# Patient Record
Sex: MALE | Race: OTHER | HISPANIC OR LATINO
[De-identification: names, ages, dates, MRNs, and addresses within clinical notes are randomized per-mention and may not be internally consistent; named-entity substitution may affect disease eponyms.]

---

## 2016-12-24 NOTE — ED PROVIDER NOTE - CONSTITUTIONAL, MLM
normal... obese, lethargic but when aroused-  oriented to person, place, time/situation and in no apparent distress.

## 2016-12-24 NOTE — H&P ADULT. - NEGATIVE NEUROLOGICAL SYMPTOMS
no focal seizures/no weakness/no tremors/no generalized seizures/no syncope/no paresthesias/no transient paralysis

## 2016-12-24 NOTE — ED PROVIDER NOTE - ENMT, MLM
Airway patent, Nasal mucosa clear. Mouth with parched mucosa. Throat has no vesicles, no oropharyngeal exudates and uvula is midline.

## 2016-12-24 NOTE — ED ADULT NURSE NOTE - OBJECTIVE STATEMENT
Patient BIBA to ED today altered from home.  Patient is able to answer his name, and place but not date, no family present at this time to add to patients current condition.  Patient GUZMAN, and has equal strength in all four extremities.  Patient states feeling weak. Patient denies chest pain, SOB, numbness or tingling.

## 2016-12-24 NOTE — H&P ADULT. - HISTORY OF PRESENT ILLNESS
81 yo M with h/o chronic HFpEF, AVR with bioprosthetic valve, recurrent hyponatremia, DM, CKD - 3 represents with one episode of lethargy. Pt was admitted last night for weakness likely 2/2 hyponatremia which improved with fluid restriction. Pt was discharged home this morning with resumption of salt tabs. After bringing the patient home, the patient's grandson left to  his medications, only to return to find the patient unresponsive. He was speaking in gibberish and was not able to recall about today's events.     In the ED, pt was found to be febrile Tmax 103.8 and chest xray showed possible LLL pneumonia. He received vanc and zosyn.

## 2016-12-24 NOTE — ED ADULT NURSE NOTE - PMH
Asthma    Chronic systolic CHF (congestive heart failure)    CKD (chronic kidney disease), stage 4 (severe)    Diabetes    Gassiness    Hypertension    Hypertrophic cardiomyopathy    Prostate cancer

## 2016-12-24 NOTE — ED PROVIDER NOTE - MEDICAL DECISION MAKING DETAILS
fever with ams  - low o2 sat, rales at left base, and cxr suggest pneumonia  - sepsis protocol, but only one liter ns at this time b/c CAD history  - admit back to medical service

## 2016-12-24 NOTE — H&P ADULT. - ASSESSMENT
81 yo M with h/o chronic HFpEF, AVR with bioprosthetic valve, recurrent hyponatremia, DM, CKD - 3 now here with community acquired pneumonia. As per grandson, his mentation improved when I examined him.

## 2016-12-24 NOTE — ED ADULT TRIAGE NOTE - CHIEF COMPLAINT QUOTE
BIBA, patient is awake and not answering any questions, BIBA, patient is awake and not answering any questions, sent from home for eval of ams, no family present for further history

## 2016-12-24 NOTE — ED PROVIDER NOTE - CRITICAL CARE PROVIDED
additional history taking/interpretation of diagnostic studies/consultation with other physicians/direct patient care (not related to procedure)/documentation

## 2016-12-24 NOTE — H&P ADULT. - PROBLEM SELECTOR PLAN 1
Febrile in the ED with new cough as per grandson. CXR showing possible LLL infiltrate. Sputum and blood cultures pending. Urine legionella antigen pending. RVP pending. To start ceftriaxone and azithromycin.

## 2016-12-24 NOTE — H&P ADULT. - NEUROLOGICAL DETAILS
deep reflexes intact/sensation intact/responds to verbal commands/alert and oriented x 3/responds to pain

## 2016-12-24 NOTE — ED ADULT NURSE NOTE - CHIEF COMPLAINT QUOTE
BIBA, patient is awake and not answering any questions, sent from home for eval of ams, no family present for further history

## 2016-12-25 NOTE — SWALLOW BEDSIDE ASSESSMENT ADULT - PHARYNGEAL PHASE
Delayed pharyngeal swallow/Decreased laryngeal elevation/Cough post oral intake Delayed pharyngeal swallow/Decreased laryngeal elevation/Delayed cough post oral intake

## 2016-12-25 NOTE — PROGRESS NOTE ADULT - SUBJECTIVE AND OBJECTIVE BOX
DAVID VINCENT is a 82y Male with HPI:  83 yo M with h/o chronic HFpEF, AVR with bioprosthetic valve, recurrent hyponatremia, DM, CKD - 3 represents with one episode of lethargy. Pt was admitted last night for weakness likely 2/2 hyponatremia which improved with fluid restriction. Pt was discharged home this morning with resumption of salt tabs. After bringing the patient home, the patient's grandson left to  his medications, only to return to find the patient unresponsive. [facility] and [facility] not able to recall about today's events.     In the ED, pt was found to be febrile Tmax 103.8   PT READMITTED WITH FEVERS  NOW BACK AT BASELINE DAUGHTER AT BEDSIDE STATES HE HAS SHEEBA HAVING UTI AND SEEN UROLOGIST  IN [place]    Allergies:  No Known Allergies      Medications:  cefTRIAXone   IVPB 1Gram(s) IV Intermittent every 24 hours  amiodarone    Tablet 200milliGRAM(s) Oral daily  aspirin enteric coated 81milliGRAM(s) Oral daily  folic acid 1milliGRAM(s) Oral daily  gabapentin   Solution 900milliGRAM(s) Oral two times a day  lactulose Syrup 10Gram(s) Oral daily  furosemide    Tablet 40milliGRAM(s) Oral daily  pantoprazole    Tablet 40milliGRAM(s) Oral before breakfast  artificial  tears Solution 1Drop(s) Both EYES two times a day  sodium chloride 1Gram(s) Oral three times a day  tiotropium 18 MICROgram(s) Capsule 1Capsule(s) Inhalation daily  acetaminophen  Suppository 650milliGRAM(s) Rectal every 6 hours PRN  metFORMIN 500milliGRAM(s) Oral daily  insulin lispro (HumaLOG) corrective regimen sliding scale  SubCutaneous two times a day with meals  dextrose 5%. 1000milliLiter(s) IV Continuous <Continuous>  dextrose Gel 1Dose(s) Oral once PRN  dextrose 50% Injectable 12.5Gram(s) IV Push once  dextrose 50% Injectable 25Gram(s) IV Push once  dextrose 50% Injectable 25Gram(s) IV Push once  glucagon  Injectable 1milliGRAM(s) IntraMuscular once PRN      ANTIBIOTICS: ROCEPHIN     Review of Systems: - Negative except as mentioned above     Physical Exam:  ICU Vital Signs Last 24 Hrs  T(C): 37.4, Max: 39.9 (12-24 @ 15:38)  T(F): 99.3, Max: 103.8 (12-24 @ 15:38)  HR: 85 (77 - 91)  BP: 120/79 (106/61 - 148/85)  BP(mean): --  ABP: --  ABP(mean): --  RR: 18 (16 - 22)  SpO2: 98% (94% - 100%)    GEN: NAD, pleasant  HEENT: normocephalic and atraumatic. EOMI. FAUZIA...  NECK: Supple. No carotid bruits.  No lymphadenopathy or thyromegaly.  LUNGS: Clear to auscultation.  HEART: Regular rate and rhythm without murmur.  ABDOMEN: Soft, nontender, and nondistended.  Positive bowel sounds.  No hepatosplenomegaly was noted.  NO REBOUND NO GUARDING  EXTREMITIES: Without any cyanosis, clubbing, rash, lesions or edema.  NEUROLOGIC: Cranial nerves II through XII are grossly intact.    SKIN: No ulceration or induration present.      Labs:  25 Dec 2016 10:19    129    |  87     |  23.0   ----------------------------<  111    4.2     |  31.0   |  1.73     Ca    8.9        25 Dec 2016 10:19  Phos  3.1       24 Dec 2016 16:51  Mg     1.7       25 Dec 2016 10:19    TPro  6.2    /  Alb  3.6    /  TBili  0.9    /  DBili  x      /  AST  31     /  ALT  22     /  AlkPhos  84     25 Dec 2016 10:19                          10.6   9.70  )-----------( 112      ( 25 Dec 2016 10:19 )             32.1       PT/INR - ( 24 Dec 2016 16:51 )   PT: 15.1 sec;   INR: 1.37 ratio         PTT - ( 24 Dec 2016 16:51 )  PTT:40.6 sec  Urinalysis Basic - ( 24 Dec 2016 16:55 )    Color: Yellow / Appearance: Clear / S.015 / pH: x  Gluc: x / Ketone: Negative  / Bili: Negative / Urobili: 4 mg/dL   Blood: x / Protein: 30 mg/dL / Nitrite: Negative   Leuk Esterase: Negative / RBC: 3-5 /HPF / WBC 3-5   Sq Epi: x / Non Sq Epi: Occasional / Bacteria: Occasional      LIVER FUNCTIONS - ( 25 Dec 2016 10:19 )  Alb: 3.6 g/dL / Pro: 6.2 g/dL / ALK PHOS: 84 U/L / ALT: 22 U/L / AST: 31 U/L / GGT: x           CARDIAC MARKERS ( 24 Dec 2016 16:51 )  x     / <0.01 ng/mL / x     / x     / x      CARDIAC MARKERS ( 24 Dec 2016 02:49 )  x     / 0.03 ng/mL / x     / x     / x          CAPILLARY BLOOD GLUCOSE    ABG - ( 24 Dec 2016 16:12 )  pH: 7.42  /  pCO2: 51    /  pO2: 48    / HCO3: 31    / Base Excess: 7.4   /  SaO2: 86                  RECENT CULTURES:  12-24 .Urine Clean Catch (Midstream) XXXX XXXX   No significant bacterial growth.

## 2016-12-25 NOTE — SWALLOW BEDSIDE ASSESSMENT ADULT - ORAL PHASE
Delayed oral transit time/however WFL/Decreased anterior-posterior movement of the bolus suspect posterior loss

## 2016-12-26 NOTE — CONSULT NOTE ADULT - PROBLEM SELECTOR PROBLEM 4
Type 2 diabetes mellitus with diabetic chronic kidney disease, unspecified CKD stage, unspecified long term insulin use status

## 2016-12-26 NOTE — PROGRESS NOTE ADULT - SUBJECTIVE AND OBJECTIVE BOX
DAVID VINCENT is a 82y Male with HPI:  83 yo M with h/o chronic HFpEF, AVR with bioprosthetic valve, recurrent hyponatremia, DM, CKD - 3 represents with one episode of lethargy. Pt was admitted last night for weakness likely 2/2 hyponatremia which improved with fluid restriction. Pt was discharged home this morning with resumption of salt tabs. After bringing the patient home, the patient's grandson left to  his medications, only to return to find the patient unresponsive. He was speaking in gibberish and was not able to recall about today's events.     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    fever resolved  alert        Allergies:  No Known Allergies      Medications:  cefTRIAXone   IVPB 1Gram(s) IV Intermittent every 24 hours  amiodarone    Tablet 200milliGRAM(s) Oral daily  aspirin enteric coated 81milliGRAM(s) Oral daily  folic acid 1milliGRAM(s) Oral daily  gabapentin   Solution 900milliGRAM(s) Oral two times a day  lactulose Syrup 10Gram(s) Oral daily  furosemide    Tablet 40milliGRAM(s) Oral daily  pantoprazole    Tablet 40milliGRAM(s) Oral before breakfast  artificial  tears Solution 1Drop(s) Both EYES two times a day  sodium chloride 1Gram(s) Oral three times a day  tiotropium 18 MICROgram(s) Capsule 1Capsule(s) Inhalation daily  acetaminophen  Suppository 650milliGRAM(s) Rectal every 6 hours PRN  metFORMIN 500milliGRAM(s) Oral daily  insulin lispro (HumaLOG) corrective regimen sliding scale  SubCutaneous two times a day with meals  dextrose 5%. 1000milliLiter(s) IV Continuous <Continuous>  dextrose Gel 1Dose(s) Oral once PRN  dextrose 50% Injectable 12.5Gram(s) IV Push once  dextrose 50% Injectable 25Gram(s) IV Push once  dextrose 50% Injectable 25Gram(s) IV Push once  glucagon  Injectable 1milliGRAM(s) IntraMuscular once PRN      ANTIBIOTICS: ROCEPHIN     Review of Systems: - Negative except as mentioned above     Physical Exam:  ICU Vital Signs Last 24 Hrs  T(C): 37.4, Max: 39.9 (12-24 @ 15:38)  T(F): 99.3, Max: 103.8 (12-24 @ 15:38)  HR: 85 (77 - 91)  BP: 120/79 (106/61 - 148/85)  BP(mean): --  ABP: --  ABP(mean): --  RR: 18 (16 - 22)  SpO2: 98% (94% - 100%)    GEN: NAD, pleasant  HEENT: normocephalic and atraumatic. EOMI. FAUZIA...  NECK: Supple. No carotid bruits.  No lymphadenopathy or thyromegaly.  LUNGS: Clear to auscultation.  HEART: Regular rate and rhythm without murmur.  ABDOMEN: Soft, nontender, and nondistended.  Positive bowel sounds.  No hepatosplenomegaly was noted.  NO REBOUND NO GUARDING  EXTREMITIES: Without any cyanosis, clubbing, rash, lesions or edema.  NEUROLOGIC: Cranial nerves II through XII are grossly intact.    SKIN: No ulceration or induration present.      Labs:  25 Dec 2016 10:19    129    |  87     |  23.0   ----------------------------<  111    4.2     |  31.0   |  1.73     Ca    8.9        25 Dec 2016 10:19  Phos  3.1       24 Dec 2016 16:51  Mg     1.7       25 Dec 2016 10:19    TPro  6.2    /  Alb  3.6    /  TBili  0.9    /  DBili  x      /  AST  31     /  ALT  22     /  AlkPhos  84     25 Dec 2016 10:19                          10.6   9.70  )-----------( 112      ( 25 Dec 2016 10:19 )             32.1       PT/INR - ( 24 Dec 2016 16:51 )   PT: 15.1 sec;   INR: 1.37 ratio         PTT - ( 24 Dec 2016 16:51 )  PTT:40.6 sec  Urinalysis Basic - ( 24 Dec 2016 16:55 )    Color: Yellow / Appearance: Clear / S.015 / pH: x  Gluc: x / Ketone: Negative  / Bili: Negative / Urobili: 4 mg/dL   Blood: x / Protein: 30 mg/dL / Nitrite: Negative   Leuk Esterase: Negative / RBC: 3-5 /HPF / WBC 3-5   Sq Epi: x / Non Sq Epi: Occasional / Bacteria: Occasional      LIVER FUNCTIONS - ( 25 Dec 2016 10:19 )  Alb: 3.6 g/dL / Pro: 6.2 g/dL / ALK PHOS: 84 U/L / ALT: 22 U/L / AST: 31 U/L / GGT: x           CARDIAC MARKERS ( 24 Dec 2016 16:51 )  x     / <0.01 ng/mL / x     / x     / x      CARDIAC MARKERS ( 24 Dec 2016 02:49 )  x     / 0.03 ng/mL / x     / x     / x          CAPILLARY BLOOD GLUCOSE    ABG - ( 24 Dec 2016 16:12 )  pH: 7.42  /  pCO2: 51    /  pO2: 48    / HCO3: 31    / Base Excess: 7.4   /  SaO2: 86                  RECENT CULTURES:  12-24 .Urine Clean Catch (Midstream) XXXX XXXX   No significant bacterial growth.

## 2016-12-26 NOTE — CONSULT NOTE ADULT - ASSESSMENT
1.CHF ( S/P A VR )  2.Cardio Renal Syndrome, C KD - 3  3.Hypervolemic  Hyponatremia,  4.Anemia  5.Metabolic alkalosis - ? Mixed acid - base Disorder,

## 2016-12-26 NOTE — PROGRESS NOTE ADULT - SUBJECTIVE AND OBJECTIVE BOX
Renal :      S Na+ > 130 Meq/L  e GFR < 40 ml.,    S : D.C Metformin & NaCl.    Maintain KCl , to replete intracellular stores & Correct chronic  Hypervolemic hyponatremia,    Oral F.R

## 2016-12-26 NOTE — CONSULT NOTE ADULT - SUBJECTIVE AND OBJECTIVE BOX
Patient is a 82y old  AA  Male who presented w. Fever & Lethargy (24 Dec 2016 18:08)    Recently Treated & Discharged ( S Na+ < 125 Meq/L ) only to Re present to the Ed w.in 24 Hours,    Discussed w. Dr. Pickering,      HPI:    83 yo M with h/o chronic HF pEF, AVR with bioprosthetic valve, recurrent hyponatremia, DM, CKD - 3 represented w.  lethargy. Pt was admitted last night for weakness likely 2/2 hyponatremia which improved with fluid restriction. Pt was discharged to  home this morning with resumption of salt tabs. After bringing the patient home, the patient's grandson left to  his medications, only to return to find the patient unresponsive. He was speaking in gibberish and was not able to recall  today's events.     In the ED, pt was found to be febrile T max 103.8 and chest x-ray showed possible LLL pneumonia. He received vancomycin  and zosyn. (24 Dec 2016 18:08)      PAST MEDICAL & SURGICAL HISTORY:  Chronic systolic CHF (congestive heart failure)  Hypertrophic cardiomyopathy  C KD (chronic kidney disease), stage 3  Diabetes  Asthma  Prostate cancer  Hypertension    History of valvuloplasty  History of knee surgery      FAMILY HISTORY:  No pertinent family history in first degree relatives      Social History: No ETOH.    MEDICATIONS :  ceftriaxone    1Gram IV Intermittent every 24 hours  amiodarone    Tablet 200 mg., Oral daily  aspirin enteric coated 81 mg.,  Oral daily  folic acid 1mg.,  Oral daily  gabapentin   Solution 900milliGRAM(s) Oral two times a day  lactulose Syrup 10Gram(s) Oral daily  furosemide    Tablet 40milliGRAM(s) Oral daily  pantoprazole    Tablet 40milliGRAM(s) Oral before breakfast  tiotropium 18 mcg., Capsule  Inhalation daily  potassium chloride    Tablet ER 20milliEquivalent(s) Oral two times a day    Allergies    No Known Allergies    REVIEW OF SYSTEMS:    CONSTITUTIONAL: Lethargic,  EYES: No eye pain, visual disturbances, or discharge  NECK: No pain or stiffness  RESPIRATORY: No cough, wheezing, chills or hemoptysis; No shortness of breath  CARDIOVASCULAR: No chest pain, palpitations, dizziness, or leg swelling  GASTROINTESTINAL: No abdominal or epigastric pain.  GENITOURINARY: No dysuria, frequency, hematuria, or incontinence  NEUROLOGICAL :Weak * Lethargic, No Focal deficits,  SKIN: No itching, burning, rashes, or lesions   LYMPH NODES: No enlarged glands  MUSCULOSKELETAL: No joint pain or swelling;  PSYCHIATRIC: No depression,   HEME/LYMPH: No easy bruising, or bleeding gums        Vital Signs Last 24 Hrs  T(C): 36.9, Max: 38.1 (- @ 22:00)  T(F): 98.5, Max: 100.6 ( @ 22:00)  HR: 76 (76 - 83)  BP: 102/65 (102/65 - 107/69)  BP(mean): --  RR: 18 (18 - 18)  SpO2: 98% (95% - 98%)    PHYSICAL EXAM:    GENERAL: NAD, Pale, Non Icteric,  HEAD:  Atraumatic, Normocephalic  EYES: EOMI, PERRLA, conjunctiva and sclera clear  NECK: Supple, No JVD, Normal thyroid  NERVOUS SYSTEM:  Lethargic,  CHEST/LUNG: Clear to percussion bilaterally;   HEART: Regular rate and rhythm; No murmurs, rubs, or gallops  ABDOMEN: Soft, Nontender, Nondistended; Bowel sounds present  EXTREMITIES:  2+ Peripheral Pulses, No clubbing, cyanosis, or edema  LYMPH: No lymphadenopathy noted  SKIN: No rashes or lesions      LABS:                        10.7   7.33  )-----------( 111      ( 26 Dec 2016 07:06 )             32.6       131    |  89     |  33.0   ----------------------------<  108    4.0     |  30.0   |  1.97     Ca    8.7        26 Dec 2016 07:06  Phos  3.1       24 Dec 2016 16:51  Mg     2.0       26 Dec 2016 07:06    T Pro  6.3    /  Alb  3.3    /  T Bili  0.6    /  AST  23     /  ALT  19     /  Alk Phos  84     26 Dec 2016 07:06    PT /INR - ( 24 Dec 2016 16:51 )   PT: 15.1 sec;   INR: 1.37 ratio         PTT - ( 24 Dec 2016 16:51 )  PTT:40.6 sec  Urinalysis Basic - ( 25 Dec 2016 14:54 )    Color: Yellow / Appearance: Clear / S.015 / pH: x  Gluc: x / Ketone: Negative  / Bili: Negative / Urobilinogen : 1 mg/dL   Blood: x / Protein: 30 mg/dL / Nitrite: Negative   Leuk Esterase: Trace / RBC: 3-5 /HPF / WBC 10-15 /HPF   Sq Epi: x / Non Sq Epi: Few / Bacteria: Few      Magnesium, Serum: 2.0 mg/dL ( @ 07:06)      RADIOLOGY :      FINDINGS:  Prior study of earlier the same day was available for review.    The lungs demonstrate increased pulmonary vascular congestion. No gross   consolidation is seen. No pleural effusion is seen. The heart is   difficult to evaluate. The patient is status post median sternotomy and   valve replacement.           IMPRESSION: Increased pulmonary vascular congestion noted. No gross   consolidation is seen. Status post valve replacement.

## 2016-12-27 NOTE — PROGRESS NOTE ADULT - SUBJECTIVE AND OBJECTIVE BOX
DAVID VINCENT is a 82y Male with HPI:  81 yo M with h/o chronic HFpEF, AVR with bioprosthetic valve, recurrent hyponatremia, DM, CKD - 3 represents with one episode of lethargy. Pt was admitted last night for weakness likely 2/2 hyponatremia which improved with fluid restriction. Pt was discharged home this morning with resumption of salt tabs. After bringing the patient home, the patient's grandson left to  his medications, only to return to find the patient unresponsive. He was speaking in gibberish and was not able to recall about today's events.     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    fever resolved  LETHARGIC AND DIFF TO AROUSE, NO SLEEPING PILLS OR TRANQ GIVEN  HAS KRYSTIN - CHK ABG  AWAKENS BUT LETHARGIC AFTER 5 MINS OF MOVING PT  AWARE OF ME  - SL CONFUSED  LABS WL        Allergies:  No Known Allergies      Medications:  cefTRIAXone   IVPB 1Gram(s) IV Intermittent every 24 hours  amiodarone    Tablet 200milliGRAM(s) Oral daily  aspirin enteric coated 81milliGRAM(s) Oral daily  folic acid 1milliGRAM(s) Oral daily  gabapentin   Solution 900milliGRAM(s) Oral two times a day  lactulose Syrup 10Gram(s) Oral daily  furosemide    Tablet 40milliGRAM(s) Oral daily  pantoprazole    Tablet 40milliGRAM(s) Oral before breakfast  artificial  tears Solution 1Drop(s) Both EYES two times a day  sodium chloride 1Gram(s) Oral three times a day  tiotropium 18 MICROgram(s) Capsule 1Capsule(s) Inhalation daily  acetaminophen  Suppository 650milliGRAM(s) Rectal every 6 hours PRN  metFORMIN 500milliGRAM(s) Oral daily  insulin lispro (HumaLOG) corrective regimen sliding scale  SubCutaneous two times a day with meals  dextrose 5%. 1000milliLiter(s) IV Continuous <Continuous>  dextrose Gel 1Dose(s) Oral once PRN  dextrose 50% Injectable 12.5Gram(s) IV Push once  dextrose 50% Injectable 25Gram(s) IV Push once  dextrose 50% Injectable 25Gram(s) IV Push once  glucagon  Injectable 1milliGRAM(s) IntraMuscular once PRN      ANTIBIOTICS: D/C     Review of Systems: -LETHARGY     Physical Exam:  ICU Vital Signs Last 24 Hrs  T(C): 37.4, Max: 39.9 (12-24 @ 15:38)  T(F): 99.3, Max: 103.8 (12-24 @ 15:38)  HR: 85 (77 - 91)  BP: 120/79 (106/61 - 148/85)  BP(mean): --  ABP: --  ABP(mean): --  RR: 18 (16 - 22)  SpO2: 98% (94% - 100%)    GEN: NAD, pleasant  HEENT: normocephalic and atraumatic. EOMI. FAUZIA...  NECK: Supple. No carotid bruits.  No lymphadenopathy or thyromegaly.  LUNGS: Clear to auscultation.  HEART: Regular rate and rhythm without murmur.  ABDOMEN: Soft, nontender, and nondistended.  Positive bowel sounds.  No hepatosplenomegaly was noted.  NO REBOUND NO GUARDING  EXTREMITIES: Without any cyanosis, clubbing, rash, lesions or edema.  NEUROLOGIC: Cranial nerves II through XII are grossly intact.    SKIN: No ulceration or induration present.      Labs:  Comprehensive Metabolic Panel in AM (12.27.16 @ 07:01)    Sodium, Serum: 132 mmol/L    Potassium, Serum: 4.6: Mild hemolysis.  Results may be falsely elevated. mmol/L    Chloride, Serum: 90 mmol/L    Carbon Dioxide, Serum: 28.0 mmol/L    Anion Gap, Serum: 14 mmol/L    Blood Urea Nitrogen, Serum: 49.0 mg/dL    Creatinine, Serum: 2.93 mg/dL    Glucose, Serum: 157 mg/dL    Calcium, Total Serum: 8.9 mg/dL    Protein Total, Serum: 6.5 g/dL    Albumin, Serum: 3.2 g/dL    Bilirubin Total, Serum: 0.4 mg/dL    Alkaline Phosphatase, Serum: 83 U/L    Aspartate Aminotransferase (AST/SGOT): 18 U/L    Alanine Aminotransferase (ALT/SGPT): 17 U/L    eGFR if Non : 19: Interpretative comment  T    25 Dec 2016 10:19    129    |  87     |  23.0   ----------------------------<  111    4.2     |  31.0   |  1.73     Ca    8.9        25 Dec 2016 10:19  Phos  3.1       24 Dec 2016 16:51  Mg     1.7       25 Dec 2016 10:19    TPro  6.2    /  Alb  3.6    /  TBili  0.9    /  DBili  x      /  AST  31     /  ALT  22     /  AlkPhos  84     25 Dec 2016 10:19                          10.6   9.70  )-----------( 112      ( 25 Dec 2016 10:19 )             32.1       PT/INR - ( 24 Dec 2016 16:51 )   PT: 15.1 sec;   INR: 1.37 ratio         PTT - ( 24 Dec 2016 16:51 )  PTT:40.6 sec  Urinalysis Basic - ( 24 Dec 2016 16:55 )    Color: Yellow / Appearance: Clear / S.015 / pH: x  Gluc: x / Ketone: Negative  / Bili: Negative / Urobili: 4 mg/dL   Blood: x / Protein: 30 mg/dL / Nitrite: Negative   Leuk Esterase: Negative / RBC: 3-5 /HPF / WBC 3-5   Sq Epi: x / Non Sq Epi: Occasional / Bacteria: Occasional      LIVER FUNCTIONS - ( 25 Dec 2016 10:19 )  Alb: 3.6 g/dL / Pro: 6.2 g/dL / ALK PHOS: 84 U/L / ALT: 22 U/L / AST: 31 U/L / GGT: x           CARDIAC MARKERS ( 24 Dec 2016 16:51 )  x     / <0.01 ng/mL / x     / x     / x      CARDIAC MARKERS ( 24 Dec 2016 02:49 )  x     / 0.03 ng/mL / x     / x     / x          CAPILLARY BLOOD GLUCOSE    ABG - ( 24 Dec 2016 16:12 )  pH: 7.42  /  pCO2: 51    /  pO2: 48    / HCO3: 31    / Base Excess: 7.4   /  SaO2: 86                  RECENT CULTURES:  12-24 .Urine Clean Catch (Midstream) XXXX XXXX   No significant bacterial growth.

## 2016-12-27 NOTE — PROGRESS NOTE ADULT - SUBJECTIVE AND OBJECTIVE BOX
Patient is a 82y old  Male who presents with a chief complaint of Lethargic (24 Dec 2016 18:08)      BRIEF HOSPITAL COURSE: 83 yo M with h/o chronic HFpEF, AVR with bioprosthetic valve, recurrent hyponatremia, DM, CKD - 3 represents with one episode of lethargy. Pt was admitted last night for weakness likely 2/2 hyponatremia which improved with fluid restriction. Pt was discharged home 12/24/2016  with resumption of salt tabs. After bringing the patient home, the patient's grandson left to  his medications, only to return to find the patient unresponsive. [facility] and was not able to recall about today's events.     Events last 24 hours: Improving Sodium, ABX were discontinued,  [place] on CKD worsening Cr now 3.52.  Tonight wRRT for worsening Lethergy found to be Hypercapnic requiring Bipap.  ABG not improving and setting were changed.  Pt waking up now in ICU.    PAST MEDICAL & SURGICAL HISTORY:  Chronic systolic CHF (congestive heart failure)  Hypertrophic cardiomyopathy  CKD (chronic kidney disease), stage 4 (severe)  Gassiness  Diabetes  Asthma  Prostate cancer  Hypertension  History of valvuloplasty  History of knee surgery      Review of Systems: Unable to access pt obtunded on exam      Medications:  amiodarone    Tablet 200milliGRAM(s) Oral daily  tiotropium 18 MICROgram(s) Capsule 1Capsule(s) Inhalation daily  gabapentin   Solution 900milliGRAM(s) Oral two times a day  acetaminophen  Suppository 650milliGRAM(s) Rectal every 6 hours PRN  aspirin enteric coated 81milliGRAM(s) Oral daily  lactulose Syrup 10Gram(s) Oral daily  pantoprazole    Tablet 40milliGRAM(s) Oral before breakfast  folic acid 1milliGRAM(s) Oral daily  dextrose 5%. 1000milliLiter(s) IV Continuous <Continuous>  potassium chloride    Tablet ER 20milliEquivalent(s) Oral two times a day  artificial  tears Solution 1Drop(s) Both EYES two times a day        ICU Vital Signs Last 24 Hrs  T(C): 36.9, Max: 36.9 (12-27 @ 20:00)  T(F): 98.5, Max: 98.5 (12-27 @ 20:00)  HR: 88 (51 - 91)  BP: 106/64 (85/65 - 112/80)  BP(mean): 80 (80 - 80)  RR: 18 (16 - 18)  SpO2: 98% (93% - 98%)      ABG - ( 27 Dec 2016 18:38 )  pH: 7.19  /  pCO2: 85    /  pO2: 78    / HCO3: 26    / Base Excess: 1.5   /  SaO2: 95              LABS:                        10.7   4.74  )-----------( 128      ( 27 Dec 2016 18:19 )             33.9     27 Dec 2016 18:19    132    |  89     |  57.0   ----------------------------<  138    4.6     |  29.0   |  3.52     Ca    8.9        27 Dec 2016 18:19  Phos  5.2       27 Dec 2016 18:20  Mg     2.5       27 Dec 2016 18:20    TPro  6.8    /  Alb  3.6    /  TBili  0.4    /  DBili  x      /  AST  15     /  ALT  18     /  AlkPhos  88     27 Dec 2016 18:19          CAPILLARY BLOOD GLUCOSE  99 (26 Dec 2016 08:00)        CULTURES:  Rapid RVP Result: NotDetec (12-25 @ 19:16)  Culture Results:   No growth (12-25 @ 14:54)  Culture Results:   No growth at 48 hours (12-24 @ 22:01)  Culture Results:   No growth at 48 hours (12-24 @ 18:33)  Culture Results:   No significant bacterial growth. (12-24 @ 01:27)  Culture Results:   No growth at 48 hours (12-23 @ 23:11)  Culture Results:   No growth at 48 hours (12-23 @ 23:08)      Physical Examination:    General: No acute distress.  Lethargic but arrousable to Noxious stim.  Speaking now.    HEENT: Pupils equal, reactive to light.  Symmetric.    PULM: Decreased BS b/l. Fine rhonchi Bibasilarly, No Wheeze, No Rales, no significant sputum production    CVS: S1, S2 tachy, no murmurs, rubs, or gallops    ABD: Soft, nondistended, nontender, normoactive bowel sounds, no masses    EXT: Trace Bipedal edema, nontender    SKIN: Warm and well perfused, no rashes noted.    RADIOLOGY: ***   EXAM:  CHEST SINGLE VIEW FRONTAL                          PROCEDURE DATE:  12/24/2016        INTERPRETATION:  Chest radiograph (one view)     CPT 75193    CLINICAL INFORMATION:  Patient is unable to communicate.  Chest pain.    TECHNIQUE:  Single frontal view of the chest was obtained.    FINDINGS:  Prior study of earlier the same day was available for review.    The lungs demonstrate increased pulmonary vascular congestion. No gross   consolidation is seen. No pleural effusion is seen. The heart is   difficult to evaluate. The patient is status post median sternotomy and   valve replacement.           IMPRESSION: Increased pulmonary vascular congestion noted. No gross   consolidation is seen. Status post valve replacement.       CRITICAL CARE TIME SPENT: 45

## 2016-12-27 NOTE — PHYSICAL THERAPY INITIAL EVALUATION ADULT - ACTIVE RANGE OF MOTION EXAMINATION, REHAB EVAL
bilateral  lower extremity Active ROM was WFL (within functional limits)/bilateral upper extremity Active ROM was WFL (within functional limits)

## 2016-12-27 NOTE — PHYSICAL THERAPY INITIAL EVALUATION ADULT - PASSIVE RANGE OF MOTION EXAMINATION, REHAB EVAL
bilateral upper extremity Passive ROM was WFL (within functional limits)/bilateral lower extremity Passive ROM was WFL (within functional limits)

## 2016-12-27 NOTE — PHYSICAL THERAPY INITIAL EVALUATION ADULT - GENERAL OBSERVATIONS, REHAB EVAL
Pt. greeted resting oob in chair, (+) IV lock, (+) O2 via NC. Per personal aide at bedside pt has been more "sleepy" today

## 2016-12-27 NOTE — PHYSICAL THERAPY INITIAL EVALUATION ADULT - ADDITIONAL COMMENTS
Pt. lives in a house with 3 steps to enter (+) handrails. There are no steps to negotiate inside. Pt was independent PTA and owns a RW that he was using as needed. Pt caregiver at bedside states she is with the patient at all times and assists with ADL/IADLS however pt was independent with ambulation and grooming/bathing/toileting

## 2016-12-27 NOTE — CHART NOTE - NSCHARTNOTEFT_GEN_A_CORE
Rapid Response PGY 3 Note    [id number]  DAVID VINCENT    82y year old Male  admitted for Sepsis 2/2 to pneumonia.   RR called because pt noted to be AMS for few hours with no improvement.   Patient was seen and examined at the bedside by the RRT.   PT is lethargic, but arousable. Pt denies any cp, dizziness, sob, headache, abd pain, leg pain.     Allergies: No Known Allergies    PAST MEDICAL & SURGICAL HISTORY:  Chronic systolic CHF (congestive heart failure)  Hypertrophic cardiomyopathy  CKD (chronic kidney disease), stage 4 (severe)  Gassiness  Diabetes  Asthma  Prostate cancer  Hypertension  History of valvuloplasty  History of knee surgery    VS: HR 80, BP 92/58, 2sat 98% on NC, T 98    Telemetry: A fib at 78bpm     PHYSICAL EXAM:    GENERAL: lethargic  HEAD:  Atraumatic, Normocephalic, EOMI, PERRLA, conjunctiva and sclera clear  NECK: Supple, No JVD, Normal thyroid  CHEST/LUNG: Clear to percussion bilaterally; No rales, rhonchi, wheezing, or rubs  HEART: irregular rate and rhythm; No murmurs, rubs, or gallops  ABDOMEN: Soft, Nontender, Nondistended; Bowel sounds present  EXTREMITIES:  2+ Peripheral Pulses, No clubbing, cyanosis, or edema  NERVOUS SYSTEM:  somnolent, but arousable. Motor Strength 5/5 B/L upper and lower extremities; DTRs 2+ intact and symmetric    I & Os for 24h ending 12-27 @ 07:00  =============================================  IN: 100 ml / OUT: 0 ml / NET: 100 ml    I & Os for current day (as of 12-27 @ 23:22)  =============================================  IN: 1085.6 ml / OUT: 0 ml / NET: 1085.6 ml                            10.7   4.74  )-----------( 128      ( 27 Dec 2016 18:19 )             33.9     27 Dec 2016 18:19    132    |  89     |  57.0   ----------------------------<  138    4.6     |  29.0   |  3.52     Ca    8.9        27 Dec 2016 18:19  Phos  5.2       27 Dec 2016 18:20  Mg     2.5       27 Dec 2016 18:20    TPro  6.8    /  Alb  3.6    /  TBili  0.4    /  DBili  x      /  AST  15     /  ALT  18     /  AlkPhos  88     27 Dec 2016 18:19     LIVER FUNCTIONS - ( 27 Dec 2016 18:19 )  Alb: 3.6 g/dL / Pro: 6.8 g/dL / ALK PHOS: 88 U/L / ALT: 18 U/L / AST: 15 U/L / GGT: x        ABG at Rapid: pH 7.20, pCO2 82, pO2 74, HCO3 26        A/P:  81 yo m with:     1) Encephelopathy likely 2/2 to Respiratory Acidosis and met acidosis on ABG:  BIpap ordered, ICU consulted   no focal defecit noted  CT head w/o contrast  cbc, cmp, mg, phos, RPR, vit B12, ammonia level, TSH    2) Acute on chronic Renal failure:   hold Lasix   monitor BUN/Cr.     Case discussed with Dr. Suarez, who agrees with the plan and wants ICU consultation  Naida Uribe PGY 3

## 2016-12-27 NOTE — CHART NOTE - NSCHARTNOTEFT_GEN_A_CORE
The PA was called to evaluate an IV infiltrate. The IV was located in the left wrist and there was swelling around the site. When I attempted to flush the IV I felt air bubbles through the skin. I did observe minor swelling, but no pain, or erythema. The IV will be removed and the nurse was instructed to apply warm compresses to the area (15 minutes on and 15 minutes off) as well as elevate the extremity.

## 2016-12-27 NOTE — CHART NOTE - NSCHARTNOTEFT_GEN_A_CORE
PGY3 Rapid Response    82 year old male with PMH 83 yo M with h/o chronic HFpEF, AVR with bioprosthetic valve, recurrent hyponatremia, DM, CKD - 3 admitted for sepsis secondary to pneumonia with rapid response called for lethargy and hypoxia. ICU at bedside and previously consulted. ABG showing pt is acidotic and hypercapnic while on bipap. Pt transferred to ICU for monitoring and possible intubation after observation with bipap settings adjusted for pt.     Vital Signs Last 24 Hrs  T(C): 36.6, Max: 36.7 (12-26 @ 22:27)  T(F): 97.8, Max: 98 (12-26 @ 22:27)  HR: 76 (51 - 91)  BP: 92/58 (85/65 - 112/80)  BP(mean): --  RR: 16 (16 - 18)  SpO2: 98% (97% - 98%)     PE Gen pt lethargic  Chest L/S course throughout  Heart S1S2 tachycardic  Abd soft nontender nondistended +BS  Extremities +CMSx4 no edema  Neuro Pt responsive to pain only    ABG - ( 27 Dec 2016 18:38 )  pH: 7.19  /  pCO2: 85    /  pO2: 78    / HCO3: 26    / Base Excess: 1.5   /  SaO2: 95          A/P 82 year old male with rapid response called for increased lethargy and hypercapnia   Pt transferred to ICU  Chest xray ordered  Spoke to Dr. Suarez who is aware and has been in contact with ICU.    Cami Wei  PGY3

## 2016-12-27 NOTE — PROGRESS NOTE ADULT - PROBLEM SELECTOR PLAN 1
Bipap Settings increased with good effect on MS - Awake now  Repeat ABG  Intubation may be necessary   Will Add nebs

## 2016-12-27 NOTE — PROCEDURE NOTE - NSTRACHPOSTINTU_RESP_A_CORE
Breath sounds bilateral/Breath sounds equal/Chest excursion noted/Positive end tidal Co2 noted Breath sounds equal/Chest excursion noted/MAC 4 blade/Breath sounds bilateral/Positive end tidal Co2 noted

## 2016-12-28 NOTE — PROGRESS NOTE ADULT - SUBJECTIVE AND OBJECTIVE BOX
PAST MEDICAL & SURGICAL BRIEF HOSPITAL COURSE: 81 yo M with h/o chronic HFpEF, AVR with bioprosthetic valve, recurrent hyponatremia, DM, CKD - 3 represents with one episode of lethargy. Pt was admitted last night for weakness likely 2/2 hyponatremia which improved with fluid restriction. Pt was discharged home 12/24/2016  with resumption of salt tabs. After bringing the patient home, the patient's grandson left to  his medications, only to return to find the patient unresponsive. He was speaking in gibberish and was not able to recall about today's events.     Events last 24 hours: Improving Sodium, ABX were discontinued,  [place] on CKD worsening Cr now 3.52.  Tonight wRRT for worsening Lethergy found to be Hypercapnic requiring Bipap.  ABG not improving and setting were changed.  Pt waking up now in ICU.HISTORY:  Chronic systolic CHF (congestive heart failure)  Hypertrophic cardiomyopathy  CKD (chronic kidney disease), stage 4 (severe)  Gassiness  Diabetes  Asthma  Prostate cancer  Hypertension  History of valvuloplasty  History of knee surgery      Review of Systems:  unable to obtain      Medications:    amiodarone    Tablet 200milliGRAM(s) Oral daily    tiotropium 18 MICROgram(s) Capsule 1Capsule(s) Inhalation daily  ALBUTerol/ipratropium for Nebulization 3milliLiter(s) Nebulizer every 6 hours    gabapentin   Solution 200milliGRAM(s) Oral daily      aspirin enteric coated 81milliGRAM(s) Oral daily  heparin  Injectable 5000Unit(s) SubCutaneous every 12 hours    lactulose Syrup 10Gram(s) Oral daily  pantoprazole  Injectable 40milliGRAM(s) IV Push daily      methylPREDNISolone sodium succinate Injectable 40milliGRAM(s) IV Push two times a day  insulin lispro (HumaLOG) corrective regimen sliding scale  SubCutaneous every 6 hours  dextrose Gel 1Dose(s) Oral once PRN  dextrose 50% Injectable 12.5Gram(s) IV Push once  dextrose 50% Injectable 25Gram(s) IV Push once  dextrose 50% Injectable 25Gram(s) IV Push once  glucagon  Injectable 1milliGRAM(s) IntraMuscular once PRN    folic acid 1milliGRAM(s) Oral daily  dextrose 5%. 1000milliLiter(s) IV Continuous <Continuous>  plasma-lyte A. 1000milliLiter(s) IV Continuous <Continuous>  dextrose 5%. 1000milliLiter(s) IV Continuous <Continuous>      artificial  tears Solution 1Drop(s) Both EYES two times a day  chlorhexidine 0.12% Liquid 15milliLiter(s) Swish and Spit every 12 hours    Mode: AC/ CMV (Assist Control/ Continuous Mandatory Ventilation)  RR (machine): 20  TV (machine): 360  FiO2: 35  PEEP: 5  ITime: 1  MAP: 9  PIP: 21      ICU Vital Signs Last 24 Hrs  T(C): 37.4, Max: 37.4 (12-28 @ 15:00)  T(F): 99.3, Max: 99.3 (12-28 @ 15:00)  HR: 70 (68 - 89)  BP: 109/63 (75/52 - 142/67)  BP(mean): 82 (60 - 97)  ABP: --  ABP(mean): --  RR: 20 (13 - 40)  SpO2: 98% (91% - 100%)      ABG - ( 28 Dec 2016 09:36 )  pH: 7.27  /  pCO2: 68    /  pO2: 65    / HCO3: 27    / Base Excess: 2.5   /  SaO2: 93        I&O's Detail    LABS:                        9.1    3.08  )-----------( 109      ( 28 Dec 2016 03:33 )             27.5     28 Dec 2016 03:33    131    |  88     |  60.0   ----------------------------<  152    4.7     |  27.0   |  3.52     Ca    8.0        28 Dec 2016 03:33  Phos  5.2       27 Dec 2016 18:20  Mg     2.5       28 Dec 2016 03:33    TPro  5.6    /  Alb  3.1    /  TBili  0.3    /  DBili  x      /  AST  12     /  ALT  15     /  AlkPhos  74     28 Dec 2016 03:33    CAPILLARY BLOOD GLUCOSE  162 (28 Dec 2016 06:00)    CULTURES:  Rapid RVP Result: NotDetec (12-25 @ 19:16)  Culture Results:   No growth (12-25 @ 14:54)  Culture Results:   No growth at 48 hours (12-24 @ 22:01)  Culture Results:   No growth at 48 hours (12-24 @ 18:33)  Culture Results:   No significant bacterial growth. (12-24 @ 01:27)  Culture Results:   No growth at 48 hours (12-23 @ 23:11)  Culture Results:   No growth at 48 hours (12-23 @ 23:08)      Physical Examination:    General: No acute distress.  Alert, oriented, interactive, nonfocal    HEENT: Pupils equal, reactive to light.  Symmetric.    PULM: Clear to auscultation bilaterally, no significant sputum production    CVS: Regular rate and rhythm, no murmurs, rubs, or gallops    ABD: Soft, nondistended, nontender, normoactive bowel sounds, no masses    EXT: No edema, nontender    SKIN: Warm and well perfused, no rashes noted.    RADIOLOGY: ***    CRITICAL CARE TIME SPENT: *** PAST MEDICAL & SURGICAL BRIEF HOSPITAL COURSE: 81 yo M with h/o chronic HFpEF, AVR with bioprosthetic valve, recurrent hyponatremia, DM, CKD - 3 represents with one episode of lethargy. Pt was admitted last night for weakness likely 2/2 hyponatremia which improved with fluid restriction. Pt was discharged home 12/24/2016  with resumption of salt tabs. After bringing the patient home, the patient's grandson left to  his medications, only to return to find the patient unresponsive. He was speaking in gibberish and was not able to recall about today's events.     Events last 24 hours: Intubated early for hypercarbic respiratory failure awake this am following commands, received fluids overnight for low BP      HISTORY:  Chronic systolic CHF (congestive heart failure)  Hypertrophic cardiomyopathy  CKD (chronic kidney disease), stage 4 (severe)  Gassiness  Diabetes  Asthma  Prostate cancer  Hypertension  History of valvuloplasty  History of knee surgery      Review of Systems:  unable to obtain      Medications:    amiodarone    Tablet 200milliGRAM(s) Oral daily    tiotropium 18 MICROgram(s) Capsule 1Capsule(s) Inhalation daily  ALBUTerol/ipratropium for Nebulization 3milliLiter(s) Nebulizer every 6 hours    gabapentin   Solution 200milliGRAM(s) Oral daily      aspirin enteric coated 81milliGRAM(s) Oral daily  heparin  Injectable 5000Unit(s) SubCutaneous every 12 hours    lactulose Syrup 10Gram(s) Oral daily  pantoprazole  Injectable 40milliGRAM(s) IV Push daily      methylPREDNISolone sodium succinate Injectable 40milliGRAM(s) IV Push two times a day  insulin lispro (HumaLOG) corrective regimen sliding scale  SubCutaneous every 6 hours  dextrose Gel 1Dose(s) Oral once PRN  dextrose 50% Injectable 12.5Gram(s) IV Push once  dextrose 50% Injectable 25Gram(s) IV Push once  dextrose 50% Injectable 25Gram(s) IV Push once  glucagon  Injectable 1milliGRAM(s) IntraMuscular once PRN    folic acid 1milliGRAM(s) Oral daily  dextrose 5%. 1000milliLiter(s) IV Continuous <Continuous>  plasma-lyte A. 1000milliLiter(s) IV Continuous <Continuous>  dextrose 5%. 1000milliLiter(s) IV Continuous <Continuous>      artificial  tears Solution 1Drop(s) Both EYES two times a day  chlorhexidine 0.12% Liquid 15milliLiter(s) Swish and Spit every 12 hours    Mode: AC/ CMV (Assist Control/ Continuous Mandatory Ventilation)  RR (machine): 20  TV (machine): 360  FiO2: 35  PEEP: 5  ITime: 1  MAP: 9  PIP: 21      ICU Vital Signs Last 24 Hrs  T(C): 37.4, Max: 37.4 (12-28 @ 15:00)  T(F): 99.3, Max: 99.3 (12-28 @ 15:00)  HR: 70 (68 - 89)  BP: 109/63 (75/52 - 142/67)  BP(mean): 82 (60 - 97)  ABP: --  ABP(mean): --  RR: 20 (13 - 40)  SpO2: 98% (91% - 100%)      ABG - ( 28 Dec 2016 09:36 )  pH: 7.27  /  pCO2: 68    /  pO2: 65    / HCO3: 27    / Base Excess: 2.5   /  SaO2: 93        I&O's Detail    LABS:                        9.1    3.08  )-----------( 109      ( 28 Dec 2016 03:33 )             27.5     28 Dec 2016 03:33    131    |  88     |  60.0   ----------------------------<  152    4.7     |  27.0   |  3.52     Ca    8.0        28 Dec 2016 03:33  Phos  5.2       27 Dec 2016 18:20  Mg     2.5       28 Dec 2016 03:33    TPro  5.6    /  Alb  3.1    /  TBili  0.3    /  DBili  x      /  AST  12     /  ALT  15     /  AlkPhos  74     28 Dec 2016 03:33    CAPILLARY BLOOD GLUCOSE  162 (28 Dec 2016 06:00)    CULTURES:  Rapid RVP Result: NotDetec (12-25 @ 19:16)  Culture Results:   No growth (12-25 @ 14:54)  Culture Results:   No growth at 48 hours (12-24 @ 22:01)  Culture Results:   No growth at 48 hours (12-24 @ 18:33)  Culture Results:   No significant bacterial growth. (12-24 @ 01:27)  Culture Results:   No growth at 48 hours (12-23 @ 23:11)  Culture Results:   No growth at 48 hours (12-23 @ 23:08)      Physical Examination:    General: No acute distress.  Alert, following commands    HEENT: Pupils equal, reactive to light.  Symmetric.    PULM: Diminished b/l    CVS: Afib    ABD: Soft, nondistended, nontender, normoactive bowel sounds, no masses    EXT: No edema, nontender    SKIN: Warm and well perfused, no rashes noted.    RADIOLOGY: EXAM:  CHEST SINGLE VIEW FRONTAL                          PROCEDURE DATE:  12/27/2016        INTERPRETATION:  Chest, single portable view    HISTORY: Status post ET tube and OG tube placement    Comparison: 12/27    IMPRESSION:    Support Devices:ET tube just above the serena, OG tube not visualized  Heart: Cardiomegaly stable  Mediastinum:  Unremarkable  Lungs/Airways: Mild interstitial edema stable  Bones/Soft tissues: Unremarkable    CRITICAL CARE TIME SPENT: 40

## 2016-12-28 NOTE — PROGRESS NOTE ADULT - SUBJECTIVE AND OBJECTIVE BOX
[facility] [place] JACLYN LEAL MD FACP   ISABELLE APARICIO MD  Diplomates American Board of Internal Medicine and Infecctious Diseases        DAVID VINCENT [id number]    INTERVAL HPI/OVERNIGHT EVENTS:    intubated due to resp failure  stable on vent  alert  afebrile    Vital Signs Last 24 Hrs  T(C): 36.8, Max: 36.9 (12-27 @ 20:00)  T(F): 98.2, Max: 98.5 (12-27 @ 20:00)  HR: 71 (69 - 89)  BP: 95/62 (75/52 - 108/63)  BP(mean): 72 (60 - 81)  RR: 30 (13 - 40)  SpO2: 98% (91% - 100%)    MEDICATIONS  (STANDING):  amiodarone    Tablet 200milliGRAM(s) Oral daily  aspirin enteric coated 81milliGRAM(s) Oral daily  folic acid 1milliGRAM(s) Oral daily  gabapentin   Solution 900milliGRAM(s) Oral two times a day  lactulose Syrup 10Gram(s) Oral daily  artificial  tears Solution 1Drop(s) Both EYES two times a day  tiotropium 18 MICROgram(s) Capsule 1Capsule(s) Inhalation daily  dextrose 5%. 1000milliLiter(s) IV Continuous <Continuous>  potassium chloride    Tablet ER 20milliEquivalent(s) Oral two times a day  ALBUTerol/ipratropium for Nebulization 3milliLiter(s) Nebulizer every 6 hours  chlorhexidine 0.12% Liquid 15milliLiter(s) Swish and Spit every 12 hours  dexmedetomidine Infusion 0.7MICROgram(s)/kG/Hr IV Continuous <Continuous>  plasma-lyte A. 1000milliLiter(s) IV Continuous <Continuous>  methylPREDNISolone sodium succinate Injectable 40milliGRAM(s) IV Push two times a day  pantoprazole  Injectable 40milliGRAM(s) IV Push daily  insulin lispro (HumaLOG) corrective regimen sliding scale  SubCutaneous every 6 hours  dextrose 5%. 1000milliLiter(s) IV Continuous <Continuous>  dextrose 50% Injectable 12.5Gram(s) IV Push once  dextrose 50% Injectable 25Gram(s) IV Push once  dextrose 50% Injectable 25Gram(s) IV Push once    MEDICATIONS  (PRN):  dextrose Gel 1Dose(s) Oral once PRN Blood Glucose LESS THAN 70 milliGRAM(s)/deciliter  glucagon  Injectable 1milliGRAM(s) IntraMuscular once PRN Glucose LESS THAN 70 milligrams/deciliter        Allergies    No Known Allergies    Intolerances        REVIEW OF SYSTEMS:          PHYSICAL EXAMINATION:    VITAL SIGNS:   T(C): 36.8, Max: 36.9 (12-27 @ 20:00)  HR: 71 (69 - 89)  BP: 95/62 (75/52 - 108/63)  RR: 30 (13 - 40)  SpO2: 98% (91% - 100%)  Wt(kg): -- I & Os for 24h ending 12-28 @ 07:00  =============================================  IN: 2599.2 ml / OUT: 485 ml / NET: 2114.2 ml    I & Os for current day (as of 12-28 @ 08:50)  =============================================  IN: 75 ml / OUT: 300 ml / NET: -225 ml      HEENT: Head is normocephalic and atraumatic. Extraocular muscles are intact. Pupils are equal, round, and reactive to light and accommodation. Nares appeared normal. Mouth is well hydrated and without lesions. Mucous membranes are moist. Posterior pharynx clear of any exudate or lesions.    NECK: Supple. No carotid bruits.  No lymphadenopathy or thyromegaly.    LUNGS: Clear to auscultation.    HEART: Regular rate and rhythm without murmur.    ABDOMEN: Soft, nontender, and nondistended.  Positive bowel sounds.  No hepatosplenomegaly was noted.    EXTREMITIES: Without any cyanosis, clubbing, rash, lesions or edema.    NEUROLOGIC: Cranial nerves II through XII are grossly intact.    MUSCULOSKELETAL:    SKIN: No ulceration or induration present.      LABS:                        9.1    3.08  )-----------( 109      ( 28 Dec 2016 03:33 )             27.5     28 Dec 2016 03:33    131    |  88     |  60.0   ----------------------------<  152    4.7     |  27.0   |  3.52     Ca    8.0        28 Dec 2016 03:33  Phos  5.2       27 Dec 2016 18:20  Mg     2.5       28 Dec 2016 03:33    TPro  5.6    /  Alb  3.1    /  TBili  0.3    /  DBili  x      /  AST  12     /  ALT  15     /  AlkPhos  74     28 Dec 2016 03:33              RADIOLOGY & ADDITIONAL STUDIES:      ASSESSMENT:    RESP FAILURE  ?IF ON KRYSTIN TX AT HOME - JUST HOME 02  NO ACTIVE ID ISSUES  TX AS PER ICU    PLAN:          SHLOMO LEAL MD FACP

## 2016-12-29 NOTE — SWALLOW BEDSIDE ASSESSMENT ADULT - SWALLOW EVAL: DIAGNOSIS
Oral stage appears grossly WFL for consistencies administered; Suspected pharyngeal dysphagia for puree and thins due +over S&S of penetration/aspiration
Pt. with mild oral dysphagia impacted by reduced dentition.  Unable to r/o pharyngea dysphagia.

## 2016-12-29 NOTE — PROGRESS NOTE ADULT - SUBJECTIVE AND OBJECTIVE BOX
S; extubated doing well    PAST MEDICAL & SURGICAL HISTORY:  Chronic systolic CHF (congestive heart failure)  Hypertrophic cardiomyopathy  CKD (chronic kidney disease), stage 4 (severe)  Gassiness  Diabetes  Asthma  Prostate cancer  Hypertension  History of valvuloplasty  History of knee surgery      Review of Systems:  CONSTITUTIONAL: No fever, chills, or fatigue  EYES: No eye pain, visual disturbances, or discharge  ENMT:  No difficulty hearing, tinnitus, vertigo; No sinus or throat pain  NECK: No pain or stiffness  RESPIRATORY: No cough, wheezing, chills or hemoptysis; No shortness of breath  CARDIOVASCULAR: No chest pain, palpitations, dizziness, or leg swelling  GASTROINTESTINAL: No abdominal or epigastric pain. No nausea, vomiting, or hematemesis; No diarrhea or constipation. No melena or hematochezia.  GENITOURINARY: No dysuria, frequency, hematuria, or incontinence  NEUROLOGICAL: No headaches, memory loss, loss of strength, numbness, or tremors  SKIN: No itching, burning, rashes, or lesions   MUSCULOSKELETAL: No joint pain or swelling; No muscle, back, or extremity pain  PSYCHIATRIC: No depression, anxiety, mood swings, or difficulty sleeping      Medications:    amiodarone    Tablet 200milliGRAM(s) Oral daily    ALBUTerol/ipratropium for Nebulization 3milliLiter(s) Nebulizer every 6 hours    gabapentin   Solution 200milliGRAM(s) Oral daily      aspirin enteric coated 81milliGRAM(s) Oral daily  heparin  Injectable 5000Unit(s) SubCutaneous every 12 hours    pantoprazole  Injectable 40milliGRAM(s) IV Push daily      methylPREDNISolone sodium succinate Injectable 40milliGRAM(s) IV Push two times a day  insulin lispro (HumaLOG) corrective regimen sliding scale  SubCutaneous every 6 hours  dextrose Gel 1Dose(s) Oral once PRN  dextrose 50% Injectable 12.5Gram(s) IV Push once  dextrose 50% Injectable 25Gram(s) IV Push once  dextrose 50% Injectable 25Gram(s) IV Push once  glucagon  Injectable 1milliGRAM(s) IntraMuscular once PRN    folic acid 1milliGRAM(s) Oral daily      artificial  tears Solution 1Drop(s) Both EYES two times a day        Mode: CPAP with PS  FiO2: 35  PEEP: 5  PS: 5  MAP: 6      ICU Vital Signs Last 24 Hrs  T(C): 36.8, Max: 37.6 (12-29 @ 00:00)  T(F): 98.2, Max: 99.7 (12-29 @ 00:00)  HR: 80 (69 - 87)  BP: 132/79 (105/72 - 153/95)  BP(mean): 101 (84 - 116)  ABP: --  ABP(mean): --  RR: 18 (11 - 64)  SpO2: 97% (95% - 100%)      ABG - ( 28 Dec 2016 09:36 )  pH: 7.27  /  pCO2: 68    /  pO2: 65    / HCO3: 27    / Base Excess: 2.5   /  SaO2: 93                  I&O's Detail        LABS:                        9.3    4.29  )-----------( 126      ( 29 Dec 2016 04:56 )             27.1     29 Dec 2016 04:56    136    |  95     |  71.0   ----------------------------<  191    4.3     |  29.0   |  2.89     Ca    8.8        29 Dec 2016 04:56  Mg     2.6       29 Dec 2016 04:56    TPro  5.8    /  Alb  3.0    /  TBili  0.4    /  DBili  x      /  AST  21     /  ALT  19     /  AlkPhos  76     29 Dec 2016 04:56          CAPILLARY BLOOD GLUCOSE  178 (29 Dec 2016 18:00)        CULTURES:  Rapid RVP Result: NotDetec (12-25 @ 19:16)  Culture Results:   No growth (12-25 @ 14:54)  Culture Results:   No growth at 48 hours (12-24 @ 22:01)  Culture Results:   No growth at 5 days. (12-24 @ 18:33)  Culture Results:   No significant bacterial growth. (12-24 @ 01:27)  Culture Results:   No growth at 5 days. (12-23 @ 23:11)  Culture Results:   No growth at 5 days. (12-23 @ 23:08)      Physical Examination:    General: No acute distress.  Alert, oriented, interactive, nonfocal    HEENT: Pupils equal, reactive to light.  Symmetric.    PULM: diminshed    CVS: afib    ABD: Soft, nondistended, nontender, normoactive bowel sounds, no masses    EXT: No edema, nontender    SKIN: Warm and well perfused, no rashes noted.

## 2016-12-29 NOTE — SWALLOW BEDSIDE ASSESSMENT ADULT - SLP GENERAL OBSERVATIONS
Pt received A&A resting in bed, 0x3, +02NC, [place] speaking, daughter Dona present
family present, O2 nasal canula, O2 %

## 2016-12-29 NOTE — SWALLOW BEDSIDE ASSESSMENT ADULT - SWALLOW EVAL: RECOMMENDED FEEDING/EATING TECHNIQUES
maintain upright posture during/after eating for 30 mins/oral hygiene/position upright (90 degrees)/no straws/small sips/bites/crush medication (when feasible)

## 2016-12-29 NOTE — SWALLOW BEDSIDE ASSESSMENT ADULT - ASR SWALLOW ASPIRATION MONITOR
gurgly voice/pneumonia/upper respiratory infection/cough/oral hygiene/throat clearing/position upright (90Y)/change of breathing pattern/fever

## 2016-12-29 NOTE — PROGRESS NOTE ADULT - PROBLEM SELECTOR PLAN 1
- renal following. possible adding back salt tabs per ICU team. - renal following. possible adding back salt tabs per ICU team.  - Lasix held due to elevated creatinine.   - free water restriction.

## 2016-12-29 NOTE — PROGRESS NOTE ADULT - SUBJECTIVE AND OBJECTIVE BOX
DAVID VINCENT   chart reviewed.  patient extubated today.       Allergies:  No Known Allergies      Medications:  amiodarone    Tablet 200milliGRAM(s) Oral daily  aspirin enteric coated 81milliGRAM(s) Oral daily  folic acid 1milliGRAM(s) Oral daily  lactulose Syrup 10Gram(s) Oral daily  artificial  tears Solution 1Drop(s) Both EYES two times a day  tiotropium 18 MICROgram(s) Capsule 1Capsule(s) Inhalation daily  ALBUTerol/ipratropium for Nebulization 3milliLiter(s) Nebulizer every 6 hours  chlorhexidine 0.12% Liquid 15milliLiter(s) Swish and Spit every 12 hours  methylPREDNISolone sodium succinate Injectable 40milliGRAM(s) IV Push two times a day  pantoprazole  Injectable 40milliGRAM(s) IV Push daily  insulin lispro (HumaLOG) corrective regimen sliding scale  SubCutaneous every 6 hours  dextrose Gel 1Dose(s) Oral once PRN  dextrose 50% Injectable 12.5Gram(s) IV Push once  dextrose 50% Injectable 25Gram(s) IV Push once  dextrose 50% Injectable 25Gram(s) IV Push once  glucagon  Injectable 1milliGRAM(s) IntraMuscular once PRN  gabapentin   Solution 200milliGRAM(s) Oral daily  heparin  Injectable 5000Unit(s) SubCutaneous every 12 hours       REVIEW OF SYSTEMS:  CONSTITUTIONAL:  as per HPI  HEENT:  Eyes:  No diplopia or blurred vision. ENT:  No earache, sore throat or runny nose.  CARDIOVASCULAR:  No pressure, squeezing, strangling, tightness, heaviness or aching about the chest, neck, axilla or epigastrium.  RESPIRATORY:  No cough, shortness of breath, PND or orthopnea.  GASTROINTESTINAL:  No nausea, vomiting or diarrhea.  GENITOURINARY:  No dysuria, frequency or urgency. No Blood in urine  MUSCULOSKELETAL:  no joint aches, no muscle pain  SKIN:  No change in skin, hair or nails.  NEUROLOGIC:  No paresthesias, fasciculations, seizures or weakness.  PSYCHIATRIC:  No disorder of thought or mood.  ENDOCRINE:  No heat or cold intolerance, polyuria or polydipsia.  HEMATOLOGICAL:  No easy bruising or bleeding.        Physical Exam:  ICU Vital Signs Last 24 Hrs  T(C): 37.1, Max: 37.6 (12-28 @ 17:00)  T(F): 98.8, Max: 99.7 (12-28 @ 17:00)  HR: 84 (68 - 87)  BP: 153/95 (99/69 - 153/95)  BP(mean): 116 (80 - 116)  ABP: --  ABP(mean): --  RR: 26 (11 - 26)  SpO2: 100% (91% - 100%)    GEN: NAD, pleasant  HEENT: normocephalic and atraumatic. EOMI. FAUZIA.    NECK: Supple. No carotid bruits.  No lymphadenopathy or thyromegaly.  LUNGS: Clear to auscultation.  HEART: Regular rate and rhythm without murmur.  ABDOMEN: Soft, nontender, and nondistended.  Positive bowel sounds.    EXTREMITIES: Without any cyanosis, clubbing, rash, lesions or edema.  NEUROLOGIC: Cranial nerves II through XII are grossly intact.  PSYCHIATRIC: Appropriate affect .  SKIN: No ulceration or induration present.        Labs:  29 Dec 2016 04:56    136    |  95     |  71.0   ----------------------------<  191    4.3     |  29.0   |  2.89     Ca    8.8        29 Dec 2016 04:56  Phos  5.2       27 Dec 2016 18:20  Mg     2.6       29 Dec 2016 04:56    TPro  5.8    /  Alb  3.0    /  TBili  0.4    /  DBili  x      /  AST  21     /  ALT  19     /  AlkPhos  76     29 Dec 2016 04:56                          9.3    4.29  )-----------( 126      ( 29 Dec 2016 04:56 )             27.1           LIVER FUNCTIONS - ( 29 Dec 2016 04:56 )  Alb: 3.0 g/dL / Pro: 5.8 g/dL / ALK PHOS: 76 U/L / ALT: 19 U/L / AST: 21 U/L / GGT: x               CAPILLARY BLOOD GLUCOSE    ABG - ( 28 Dec 2016 09:36 )  pH: 7.27  /  pCO2: 68    /  pO2: 65    / HCO3: 27    / Base Excess: 2.5   /  SaO2: 93                  RECENT CULTURES:  12-25 .Urine Clean Catch (Midstream) XXXX XXXX   No growth    12-24 .Blood Blood-Peripheral XXXX XXXX   No growth at 48 hours    12-24 .Blood Blood-Peripheral XXXX XXXX   No growth at 48 hours    12-24 .Urine Clean Catch (Midstream) XXXX XXXX   No significant bacterial growth.    12-23 .Blood Blood-Peripheral XXXX XXXX   No growth at 5 days.    12-23 .Blood Blood-Peripheral XXXX XXXX   No growth at 5 days. DAVID VINCENT   chart reviewed.  patient extubated today. no new complaints    Allergies:  No Known Allergies      Medications:  amiodarone    Tablet 200milliGRAM(s) Oral daily  aspirin enteric coated 81milliGRAM(s) Oral daily  folic acid 1milliGRAM(s) Oral daily  lactulose Syrup 10Gram(s) Oral daily  artificial  tears Solution 1Drop(s) Both EYES two times a day  tiotropium 18 MICROgram(s) Capsule 1Capsule(s) Inhalation daily  ALBUTerol/ipratropium for Nebulization 3milliLiter(s) Nebulizer every 6 hours  chlorhexidine 0.12% Liquid 15milliLiter(s) Swish and Spit every 12 hours  methylPREDNISolone sodium succinate Injectable 40milliGRAM(s) IV Push two times a day  pantoprazole  Injectable 40milliGRAM(s) IV Push daily  insulin lispro (HumaLOG) corrective regimen sliding scale  SubCutaneous every 6 hours  dextrose Gel 1Dose(s) Oral once PRN  dextrose 50% Injectable 12.5Gram(s) IV Push once  dextrose 50% Injectable 25Gram(s) IV Push once  dextrose 50% Injectable 25Gram(s) IV Push once  glucagon  Injectable 1milliGRAM(s) IntraMuscular once PRN  gabapentin   Solution 200milliGRAM(s) Oral daily  heparin  Injectable 5000Unit(s) SubCutaneous every 12 hours       REVIEW OF SYSTEMS:  CONSTITUTIONAL:  as per HPI  HEENT:  Eyes:  No diplopia or blurred vision. ENT:  No earache, sore throat or runny nose.  CARDIOVASCULAR:  No pressure, squeezing, strangling, tightness, heaviness or aching about the chest, neck, axilla or epigastrium.  RESPIRATORY:  breathing better  GASTROINTESTINAL:  No nausea, vomiting or diarrhea.  GENITOURINARY:  No dysuria, frequency or urgency. No Blood in urine  MUSCULOSKELETAL:  no joint aches, no muscle pain  SKIN:  No change in skin, hair or nails.  NEUROLOGIC:  No paresthesias, fasciculations, seizures or weakness.  PSYCHIATRIC:  No disorder of thought or mood.  ENDOCRINE:  No heat or cold intolerance, polyuria or polydipsia.  HEMATOLOGICAL:  No easy bruising or bleeding.        Physical Exam:  ICU Vital Signs Last 24 Hrs  T(C): 37.1, Max: 37.6 (12-28 @ 17:00)  T(F): 98.8, Max: 99.7 (12-28 @ 17:00)  HR: 84 (68 - 87)  BP: 153/95 (99/69 - 153/95)  BP(mean): 116 (80 - 116)  ABP: --  ABP(mean): --  RR: 26 (11 - 26)  SpO2: 100% (91% - 100%)    GEN: NAD, pleasant, obese  HEENT: normocephalic and atraumatic. EOMI. FAUZIA.    NECK: Supple. No carotid bruits.  No lymphadenopathy or thyromegaly.  LUNGS: fair air entry, slight wheezing,.   HEART: Regular rate and rhythm without murmur.  ABDOMEN: Soft, nontender, and nondistended.  Positive bowel sounds.    EXTREMITIES: trace bilateral edema  NEUROLOGIC: Cranial nerves II through XII are grossly intact.  PSYCHIATRIC: Appropriate affect .  SKIN: No ulceration or induration present.        Labs:  29 Dec 2016 04:56    136    |  95     |  71.0   ----------------------------<  191    4.3     |  29.0   |  2.89     Ca    8.8        29 Dec 2016 04:56  Phos  5.2       27 Dec 2016 18:20  Mg     2.6       29 Dec 2016 04:56    TPro  5.8    /  Alb  3.0    /  TBili  0.4    /  DBili  x      /  AST  21     /  ALT  19     /  AlkPhos  76     29 Dec 2016 04:56                          9.3    4.29  )-----------( 126      ( 29 Dec 2016 04:56 )             27.1           LIVER FUNCTIONS - ( 29 Dec 2016 04:56 )  Alb: 3.0 g/dL / Pro: 5.8 g/dL / ALK PHOS: 76 U/L / ALT: 19 U/L / AST: 21 U/L / GGT: x               CAPILLARY BLOOD GLUCOSE    ABG - ( 28 Dec 2016 09:36 )  pH: 7.27  /  pCO2: 68    /  pO2: 65    / HCO3: 27    / Base Excess: 2.5   /  SaO2: 93                  RECENT CULTURES:  12-25 .Urine Clean Catch (Midstream) XXXX XXXX   No growth    12-24 .Blood Blood-Peripheral XXXX XXXX   No growth at 48 hours    12-24 .Blood Blood-Peripheral XXXX XXXX   No growth at 48 hours    12-24 .Urine Clean Catch (Midstream) XXXX XXXX   No significant bacterial growth.    12-23 .Blood Blood-Peripheral XXXX XXXX   No growth at 5 days.    12-23 .Blood Blood-Peripheral XXXX XXXX   No growth at 5 days.

## 2016-12-29 NOTE — SWALLOW BEDSIDE ASSESSMENT ADULT - ORAL PHASE
Delayed oral transit time/5 seconds per tsp. amount Delayed oral transit time/10+ seconds Within functional limits

## 2016-12-30 NOTE — PROGRESS NOTE ADULT - SUBJECTIVE AND OBJECTIVE BOX
Renal :         E [facility] R & Labs reviewed,    No need now for  Oral  Na cl  now;    continue current  management,    discussed w. Dr. Motley,

## 2016-12-30 NOTE — PROGRESS NOTE ADULT - SUBJECTIVE AND OBJECTIVE BOX
DAVID VINCENT     Allergies:  No Known Allergies      Medications:  amiodarone    Tablet 200milliGRAM(s) Oral daily  aspirin enteric coated 81milliGRAM(s) Oral daily  folic acid 1milliGRAM(s) Oral daily  artificial  tears Solution 1Drop(s) Both EYES two times a day  ALBUTerol/ipratropium for Nebulization 3milliLiter(s) Nebulizer every 6 hours  pantoprazole  Injectable 40milliGRAM(s) IV Push daily  gabapentin   Solution 200milliGRAM(s) Oral daily  heparin  Injectable 5000Unit(s) SubCutaneous every 12 hours  predniSONE   Tablet 10milliGRAM(s) Oral daily  insulin lispro (HumaLOG) corrective regimen sliding scale  SubCutaneous Before meals and at bedtime  dextrose 5%. 1000milliLiter(s) IV Continuous <Continuous>  dextrose Gel 1Dose(s) Oral once PRN  dextrose 50% Injectable 12.5Gram(s) IV Push once  dextrose 50% Injectable 25Gram(s) IV Push once  dextrose 50% Injectable 25Gram(s) IV Push once  glucagon  Injectable 1milliGRAM(s) IntraMuscular once PRN       REVIEW OF SYSTEMS:  CONSTITUTIONAL:  as per HPI  HEENT:  Eyes:  No diplopia or blurred vision. ENT:  No earache, sore throat or runny nose.  CARDIOVASCULAR:  No pressure, squeezing, strangling, tightness, heaviness or aching about the chest, neck, axilla or epigastrium.  RESPIRATORY:  No cough, shortness of breath, PND or orthopnea.  GASTROINTESTINAL:  No nausea, vomiting or diarrhea.  GENITOURINARY:  No dysuria, frequency or urgency. No Blood in urine  MUSCULOSKELETAL:  no joint aches, no muscle pain  SKIN:  No change in skin, hair or nails.  NEUROLOGIC:  No paresthesias, fasciculations, seizures or weakness.  PSYCHIATRIC:  No disorder of thought or mood.  ENDOCRINE:  No heat or cold intolerance, polyuria or polydipsia.  HEMATOLOGICAL:  No easy bruising or bleeding.      Physical Exam:  ICU Vital Signs Last 24 Hrs  T(C): 36.7, Max: 37 (12-29 @ 11:00)  T(F): 98, Max: 98.6 (12-29 @ 11:00)  HR: 86 (69 - 86)  BP: 138/100 (113/71 - 141/106)  BP(mean): 115 (86 - 121)  RR: 36 (12 - 64)  SpO2: 98% (95% - 100%)    GEN: NAD, pleasant  HEENT: normocephalic and atraumatic. EOMI. FAUZIA.    NECK: Supple. No carotid bruits.  No lymphadenopathy or thyromegaly.  LUNGS: Clear to auscultation.  HEART: Regular rate and rhythm without murmur.  ABDOMEN: Soft, nontender, and nondistended.  Positive bowel sounds.    EXTREMITIES: Without any cyanosis, clubbing, rash, lesions or edema.  NEUROLOGIC: Cranial nerves II through XII are grossly intact.  PSYCHIATRIC: Appropriate affect .  SKIN: No ulceration or induration present.        Labs:  30 Dec 2016 03:34    141    |  97     |  69.0   ----------------------------<  195    4.5     |  34.0   |  2.21     Ca    9.5        30 Dec 2016 03:34  Phos  2.7       30 Dec 2016 03:34  Mg     2.8       30 Dec 2016 03:34    TPro  5.8    /  Alb  3.0    /  TBili  0.4    /  DBili  x      /  AST  21     /  ALT  19     /  AlkPhos  76     29 Dec 2016 04:56                          10.1   5.80  )-----------( 147      ( 30 Dec 2016 03:34 )             30.3       LIVER FUNCTIONS - ( 29 Dec 2016 04:56 )  Alb: 3.0 g/dL / Pro: 5.8 g/dL / ALK PHOS: 76 U/L / ALT: 19 U/L / AST: 21 U/L / GGT: x               CAPILLARY BLOOD GLUCOSE  143 (30 Dec 2016 08:00)  164 (30 Dec 2016 05:00)  192 (29 Dec 2016 23:00)  178 (29 Dec 2016 18:00)        RECENT CULTURES:  12-25 .Urine Clean Catch (Midstream) XXXX XXXX   No growth    12-24 .Blood Blood-Peripheral XXXX XXXX   No growth at 5 days.    12-24 .Blood Blood-Peripheral XXXX XXXX   No growth at 5 days.    12-24 .Urine Clean Catch (Midstream) XXXX XXXX   No significant bacterial growth.    12-23 .Blood Blood-Peripheral XXXX XXXX   No growth at 5 days.    12-23 .Blood Blood-Peripheral XXXX XXXX   No growth at 5 days. DAVID VINCENT   patient seen and examined. Required bipap overnight.   no significant issues today.    Allergies:  No Known Allergies    Medications:  amiodarone    Tablet 200milliGRAM(s) Oral daily  aspirin enteric coated 81milliGRAM(s) Oral daily  folic acid 1milliGRAM(s) Oral daily  artificial  tears Solution 1Drop(s) Both EYES two times a day  ALBUTerol/ipratropium for Nebulization 3milliLiter(s) Nebulizer every 6 hours  pantoprazole  Injectable 40milliGRAM(s) IV Push daily  gabapentin   Solution 200milliGRAM(s) Oral daily  heparin  Injectable 5000Unit(s) SubCutaneous every 12 hours  predniSONE   Tablet 10milliGRAM(s) Oral daily  insulin lispro (HumaLOG) corrective regimen sliding scale  SubCutaneous Before meals and at bedtime  dextrose 5%. 1000milliLiter(s) IV Continuous <Continuous>  dextrose Gel 1Dose(s) Oral once PRN  dextrose 50% Injectable 12.5Gram(s) IV Push once  dextrose 50% Injectable 25Gram(s) IV Push once  dextrose 50% Injectable 25Gram(s) IV Push once  glucagon  Injectable 1milliGRAM(s) IntraMuscular once PRN       REVIEW OF SYSTEMS:  CONSTITUTIONAL:  as per HPI  HEENT:  Eyes:  No diplopia or blurred vision. ENT:  No earache, sore throat or runny nose.  CARDIOVASCULAR:  No pressure, squeezing, strangling, tightness, heaviness or aching about the chest, neck, axilla or epigastrium.  RESPIRATORY:  No cough, shortness of breath, PND or orthopnea.  GASTROINTESTINAL:  No nausea, vomiting or diarrhea.  GENITOURINARY:  No dysuria, frequency or urgency. No Blood in urine  MUSCULOSKELETAL:  no joint aches, no muscle pain  SKIN:  No change in skin, hair or nails.  NEUROLOGIC:  No paresthesias, fasciculations, seizures or weakness.  PSYCHIATRIC:  No disorder of thought or mood.  ENDOCRINE:  No heat or cold intolerance, polyuria or polydipsia.  HEMATOLOGICAL:  No easy bruising or bleeding.      Physical Exam:  ICU Vital Signs Last 24 Hrs  T(C): 36.7, Max: 37 (12-29 @ 11:00)  T(F): 98, Max: 98.6 (12-29 @ 11:00)  HR: 86 (69 - 86)  BP: 138/100 (113/71 - 141/106)  BP(mean): 115 (86 - 121)  RR: 36 (12 - 64)  SpO2: 98% (95% - 100%)    GEN: NAD, pleasant, obese  HEENT: normocephalic and atraumatic. EOMI. FAUZIA.  POOR dentition  NECK: Supple.  .  LUNGS: Clear to auscultation.  HEART: Regular rate and rhythm without murmur.  ABDOMEN: Soft, nontender, and nondistended.  Positive bowel sounds.    EXTREMITIES: Without any cyanosis, clubbing, rash, lesions or edema.  NEUROLOGIC: Cranial nerves II through XII are grossly intact.  PSYCHIATRIC: Appropriate affect .  SKIN: No ulceration or induration present.        Labs:  30 Dec 2016 03:34    141    |  97     |  69.0   ----------------------------<  195    4.5     |  34.0   |  2.21     Ca    9.5        30 Dec 2016 03:34  Phos  2.7       30 Dec 2016 03:34  Mg     2.8       30 Dec 2016 03:34    TPro  5.8    /  Alb  3.0    /  TBili  0.4    /  DBili  x      /  AST  21     /  ALT  19     /  AlkPhos  76     29 Dec 2016 04:56                          10.1   5.80  )-----------( 147      ( 30 Dec 2016 03:34 )             30.3       LIVER FUNCTIONS - ( 29 Dec 2016 04:56 )  Alb: 3.0 g/dL / Pro: 5.8 g/dL / ALK PHOS: 76 U/L / ALT: 19 U/L / AST: 21 U/L / GGT: x               CAPILLARY BLOOD GLUCOSE  143 (30 Dec 2016 08:00)  164 (30 Dec 2016 05:00)  192 (29 Dec 2016 23:00)  178 (29 Dec 2016 18:00)        RECENT CULTURES:  12-25 .Urine Clean Catch (Midstream) XXXX XXXX   No growth    12-24 .Blood Blood-Peripheral XXXX XXXX   No growth at 5 days.    12-24 .Blood Blood-Peripheral XXXX XXXX   No growth at 5 days.    12-24 .Urine Clean Catch (Midstream) XXXX XXXX   No significant bacterial growth.    12-23 .Blood Blood-Peripheral XXXX XXXX   No growth at 5 days.    12-23 .Blood Blood-Peripheral XXXX XXXX   No growth at 5 days.

## 2016-12-30 NOTE — PROGRESS NOTE ADULT - PROBLEM SELECTOR PLAN 4
previously on Metformin.  contraindicated due to poor renal function  - will start Lantus 20 units daily AM basal.  - sliding scale insulin for coverage

## 2016-12-30 NOTE — PROGRESS NOTE ADULT - SUBJECTIVE AND OBJECTIVE BOX
S/P VDRF due to AMS as a consequence of hypercapnea  +/- hyponatremia.    extubated 12/29   Awake alert oriented watching TV.     ***needs to wear nocturnal BIPAP****    Can transfer to monitored pulse ox bed.

## 2016-12-31 NOTE — PROGRESS NOTE ADULT - PROBLEM SELECTOR PLAN 1
resolved.  - renal follow up today  - ? restart salt tabs resolved.  no need for SODIUM CL tabs for now  - KCL supplementation per renal

## 2016-12-31 NOTE — PROGRESS NOTE ADULT - SUBJECTIVE AND OBJECTIVE BOX
DAVID VINCENT     Allergies:  No Known Allergies      Medications:  amiodarone    Tablet 200milliGRAM(s) Oral daily  aspirin enteric coated 81milliGRAM(s) Oral daily  folic acid 1milliGRAM(s) Oral daily  artificial  tears Solution 1Drop(s) Both EYES two times a day  ALBUTerol/ipratropium for Nebulization 3milliLiter(s) Nebulizer every 6 hours  pantoprazole  Injectable 40milliGRAM(s) IV Push daily  gabapentin   Solution 200milliGRAM(s) Oral daily  heparin  Injectable 5000Unit(s) SubCutaneous every 12 hours  predniSONE   Tablet 10milliGRAM(s) Oral daily  insulin lispro (HumaLOG) corrective regimen sliding scale  SubCutaneous Before meals and at bedtime  dextrose 5%. 1000milliLiter(s) IV Continuous <Continuous>  dextrose Gel 1Dose(s) Oral once PRN  dextrose 50% Injectable 12.5Gram(s) IV Push once  dextrose 50% Injectable 25Gram(s) IV Push once  dextrose 50% Injectable 25Gram(s) IV Push once  glucagon  Injectable 1milliGRAM(s) IntraMuscular once PRN  insulin glargine Injectable (LANTUS) 20Unit(s) SubCutaneous every morning  potassium chloride    Tablet ER 20milliEquivalent(s) Oral daily            REVIEW OF SYSTEMS:  CONSTITUTIONAL:  as per HPI  HEENT:  Eyes:  No diplopia or blurred vision. ENT:  No earache, sore throat or runny nose.  CARDIOVASCULAR:  No pressure, squeezing, strangling, tightness, heaviness or aching about the chest, neck, axilla or epigastrium.  RESPIRATORY:  No cough, shortness of breath, PND or orthopnea.  GASTROINTESTINAL:  No nausea, vomiting or diarrhea.  GENITOURINARY:  No dysuria, frequency or urgency. No Blood in urine  MUSCULOSKELETAL:  no joint aches, no muscle pain  SKIN:  No change in skin, hair or nails.  NEUROLOGIC:  No paresthesias, fasciculations, seizures or weakness.  PSYCHIATRIC:  No disorder of thought or mood.  ENDOCRINE:  No heat or cold intolerance, polyuria or polydipsia.  HEMATOLOGICAL:  No easy bruising or bleeding.            Physical Exam:  ICU Vital Signs Last 24 Hrs  T(C): 36.9, Max: 36.9 (12-31 @ 09:58)  T(F): 98.4, Max: 98.4 (12-31 @ 09:58)  HR: 97 (57 - 97)  BP: 108/70 (107/59 - 159/130)  BP(mean): 107 (96 - 137)  ABP: --  ABP(mean): --  RR: 19 (19 - 39)  SpO2: 99% (95% - 100%)    GEN: NAD, pleasant  HEENT: normocephalic and atraumatic. EOMI. FAUZIA.    NECK: Supple. No carotid bruits.  No lymphadenopathy or thyromegaly.  LUNGS: Clear to auscultation.  HEART: Regular rate and rhythm without murmur.  ABDOMEN: Soft, nontender, and nondistended.  Positive bowel sounds.    EXTREMITIES: Without any cyanosis, clubbing, rash, lesions or edema.  NEUROLOGIC: Cranial nerves II through XII are grossly intact.  PSYCHIATRIC: Appropriate affect .  SKIN: No ulceration or induration present.      Labs:  30 Dec 2016 03:34    141    |  97     |  69.0   ----------------------------<  195    4.5     |  34.0   |  2.21     Ca    9.5        30 Dec 2016 03:34  Phos  2.7       30 Dec 2016 03:34  Mg     2.8       30 Dec 2016 03:34                          10.1   5.80  )-----------( 147      ( 30 Dec 2016 03:34 )             30.3       CAPILLARY BLOOD GLUCOSE  177 (30 Dec 2016 22:00)  176 (30 Dec 2016 16:00)      RECENT CULTURES:  12-25 .Urine Clean Catch (Midstream) XXXX XXXX   No growth    12-24 .Blood Blood-Peripheral XXXX XXXX   No growth at 5 days.    12-24 .Blood Blood-Peripheral XXXX XXXX   No growth at 5 days. DAVID VINCENT   patient seen and examined. reports feeling week.  has not gotten out of bed yet.  nephrology consult follow up seen.   potassium supplemented.     Allergies:  No Known Allergies      Medications:  amiodarone    Tablet 200milliGRAM(s) Oral daily  aspirin enteric coated 81milliGRAM(s) Oral daily  folic acid 1milliGRAM(s) Oral daily  artificial  tears Solution 1Drop(s) Both EYES two times a day  ALBUTerol/ipratropium for Nebulization 3milliLiter(s) Nebulizer every 6 hours  pantoprazole  Injectable 40milliGRAM(s) IV Push daily  gabapentin   Solution 200milliGRAM(s) Oral daily  heparin  Injectable 5000Unit(s) SubCutaneous every 12 hours  predniSONE   Tablet 10milliGRAM(s) Oral daily  insulin lispro (HumaLOG) corrective regimen sliding scale  SubCutaneous Before meals and at bedtime  dextrose 5%. 1000milliLiter(s) IV Continuous <Continuous>  dextrose Gel 1Dose(s) Oral once PRN  dextrose 50% Injectable 12.5Gram(s) IV Push once  dextrose 50% Injectable 25Gram(s) IV Push once  dextrose 50% Injectable 25Gram(s) IV Push once  glucagon  Injectable 1milliGRAM(s) IntraMuscular once PRN  insulin glargine Injectable (LANTUS) 20Unit(s) SubCutaneous every morning  potassium chloride    Tablet ER 20milliEquivalent(s) Oral daily            REVIEW OF SYSTEMS:  CONSTITUTIONAL:  as per HPI  HEENT:  Eyes:  No diplopia or blurred vision. ENT:  No earache, sore throat or runny nose.  CARDIOVASCULAR:  No pressure, squeezing, strangling, tightness, heaviness or aching about the chest, neck, axilla or epigastrium.  RESPIRATORY:  No cough, shortness of breath, PND or orthopnea.  GASTROINTESTINAL:  No nausea, vomiting or diarrhea.  GENITOURINARY:  No dysuria, frequency or urgency. No Blood in urine  MUSCULOSKELETAL:  no joint aches, no muscle pain. REPORTS weakness  SKIN:  No change in skin, hair or nails.  NEUROLOGIC:  No paresthesias, fasciculations, seizures or weakness.  PSYCHIATRIC:  No disorder of thought or mood.  ENDOCRINE:  No heat or cold intolerance, polyuria or polydipsia.  HEMATOLOGICAL:  No easy bruising or bleeding.            Physical Exam:  ICU Vital Signs Last 24 Hrs  T(C): 36.9, Max: 36.9 (12-31 @ 09:58)  T(F): 98.4, Max: 98.4 (12-31 @ 09:58)  HR: 97 (57 - 97)  BP: 108/70 (107/59 - 159/130)  BP(mean): 107 (96 - 137)  ABP: --  ABP(mean): --  RR: 19 (19 - 39)  SpO2: 99% (95% - 100%)    GEN: NAD, pleasant, obese  HEENT: normocephalic and atraumatic. EOMI. FAUZIA.  POOR dentition  NECK: Supple.  .  LUNGS: Clear to auscultation.  HEART: Regular rate and rhythm without murmur.  ABDOMEN: Soft, nontender, and nondistended.  Positive bowel sounds.    EXTREMITIES: trace edema;  moves all extremities  NEUROLOGIC: Cranial nerves II through XII are grossly intact.  PSYCHIATRIC: Appropriate affect .  SKIN: No ulceration or induration present.        Labs:  30 Dec 2016 03:34    141    |  97     |  69.0   ----------------------------<  195    4.5     |  34.0   |  2.21     Ca    9.5        30 Dec 2016 03:34  Phos  2.7       30 Dec 2016 03:34  Mg     2.8       30 Dec 2016 03:34                          10.1   5.80  )-----------( 147      ( 30 Dec 2016 03:34 )             30.3       CAPILLARY BLOOD GLUCOSE  177 (30 Dec 2016 22:00)  176 (30 Dec 2016 16:00)      RECENT CULTURES:  12-25 .Urine Clean Catch (Midstream) XXXX XXXX   No growth    12-24 .Blood Blood-Peripheral XXXX XXXX   No growth at 5 days.    12-24 .Blood Blood-Peripheral XXXX XXXX   No growth at 5 days.

## 2016-12-31 NOTE — PROGRESS NOTE ADULT - SUBJECTIVE AND OBJECTIVE BOX
Renal :    Alert, On Respiratory Therapy,    Well Hydrated, A KI Resolving,    Dx :  Combined Acid - Base Disorder (  Respiratory Acidosis + Metabolic Alkalosis )    S : K cl, to  Combat Hyponatremia,    Use Diuretic PRN,    Will D.C IVF ,     Control of DM -      Discussed w. Dr. Motley,

## 2016-12-31 NOTE — PROGRESS NOTE ADULT - PROBLEM SELECTOR PLAN 5
BP relatively stable,   - possible add agent in AM if increasing BP relatively stable,   - will monitor

## 2016-12-31 NOTE — PROGRESS NOTE ADULT - PROBLEM SELECTOR PLAN 4
previously on Metformin.  contraindicated due to poor renal function  - will start Lantus 20 units daily AM basal.  - sliding scale insulin for coverage previously on Metformin.  contraindicated due to poor renal function  - continue Lantus 20 units daily AM basal;   - sliding scale insulin for coverage  - diabetes teaching ordred

## 2017-01-02 NOTE — PROGRESS NOTE ADULT - SUBJECTIVE AND OBJECTIVE BOX
Renal :    Alert,    On Nasal Oxygen,    GFR improved, Hyponatremia Resolved,    Will No Longer Routinely Follow,    S ; Continue K - cl ( To Provide Solute & Facilitate Free water Excretion )*  & Moderate  Oral F.R,    Loop Diuretics PRN, for CHF Exacerbation,      Thank you,

## 2017-01-02 NOTE — PROGRESS NOTE ADULT - PROBLEM SELECTOR PLAN 4
previously on Metformin.  contraindicated due to Low GFR.  - continue Lantus 20 units daily AM basal;   - sliding scale insulin for coverage , Per IM

## 2017-01-02 NOTE — PROGRESS NOTE ADULT - PROBLEM SELECTOR PLAN 4
previously on Metformin.  contraindicated due to Low GFR.  - continue Lantus 20 units daily AM basal;   - sliding scale insulin for coverage

## 2017-01-02 NOTE — PROGRESS NOTE ADULT - SUBJECTIVE AND OBJECTIVE BOX
DAVID VINCENT :    Patient seen and examined.  Reports  feeling Better .  Still  Dyspneic on Exertion,  In Chair, W. Friends @ Bedside,    On potassium supplements, Insulin & Off Diuretics,     Allergies:  No Known Allergies      Medications:    amiodarone 200 mg.,  Oral daily  aspirin enteric coated 81milliGRAM(s) Oral daily  folic acid 1milliGRAM(s) Oral daily  Albuterol   /ipratropium for Nebulization 3milliLiter(s) Nebulizer every 6 hours  pantoprazole  Injectable 40milliGRAM(s) IV Push daily  gabapentin   Solution 200milliGRAM(s) Oral daily  heparin  Injectable 5000Unit(s) SC  every 12 hours  prednisone    Tablet 10milliGRAM(s) Oral daily  insulin lispro - corrective regimen sliding scale  SC  Before meals and at bedtime  insulin glargine Injectable (LANTUS) 20Unit(s) SC  every morning  potassium chloride    Tablet ER 20milliEquivalent(s) Oral daily            REVIEW OF SYSTEMS:  CONSTITUTIONAL:  as per HPI  HEENT:  Eyes:  No diplopia or blurred vision. ENT:  No earache, sore throat or runny nose.  CARDIOVASCULAR:  No pressure, squeezing, strangling, tightness, heaviness or aching about the chest, neck, axilla or epigastrium.  RESPIRATORY:  No cough,  + shortness of breath On Exertion, No P ND or orthopnea.  GASTROINTESTINAL:  No nausea, vomiting or diarrhea.  GENITOURINARY:  No dysuria, frequency or urgency. No Blood in urine  MUSCULOSKELETAL:  no joint aches, no muscle pain. REPORTS weakness  SKIN:  No change in skin, hair or nails.  NEUROLOGIC:  No paresthesias, fasciculations, seizures or weakness.  PSYCHIATRIC:  No disorder of thought or mood.  ENDOCRINE:  No heat or cold intolerance, polyuria or polydipsia.  HEMATOLOGICAL:  No easy bruising or bleeding.       Physical Exam:    ICU Vital Signs Last 24 Hrs  T(C): 36.9, Max: 36.9 (12-31 @ 09:58)  T(F): 98.4, Max: 98.4 (12-31 @ 09:58)  HR: 97 (57 - 97)  BP: 108/70 (107/59 - 159/130)  BP(mean): 107 (96 - 137)    RR: 19 (19 - 39)  SpO2: 99% (95% - 100%)    GEN: NAD, pleasant, obese  HEENT: normocephalic and atraumatic. EOMI. FAUZIA.  POOR dentition  NECK: Supple.  .  LUNGS: Clear to auscultation. + Rhonchi,  HEART: Regular rate and rhythm without murmur.  ABDOMEN: Soft, nontender, and nondistended.  Positive bowel sounds.    EXTREMITIES: trace edema;  moves all extremities  NEUROLOGIC: Cranial nerves II through XII are grossly intact.  PSYCHIATRIC: Appropriate affect .  SKIN: No ulceration or induration present.        Labs:    Comprehensive Metabolic Panel in AM (01.02.17 @ 05:32)        Sodium, Serum: 138 mmol/L    Potassium, Serum: 4.8 mmol/L    Chloride, Serum: 97 mmol/L    Carbon Dioxide, Serum: 33.0 mmol/L    Anion Gap, Serum: 8 mmol/L    Blood Urea Nitrogen, Serum: 38.0 mg/dL    Creatinine, Serum: 1.21 mg/dL    Glucose, Serum: 84 mg/dL    Calcium, Total Serum: 9.2 mg/dL    Protein Total, Serum: 5.7 g/dL    Albumin, Serum: 3.0 g/dL    Bilirubin Total, Serum: 0.6 mg/dL    Alkaline Phosphatase, Serum: 68 U/L    Aspartate Aminotransferase (AST/SGOT): 17 U/L    Alanine Aminotransferase (ALT/SGPT): 20 U/L    eGFR if Non : 55: Interpretative comment  The units for eGFR are ml/min/1.73m2 (normalized body surface area). The  eGFR is calculated from a serum creatinine using the CKD-EPI equation.  Other variables required for calculation are race, age and sex. Among  patients w55: ith chronic kidney disease (CKD), the eGFR is useful in  determining the stage of disease according to KDOQI CKD classification.  All eGFR results are reported numerically with the following  interpretation.          GFR                    With55:                  Without     (ml/min/1.73 m2)    Kidney Damage       Kidney Damage        >= 90                    Stage 1                     Normal        60-89                    Stage 2                     Decreased GFR        :      Stage 3                     Stage 3        15-29                    Stage 4                     Stage 4        < 15                      Stage 5                     Stage 5  Each stage of CKD assumes that the associated GFR level has been in  eff55: ect for at least 3 months. Determination of stages one and two (with  eGFR > 59 ml/min/m2) requires estimation of kidney damage for at least 3  months as defined by structural or functional abnormalities.  Limitations: All estimates of GFR will be les55: s accurate for patients at  extremes of muscle mass (including but not limited to frail elderly,  critically ill, or cancer patients), those with unusual diets, and those  with conditions associated with reduced secretion or extrarenal  elimination of55:  creatinine. The eGFR equation is not recommended for use  in patients with unstable creatinine levels. mL/min/1.73M2    eGFR if African American: 64 mL/min/1.73M2          141    |  97     |  69.0   ----------------------------<  195    4.5     |  34.0   |  2.21     Ca    9.5        30 Dec 2016 03:34  Phos  2.7       30 Dec 2016 03:34  Mg     2.8       30 Dec 2016 03:34                          10.1   5.80  )-----------( 147      ( 30 Dec 2016 03:34 )             30.3       CAPILLARY BLOOD GLUCOSE  177 (30 Dec 2016 22:00)  176 (30 Dec 2016 16:00)      RECENT CULTURES:  12-25 .Urine Clean Catch (Midstream) XXXX XXXX   No growth    12-24 .Blood Blood-Peripheral XXXX XXXX   No growth at 5 days.    12-24 .Blood Blood-Peripheral XXXX XXXX   No growth at 5 days.

## 2017-01-02 NOTE — PROGRESS NOTE ADULT - SUBJECTIVE AND OBJECTIVE BOX
DAVID VINCENT     patient seen and examined. Report intermittent cough.       Allergies:  No Known Allergies      Medications:  amiodarone    Tablet 200milliGRAM(s) Oral daily  aspirin enteric coated 81milliGRAM(s) Oral daily  folic acid 1milliGRAM(s) Oral daily  artificial  tears Solution 1Drop(s) Both EYES two times a day  ALBUTerol/ipratropium for Nebulization 3milliLiter(s) Nebulizer every 6 hours  pantoprazole  Injectable 40milliGRAM(s) IV Push daily  gabapentin   Solution 200milliGRAM(s) Oral daily  heparin  Injectable 5000Unit(s) SubCutaneous every 12 hours  predniSONE   Tablet 10milliGRAM(s) Oral daily  insulin lispro (HumaLOG) corrective regimen sliding scale  SubCutaneous Before meals and at bedtime  dextrose 5%. 1000milliLiter(s) IV Continuous <Continuous>  dextrose Gel 1Dose(s) Oral once PRN  dextrose 50% Injectable 12.5Gram(s) IV Push once  dextrose 50% Injectable 25Gram(s) IV Push once  dextrose 50% Injectable 25Gram(s) IV Push once  glucagon  Injectable 1milliGRAM(s) IntraMuscular once PRN  insulin glargine Injectable (LANTUS) 20Unit(s) SubCutaneous every morning  potassium chloride   Powder 20milliEquivalent(s) Oral daily            REVIEW OF SYSTEMS:  CONSTITUTIONAL:  as per HPI  HEENT:  Eyes:  No diplopia or blurred vision. ENT:  No earache, sore throat or runny nose.  CARDIOVASCULAR:  No pressure, squeezing, strangling, tightness, heaviness or aching about the chest, neck, axilla or epigastrium.  RESPIRATORY:  slight cough  GASTROINTESTINAL:  No nausea, vomiting or diarrhea.  GENITOURINARY:  No dysuria, frequency or urgency. No Blood in urine  MUSCULOSKELETAL:  no joint aches, no muscle pain  SKIN:  No change in skin, hair or nails.  NEUROLOGIC:  No paresthesias, fasciculations, seizures or weakness.  PSYCHIATRIC:  No disorder of thought or mood.  ENDOCRINE:  No heat or cold intolerance, polyuria or polydipsia.  HEMATOLOGICAL:  No easy bruising or bleeding.           Physical Exam:  ICU Vital Signs Last 24 Hrs  T(C): 36.7, Max: 36.7 ( @ 09:54)  T(F): 98, Max: 98 ( @ 09:54)  HR: 93 (93 - 97)  BP: 116/76 (116/76 - 116/86)  RR: 16 (16 - 18)  SpO2: 99% (94% - 99%)    GEN: NAD, pleasant, obese  HEENT: normocephalic and atraumatic. EOMI. FAUZIA.  POOR dentition  NECK: Supple.  .  LUNGS: Clear to auscultation.  HEART: Regular rate and rhythm without murmur.  ABDOMEN: Soft, nontender, and nondistended.  Positive bowel sounds.    EXTREMITIES: trace edema;  moves all extremities  NEUROLOGIC: Cranial nerves II through XII are grossly intact.  PSYCHIATRIC: Appropriate affect .  SKIN: No ulceration or induration present.      Labs:  2017 05:32    138    |  97     |  38.0   ----------------------------<  84     4.8     |  33.0   |  1.21     Ca    9.2        2017 05:32    TPro  5.7    /  Alb  3.0    /  TBili  0.6    /  DBili  x      /  AST  17     /  ALT  20     /  AlkPhos  68     2017 05:32                          10.1   6.12  )-----------( 156      ( 2017 05:32 )             29.6       Urinalysis Basic - ( 31 Dec 2016 15:56 )    Color: Yellow / Appearance: Clear / S.015 / pH: x  Gluc: x / Ketone: Negative  / Bili: Negative / Urobili: 8 mg/dL   Blood: x / Protein: 15 mg/dL / Nitrite: Negative   Leuk Esterase: Small / RBC: 0-2 /HPF / WBC 3-5   Sq Epi: x / Non Sq Epi: Occasional / Bacteria: Occasional      LIVER FUNCTIONS - ( 2017 05:32 )  Alb: 3.0 g/dL / Pro: 5.7 g/dL / ALK PHOS: 68 U/L / ALT: 20 U/L / AST: 17 U/L / GGT: x           CAPILLARY BLOOD GLUCOSE  150 (2017 11:45)  84 (2017 07:56)  98 (2017 22:18)  103 (2017 16:53)

## 2017-01-03 NOTE — PHYSICAL THERAPY INITIAL EVALUATION ADULT - ADDITIONAL COMMENTS
Pt. lives in a house with 3 steps to enter (+) handrails. There are no steps to negotiate inside. Pt was independent PTA and owns a RW that he was using as needed. Pt on home O2. Pt states friends were able to assist PTA however may be unavailable to assist as needed upon discharge. Pt. lives in a house with 3 steps to enter (+) handrails. There are no steps to negotiate inside. Pt was independent PTA and owns a RW that he was using as needed. Pt on home O2. Pt states friends were able to assist PTA with ADL/IADLS however may be unavailable to assist as needed upon discharge.

## 2017-01-03 NOTE — PROGRESS NOTE ADULT - PROBLEM SELECTOR PLAN 4
previously on Metformin.  contraindicated due to Low GFR.  - decreased Lantus to 15 units daily AM basal;   - sliding scale insulin for coverage

## 2017-01-03 NOTE — PHYSICAL THERAPY INITIAL EVALUATION ADULT - IMPAIRMENTS FOUND, PT EVAL
gait, locomotion, and balance/gross motor/muscle strength/ROM
gait, locomotion, and balance/aerobic capacity/endurance/ROM/muscle strength/gross motor

## 2017-01-03 NOTE — PHYSICAL THERAPY INITIAL EVALUATION ADULT - IMPAIRED TRANSFERS: SIT/STAND, REHAB EVAL
impaired balance/decreased ROM/decreased strength/pain
decreased strength/decreased ROM/impaired balance

## 2017-01-03 NOTE — PHYSICAL THERAPY INITIAL EVALUATION ADULT - GENERAL OBSERVATIONS, REHAB EVAL
Pt. greeted OOB in chair on 5T, (+) IV lock. Agreeable to PT Pt. greeted OOB in chair on 5T, (+) IV lock, (+) tele monitor, (+) pulse ox, (+) 2L O2 via NC.  Agreeable to PT

## 2017-01-03 NOTE — PHYSICAL THERAPY INITIAL EVALUATION ADULT - DISCHARGE DISPOSITION, PT EVAL
home with supervision/assist and home PT pending progress, stairs assessment home with supervision/assist and home PT vs. Subacute Rehab pending progress and stairs assessment

## 2017-01-03 NOTE — PROGRESS NOTE ADULT - SUBJECTIVE AND OBJECTIVE BOX
DAVID VINCENT   - diabetes educator input appreciated.       Allergies:  No Known Allergies      Medications:  amiodarone    Tablet 200milliGRAM(s) Oral daily  aspirin enteric coated 81milliGRAM(s) Oral daily  folic acid 1milliGRAM(s) Oral daily  artificial  tears Solution 1Drop(s) Both EYES two times a day  ALBUTerol/ipratropium for Nebulization 3milliLiter(s) Nebulizer every 6 hours  pantoprazole  Injectable 40milliGRAM(s) IV Push daily  gabapentin   Solution 200milliGRAM(s) Oral daily  heparin  Injectable 5000Unit(s) SubCutaneous every 12 hours  predniSONE   Tablet 10milliGRAM(s) Oral daily  insulin lispro (HumaLOG) corrective regimen sliding scale  SubCutaneous Before meals and at bedtime  dextrose 5%. 1000milliLiter(s) IV Continuous <Continuous>  dextrose Gel 1Dose(s) Oral once PRN  dextrose 50% Injectable 12.5Gram(s) IV Push once  dextrose 50% Injectable 25Gram(s) IV Push once  dextrose 50% Injectable 25Gram(s) IV Push once  glucagon  Injectable 1milliGRAM(s) IntraMuscular once PRN  potassium chloride   Powder 20milliEquivalent(s) Oral daily  insulin glargine Injectable (LANTUS) 15Unit(s) SubCutaneous every morning             REVIEW OF SYSTEMS:  CONSTITUTIONAL:  as per HPI  HEENT:  Eyes:  No diplopia or blurred vision. ENT:  No earache, sore throat or runny nose.  CARDIOVASCULAR:  No pressure, squeezing, strangling, tightness, heaviness or aching about the chest, neck, axilla or epigastrium.  RESPIRATORY:  No cough, shortness of breath, PND or orthopnea.  GASTROINTESTINAL:  No nausea, vomiting or diarrhea.  GENITOURINARY:  No dysuria, frequency or urgency. No Blood in urine  MUSCULOSKELETAL:  no joint aches, no muscle pain  SKIN:  No change in skin, hair or nails.  NEUROLOGIC:  No paresthesias, fasciculations, seizures or weakness.  PSYCHIATRIC:  No disorder of thought or mood.  ENDOCRINE:  No heat or cold intolerance, polyuria or polydipsia.  HEMATOLOGICAL:  No easy bruising or bleeding.           Physical Exam:  ICU Vital Signs Last 24 Hrs  T(C): 37, Max: 37 (01-02 @ 21:23)  T(F): 98.6, Max: 98.6 (01-02 @ 21:23)  HR: 60 (60 - 99)  BP: 134/86 (120/86 - 134/86)  BP(mean): --  ABP: --  ABP(mean): --  RR: 18 (18 - 18)  SpO2: 99% (95% - 99%)    GEN: NAD, pleasant, obese  HEENT: normocephalic and atraumatic. EOMI. FAUZIA.  POOR dentition  NECK: Supple.  .  LUNGS: Clear to auscultation.  HEART: Regular rate and rhythm without murmur.  ABDOMEN: Soft, nontender, and nondistended.  Positive bowel sounds.    EXTREMITIES: trace edema;  moves all extremities  NEUROLOGIC: Cranial nerves II through XII are grossly intact.  PSYCHIATRIC: Appropriate affect .  SKIN: No ulceration or induration present.      Labs:  02 Jan 2017 05:32    138    |  97     |  38.0   ----------------------------<  84     4.8     |  33.0   |  1.21     Ca    9.2        02 Jan 2017 05:32    TPro  5.7    /  Alb  3.0    /  TBili  0.6    /  DBili  x      /  AST  17     /  ALT  20     /  AlkPhos  68     02 Jan 2017 05:32                          10.1   6.12  )-----------( 156      ( 02 Jan 2017 05:32 )             29.6           LIVER FUNCTIONS - ( 02 Jan 2017 05:32 )  Alb: 3.0 g/dL / Pro: 5.7 g/dL / ALK PHOS: 68 U/L / ALT: 20 U/L / AST: 17 U/L / GGT: x               CAPILLARY BLOOD GLUCOSE  156 (03 Jan 2017 12:00)  81 (03 Jan 2017 08:00)  99 (02 Jan 2017 21:23)        RECENT CULTURES:

## 2017-01-03 NOTE — PHYSICAL THERAPY INITIAL EVALUATION ADULT - LEVEL OF INDEPENDENCE: SIT/STAND, REHAB EVAL
moderate assist (50% patients effort)/x3 attempts with bilateral knee buckling, upon 3rd attempt pt able to stand with Robert
minimum assist (75% patients effort)

## 2017-01-03 NOTE — PHYSICAL THERAPY INITIAL EVALUATION ADULT - PERTINENT HX OF CURRENT PROBLEM, REHAB EVAL
82y year old Male admitted for Sepsis 2/2 to pneumonia. RR 12/27 2/2 AMS and lethargy, pt transferred to ICU. Pt extubated 12/29
81 yo M with h/o chronic HF pEF, AVR with bioprosthetic valve, recurrent hyponatremia, DM, CKD - 3 represented w.  lethargy. Pt was admitted for weakness likely 2/2 hyponatremia which improved with fluid restriction. In ED In the ED, pt was found to be febrile Tmax 103.8 and chest xray showed possible LLL pneumonia.

## 2017-01-03 NOTE — PHYSICAL THERAPY INITIAL EVALUATION ADULT - CRITERIA FOR SKILLED THERAPEUTIC INTERVENTIONS
predicted duration of therapy intervention/anticipated equipment needs at discharge/anticipated discharge recommendation/therapy frequency/risk reduction/prevention/functional limitations in following categories/rehab potential/impairments found
impairments found/rehab potential/risk reduction/prevention/functional limitations in following categories/anticipated equipment needs at discharge/anticipated discharge recommendation/predicted duration of therapy intervention/therapy frequency

## 2017-01-04 ENCOUNTER — TRANSCRIPTION ENCOUNTER (OUTPATIENT)
Age: 82
End: 2017-01-04

## 2017-01-04 NOTE — PROGRESS NOTE ADULT - SUBJECTIVE AND OBJECTIVE BOX
DAVID VINCENT   patient seen and examined.   PT eval appreciated.  No evidence of hypoglycemia.     Allergies:  No Known Allergies    MEDICATIONS  (STANDING):  amiodarone    Tablet 200milliGRAM(s) Oral daily  aspirin enteric coated 81milliGRAM(s) Oral daily  folic acid 1milliGRAM(s) Oral daily  artificial  tears Solution 1Drop(s) Both EYES two times a day  ALBUTerol/ipratropium for Nebulization 3milliLiter(s) Nebulizer every 6 hours  pantoprazole  Injectable 40milliGRAM(s) IV Push daily  gabapentin   Solution 200milliGRAM(s) Oral daily  heparin  Injectable 5000Unit(s) SubCutaneous every 12 hours  predniSONE   Tablet 10milliGRAM(s) Oral daily  insulin lispro (HumaLOG) corrective regimen sliding scale  SubCutaneous Before meals and at bedtime  dextrose 5%. 1000milliLiter(s) IV Continuous <Continuous>  dextrose 50% Injectable 12.5Gram(s) IV Push once  dextrose 50% Injectable 25Gram(s) IV Push once  dextrose 50% Injectable 25Gram(s) IV Push once  potassium chloride   Powder 20milliEquivalent(s) Oral daily  insulin glargine Injectable (LANTUS) 15Unit(s) SubCutaneous every morning    MEDICATIONS  (PRN):  dextrose Gel 1Dose(s) Oral once PRN Blood Glucose LESS THAN 70 milliGRAM(s)/deciliter  glucagon  Injectable 1milliGRAM(s) IntraMuscular once PRN Glucose LESS THAN 70 milligrams/deciliter         REVIEW OF SYSTEMS:  CONSTITUTIONAL:  as per HPI  HEENT:  Eyes:  No diplopia or blurred vision. ENT:  No earache, sore throat or runny nose.  CARDIOVASCULAR:  No pressure, squeezing, strangling, tightness, heaviness or aching about the chest, neck, axilla or epigastrium.  RESPIRATORY:  No cough, shortness of breath, PND or orthopnea.  GASTROINTESTINAL:  No nausea, vomiting or diarrhea.  GENITOURINARY:  No dysuria, frequency or urgency. No Blood in urine  MUSCULOSKELETAL:  no joint aches, no muscle pain  SKIN:  No change in skin, hair or nails.  NEUROLOGIC:  No paresthesias, fasciculations, seizures or weakness.  PSYCHIATRIC:  No disorder of thought or mood.  ENDOCRINE:  No heat or cold intolerance, polyuria or polydipsia.  HEMATOLOGICAL:  No easy bruising or bleeding.           Physical Exam:  Vital Signs Last 24 Hrs  T(C): 37, Max: 37 (01-04 @ 00:00)  T(F): 98.6, Max: 98.6 (01-04 @ 00:00)  HR: 81 (62 - 81)  BP: 129/76 (120/75 - 129/76)  BP(mean): --  RR: 20 (18 - 20)  SpO2: --    GEN: NAD, pleasant, obese  HEENT: normocephalic and atraumatic. EOMI. FAUZIA.  POOR dentition  NECK: Supple.  .  LUNGS: Clear to auscultation.  HEART: Regular rate and rhythm without murmur.  ABDOMEN: Soft, nontender, and nondistended.  Positive bowel sounds.    EXTREMITIES: trace edema;  moves all extremities  NEUROLOGIC: Cranial nerves II through XII are grossly intact.  PSYCHIATRIC: Appropriate affect .  SKIN: No ulceration or induration present.      Labs:             CAPILLARY BLOOD GLUCOSE  104 (04 Jan 2017 08:21)  107 (03 Jan 2017 23:20)  113 (03 Jan 2017 18:00)  156 (03 Jan 2017 12:00)

## 2017-01-04 NOTE — PROGRESS NOTE ADULT - ATTENDING COMMENTS
PT consult  SW consult  possble d/c next week - ? Rehab
Awaiting PT consult; repeat PT consult order placed on 12/31/2016; and again today.
Plan for DC to [facility]; possible tomorrow.
Please  Call , as Needed,    Thank you,
Pt. reportedly tolerating current diet of mechanical soft with thin liquids. per Speech / Swallow

## 2017-01-04 NOTE — DISCHARGE NOTE ADULT - CARE PROVIDER_API CALL
Geremias Suarez), Infectious Disease; Internal Medicine  [address] [place] [id number]  Phone: [phone]  Fax: [phone]    Henrietta Khan (DREA), Internal Medicine; Nephrology  [address] [place] [address]  Phone: [phone]  Fax: [phone]

## 2017-01-04 NOTE — PROGRESS NOTE ADULT - PROBLEM SELECTOR PLAN 4
previously on Metformin.  contraindicated due to Low GFR.  - decreased Lantus to 15 units daily AM basal;   - sliding scale insulin for coverage  - SENT LEVEMIR rx to pharmacy to determine coverage

## 2017-01-04 NOTE — DISCHARGE NOTE ADULT - SECONDARY DIAGNOSIS.
Type 2 diabetes mellitus with stage 3 chronic kidney disease, unspecified long term insulin use status Acute respiratory failure with hypercapnia CKD (chronic kidney disease), stage 4 (severe)

## 2017-01-04 NOTE — DISCHARGE NOTE ADULT - HOSPITAL COURSE
DAVID VINCENT is a 82y Male with HPI:  83 yo M with h/o chronic HFpEF, AVR with bioprosthetic valve, recurrent hyponatremia, DM, CKD - 3 represents with one episode of lethargy. Pt was admitted last night for weakness likely 2/2 hyponatremia which improved with fluid restriction. Pt was discharged home this morning with resumption of salt tabs. After bringing the patient home, the patient's grandson left to  his medications, only to return to find the patient unresponsive. [facility] and was not able to recall about today's events.     In the ED, pt was found to be febrile Tmax 103.8   PT READMITTED WITH FEVERS - all c/s neg and afebrile  NOW BACK AT BASELINE DAUGHTER AT BEDSIDE STATES HE HAS BEEN HAVING UTI AND SEEN UROLOGIST  IN [place]  patient with h/o psychogenic polydipsia - 12 glasses fluid /day.    patient was admitted in the ICU for management   Renal consulted Free water restriction Na CL and Kcl given in addition to loop diuretics.     patient was intubated for lethary and hypercapnia.  he was extubated after gas improvd    Patient was downgraded to medical floor. Renal follow up suggest to continue KCl only.    patient seen by swallow helena and RECOMMENDED Dysphagia 2 , Mechanical SOFT Thin liquids

## 2017-01-04 NOTE — DISCHARGE NOTE ADULT - MEDICATION SUMMARY - MEDICATIONS TO STOP TAKING
I will STOP taking the medications listed below when I get home from the hospital:    Lasix  -- 40  by mouth once a day    metFORMIN 500 mg oral tablet  -- 500 tab(s) by mouth once a day    Zaroxolyn  -- 2.5 milligram(s) by mouth Monday, Wednesday, and Friday    sodium chloride 1 g oral tablet  -- 1 tab(s) by mouth 3 times a day

## 2017-01-04 NOTE — DISCHARGE NOTE ADULT - PLAN OF CARE
stab sodium continue daily Potassium supplementation   - BiWeekly Chemistries to be faxed to our office [phone] with unstable creatinine, DM teaching done in hospital. NOT good candidate for Metformin.  Levemir 15 units SC daily continue OXygen COPD and CPAP for KRYSTIN  -continue inhalers

## 2017-01-04 NOTE — DISCHARGE NOTE ADULT - MEDICATION SUMMARY - MEDICATIONS TO CHANGE
I will SWITCH the dose or number of times a day I take the medications listed below when I get home from the hospital:    gabapentin  -- 900  by mouth 2 times a day

## 2017-01-04 NOTE — DISCHARGE NOTE ADULT - PATIENT PORTAL LINK FT
“You can access the [facility] Patient Portal, offered by [facility], by registering with the following website: [email or web address]”

## 2017-01-04 NOTE — DISCHARGE NOTE ADULT - CARE PLAN
Principal Discharge DX:	Hyponatremia  Goal:	stab sodium  Instructions for follow-up, activity and diet:	continue daily Potassium supplementation   - BiWeekly Chemistries to be faxed to our office [phone]  Secondary Diagnosis:	Type 2 diabetes mellitus with stage 3 chronic kidney disease, unspecified long term insulin use status  Instructions for follow-up, activity and diet:	with unstable creatinine, DM teaching done in hospital. NOT good candidate for Metformin.  Levemir 15 units SC daily  Secondary Diagnosis:	Acute respiratory failure with hypercapnia  Instructions for follow-up, activity and diet:	continue OXygen COPD and CPAP for KRYSTIN  -continue inhalers  Secondary Diagnosis:	CKD (chronic kidney disease), stage 4 (severe) Principal Discharge DX:	Hyponatremia  Goal:	stab sodium  Instructions for follow-up, activity and diet:	continue daily Potassium supplementation   - BiWeekly Chemistries to be faxed to our office [phone]  Secondary Diagnosis:	Type 2 diabetes mellitus with stage 3 chronic kidney disease, unspecified long term insulin use status  Instructions for follow-up, activity and diet:	with unstable creatinine, DM teaching done in hospital. NOT good candidate for Metformin.  Levemir 15 units SC daily  Secondary Diagnosis:	Acute respiratory failure with hypercapnia  Instructions for follow-up, activity and diet:	continue OXygen COPD and CPAP for KRYSTIN  -continue inhalers  Secondary Diagnosis:	CKD (chronic kidney disease), stage 4 (severe) Principal Discharge DX:	Hyponatremia  Goal:	stab sodium  Instructions for follow-up, activity and diet:	continue daily Potassium supplementation   - BiWeekly Chemistries to be faxed to our office [phone]  Secondary Diagnosis:	Type 2 diabetes mellitus with stage 3 chronic kidney disease, unspecified long term insulin use status  Instructions for follow-up, activity and diet:	with unstable creatinine, DM teaching done in hospital. NOT good candidate for Metformin.  Levemir 15 units SC daily  Secondary Diagnosis:	Acute respiratory failure with hypercapnia  Instructions for follow-up, activity and diet:	continue OXygen COPD and CPAP for KRYSTIN  -continue inhalers  Secondary Diagnosis:	CKD (chronic kidney disease), stage 4 (severe)

## 2017-01-04 NOTE — DISCHARGE NOTE ADULT - MEDICATION SUMMARY - MEDICATIONS TO TAKE
I will START or STAY ON the medications listed below when I get home from the hospital:    DME  -- 4L into nose OXYGEN  DX:COPD AND KRYSTIN  CONTINUOUS AND PORTABLE    -- Indication: For COPD    aspirin 81 mg oral delayed release tablet  -- 1 tab(s) by mouth once a day  -- Indication: For Chronic diastolic (congestive) heart failure    Cordarone 200 mg oral tablet  -- 1 tab(s) by mouth once a day  -- Indication: For Chronic diastolic (congestive) heart failure    Neurontin 300 mg oral capsule  -- 1 cap(s) by mouth 3 times a day  -- Indication: For neuropathy    insulin lispro 100 units/mL subcutaneous solution  --  subcutaneous 4 times a day (before meals and at bedtime); 2 Unit(s) if Glucose 151 - 200  4 Unit(s) if Glucose 201 - 250  6 Unit(s) if Glucose 251 - 300  8 Unit(s) if Glucose 301 - 350  10 Unit(s) if Glucose 351 - 400  12 Unit(s) if Glucose Greater Than 400  -- Indication: For Diabetes    Levemir FlexPen 100 units/mL subcutaneous solution  -- 15 unit(s) subcutaneous once a day MDD:dispense 5 pens  -- Check with your doctor before becoming pregnant.  Do not drink alcoholic beverages when taking this medication.  Keep in refrigerator.  Do not freeze.    -- Indication: For Diabetes    lovastatin 40 mg oral tablet  -- 1 tab(s) by mouth once a day  -- Indication: For Chronic diastolic (congestive) heart failure    Spiriva 18 mcg inhalation capsule  -- 1 cap(s) inhaled once a day  -- Indication: For COPD    Breo Ellipta 200 mcg-25 mcg/inh inhalation powder  -- 1 puff(s) inhaled once a day  -- Indication: For COPD    lactulose 10 g/15 mL oral syrup  -- 15 milliliter(s) by mouth once a day  -- Indication: For Constipation    Klor-Con 20 mEq oral powder for reconstitution  -- 1 packet(s) by mouth once a day  -- Indication: For Hyponatremia    ocular lubricant ophthalmic solution  -- 1 drop(s) to each affected eye 2 times a day  -- Indication: For Eye    Protonix 40 mg oral delayed release tablet  -- 1 tab(s) by mouth once a day  -- Indication: For GERD    folic acid 0.8 mg oral tablet  -- 1 tab(s) by mouth once a day  -- Indication: For Supplment

## 2017-01-05 NOTE — PROGRESS NOTE ADULT - SUBJECTIVE AND OBJECTIVE BOX
DAVID VINCENT is a 82y Male with HPI:  83 yo M with h/o chronic HFpEF, AVR with bioprosthetic valve, recurrent hyponatremia, DM, CKD - 3 represents with one episode of lethargy. Pt was admitted last night for weakness likely 2/2 hyponatremia which improved with fluid restriction. Pt was discharged home this morning with resumption of salt tabs. In the ED, pt was found to be febrile Tmax 103.8 and chest xray showed possible LLL pneumonia. He received vanc and zosyn.   PT CLINICALLY IMPROVED STABLE FOR TRANSFER TO REHAB        Allergies:  No Known Allergies      Medications:  amiodarone    Tablet 200milliGRAM(s) Oral daily  aspirin enteric coated 81milliGRAM(s) Oral daily  folic acid 1milliGRAM(s) Oral daily  artificial  tears Solution 1Drop(s) Both EYES two times a day  ALBUTerol/ipratropium for Nebulization 3milliLiter(s) Nebulizer every 6 hours  pantoprazole  Injectable 40milliGRAM(s) IV Push daily  gabapentin   Solution 200milliGRAM(s) Oral daily  heparin  Injectable 5000Unit(s) SubCutaneous every 12 hours  predniSONE   Tablet 10milliGRAM(s) Oral daily  insulin lispro (HumaLOG) corrective regimen sliding scale  SubCutaneous Before meals and at bedtime  dextrose 5%. 1000milliLiter(s) IV Continuous <Continuous>  dextrose Gel 1Dose(s) Oral once PRN  dextrose 50% Injectable 12.5Gram(s) IV Push once  dextrose 50% Injectable 25Gram(s) IV Push once  dextrose 50% Injectable 25Gram(s) IV Push once  glucagon  Injectable 1milliGRAM(s) IntraMuscular once PRN  potassium chloride   Powder 20milliEquivalent(s) Oral daily  insulin glargine Injectable (LANTUS) 15Unit(s) SubCutaneous every morning  gabapentin 300milliGRAM(s) Oral three times a day      ANTIBIOTICS:         Review of Systems: - Negative except as mentioned above     Physical Exam:  ICU Vital Signs Last 24 Hrs  T(C): 36.8, Max: 36.8 (01-04 @ 23:02)  T(F): 98.2, Max: 98.2 (01-04 @ 23:02)  HR: 77 (65 - 90)  BP: 144/83 (125/71 - 144/83)  BP(mean): --  ABP: --  ABP(mean): --  RR: 20 (19 - 20)  SpO2: 98% (95% - 98%)    GEN: NAD, pleasant  HEENT: normocephalic and atraumatic. EOMI. FAUZIA...  NECK: Supple. No carotid bruits.  No lymphadenopathy or thyromegaly.  LUNGS: Clear to auscultation.  HEART: Regular rate and rhythm without murmur.  ABDOMEN: Soft, nontender, and nondistended.  Positive bowel sounds.  No hepatosplenomegaly was noted.  NO REBOUND NO GUARDING  EXTREMITIES: Without any cyanosis, clubbing, rash, lesions or edema.  NEUROLOGIC: Cranial nerves II through XII are grossly intact.    SKIN: No ulceration or induration present.      Labs:                      CAPILLARY BLOOD GLUCOSE  83 (04 Jan 2017 22:38)  120 (04 Jan 2017 16:33)  160 (04 Jan 2017 11:39)        RECENT CULTURES:

## 2017-01-06 NOTE — PROGRESS NOTE ADULT - PROBLEM SELECTOR PROBLEM 2
CKD (chronic kidney disease), stage 4 (severe)
Polydipsia
CKD (chronic kidney disease), stage 4 (severe)
CKD (chronic kidney disease), stage 4 (severe)
Stage 3 chronic kidney disease
Type 2 diabetes mellitus with diabetic chronic kidney disease, unspecified CKD stage, unspecified long term insulin use status
Type 2 diabetes mellitus with diabetic chronic kidney disease, unspecified CKD stage, unspecified long term insulin use status

## 2017-01-06 NOTE — PROGRESS NOTE ADULT - PROBLEM SELECTOR PLAN 2
Not an active concern,
Not an active concern,
Not an active concern
Not an active concern
- continue SS insulin
Renal following   Getting lasix   May require rico hydration
SSI
increased creatinine watch urine output, renall dose meds
Not an active concern,

## 2017-01-06 NOTE — PROGRESS NOTE ADULT - PROBLEM SELECTOR PLAN 3
improving renal function  - avoid nephrotoxic agents  - lasix from outpatient stopped
improving renal function  - avoid nephrotoxic agents  - Lasix from outpatient stopped , To Use PRN
Fluid restriction for now   cont to monitor
continue current management
holding lasix needed fluids overnight, hyperchloremic metabolic acidosis on kae of CO2 retention
stable on current management./
improving renal function  - avoid nephrotoxic agents  - lasix from outpatient stopped

## 2017-01-06 NOTE — PROGRESS NOTE ADULT - PROBLEM SELECTOR PROBLEM 5
Hypertension
Hyponatremia
Type 2 diabetes mellitus with diabetic chronic kidney disease, unspecified CKD stage, unspecified long term insulin use status

## 2017-01-06 NOTE — PROGRESS NOTE ADULT - PROBLEM SELECTOR PROBLEM 1
Hyponatremia
Polydipsia
Hyponatremia
Acute respiratory failure with hypercapnia
Hyponatremia
Polydipsia

## 2017-01-06 NOTE — PROGRESS NOTE ADULT - PROBLEM SELECTOR PROBLEM 6
Acute respiratory failure with hypercapnia
Dysphagia

## 2017-01-06 NOTE — PROGRESS NOTE ADULT - PROBLEM SELECTOR PROBLEM 4
Diabetes
Hyponatremia
Stage 3 chronic kidney disease

## 2017-01-06 NOTE — PROGRESS NOTE ADULT - PROBLEM SELECTOR PLAN 6
resolved hypercapnia  - continue nocturnal bipap
dysphagia diet speech and swallow evaluated
resolved hypercapnia  - continue nocturnal bipap

## 2017-01-06 NOTE — PROGRESS NOTE ADULT - PROVIDER SPECIALTY LIST ADULT
Critical Care
Infectious Disease
Internal Medicine
Nephrology
Nephrology
Internal Medicine
Nephrology
Nephrology

## 2017-01-06 NOTE — PROGRESS NOTE ADULT - PROBLEM SELECTOR PROBLEM 3
CKD (chronic kidney disease), stage 4 (severe)
Chronic diastolic (congestive) heart failure
CKD (chronic kidney disease), stage 4 (severe)
Chronic diastolic (congestive) heart failure
Chronic diastolic (congestive) heart failure
Chronic systolic CHF (congestive heart failure)
Essential hypertension
Hyponatremia

## 2017-01-10 ENCOUNTER — APPOINTMENT (OUTPATIENT)
Dept: INTERNAL MEDICINE | Facility: CLINIC | Age: 82
End: 2017-01-10

## 2017-02-14 ENCOUNTER — APPOINTMENT (OUTPATIENT)
Dept: INTERNAL MEDICINE | Facility: CLINIC | Age: 82
End: 2017-02-14

## 2017-04-11 ENCOUNTER — APPOINTMENT (OUTPATIENT)
Dept: INTERNAL MEDICINE | Facility: CLINIC | Age: 82
End: 2017-04-11

## 2017-09-27 ENCOUNTER — APPOINTMENT (OUTPATIENT)
Dept: INTERNAL MEDICINE | Facility: CLINIC | Age: 82
End: 2017-09-27
Payer: MEDICARE

## 2017-09-27 PROCEDURE — 90686 IIV4 VACC NO PRSV 0.5 ML IM: CPT

## 2017-09-27 PROCEDURE — G0008: CPT

## 2017-09-27 PROCEDURE — 99214 OFFICE O/P EST MOD 30 MIN: CPT | Mod: 25

## 2017-09-27 PROCEDURE — 36415 COLL VENOUS BLD VENIPUNCTURE: CPT

## 2017-10-02 ENCOUNTER — APPOINTMENT (OUTPATIENT)
Dept: ORTHOPEDIC SURGERY | Facility: CLINIC | Age: 82
End: 2017-10-02
Payer: MEDICARE

## 2017-10-02 VITALS
BODY MASS INDEX: 40.15 KG/M2 | WEIGHT: 241 LBS | DIASTOLIC BLOOD PRESSURE: 82 MMHG | HEIGHT: 65 IN | SYSTOLIC BLOOD PRESSURE: 116 MMHG | HEART RATE: 84 BPM

## 2017-10-02 PROCEDURE — 20610 DRAIN/INJ JOINT/BURSA W/O US: CPT | Mod: RT

## 2017-10-02 PROCEDURE — 99213 OFFICE O/P EST LOW 20 MIN: CPT | Mod: 25

## 2017-10-13 ENCOUNTER — EMERGENCY (EMERGENCY)
Facility: HOSPITAL | Age: 82
LOS: 1 days | Discharge: HOME | End: 2017-10-13
Attending: EMERGENCY MEDICINE
Payer: MEDICARE

## 2017-10-13 VITALS
DIASTOLIC BLOOD PRESSURE: 88 MMHG | TEMPERATURE: 98 F | SYSTOLIC BLOOD PRESSURE: 144 MMHG | HEART RATE: 64 BPM | OXYGEN SATURATION: 98 %

## 2017-10-13 VITALS — HEIGHT: 65 IN | WEIGHT: 240.08 LBS

## 2017-10-13 DIAGNOSIS — Z98.89 OTHER SPECIFIED POSTPROCEDURAL STATES: Chronic | ICD-10-CM

## 2017-10-13 PROCEDURE — 99284 EMERGENCY DEPT VISIT MOD MDM: CPT | Mod: 25

## 2017-10-13 PROCEDURE — 99284 EMERGENCY DEPT VISIT MOD MDM: CPT

## 2017-10-13 PROCEDURE — 96374 THER/PROPH/DIAG INJ IV PUSH: CPT

## 2017-10-13 PROCEDURE — 96375 TX/PRO/DX INJ NEW DRUG ADDON: CPT

## 2017-10-13 RX ORDER — FAMOTIDINE 10 MG/ML
20 INJECTION INTRAVENOUS ONCE
Qty: 0 | Refills: 0 | Status: COMPLETED | OUTPATIENT
Start: 2017-10-13 | End: 2017-10-13

## 2017-10-13 RX ORDER — DIPHENHYDRAMINE HCL 50 MG
25 CAPSULE ORAL ONCE
Qty: 0 | Refills: 0 | Status: COMPLETED | OUTPATIENT
Start: 2017-10-13 | End: 2017-10-13

## 2017-10-13 RX ORDER — FAMOTIDINE 10 MG/ML
1 INJECTION INTRAVENOUS
Qty: 20 | Refills: 0 | OUTPATIENT
Start: 2017-10-13 | End: 2017-10-23

## 2017-10-13 RX ORDER — DIPHENHYDRAMINE HCL 50 MG
1 CAPSULE ORAL
Qty: 30 | Refills: 0 | OUTPATIENT
Start: 2017-10-13 | End: 2017-10-23

## 2017-10-13 RX ADMIN — Medication 125 MILLIGRAM(S): at 16:27

## 2017-10-13 RX ADMIN — Medication 25 MILLIGRAM(S): at 16:27

## 2017-10-13 RX ADMIN — FAMOTIDINE 20 MILLIGRAM(S): 10 INJECTION INTRAVENOUS at 16:27

## 2017-10-13 NOTE — ED ADULT NURSE NOTE - OBJECTIVE STATEMENT
Pt A&OX3, states he was eating a donut and did not see a bee was on donut when he bit into it.  Bee stung pt on lower lip.  Lip swelling has been increasing since bite.  Pt also has swelling to left cheek.  Pt denies any difficulty swallowing or breathing.  Clear bsb, abd soft nondistended, nontender, moving all ext well.   Pt is O2 dependent at home, 2LNC maintained.

## 2017-10-13 NOTE — ED ADULT NURSE REASSESSMENT NOTE - NS ED NURSE REASSESS COMMENT FT1
patient rec'd at this time. patient alert and cooperative nasal o2 in use.  family states that dr was just by and states that6 she was goiong to sent patient home. awaiting on discharge papers.  patient deneis any pain or discomfort will monitor and cahrt changes

## 2017-10-13 NOTE — ED ADULT NURSE NOTE - CHPI ED SYMPTOMS NEG
no shortness of breath/no difficulty swallowing/no nausea/no difficulty breathing/no rash/no throat itching/no wheezing/no cough/no vomiting

## 2017-10-13 NOTE — ED ADULT TRIAGE NOTE - CHIEF COMPLAINT QUOTE
pt was stung by yellow jacket about 1 hour ago. pt has swelling to lower lip, ice applied prior to arrival. no resp  distress, no wheezing no hives

## 2017-10-13 NOTE — ED PROVIDER NOTE - ENMT, MLM
Airway patent, Nasal mucosa clear. Mouth with normal mucosa. Throat has no vesicles, no oropharyngeal exudates and uvula is midline. No edema of hypopharynx. Uvula midline.

## 2017-10-13 NOTE — ED PROVIDER NOTE - MEDICAL DECISION MAKING DETAILS
Pt with localized edema from sting versus mild allergic rxn. Steroids, Benadryl, and Pepcid. Reevaluate.

## 2017-10-13 NOTE — ED PROVIDER NOTE - OBJECTIVE STATEMENT
84 y/o M pt with PMHx of cardiomyopathy, CKD, DM, and HTN presents to ED with lip and left sided face swelling. Pt was eating a piece of cake that had a bee on it and bee stung his lip. Denies any difficulty brething, rash, hives, and states that his face is becoming increasingly more swollen since the sting.

## 2017-11-13 ENCOUNTER — RX RENEWAL (OUTPATIENT)
Age: 82
End: 2017-11-13

## 2017-11-16 ENCOUNTER — MEDICATION RENEWAL (OUTPATIENT)
Age: 82
End: 2017-11-16

## 2017-12-24 ENCOUNTER — INPATIENT (INPATIENT)
Facility: HOSPITAL | Age: 82
LOS: 2 days | Discharge: HOME | DRG: 202 | End: 2017-12-27
Attending: HOSPITALIST | Admitting: HOSPITALIST
Payer: MEDICARE

## 2017-12-24 VITALS
OXYGEN SATURATION: 91 % | RESPIRATION RATE: 22 BRPM | HEART RATE: 91 BPM | HEIGHT: 64 IN | SYSTOLIC BLOOD PRESSURE: 119 MMHG | WEIGHT: 220.02 LBS | DIASTOLIC BLOOD PRESSURE: 72 MMHG | TEMPERATURE: 99 F

## 2017-12-24 DIAGNOSIS — J18.9 PNEUMONIA, UNSPECIFIED ORGANISM: ICD-10-CM

## 2017-12-24 DIAGNOSIS — J44.9 CHRONIC OBSTRUCTIVE PULMONARY DISEASE, UNSPECIFIED: ICD-10-CM

## 2017-12-24 DIAGNOSIS — Z95.2 PRESENCE OF PROSTHETIC HEART VALVE: ICD-10-CM

## 2017-12-24 DIAGNOSIS — Z98.89 OTHER SPECIFIED POSTPROCEDURAL STATES: Chronic | ICD-10-CM

## 2017-12-24 DIAGNOSIS — E11.9 TYPE 2 DIABETES MELLITUS WITHOUT COMPLICATIONS: ICD-10-CM

## 2017-12-24 DIAGNOSIS — I10 ESSENTIAL (PRIMARY) HYPERTENSION: ICD-10-CM

## 2017-12-24 DIAGNOSIS — I48.0 PAROXYSMAL ATRIAL FIBRILLATION: ICD-10-CM

## 2017-12-24 DIAGNOSIS — E78.5 HYPERLIPIDEMIA, UNSPECIFIED: ICD-10-CM

## 2017-12-24 DIAGNOSIS — I48.91 UNSPECIFIED ATRIAL FIBRILLATION: ICD-10-CM

## 2017-12-24 DIAGNOSIS — J18.1 LOBAR PNEUMONIA, UNSPECIFIED ORGANISM: ICD-10-CM

## 2017-12-24 LAB
ACANTHOCYTES BLD QL SMEAR: SLIGHT — SIGNIFICANT CHANGE UP
ALBUMIN SERPL ELPH-MCNC: 3.6 G/DL — SIGNIFICANT CHANGE UP (ref 3.3–5.2)
ALP SERPL-CCNC: 81 U/L — SIGNIFICANT CHANGE UP (ref 40–120)
ALT FLD-CCNC: 11 U/L — SIGNIFICANT CHANGE UP
ANION GAP SERPL CALC-SCNC: 14 MMOL/L — SIGNIFICANT CHANGE UP (ref 5–17)
ANISOCYTOSIS BLD QL: SLIGHT — SIGNIFICANT CHANGE UP
APPEARANCE UR: CLEAR — SIGNIFICANT CHANGE UP
AST SERPL-CCNC: 25 U/L — SIGNIFICANT CHANGE UP
BACTERIA # UR AUTO: ABNORMAL
BASOPHILS NFR BLD AUTO: 1 % — SIGNIFICANT CHANGE UP (ref 0–2)
BILIRUB SERPL-MCNC: 0.9 MG/DL — SIGNIFICANT CHANGE UP (ref 0.4–2)
BILIRUB UR-MCNC: NEGATIVE — SIGNIFICANT CHANGE UP
BUN SERPL-MCNC: 24 MG/DL — HIGH (ref 8–20)
CALCIUM SERPL-MCNC: 8.7 MG/DL — SIGNIFICANT CHANGE UP (ref 8.6–10.2)
CHLORIDE SERPL-SCNC: 96 MMOL/L — LOW (ref 98–107)
CO2 SERPL-SCNC: 29 MMOL/L — SIGNIFICANT CHANGE UP (ref 22–29)
COLOR SPEC: YELLOW — SIGNIFICANT CHANGE UP
CREAT SERPL-MCNC: 1.67 MG/DL — HIGH (ref 0.5–1.3)
DACRYOCYTES BLD QL SMEAR: SLIGHT — SIGNIFICANT CHANGE UP
DIFF PNL FLD: ABNORMAL
ELLIPTOCYTES BLD QL SMEAR: SLIGHT — SIGNIFICANT CHANGE UP
EOSINOPHIL NFR BLD AUTO: 1 % — SIGNIFICANT CHANGE UP (ref 0–6)
EPI CELLS # UR: SIGNIFICANT CHANGE UP
FERRITIN SERPL-MCNC: 22.7 NG/ML — LOW (ref 30–400)
FOLATE SERPL-MCNC: >20 NG/ML — HIGH (ref 4–16)
GLUCOSE BLDC GLUCOMTR-MCNC: 129 MG/DL — HIGH (ref 70–99)
GLUCOSE BLDC GLUCOMTR-MCNC: 139 MG/DL — HIGH (ref 70–99)
GLUCOSE BLDC GLUCOMTR-MCNC: 146 MG/DL — HIGH (ref 70–99)
GLUCOSE BLDC GLUCOMTR-MCNC: 161 MG/DL — HIGH (ref 70–99)
GLUCOSE SERPL-MCNC: 102 MG/DL — SIGNIFICANT CHANGE UP (ref 70–115)
GLUCOSE UR QL: NEGATIVE MG/DL — SIGNIFICANT CHANGE UP
HCT VFR BLD CALC: 27.8 % — LOW (ref 42–52)
HGB BLD-MCNC: 8.4 G/DL — LOW (ref 14–18)
HYPOCHROMIA BLD QL: SLIGHT — SIGNIFICANT CHANGE UP
IRON SATN MFR SERPL: 14 % — LOW (ref 16–55)
IRON SATN MFR SERPL: 58 UG/DL — LOW (ref 59–158)
KETONES UR-MCNC: NEGATIVE — SIGNIFICANT CHANGE UP
LACTATE BLDV-MCNC: 1.3 MMOL/L — SIGNIFICANT CHANGE UP (ref 0.5–2)
LEUKOCYTE ESTERASE UR-ACNC: ABNORMAL
LYMPHOCYTES # BLD AUTO: 6 % — LOW (ref 20–55)
MACROCYTES BLD QL: SLIGHT — SIGNIFICANT CHANGE UP
MCHC RBC-ENTMCNC: 25.4 PG — LOW (ref 27–31)
MCHC RBC-ENTMCNC: 30.2 G/DL — LOW (ref 32–36)
MCV RBC AUTO: 84 FL — SIGNIFICANT CHANGE UP (ref 80–94)
MICROCYTES BLD QL: SLIGHT — SIGNIFICANT CHANGE UP
MONOCYTES NFR BLD AUTO: 3 % — SIGNIFICANT CHANGE UP (ref 3–10)
NEUTROPHILS NFR BLD AUTO: 87 % — HIGH (ref 37–73)
NEUTS BAND # BLD: 1 % — SIGNIFICANT CHANGE UP (ref 0–8)
NITRITE UR-MCNC: POSITIVE
OB PNL STL: POSITIVE
OVALOCYTES BLD QL SMEAR: SLIGHT — SIGNIFICANT CHANGE UP
PH UR: 6 — SIGNIFICANT CHANGE UP (ref 5–8)
PLAT MORPH BLD: NORMAL — SIGNIFICANT CHANGE UP
PLATELET # BLD AUTO: 138 K/UL — LOW (ref 150–400)
POIKILOCYTOSIS BLD QL AUTO: SIGNIFICANT CHANGE UP
POTASSIUM SERPL-MCNC: 4.5 MMOL/L — SIGNIFICANT CHANGE UP (ref 3.5–5.3)
POTASSIUM SERPL-SCNC: 4.5 MMOL/L — SIGNIFICANT CHANGE UP (ref 3.5–5.3)
PROT SERPL-MCNC: 6.4 G/DL — LOW (ref 6.6–8.7)
PROT UR-MCNC: 15 MG/DL
RAPID RVP RESULT: SIGNIFICANT CHANGE UP
RBC # BLD: 3.31 M/UL — LOW (ref 4.6–6.2)
RBC # FLD: 16.4 % — HIGH (ref 11–15.6)
RBC BLD AUTO: ABNORMAL
RBC CASTS # UR COMP ASSIST: ABNORMAL /HPF (ref 0–4)
RETICS #: 6.4 K/UL — LOW (ref 25–125)
RETICS/RBC NFR: 1.9 % — SIGNIFICANT CHANGE UP (ref 0.5–2.5)
SCHISTOCYTES BLD QL AUTO: SLIGHT — SIGNIFICANT CHANGE UP
SODIUM SERPL-SCNC: 139 MMOL/L — SIGNIFICANT CHANGE UP (ref 135–145)
SP GR SPEC: 1.01 — SIGNIFICANT CHANGE UP (ref 1.01–1.02)
TARGETS BLD QL SMEAR: SLIGHT — SIGNIFICANT CHANGE UP
TIBC SERPL-MCNC: 422 UG/DL — SIGNIFICANT CHANGE UP (ref 220–430)
TRANSFERRIN SERPL-MCNC: 295 MG/DL — SIGNIFICANT CHANGE UP (ref 180–329)
UROBILINOGEN FLD QL: 1 MG/DL
VARIANT LYMPHS # BLD: 1 % — SIGNIFICANT CHANGE UP (ref 0–6)
VIT B12 SERPL-MCNC: 535 PG/ML — SIGNIFICANT CHANGE UP (ref 180–914)
WBC # BLD: 9.5 K/UL — SIGNIFICANT CHANGE UP (ref 4.8–10.8)
WBC # FLD AUTO: 9.5 K/UL — SIGNIFICANT CHANGE UP (ref 4.8–10.8)
WBC UR QL: >50

## 2017-12-24 PROCEDURE — 99223 1ST HOSP IP/OBS HIGH 75: CPT

## 2017-12-24 PROCEDURE — 93010 ELECTROCARDIOGRAM REPORT: CPT

## 2017-12-24 PROCEDURE — 99222 1ST HOSP IP/OBS MODERATE 55: CPT

## 2017-12-24 PROCEDURE — 71020: CPT | Mod: 26

## 2017-12-24 PROCEDURE — 71250 CT THORAX DX C-: CPT | Mod: 26

## 2017-12-24 RX ORDER — AZITHROMYCIN 500 MG/1
500 TABLET, FILM COATED ORAL ONCE
Qty: 0 | Refills: 0 | Status: COMPLETED | OUTPATIENT
Start: 2017-12-24 | End: 2017-12-24

## 2017-12-24 RX ORDER — AMIODARONE HYDROCHLORIDE 400 MG/1
200 TABLET ORAL DAILY
Qty: 0 | Refills: 0 | Status: DISCONTINUED | OUTPATIENT
Start: 2017-12-24 | End: 2017-12-27

## 2017-12-24 RX ORDER — GLUCAGON INJECTION, SOLUTION 0.5 MG/.1ML
1 INJECTION, SOLUTION SUBCUTANEOUS ONCE
Qty: 0 | Refills: 0 | Status: DISCONTINUED | OUTPATIENT
Start: 2017-12-24 | End: 2017-12-27

## 2017-12-24 RX ORDER — IPRATROPIUM/ALBUTEROL SULFATE 18-103MCG
3 AEROSOL WITH ADAPTER (GRAM) INHALATION ONCE
Qty: 0 | Refills: 0 | Status: COMPLETED | OUTPATIENT
Start: 2017-12-24 | End: 2017-12-24

## 2017-12-24 RX ORDER — CEFTRIAXONE 500 MG/1
1 INJECTION, POWDER, FOR SOLUTION INTRAMUSCULAR; INTRAVENOUS EVERY 24 HOURS
Qty: 0 | Refills: 0 | Status: DISCONTINUED | OUTPATIENT
Start: 2017-12-24 | End: 2017-12-27

## 2017-12-24 RX ORDER — INSULIN LISPRO 100/ML
VIAL (ML) SUBCUTANEOUS
Qty: 0 | Refills: 0 | Status: DISCONTINUED | OUTPATIENT
Start: 2017-12-24 | End: 2017-12-27

## 2017-12-24 RX ORDER — DEXTROSE 50 % IN WATER 50 %
12.5 SYRINGE (ML) INTRAVENOUS ONCE
Qty: 0 | Refills: 0 | Status: DISCONTINUED | OUTPATIENT
Start: 2017-12-24 | End: 2017-12-27

## 2017-12-24 RX ORDER — SACCHAROMYCES BOULARDII 250 MG
250 POWDER IN PACKET (EA) ORAL
Qty: 0 | Refills: 0 | Status: DISCONTINUED | OUTPATIENT
Start: 2017-12-24 | End: 2017-12-27

## 2017-12-24 RX ORDER — FOLIC ACID 0.8 MG
1 TABLET ORAL DAILY
Qty: 0 | Refills: 0 | Status: DISCONTINUED | OUTPATIENT
Start: 2017-12-24 | End: 2017-12-27

## 2017-12-24 RX ORDER — ONDANSETRON 8 MG/1
4 TABLET, FILM COATED ORAL EVERY 6 HOURS
Qty: 0 | Refills: 0 | Status: DISCONTINUED | OUTPATIENT
Start: 2017-12-24 | End: 2017-12-27

## 2017-12-24 RX ORDER — AZITHROMYCIN 500 MG/1
500 TABLET, FILM COATED ORAL DAILY
Qty: 0 | Refills: 0 | Status: COMPLETED | OUTPATIENT
Start: 2017-12-24 | End: 2017-12-27

## 2017-12-24 RX ORDER — BUDESONIDE AND FORMOTEROL FUMARATE DIHYDRATE 160; 4.5 UG/1; UG/1
2 AEROSOL RESPIRATORY (INHALATION)
Qty: 0 | Refills: 0 | Status: DISCONTINUED | OUTPATIENT
Start: 2017-12-24 | End: 2017-12-27

## 2017-12-24 RX ORDER — SODIUM CHLORIDE 9 MG/ML
1000 INJECTION INTRAMUSCULAR; INTRAVENOUS; SUBCUTANEOUS ONCE
Qty: 0 | Refills: 0 | Status: COMPLETED | OUTPATIENT
Start: 2017-12-24 | End: 2017-12-24

## 2017-12-24 RX ORDER — ACETAMINOPHEN 500 MG
650 TABLET ORAL EVERY 6 HOURS
Qty: 0 | Refills: 0 | Status: DISCONTINUED | OUTPATIENT
Start: 2017-12-24 | End: 2017-12-27

## 2017-12-24 RX ORDER — ACETAMINOPHEN 500 MG
650 TABLET ORAL ONCE
Qty: 0 | Refills: 0 | Status: COMPLETED | OUTPATIENT
Start: 2017-12-24 | End: 2017-12-24

## 2017-12-24 RX ORDER — SODIUM CHLORIDE 9 MG/ML
1000 INJECTION, SOLUTION INTRAVENOUS
Qty: 0 | Refills: 0 | Status: DISCONTINUED | OUTPATIENT
Start: 2017-12-24 | End: 2017-12-27

## 2017-12-24 RX ORDER — CEFTRIAXONE 500 MG/1
1 INJECTION, POWDER, FOR SOLUTION INTRAMUSCULAR; INTRAVENOUS ONCE
Qty: 0 | Refills: 0 | Status: COMPLETED | OUTPATIENT
Start: 2017-12-24 | End: 2017-12-24

## 2017-12-24 RX ORDER — ATORVASTATIN CALCIUM 80 MG/1
10 TABLET, FILM COATED ORAL AT BEDTIME
Qty: 0 | Refills: 0 | Status: DISCONTINUED | OUTPATIENT
Start: 2017-12-24 | End: 2017-12-27

## 2017-12-24 RX ORDER — DEXTROSE 50 % IN WATER 50 %
1 SYRINGE (ML) INTRAVENOUS ONCE
Qty: 0 | Refills: 0 | Status: DISCONTINUED | OUTPATIENT
Start: 2017-12-24 | End: 2017-12-27

## 2017-12-24 RX ORDER — SODIUM CHLORIDE 9 MG/ML
3 INJECTION INTRAMUSCULAR; INTRAVENOUS; SUBCUTANEOUS EVERY 8 HOURS
Qty: 0 | Refills: 0 | Status: DISCONTINUED | OUTPATIENT
Start: 2017-12-24 | End: 2017-12-27

## 2017-12-24 RX ADMIN — Medication 650 MILLIGRAM(S): at 03:15

## 2017-12-24 RX ADMIN — BUDESONIDE AND FORMOTEROL FUMARATE DIHYDRATE 2 PUFF(S): 160; 4.5 AEROSOL RESPIRATORY (INHALATION) at 08:55

## 2017-12-24 RX ADMIN — Medication 40 MILLIGRAM(S): at 06:33

## 2017-12-24 RX ADMIN — Medication 250 MILLIGRAM(S): at 17:14

## 2017-12-24 RX ADMIN — Medication 250 MILLIGRAM(S): at 06:51

## 2017-12-24 RX ADMIN — AMIODARONE HYDROCHLORIDE 200 MILLIGRAM(S): 400 TABLET ORAL at 06:51

## 2017-12-24 RX ADMIN — SODIUM CHLORIDE 1000 MILLILITER(S): 9 INJECTION INTRAMUSCULAR; INTRAVENOUS; SUBCUTANEOUS at 03:15

## 2017-12-24 RX ADMIN — Medication 3 MILLILITER(S): at 06:39

## 2017-12-24 RX ADMIN — SODIUM CHLORIDE 3 MILLILITER(S): 9 INJECTION INTRAMUSCULAR; INTRAVENOUS; SUBCUTANEOUS at 21:24

## 2017-12-24 RX ADMIN — SODIUM CHLORIDE 3 MILLILITER(S): 9 INJECTION INTRAMUSCULAR; INTRAVENOUS; SUBCUTANEOUS at 06:30

## 2017-12-24 RX ADMIN — BUDESONIDE AND FORMOTEROL FUMARATE DIHYDRATE 2 PUFF(S): 160; 4.5 AEROSOL RESPIRATORY (INHALATION) at 20:51

## 2017-12-24 RX ADMIN — CEFTRIAXONE 100 GRAM(S): 500 INJECTION, POWDER, FOR SOLUTION INTRAMUSCULAR; INTRAVENOUS at 06:32

## 2017-12-24 RX ADMIN — Medication 1 MILLIGRAM(S): at 11:04

## 2017-12-24 RX ADMIN — Medication 650 MILLIGRAM(S): at 17:14

## 2017-12-24 RX ADMIN — ATORVASTATIN CALCIUM 10 MILLIGRAM(S): 80 TABLET, FILM COATED ORAL at 21:24

## 2017-12-24 RX ADMIN — Medication 650 MILLIGRAM(S): at 03:45

## 2017-12-24 RX ADMIN — AZITHROMYCIN 255 MILLIGRAM(S): 500 TABLET, FILM COATED ORAL at 06:32

## 2017-12-24 RX ADMIN — SODIUM CHLORIDE 3 MILLILITER(S): 9 INJECTION INTRAMUSCULAR; INTRAVENOUS; SUBCUTANEOUS at 15:24

## 2017-12-24 RX ADMIN — SODIUM CHLORIDE 1000 MILLILITER(S): 9 INJECTION INTRAMUSCULAR; INTRAVENOUS; SUBCUTANEOUS at 06:32

## 2017-12-24 RX ADMIN — AZITHROMYCIN 500 MILLIGRAM(S): 500 TABLET, FILM COATED ORAL at 11:04

## 2017-12-24 NOTE — H&P ADULT - PROBLEM SELECTOR PLAN 2
Patient with significant anemia, which patient and family report as new. Patient started on Xarelto 1 month ago for afib, and on ASA. No reports of BRBPR/Black stool. Reports normal colonoscopy 10 years ago, no hx of PUD/GERD. Check occult  blood in stool, retic count, iron studies. Hold asa/xarelto for now. May need GI eval if occult pos. No indication for transfusion at this time.

## 2017-12-24 NOTE — H&P ADULT - ASSESSMENT
82 y/o male with LLL PNA w/ elevated pneumonia severity index, hx of HTN, HLD, DM-2, COPD, chronic hypoxia, Afib

## 2017-12-24 NOTE — ED PROVIDER NOTE - OBJECTIVE STATEMENT
82 y/o male with PMHx COPD presents to the ED with c/o shoulder pain, onset 2300 yesterday. Pt reports sore throat, non-productive cough for the past few days, SOB, and fever. Pt attempted to alleviate with OTC medication and nebulizer treatment. Pt denies n/v, and any other acute symptoms and complaints at this time.   PMD: Dr. Suarez  Cardio: Dr. Bond 82 y/o male with PMHx COPD on 2.5L home O2, no chronic steroids or h/o intubation, Afib on xarelto, presents to the ED with c/o shoulder pain, onset 2300 yesterday. Pt reports sore throat,  productive cough for the past few days, SOB, and fever. Pt attempted to alleviate with OTC medication and nebulizer treatment. Not currently SOB at this time. Daughter reports finding pt with shaking chills tonight. Pt denies n/v, and any other acute symptoms and complaints at this time.   PMD: Dr. Suarez  Cardio: Dr. Bond

## 2017-12-24 NOTE — CONSULT NOTE ADULT - SUBJECTIVE AND OBJECTIVE BOX
[facility] [place] JACLYN LEAL MD FACP   ISABELLE WALTON MD  Diplomates American Board of Internal Medicine and Infecctious Diseases      [id number]  DAVID VINCENT is a 83y  Male     CC:  PT WELL KNOWN TO ME    FOLLOWING MED ISSUES  STAGE 3 RF  AVR  H/O AFIB  H/O PNEUMOCOCCAL SEPSIS WITH SEPTIC L KNEE  WELL CONTROLLED DM A1C<7  HBP  COPD  CHR HYPOXIA ON HOME O2    ILL 36 HRS FEVER/CHILLS /COUGH  NO GI/ SXS    Past Medical & Surgical Hx:  PAST MEDICAL & SURGICAL HISTORY:  COPD (chronic obstructive pulmonary disease)  Hyperlipidemia, unspecified hyperlipidemia type  Essential hypertension  Afib  No significant past surgical history      Problem List:  HEALTH ISSUES - PROBLEM Dx:  Atrial fibrillation, unspecified type: Atrial fibrillation, unspecified type  Essential hypertension: Essential hypertension  Hyperlipidemia, unspecified hyperlipidemia type: Hyperlipidemia, unspecified hyperlipidemia type  Chronic obstructive pulmonary disease, unspecified COPD type: Chronic obstructive pulmonary disease, unspecified COPD type  Pneumonia of left lower lobe due to infectious organism: Pneumonia of left lower lobe due to infectious organism            Allergies    No Known Allergies    Intolerances          ANTIBIOTICS:   azithromycin   Tablet 500 milliGRAM(s) Oral daily  cefTRIAXone   IVPB 1 Gram(s) IV Intermittent every 24 hours       Review of Systems: - Negative except as mentioned below  COUGH  CHILLS  NO CP    Physical Exam:    Vital Signs Last 24 Hrs  T(C): 37.1 (24 Dec 2017 08:04), Max: 37.4 (24 Dec 2017 03:27)  T(F): 98.8 (24 Dec 2017 08:04), Max: 99.3 (24 Dec 2017 03:27)  HR: 62 (24 Dec 2017 08:04) (62 - 91)  BP: 95/53 (24 Dec 2017 08:04) (95/53 - 121/70)  BP(mean): 87 (24 Dec 2017 05:41) (87 - 87)  RR: 22 (24 Dec 2017 08:04) (18 - 22)  SpO2: 98% (24 Dec 2017 08:04) (91% - 98%)    Height (cm): 162.56 (12-24 @ 00:42)  Weight (kg): 99.8 (12-24 @ 00:42)  BMI (kg/m2): 37.8 (12-24 @ 00:42)  BSA (m2): 2.04 (12-24 @ 00:42)    GEN: NAD, pleasant VSS ALERT, NON TOXIC  HEENT: normocephalic and atraumatic. EOMI. FAUZIA. Moist mucosa. Clear Posterior pharynx.  NECK: Supple. No carotid bruits.  No lymphadenopathy or thyromegaly.  LUNGS: RALES LLL  CV 1/6 M NSR  ABDOMEN: Soft, nontender, and nondistended.  Positive bowel sounds.  No hepatosplenomegaly was noted. OBESE NO CHANGE  EXTREMITIES: Without any cyanosis, clubbing, rash, lesions or edema.  NEUROLOGIC: Cranial nerves II through XII are grossly intact.  MUSCULOSKELETAL:NL JNTS  SKIN: No ulceration or induration present.      Labs:                        8.4    9.5   )-----------( 138      ( 24 Dec 2017 03:24 )             27.8     12-24    139  |  96<L>  |  24.0<H>  ----------------------------<  102  4.5   |  29.0  |  1.67<H>    Ca    8.7      24 Dec 2017 03:24    TPro  6.4<L>  /  Alb  3.6  /  TBili  0.9  /  DBili  x   /  AST  25  /  ALT  11  /  AlkPhos  81  12-24          WBC Count: 9.5 K/uL (12-24 @ 03:24)  Hematocrit: 27.8 % (12-24 @ 03:24)  Platelet Count - Automated: 138 K/uL (12-24 @ 03:24)  Hemoglobin: 8.4 g/dL (12-24 @ 03:24)    Sodium, Serum: 139 mmol/L (12-24 @ 03:24)  Blood Urea Nitrogen, Serum: 24.0 mg/dL <H> (12-24 @ 03:24)  Potassium, Serum: 4.5 mmol/L (12-24 @ 03:24)          MICROBIOLOGY        RADIOLOGY  < from: Xray Chest 2 Views PA/Lat (12.24.17 @ 02:58) >  he lungs demonstrate patchy airspace opacity at the left base suspicious   for pneumonia. No pleural effusion is seen. The heart is difficultto   evaluate. The patient is status post median sternotomy.           IMPRESSION:  Left lower lobe pneumonia. Status post sternotomy.               < end of copied text >              SHLOMO LEAL MD FACP

## 2017-12-24 NOTE — ED ADULT TRIAGE NOTE - CHIEF COMPLAINT QUOTE
Pt states "I started having shoulder pain in both shoulders, went to bed, and was having difficulty breathing with chills", states shoulder pain has since resolved

## 2017-12-24 NOTE — CONSULT NOTE ADULT - SUBJECTIVE AND OBJECTIVE BOX
PULMONARY CONSULT NOTE      LILY VINCENTJOE-[id number]    Patient is a 83y old  Male who presents with a chief complaint of Chills, Cough (24 Dec 2017 05:41)  Patient with 36 hours of chills, cough with findings on CXR of LLL infiltrate.  Review of CXR personally reveals that study appears to be accurate though review of older chest xrays reveal waxing and waning atelectatic changes to LLL.  He does not have significant primary pulmonary disease (known to our group) with PFT data in the past all without airway obstruction but there is restriction to a mild degree secondary to obesity.  He is s/p valve surgery in the past,.  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  Currently in no distress on nasal O2.  He denies chest pain, hemoptysis.     INTERVAL HPI/OVERNIGHT EVENTS:    MEDICATIONS  (STANDING):  amiodarone    Tablet 200 milliGRAM(s) Oral daily  atorvastatin 10 milliGRAM(s) Oral at bedtime  azithromycin   Tablet 500 milliGRAM(s) Oral daily  buDESOnide 160 MICROgram(s)/formoterol 4.5 MICROgram(s) Inhaler 2 Puff(s) Inhalation two times a day  cefTRIAXone   IVPB 1 Gram(s) IV Intermittent every 24 hours  dextrose 5%. 1000 milliLiter(s) (50 mL/Hr) IV Continuous <Continuous>  dextrose 50% Injectable 12.5 Gram(s) IV Push once  folic acid 1 milliGRAM(s) Oral daily  insulin lispro (HumaLOG) corrective regimen sliding scale   SubCutaneous Before meals and at bedtime  saccharomyces boulardii 250 milliGRAM(s) Oral two times a day  sodium chloride 0.9% lock flush 3 milliLiter(s) IV Push every 8 hours      MEDICATIONS  (PRN):  acetaminophen   Tablet 650 milliGRAM(s) Oral every 6 hours PRN For Temp greater than 38.5 C (101.3 F)  dextrose Gel 1 Dose(s) Oral once PRN Blood Glucose LESS THAN 70 milliGRAM(s)/deciliter  glucagon  Injectable 1 milliGRAM(s) IntraMuscular once PRN Glucose LESS THAN 70 milligrams/deciliter  ondansetron Injectable 4 milliGRAM(s) IV Push every 6 hours PRN Nausea      Allergies    No Known Allergies    Intolerances        PAST MEDICAL & SURGICAL HISTORY:  COPD (chronic obstructive pulmonary disease)  Hyperlipidemia, unspecified hyperlipidemia type  Essential hypertension  Afib  No significant past surgical history      FAMILY HISTORY:  No pertinent family history in first degree relatives      SOCIAL HISTORY  Smoking History: Former    REVIEW OF SYSTEMS:    CONSTITUTIONAL:  As per HPI.    HEENT:  Eyes:  No diplopia or blurred vision. ENT:  No earache, sore throat or runny nose.    CARDIOVASCULAR:  No pressure, squeezing, tightness, heaviness or aching about the chest; no palpitations.    RESPIRATORY:  Per HPI    GASTROINTESTINAL:  No nausea, vomiting or diarrhea.    GENITOURINARY:  No dysuria, frequency or urgency.    MUSCULOSKELETAL:  No joint pains    SKIN:  No new lesions.    NEUROLOGIC:  No paresthesias, fasciculations, seizures or weakness.    PSYCHIATRIC:  No disorder of thought or mood.    ENDOCRINE:  No heat or cold intolerance, polyuria or polydipsia.    HEMATOLOGICAL:  No easy bruising or bleeding.     Vital Signs Last 24 Hrs  T(C): 36.9 (24 Dec 2017 11:50), Max: 37.4 (24 Dec 2017 03:27)  T(F): 98.5 (24 Dec 2017 11:50), Max: 99.3 (24 Dec 2017 03:27)  HR: 62 (24 Dec 2017 11:50) (62 - 91)  BP: 100/73 (24 Dec 2017 11:50) (95/53 - 121/70)  BP(mean): 87 (24 Dec 2017 05:41) (87 - 87)  RR: 21 (24 Dec 2017 11:50) (18 - 22)  SpO2: 96% (24 Dec 2017 11:50) (91% - 98%)    PHYSICAL EXAMINATION:    GENERAL: The patient is a well-developed, well-nourished _____in no apparent distress.     HEENT: Head is normocephalic and atraumatic. Extraocular muscles are intact. Mucous membranes are moist.     NECK: Supple.     LUNGS: Clear to auscultation without wheezing, rales, or rhonchi. Respirations unlabored; bronchial breath sounds LLL    HEART: Regular rate and rhythm without murmur.    ABDOMEN: Soft, nontender, and nondistended.  No hepatosplenomegaly is noted.    EXTREMITIES: Without any cyanosis, clubbing, rash, lesions or edema.    NEUROLOGIC: Grossly intact.    SKIN: No ulceration or induration present.      LABS:                        8.4    9.5   )-----------( 138      ( 24 Dec 2017 03:24 )             27.8     12-    139  |  96<L>  |  24.0<H>  ----------------------------<  102  4.5   |  29.0  |  1.67<H>    Ca    8.7      24 Dec 2017 03:24    TPro  6.4<L>  /  Alb  3.6  /  TBili  0.9  /  DBili  x   /  AST  25  /  ALT  11  /  AlkPhos  81  12-24      Urinalysis Basic - ( 24 Dec 2017 12:13 )    Color: Yellow / Appearance: Clear / S.010 / pH: x  Gluc: x / Ketone: Negative  / Bili: Negative / Urobili: 1 mg/dL   Blood: x / Protein: 15 mg/dL / Nitrite: Positive   Leuk Esterase: Moderate / RBC: 3-5 /HPF / WBC >50   Sq Epi: x / Non Sq Epi: Occasional / Bacteria: Moderate        CARDIAC MARKERS ( 24 Dec 2017 03:24 )  x     / 0.04 ng/mL / x     / x     / x                    MICROBIOLOGY:    RADIOLOGY & ADDITIONAL STUDIES:< from: Xray Chest 2 Views PA/Lat (17 @ 02:58) >   EXAM:  CHEST P.A. LATERAL                          PROCEDURE DATE:  2017          INTERPRETATION:  Chest radiographs     CPT 25951    CLINICAL INFORMATION:       Cough and fever of unknown severity or   duration.    TECHNIQUE:  Frontal and lateral views of the chest were obtained.    FINDINGS:  No previous examinations are available for review.    The lungs demonstrate patchy airspace opacity at the left base suspicious   for pneumonia. No pleural effusion is seen. The heart is difficultto   evaluate. The patient is status post median sternotomy.           IMPRESSION:  Left lower lobe pneumonia. Status post sternotomy.           < end of copied text >

## 2017-12-24 NOTE — H&P ADULT - PROBLEM SELECTOR PLAN 1
Admit to monitored/pulse ox bed, cont. CAP coverage, Nebs, oxygen, probiotics, incentive spirometer, chest PT. Blood Cx, urine legionella. No hx of dysphagia

## 2017-12-24 NOTE — ED ADULT NURSE NOTE - OBJECTIVE STATEMENT
Assumed pt care @ 0300. Pt received sitting on stretcher in NAD on 2.5L NC. Pt AOx3 C/O  both of his shoulders hurting and then it became relieved. Pt reports chills and shakeyness with cough and c/o throat pain. Daughter in law @ bedside. Lungs CTA, RR even unlabored. Denies Chest pain Nausea, Vomiting, Diarrhea. Skin warm, dry, color appropriate for age and race. Assumed pt care @ 0300. Pt received sitting on stretcher in NAD on 2.5L NC. Pt AOx3 C/O  both of his shoulders hurting and then it became relieved. Pt reports chills and shakeyness with cough and c/o throat pain with SOB. Daughter in law @ bedside. Pt got flu vaccine this season. Lungs CTA, RR even unlabored. Denies Chest pain Nausea, Vomiting, Diarrhea. Skin warm, dry, color appropriate for age and race.

## 2017-12-24 NOTE — H&P ADULT - PMH
Afib    COPD (chronic obstructive pulmonary disease)    Essential hypertension    Hyperlipidemia, unspecified hyperlipidemia type

## 2017-12-24 NOTE — H&P ADULT - HISTORY OF PRESENT ILLNESS
84 y/o male from home with Daughter with history of COPD on home 02 3 liters cont, DM-2, HTN, HLD, Chronic LE edema, Afib on Xarelto presents with rigors, cough, that started 3 days ago. He denies any sick contacts, travel, or hx of PNA. Patient went to bed last night and called to his Daughter after he was shaking in bed and coughing with thick sputum. In the ED patient with LLL infiltrate on cxr, ronchi at left base, left shift. No fever. Patient also noted to have significant anemia with no history of anemia or hx of transfusion. Denies any BRBPR or black stool.

## 2017-12-24 NOTE — ED ADULT NURSE REASSESSMENT NOTE - NS ED NURSE REASSESS COMMENT FT1
Unable to locate patient in ED, placed call to home and spoke with daughter and states "other sister was at the hospital with dad", at home daughter provided me with cell phone number and upon calling- number is not in service, charge RN and MD made aware

## 2017-12-25 LAB
ANION GAP SERPL CALC-SCNC: 12 MMOL/L — SIGNIFICANT CHANGE UP (ref 5–17)
BASOPHILS # BLD AUTO: 0 K/UL — SIGNIFICANT CHANGE UP (ref 0–0.2)
BASOPHILS NFR BLD AUTO: 0.4 % — SIGNIFICANT CHANGE UP (ref 0–2)
BUN SERPL-MCNC: 24 MG/DL — HIGH (ref 8–20)
CALCIUM SERPL-MCNC: 8.6 MG/DL — SIGNIFICANT CHANGE UP (ref 8.6–10.2)
CHLORIDE SERPL-SCNC: 101 MMOL/L — SIGNIFICANT CHANGE UP (ref 98–107)
CO2 SERPL-SCNC: 29 MMOL/L — SIGNIFICANT CHANGE UP (ref 22–29)
CREAT SERPL-MCNC: 1.43 MG/DL — HIGH (ref 0.5–1.3)
EOSINOPHIL # BLD AUTO: 0 K/UL — SIGNIFICANT CHANGE UP (ref 0–0.5)
EOSINOPHIL NFR BLD AUTO: 1.1 % — SIGNIFICANT CHANGE UP (ref 0–5)
GLUCOSE BLDC GLUCOMTR-MCNC: 102 MG/DL — HIGH (ref 70–99)
GLUCOSE BLDC GLUCOMTR-MCNC: 106 MG/DL — HIGH (ref 70–99)
GLUCOSE BLDC GLUCOMTR-MCNC: 107 MG/DL — HIGH (ref 70–99)
GLUCOSE BLDC GLUCOMTR-MCNC: 110 MG/DL — HIGH (ref 70–99)
GLUCOSE SERPL-MCNC: 117 MG/DL — HIGH (ref 70–115)
HBA1C BLD-MCNC: 5 % — SIGNIFICANT CHANGE UP (ref 4–5.6)
HCT VFR BLD CALC: 25.2 % — LOW (ref 42–52)
HGB BLD-MCNC: 7.9 G/DL — LOW (ref 14–18)
INR BLD: 1.23 RATIO — HIGH (ref 0.88–1.16)
LEGIONELLA AG UR QL: NEGATIVE — SIGNIFICANT CHANGE UP
LYMPHOCYTES # BLD AUTO: 0.6 K/UL — LOW (ref 1–4.8)
LYMPHOCYTES # BLD AUTO: 13.3 % — LOW (ref 20–55)
MAGNESIUM SERPL-MCNC: 2.1 MG/DL — SIGNIFICANT CHANGE UP (ref 1.8–2.6)
MCHC RBC-ENTMCNC: 25.6 PG — LOW (ref 27–31)
MCHC RBC-ENTMCNC: 31.3 G/DL — LOW (ref 32–36)
MCV RBC AUTO: 81.8 FL — SIGNIFICANT CHANGE UP (ref 80–94)
MONOCYTES # BLD AUTO: 0.4 K/UL — SIGNIFICANT CHANGE UP (ref 0–0.8)
MONOCYTES NFR BLD AUTO: 8.4 % — SIGNIFICANT CHANGE UP (ref 3–10)
NEUTROPHILS # BLD AUTO: 3.6 K/UL — SIGNIFICANT CHANGE UP (ref 1.8–8)
NEUTROPHILS NFR BLD AUTO: 76.6 % — HIGH (ref 37–73)
PHOSPHATE SERPL-MCNC: 2.9 MG/DL — SIGNIFICANT CHANGE UP (ref 2.4–4.7)
PLATELET # BLD AUTO: 126 K/UL — LOW (ref 150–400)
POTASSIUM SERPL-MCNC: 4.4 MMOL/L — SIGNIFICANT CHANGE UP (ref 3.5–5.3)
POTASSIUM SERPL-SCNC: 4.4 MMOL/L — SIGNIFICANT CHANGE UP (ref 3.5–5.3)
PROTHROM AB SERPL-ACNC: 13.6 SEC — HIGH (ref 9.8–12.7)
RBC # BLD: 3.08 M/UL — LOW (ref 4.6–6.2)
RBC # FLD: 16.8 % — HIGH (ref 11–15.6)
SODIUM SERPL-SCNC: 142 MMOL/L — SIGNIFICANT CHANGE UP (ref 135–145)
WBC # BLD: 4.7 K/UL — LOW (ref 4.8–10.8)
WBC # FLD AUTO: 4.7 K/UL — LOW (ref 4.8–10.8)

## 2017-12-25 PROCEDURE — 99233 SBSQ HOSP IP/OBS HIGH 50: CPT

## 2017-12-25 PROCEDURE — 99222 1ST HOSP IP/OBS MODERATE 55: CPT

## 2017-12-25 PROCEDURE — 99232 SBSQ HOSP IP/OBS MODERATE 35: CPT

## 2017-12-25 PROCEDURE — 99223 1ST HOSP IP/OBS HIGH 75: CPT

## 2017-12-25 RX ORDER — FUROSEMIDE 40 MG
40 TABLET ORAL DAILY
Qty: 0 | Refills: 0 | Status: DISCONTINUED | OUTPATIENT
Start: 2017-12-25 | End: 2017-12-27

## 2017-12-25 RX ORDER — HYDROCORTISONE 1 %
1 OINTMENT (GRAM) TOPICAL
Qty: 0 | Refills: 0 | Status: DISCONTINUED | OUTPATIENT
Start: 2017-12-25 | End: 2017-12-27

## 2017-12-25 RX ORDER — PANTOPRAZOLE SODIUM 20 MG/1
40 TABLET, DELAYED RELEASE ORAL
Qty: 0 | Refills: 0 | Status: DISCONTINUED | OUTPATIENT
Start: 2017-12-25 | End: 2017-12-27

## 2017-12-25 RX ADMIN — SODIUM CHLORIDE 3 MILLILITER(S): 9 INJECTION INTRAMUSCULAR; INTRAVENOUS; SUBCUTANEOUS at 15:16

## 2017-12-25 RX ADMIN — Medication 250 MILLIGRAM(S): at 06:06

## 2017-12-25 RX ADMIN — Medication 1 SUPPOSITORY(S): at 17:23

## 2017-12-25 RX ADMIN — Medication 250 MILLIGRAM(S): at 17:23

## 2017-12-25 RX ADMIN — AMIODARONE HYDROCHLORIDE 200 MILLIGRAM(S): 400 TABLET ORAL at 06:06

## 2017-12-25 RX ADMIN — AZITHROMYCIN 500 MILLIGRAM(S): 500 TABLET, FILM COATED ORAL at 12:01

## 2017-12-25 RX ADMIN — SODIUM CHLORIDE 3 MILLILITER(S): 9 INJECTION INTRAMUSCULAR; INTRAVENOUS; SUBCUTANEOUS at 06:07

## 2017-12-25 RX ADMIN — PANTOPRAZOLE SODIUM 40 MILLIGRAM(S): 20 TABLET, DELAYED RELEASE ORAL at 17:23

## 2017-12-25 RX ADMIN — Medication 1 MILLIGRAM(S): at 12:01

## 2017-12-25 RX ADMIN — Medication 650 MILLIGRAM(S): at 23:10

## 2017-12-25 RX ADMIN — ATORVASTATIN CALCIUM 10 MILLIGRAM(S): 80 TABLET, FILM COATED ORAL at 21:35

## 2017-12-25 RX ADMIN — BUDESONIDE AND FORMOTEROL FUMARATE DIHYDRATE 2 PUFF(S): 160; 4.5 AEROSOL RESPIRATORY (INHALATION) at 08:52

## 2017-12-25 RX ADMIN — CEFTRIAXONE 100 GRAM(S): 500 INJECTION, POWDER, FOR SOLUTION INTRAMUSCULAR; INTRAVENOUS at 06:07

## 2017-12-25 RX ADMIN — BUDESONIDE AND FORMOTEROL FUMARATE DIHYDRATE 2 PUFF(S): 160; 4.5 AEROSOL RESPIRATORY (INHALATION) at 20:57

## 2017-12-25 RX ADMIN — Medication 40 MILLIGRAM(S): at 17:23

## 2017-12-25 RX ADMIN — SODIUM CHLORIDE 3 MILLILITER(S): 9 INJECTION INTRAMUSCULAR; INTRAVENOUS; SUBCUTANEOUS at 21:35

## 2017-12-25 NOTE — PROGRESS NOTE ADULT - SUBJECTIVE AND OBJECTIVE BOX
PULMONARY Progress NOTE      YAMIL VINCENTALVIN-[id number]    Patient is a 83y old  Male who presentd with a chief complaint of Chills, Cough (24 Dec 2017 05:41)  Patient with 36 hours of chills, cough with findings on CXR of LLL infiltrate.  Review of CXR personally reveals that study appears to be accurate though review of older chest xrays reveal waxing and waning atelectatic changes to LLL.  He does not have significant primary pulmonary disease (known to our group) with PFT data in the past all without airway obstruction but there is restriction to a mild degree secondary to obesity.  He is s/p valve surgery in the past,.  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  Currently in no distress on nasal O2.  He denies chest pain, hemoptysis.     INTERVAL HPI/OVERNIGHT EVENTS:  No pulmonary complaints    MEDICATIONS  (STANDING):  amiodarone    Tablet 200 milliGRAM(s) Oral daily  atorvastatin 10 milliGRAM(s) Oral at bedtime  azithromycin   Tablet 500 milliGRAM(s) Oral daily  buDESOnide 160 MICROgram(s)/formoterol 4.5 MICROgram(s) Inhaler 2 Puff(s) Inhalation two times a day  cefTRIAXone   IVPB 1 Gram(s) IV Intermittent every 24 hours  dextrose 5%. 1000 milliLiter(s) (50 mL/Hr) IV Continuous <Continuous>  dextrose 50% Injectable 12.5 Gram(s) IV Push once  folic acid 1 milliGRAM(s) Oral daily  insulin lispro (HumaLOG) corrective regimen sliding scale   SubCutaneous Before meals and at bedtime  saccharomyces boulardii 250 milliGRAM(s) Oral two times a day  sodium chloride 0.9% lock flush 3 milliLiter(s) IV Push every 8 hours      MEDICATIONS  (PRN):  acetaminophen   Tablet 650 milliGRAM(s) Oral every 6 hours PRN For Temp greater than 38.5 C (101.3 F)  dextrose Gel 1 Dose(s) Oral once PRN Blood Glucose LESS THAN 70 milliGRAM(s)/deciliter  glucagon  Injectable 1 milliGRAM(s) IntraMuscular once PRN Glucose LESS THAN 70 milligrams/deciliter  ondansetron Injectable 4 milliGRAM(s) IV Push every 6 hours PRN Nausea      Allergies    No Known Allergies    Intolerances        PAST MEDICAL & SURGICAL HISTORY:  COPD (chronic obstructive pulmonary disease)  Hyperlipidemia, unspecified hyperlipidemia type  Essential hypertension  Afib  No significant past surgical history      FAMILY HISTORY:  No pertinent family history in first degree relatives      SOCIAL HISTORY  Smoking History: Former    REVIEW OF SYSTEMS:    CONSTITUTIONAL:  As per HPI.    HEENT:  Eyes:  No diplopia or blurred vision. ENT:  No earache, sore throat or runny nose.    CARDIOVASCULAR:  No pressure, squeezing, tightness, heaviness or aching about the chest; no palpitations.    RESPIRATORY:  Per HPI    GASTROINTESTINAL:  No nausea, vomiting or diarrhea.    GENITOURINARY:  No dysuria, frequency or urgency.    MUSCULOSKELETAL:  No joint pains    SKIN:  No new lesions.    NEUROLOGIC:  No paresthesias, fasciculations, seizures or weakness.    PSYCHIATRIC:  No disorder of thought or mood.    ENDOCRINE:  No heat or cold intolerance, polyuria or polydipsia.    HEMATOLOGICAL:  No easy bruising or bleeding.     Vital Signs Last 24 Hrs  T(C): 36.9 (24 Dec 2017 11:50), Max: 37.4 (24 Dec 2017 03:27)  T(F): 98.5 (24 Dec 2017 11:50), Max: 99.3 (24 Dec 2017 03:27)  HR: 62 (24 Dec 2017 11:50) (62 - 91)  BP: 100/73 (24 Dec 2017 11:50) (95/53 - 121/70)  BP(mean): 87 (24 Dec 2017 05:41) (87 - 87)  RR: 21 (24 Dec 2017 11:50) (18 - 22)  SpO2: 96% (24 Dec 2017 11:50) (91% - 98%)    PHYSICAL EXAMINATION:    GENERAL: The patient is a well-developed, well-nourished _____in no apparent distress.     HEENT: Head is normocephalic and atraumatic. Extraocular muscles are intact. Mucous membranes are moist.     NECK: Supple.     LUNGS: Clear to auscultation post cough without wheezing, rales, or rhonchi. Respirations unlabored;     HEART: Regular rate and rhythm without murmur.    ABDOMEN: Soft, nontender, and nondistended.  No hepatosplenomegaly is noted.    EXTREMITIES: Without any cyanosis, clubbing, rash, lesions or edema.    NEUROLOGIC: Grossly intact.    SKIN: No ulceration or induration present.      LABS:                        8.4    9.5   )-----------( 138      ( 24 Dec 2017 03:24 )             27.8     12-    139  |  96<L>  |  24.0<H>  ----------------------------<  102  4.5   |  29.0  |  1.67<H>    Ca    8.7      24 Dec 2017 03:24    TPro  6.4<L>  /  Alb  3.6  /  TBili  0.9  /  DBili  x   /  AST  25  /  ALT  11  /  AlkPhos  81  12-24      Urinalysis Basic - ( 24 Dec 2017 12:13 )    Color: Yellow / Appearance: Clear / S.010 / pH: x  Gluc: x / Ketone: Negative  / Bili: Negative / Urobili: 1 mg/dL   Blood: x / Protein: 15 mg/dL / Nitrite: Positive   Leuk Esterase: Moderate / RBC: 3-5 /HPF / WBC >50   Sq Epi: x / Non Sq Epi: Occasional / Bacteria: Moderate        CARDIAC MARKERS ( 24 Dec 2017 03:24 )  x     / 0.04 ng/mL / x     / x     / x            MICROBIOLOGY: RSV neg    RADIOLOGY & ADDITIONAL STUDIES:       EXAM:  CHEST P.A. LATERAL    17                      FINDINGS:  No previous examinations are available for review.    The lungs demonstrate patchy airspace opacity at the left base suspicious   for pneumonia. No pleural effusion is seen. The heart is difficultto   evaluate. The patient is status post median sternotomy.           IMPRESSION:  Left lower lobe pneumonia. Status post sternotomy.           STEVEN GUERRA M.D., ATTENDING RADIOLOGIST  This document has been electronically signed. Dec 24 2017 10:15AM          CT Chest on 17  FINDINGS:    The lungs are clear. No pneumonia, CHF, pleural effusion, pneumothorax.    The heart is enlarged. Status post CABG procedure. Visualized thyroid   gland is unremarkable. Ascending aorta measures 4.9 cm in diameter. The   aortic knob measures 4.2 cm in diameter. Dense coronary artery   calcifications. Dense aortic valve calcifications.     Limited visualized upper abdomen shows moderate bilateral nonspecific   perinephric stranding. No acute osseous abnormality.    IMPRESSION:  No pneumonia.     JORDAN HERNANDEZ M.D., ATTENDING RADIOLOGIST  This document has been electronically signed. Dec 24 2017  5:32PM

## 2017-12-25 NOTE — PROGRESS NOTE ADULT - ASSESSMENT
82 y/o male from home with Daughter with history of COPD on home 02 3 liters cont, DM-2, HTN, HLD, Chronic LE edema, Afib on Xarelto presents with rigors, cough, that started 3 days ago. He denies any sick contacts, travel, or hx of PNA. Patient went to bed last night and called to his Daughter after he was shaking in bed and coughing with thick sputum. In the ED patient with LLL infiltrate on cxr, ronchi at left base, left shift. No fever. Patient also noted to have significant anemia with no history of anemia or hx of transfusion. Denies any BRBPR or black stool.  pt started on iv abx for pneumonia cxr with pneumonia ct scan ?no pneumonia  STOOL GUAIAC  POSTIVE   SEEN BY GI  CT NO PNEUMONIA POSSIBLE BRONCHITIS  URINE POSSIBLE UTI  WILL CONTINUE ABX  WILL FOLLOW UP

## 2017-12-25 NOTE — CONSULT NOTE ADULT - ASSESSMENT
IMP:  RECURRENT PNEUMONIA IN FULLY VACCINATED MALE  R/O BACTEREMIA AND SECONDARY PVE    COPD ON O2 - STABLE CALL PULM IF NEEDED    DM - NO TX  HBP -? MEDS BUT SL LOW NOW    NO RESP DISTRESS    PLAN  BLOOD C/S  CT CHEST  LEGIONELLA TESTING  R/Z PENDING C/S  DEFER TTE UNLESS POS BLOODS
Patient with obesity probable KRYSTIN with nocturnal desats on home O2 at night with history and xray, physical findings c/w pneumonia.    No current distress    Plan:  1.CT planned to better evaluate LLL as findings may reflect simple volume abnormality in obese male with AP portable technique  2.Abx per ID  3.O2 nocturnally and daytime titrate to sat >93%
Patient with ?pneumonia and with heme positive stools and now with iron deficiency anemia.    Once his pulmonary status improves, should be considered for EGD and colonoscopy  Cardiology and pulmonary evaluation  Iron supplementation  PPI bid  Hold anticoagulation at this time  Anusol suppository bid

## 2017-12-25 NOTE — PROGRESS NOTE ADULT - ASSESSMENT
82 y/o male with LLL PNA w/ elevated pneumonia severity index, hx of HTN, HLD, DM-2, COPD, chronic hypoxia, Afib       Problem/Plan - 1:  ·  Problem: Pneumonia of left lower lobe due to infectious organism.  Plan:   on monitored/pulse ox bed,   CT chest reported no pneumonia  on. ceftriaxone/Zithromax. f/u Id regarding abx. May dc. No fever. Mild leucopenia   Nebs, oxygen, probiotics, incentive spirometer, chest PT.   f/u Blood Cx, urine legionella.      Problem/Plan - 2:  ·  Problem: R/O Anemia, unspecified type.  Plan: Patient with significant anemia, which patient and family report as new. Patient started on Xarelto 1 month ago for afib, and on ASA. No reports of BRBPR/Black stool. Reports normal colonoscopy 10 years ago, no hx of PUD/GERD.   Positive occult  blood in stool.   retic count, iron studies noted.   Hold asa/xarelto for now. GI eval   No indication for transfusion at this time.      Problem/Plan - 3:  ·  Problem: Chronic obstructive pulmonary disease, unspecified COPD type.  Plan: Stable cont. 02, Symbicort in place of Breo while in hospital.      Problem/Plan - 4:  ·  Problem: Hyperlipidemia, unspecified hyperlipidemia type.  Plan: cardiac diet, Statin.      Problem/Plan - 5:  ·  Problem: Essential hypertension.  Plan: DASH diet, cont. o/p meds.      Problem/Plan - 6:  Problem: Atrial fibrillation, unspecified type. Plan: plan as above. 82 y/o male with LLL PNA w/ elevated pneumonia severity index, hx of HTN, HLD, DM-2, COPD, chronic hypoxia, Afib       Problem/Plan - 1:  ·  Problem: Pneumonia of left lower lobe due to infectious organism.  Plan:   on monitored/pulse ox bed,   CT chest reported no pneumonia  on. ceftriaxone/Zithromax. f/u Id regarding abx. May dc. No fever. Mild leucopenia   Nebs, oxygen, probiotics, incentive spirometer, chest PT.   f/u Blood Cx, urine legionella.      Problem/Plan - 2:  ·  Problem: R/O Anemia, unspecified  Plan: Patient with significant anemia, which patient and family report as new. Patient started on Xarelto 1 month ago for afib, and on ASA. No reports of BRBPR/Black stool. Reports normal colonoscopy 10 years ago, no hx of PUD/GERD.hemorrhoids reported by GI. annusol supp added. protonix IV BID added   Positive occult  blood in stool.   retic count, iron studies noted.   Hold asa/xarelto for now. GI eval appreciated  No indication for transfusion at this time.      Problem/Plan - 3:  ·  Problem: Chronic obstructive pulmonary disease, unspecified COPD type.  Plan: Stable cont. 02, Symbicort in place of Breo while in hospital.      Problem/Plan - 4:  ·  Problem: Hyperlipidemia, unspecified hyperlipidemia type.  Plan: cardiac diet, Statin.      Problem/Plan - 5:  ·  Problem: Essential hypertension.  Plan: DASH diet, cont. o/p meds.      Problem/Plan - 6:  Problem: Atrial fibrillation, unspecified type. Plan:    Problem/pla6: UTI. on ceftriaxone/zithro for PNA, will cover. asymptomatic 82 y/o male with LLL PNA w/ elevated pneumonia severity index, hx of HTN, HLD, DM-2, COPD, chronic hypoxia, Afib       Problem/Plan - 1:  ·  Problem: Pneumonia of left lower lobe due to infectious organism.  Plan:   on monitored/pulse ox bed,   CT chest reported no pneumonia  on. ceftriaxone/Zithromax. f/u Id regarding abx. May dc. No fever. Mild leucopenia   Nebs, oxygen, probiotics, incentive spirometer, chest PT.   f/u Blood Cx, urine legionella.      Problem/Plan - 2:  ·  Problem:  Anemia, unspecified  Plan: Patient with significant anemia, which patient and family report as new. Patient started on Xarelto 1 month ago for afib, and on ASA. No reports of BRBPR/Black stool. Reports normal colonoscopy 10 years ago, no hx of PUD/GERD.hemorrhoids reported by GI. annusol supp added. protonix IV BID added   Positive occult  blood in stool.   retic count, iron studies noted.   Hold asa/xarelto for now. GI eval appreciated  No indication for transfusion at this time.      Problem/Plan - 3:  ·  Problem: Chronic obstructive pulmonary disease, unspecified COPD type.  Plan: Stable cont. 02, Symbicort in place of Breo while in hospital.      Problem/Plan - 4:  ·  Problem: Hyperlipidemia, unspecified hyperlipidemia type.  Plan: cardiac diet, Statin.      Problem/Plan - 5:  ·  Problem: Essential hypertension.  Plan: DASH diet, cont. o/p meds.      Problem/Plan - 6:  Problem: Atrial fibrillation, unspecified type. Plan:    Problem/pla6: UTI. on ceftriaxone/zithro for PNA, will cover. asymptomatic

## 2017-12-25 NOTE — PROGRESS NOTE ADULT - SUBJECTIVE AND OBJECTIVE BOX
Patient is a 83y old  Male who presents with a chief complaint of Chills, Cough (24 Dec 2017 05:41)    Interval:      Interval: FOBt positive,. Hb slightly dropped to 7.9 [ 8.4]. GI consult placed. Seen by Id and pulmonary     hpi:    82 y/o male from home with Daughter with history of COPD on home 02 3 liters cont, DM-2, HTN, HLD, Chronic LE edema, Afib on Xarelto presents with rigors, cough, that started 3 days ago. He denies any sick contacts, travel, or hx of PNA. Patient went to bed last night and called to his Daughter after he was shaking in bed and coughing with thick sputum. In the ED patient with LLL infiltrate on cxr, ronchi at left base, left shift. No fever. Patient also noted to have significant anemia with no history of anemia or hx of transfusion. Denies any BRBPR or black stool.      ROS:  CONSTITUTIONAL:  No distress.no fever/chills/fatigue/weight loss  HEENT:  Eyes:  No diplopia or blurred vision.   CARDIOVASCULAR:  No pressure, squeezing, tightness, heaviness or aching about the chest; no palpitations.no leg swelling, no orthopnea or PND  RESPIRATORY:  no SOB. no wheezing.no cough or sputum.  No hemoptysis  GI: no nausea, no vomiting, no diarrhea, no constipation. No hematochezia or melena  EXT:No joint pain or joint swelling or redness  SKIN: no skin break or ulcer. No cellulitis.   CNS: No headaches. No weakness.no numbness. No depression or anxiety. No SI    MEDICATIONS  (STANDING):  amiodarone    Tablet 200 milliGRAM(s) Oral daily  atorvastatin 10 milliGRAM(s) Oral at bedtime  azithromycin   Tablet 500 milliGRAM(s) Oral daily  buDESOnide 160 MICROgram(s)/formoterol 4.5 MICROgram(s) Inhaler 2 Puff(s) Inhalation two times a day  cefTRIAXone   IVPB 1 Gram(s) IV Intermittent every 24 hours  dextrose 5%. 1000 milliLiter(s) (50 mL/Hr) IV Continuous <Continuous>  dextrose 50% Injectable 12.5 Gram(s) IV Push once  folic acid 1 milliGRAM(s) Oral daily  insulin lispro (HumaLOG) corrective regimen sliding scale   SubCutaneous Before meals and at bedtime  saccharomyces boulardii 250 milliGRAM(s) Oral two times a day  sodium chloride 0.9% lock flush 3 milliLiter(s) IV Push every 8 hours    MEDICATIONS  (PRN):  acetaminophen   Tablet 650 milliGRAM(s) Oral every 6 hours PRN For Temp greater than 38.5 C (101.3 F)  acetaminophen   Tablet. 650 milliGRAM(s) Oral every 6 hours PRN Mild Pain (1 - 3)  dextrose Gel 1 Dose(s) Oral once PRN Blood Glucose LESS THAN 70 milliGRAM(s)/deciliter  glucagon  Injectable 1 milliGRAM(s) IntraMuscular once PRN Glucose LESS THAN 70 milligrams/deciliter  ondansetron Injectable 4 milliGRAM(s) IV Push every 6 hours PRN Nausea    P/E:    Vital Signs Last 24 Hrs  T(C): 37.1 (25 Dec 2017 05:02), Max: 37.2 (24 Dec 2017 20:30)  T(F): 98.7 (25 Dec 2017 05:02), Max: 98.9 (24 Dec 2017 20:30)  HR: 60 (25 Dec 2017 05:02) (60 - 74)  BP: 127/82 (25 Dec 2017 05:02) (100/73 - 132/72)  BP(mean): --  RR: 18 (25 Dec 2017 05:02) (18 - 21)  SpO2: 99% (25 Dec 2017 05:02) (96% - 99%)    GENERAL: Not in distress. Alert    HEENT:  Normocephalic and atraumatic. PEARLA,EOMI  NECK: Supple.  No JVD.    CARDIOVASCULAR: RRR S1, S2. No murmur/rubs/gallop  LUNGS: BLAE+, reduced. no rales, no wheezing, no rhonchi.    ABDOMEN: ND. Soft,  NT, no guarding / rebound / rigidity. BS normoactive. No CVA tenderness.    BACK: No spine tenderness.  EXTREMITIES: no cyanosis, no clubbing, no edema.   SKIN: no rash. No skin break or ulcer. No cellulitis.  NEUROLOGIC: AAO*3.strength is symmetric, sensation intact, speech fluent.    PSYCHIATRIC: Calm.  No agitation. Patient is a 83y old  Male who presents with a chief complaint of Chills, Cough (24 Dec 2017 05:41)    Interval:      Interval: FOBt positive,. Hb slightly dropped to 7.9 [ 8.4]. Seen by Id and pulmonary and GI. Gi reported hemorrhoids and BRBR. on protonix BID. egd/colonosocopy once pulmonary status improve.   h/o afib , AC on hold due to bleed and anemia. cardiology cx was placed.  patient see Dr.Snider Wyatt Romano [facility] cardiology    hpi:    84 y/o male from home with Daughter with history of COPD on home 02 3 liters cont, DM-2, HTN, HLD, Chronic LE edema, Afib on Xarelto presents with rigors, cough, that started 3 days ago. He denies any sick contacts, travel, or hx of PNA. Patient went to bed last night and called to his Daughter after he was shaking in bed and coughing with thick sputum. In the ED patient with LLL infiltrate on cxr, ronchi at left base, left shift. No fever. Patient also noted to have significant anemia with no history of anemia or hx of transfusion. Denies any BRBPR or black stool.      ROS:  CONSTITUTIONAL:  No distress.no fever/chills/fatigue/weight loss  HEENT:  Eyes:  No diplopia or blurred vision.   CARDIOVASCULAR:  No pressure, squeezing, tightness, heaviness or aching about the chest; no palpitations.no leg swelling, no orthopnea or PND  RESPIRATORY:  no SOB. no wheezing.no cough or sputum.  No hemoptysis  GI: no nausea, no vomiting, no diarrhea, no constipation. No hematochezia or melena  EXT:No joint pain or joint swelling or redness  SKIN: no skin break or ulcer. No cellulitis.   CNS: No headaches. No weakness.no numbness. No depression or anxiety. No SI    MEDICATIONS  (STANDING):  amiodarone    Tablet 200 milliGRAM(s) Oral daily  atorvastatin 10 milliGRAM(s) Oral at bedtime  azithromycin   Tablet 500 milliGRAM(s) Oral daily  buDESOnide 160 MICROgram(s)/formoterol 4.5 MICROgram(s) Inhaler 2 Puff(s) Inhalation two times a day  cefTRIAXone   IVPB 1 Gram(s) IV Intermittent every 24 hours  dextrose 5%. 1000 milliLiter(s) (50 mL/Hr) IV Continuous <Continuous>  dextrose 50% Injectable 12.5 Gram(s) IV Push once  folic acid 1 milliGRAM(s) Oral daily  insulin lispro (HumaLOG) corrective regimen sliding scale   SubCutaneous Before meals and at bedtime  saccharomyces boulardii 250 milliGRAM(s) Oral two times a day  sodium chloride 0.9% lock flush 3 milliLiter(s) IV Push every 8 hours    MEDICATIONS  (PRN):  acetaminophen   Tablet 650 milliGRAM(s) Oral every 6 hours PRN For Temp greater than 38.5 C (101.3 F)  acetaminophen   Tablet. 650 milliGRAM(s) Oral every 6 hours PRN Mild Pain (1 - 3)  dextrose Gel 1 Dose(s) Oral once PRN Blood Glucose LESS THAN 70 milliGRAM(s)/deciliter  glucagon  Injectable 1 milliGRAM(s) IntraMuscular once PRN Glucose LESS THAN 70 milligrams/deciliter  ondansetron Injectable 4 milliGRAM(s) IV Push every 6 hours PRN Nausea    P/E:    Vital Signs Last 24 Hrs  T(C): 37.1 (25 Dec 2017 05:02), Max: 37.2 (24 Dec 2017 20:30)  T(F): 98.7 (25 Dec 2017 05:02), Max: 98.9 (24 Dec 2017 20:30)  HR: 60 (25 Dec 2017 05:02) (60 - 74)  BP: 127/82 (25 Dec 2017 05:02) (100/73 - 132/72)  BP(mean): --  RR: 18 (25 Dec 2017 05:02) (18 - 21)  SpO2: 99% (25 Dec 2017 05:02) (96% - 99%)    GENERAL: Not in distress. Alert    HEENT:  Normocephalic and atraumatic. PEARLA,EOMI  NECK: Supple.  No JVD.    CARDIOVASCULAR: RRR S1, S2. No murmur/rubs/gallop  LUNGS: BLAE+, reduced. no rales, no wheezing, no rhonchi.    ABDOMEN: ND. Soft,  NT, no guarding / rebound / rigidity. BS normoactive. No CVA tenderness.    BACK: No spine tenderness.  EXTREMITIES: no cyanosis, no clubbing, no edema.   SKIN: no rash. No skin break or ulcer. No cellulitis.  NEUROLOGIC: AAO*3.strength is symmetric, sensation intact, speech fluent.    PSYCHIATRIC: Calm.  No agitation.

## 2017-12-25 NOTE — CONSULT NOTE ADULT - SUBJECTIVE AND OBJECTIVE BOX
CHIEF COMPLAINT: chills/cough/dyspnea    HPI: Patient is a  83y Male with h/o bio AVR, CABG, recent onset AF, COPD on home O2, here with chills/cough and dyspnea. Started Saturday night. Felt shaking chills and had low grade fever. Has had cough with green sputum as well. Developed SOB suddenly as well with sensation he was choking. No CP, pnd. Has had chronic orthopnea since open heart surgey. Recent onset AF as well and started on Xarelto. Started on antibiotics here and has clincially improved. Has had mild leg swelling as well that is chronic.     PAST MEDICAL & SURGICAL HISTORY:  COPD (chronic obstructive pulmonary disease) on home O2  Hyperlipidemia, unspecified hyperlipidemia type  Essential hypertension  Afib  CAD s/p CABG and bio AVR 2014 by Dr. Nguyen    MEDICATIONS: Furosemide 40 mg a day, metformin 500 a day, gabapentin 300 b.i.d., amiodarone 200 mg a day, losartan 40 mg a day, folic acid 800 mcg, albuterol 2.5 a day, aspirin 81 a day, and Protonix 40 a day.    FAMILY HISTORY:  FAMILY HISTORY:  No pertinent family history in first degree relatives      SOCIAL HISTORY: no EtOH, drugs or tobacco    ROS: GI negative, All others negative    PHYSCIAL EXAM:  Vital Signs Last 24 Hrs  T(C): 36.6 (25 Dec 2017 09:03), Max: 37.2 (24 Dec 2017 20:30)  T(F): 97.9 (25 Dec 2017 09:03), Max: 98.9 (24 Dec 2017 20:30)  HR: 74 (25 Dec 2017 09:03) (60 - 74)  BP: 122/68 (25 Dec 2017 09:03) (122/68 - 132/72)  BP(mean): --  RR: 18 (25 Dec 2017 09:03) (18 - 20)  SpO2: 97% (25 Dec 2017 09:03) (97% - 99%)  I&O's Summary    24 Dec 2017 07:01  -  25 Dec 2017 07:00  --------------------------------------------------------  IN: 0 mL / OUT: 500 mL / NET: -500 mL      GEN: NAD  HEENT: MMM, sclera anicteric  RESP: reduced air movement but no rales/rhonchi/wheezes  CVS: soft S1S2,irregular, , no JVD/S3 no M/R/G  GI: Soft, NT, ND, BS+  EXT: 1+ edema bilaterally  NEURO: AAOX3  PSYCH: Normal affect    ECG: AF, iRBBB    LABS:                        7.9    4.7   )-----------( 126      ( 25 Dec 2017 06:20 )             25.2     12-25    142  |  101  |  24.0<H>  ----------------------------<  117<H>  4.4   |  29.0  |  1.43<H>    Ca    8.6      25 Dec 2017 06:20  Phos  2.9     12-25  Mg     2.1     12-25    TPro  6.4<L>  /  Alb  3.6  /  TBili  0.9  /  DBili  x   /  AST  25  /  ALT  11  /  AlkPhos  81  12-24    CARDIAC MARKERS ( 24 Dec 2017 03:24 )  x     / 0.04 ng/mL / x     / x     / x          PT/INR - ( 25 Dec 2017 13:25 )   PT: 13.6 sec;   INR: 1.23 ratio         Echo (done in office 7/2017)  1. Hyperdynamic global left ventricular systolic function.  2. Left ventricular ejection fraction, by visual estimation, is 70 to 75%.  3. Moderate concentric left ventricular hypertrophy.  4. Normal left ventricular internal cavity size.  5. Mildly enlarged right ventricle with normal systolic function..  6. Moderately dilated left atrium.  7. Calculated LA Volume Index 48ml/ml2.  8. Borderline dilated right atrium.  9. Mild to moderate mitral annular calcification.  10. Moderate thickening and calcification of the anterior and posterior mitral valve leaflets.  11. Mild mitral valve regurgitation.  12. Mild tricuspid regurgitation.  13. Mildly elevated pulmonary artery systolic pressure.  14. Trace pulmonic valve regurgitation.  15. Bovine bioprosthesis in the aortic position.  16. Aortic valve is normally functioning bioprosthetic valve.  17. Interatrial and interventricular septa appear intact, with no echocardiographic evidence of  intracardiac shunting.  18. There is no evidence of pericardial effusion.  19. In comparison to prior study, the PA pressure is slightly lower otherwise, there is no  significant interval change.      Assessment:   Patient is a  83y Male with h/o bio AVR, CABG, recent onset AF, COPD on home O2, here with fevers/chills and found to have ? PNA, GIB and UTI. No infiltrate on CT, likely acute bronchitis causing SOB and cough. Evidence of UTI as well contributing to chills/fever. AF is rate controlled and no evidence significant decompensated CHF. No evidence of ischemic. Has GIB with positive guiaic and anemia, no active bleeding at this time  Recent echo with normal systolic function and normally functioning Bio AVR      Plan:  1. Continue rate control AF  2. CV stable for EGD/colonoscopy at moderate risk  3. Continue to hold A/C, will need to restart once etiology of bleed ascertained as long as no active bleeding  4. Continue po lasix  5. No further cardiac work up at this time  6. Will follow, thanks!

## 2017-12-25 NOTE — PROGRESS NOTE ADULT - ASSESSMENT
Patient with obesity probable KRYSTIN with nocturnal desats on home O2 at night with history and xray, physical findings c/w pneumonia - not confirmed on CT  No current distress    Plan:    1.Abx per ID  2.O2 nocturnally and daytime titrate to sat >93%  3.Truncal elevation for sleep  4.Nocturnal H2 blocker Patient with obesity probable KRYSTIN with nocturnal desats on home O2 at night with history and xray, physical findings c/w pneumonia - not confirmed on CT  No current distress    Plan:    1.Abx per ID  2.O2 nocturnally and daytime titrate to sat >93%  3.Truncal elevation for sleep  4.Antiacid Rx

## 2017-12-25 NOTE — PROGRESS NOTE ADULT - SUBJECTIVE AND OBJECTIVE BOX
DAVID VINCENT is a 83y Male with HPI:  82 y/o male from home with Daughter with history of COPD on home 02 3 liters cont, DM-2, HTN, HLD, Chronic LE edema, Afib on Xarelto presents with rigors, cough, that started 3 days ago. He denies any sick contacts, travel, or hx of PNA. Patient went to bed last night and called to his Daughter after he was shaking in bed and coughing with thick sputum. In the ED patient with LLL infiltrate on cxr, ronchi at left base, left shift. No fever. Patient also noted to have significant anemia with no history of anemia or hx of transfusion. Denies any BRBPR or black stool.  pt started on iv abx for pneumonia cxr with pneumonia ct scan ?no pneumonia  STOOL GUIAC POSTIVE         Allergies:  No Known Allergies      Medications:  acetaminophen   Tablet 650 milliGRAM(s) Oral every 6 hours PRN  acetaminophen   Tablet. 650 milliGRAM(s) Oral every 6 hours PRN  amiodarone    Tablet 200 milliGRAM(s) Oral daily  atorvastatin 10 milliGRAM(s) Oral at bedtime  azithromycin   Tablet 500 milliGRAM(s) Oral daily  buDESOnide 160 MICROgram(s)/formoterol 4.5 MICROgram(s) Inhaler 2 Puff(s) Inhalation two times a day  cefTRIAXone   IVPB 1 Gram(s) IV Intermittent every 24 hours  dextrose 5%. 1000 milliLiter(s) IV Continuous <Continuous>  dextrose 50% Injectable 12.5 Gram(s) IV Push once  dextrose Gel 1 Dose(s) Oral once PRN  folic acid 1 milliGRAM(s) Oral daily  furosemide    Tablet 40 milliGRAM(s) Oral daily  glucagon  Injectable 1 milliGRAM(s) IntraMuscular once PRN  hydrocortisone hemorrhoidal Suppository 1 Suppository(s) Rectal two times a day  insulin lispro (HumaLOG) corrective regimen sliding scale   SubCutaneous Before meals and at bedtime  ondansetron Injectable 4 milliGRAM(s) IV Push every 6 hours PRN  pantoprazole  Injectable 40 milliGRAM(s) IV Push two times a day  saccharomyces boulardii 250 milliGRAM(s) Oral two times a day  sodium chloride 0.9% lock flush 3 milliLiter(s) IV Push every 8 hours      ANTIBIOTICS:         Review of Systems: - Negative except as mentioned above     Physical Exam:  ICU Vital Signs Last 24 Hrs  T(C): 36.6 (25 Dec 2017 09:03), Max: 37.2 (24 Dec 2017 20:30)  T(F): 97.9 (25 Dec 2017 09:03), Max: 98.9 (24 Dec 2017 20:30)  HR: 74 (25 Dec 2017 09:03) (60 - 74)  BP: 122/68 (25 Dec 2017 09:03) (122/68 - 132/72)  BP(mean): --  ABP: --  ABP(mean): --  RR: 18 (25 Dec 2017 09:03) (18 - 20)  SpO2: 97% (25 Dec 2017 09:03) (97% - 99%)    GEN: NAD, pleasant  HEENT: normocephalic and atraumatic. EOMI. FAUZIA...  NECK: Supple. No carotid bruits.  No lymphadenopathy or thyromegaly.  LUNGS: Clear to auscultation.  HEART: Regular rate and rhythm without murmur.  ABDOMEN: Soft, nontender, and nondistended.  Positive bowel sounds.  No hepatosplenomegaly was noted.  NO REBOUND NO GUARDING  EXTREMITIES: Without any cyanosis, clubbing, rash, lesions or edema.  NEUROLOGIC: Cranial nerves II through XII are grossly intact.    SKIN: No ulceration or induration present.      Labs:

## 2017-12-25 NOTE — CONSULT NOTE ADULT - SUBJECTIVE AND OBJECTIVE BOX
HPI:  84 y/o male from home with Daughter with history of COPD on home 02 3 liters cont, DM-2, HTN, HLD, Chronic LE edema, Afib on Xarelto presents with rigors, cough, that started 3 days ago. He denies any sick contacts, travel, or hx of PNA. Patient went to bed last night and called to his Daughter after he was shaking in bed and coughing with thick sputum. In the ED patient with LLL infiltrate on cxr, ronchi at left base, left shift. No fever. Patient also noted to have significant anemia with no history of anemia or hx of transfusion. Denies any BRBPR or black stool. (24 Dec 2017 05:41)      PAST MEDICAL & SURGICAL HISTORY:  COPD (chronic obstructive pulmonary disease)  Hyperlipidemia, unspecified hyperlipidemia type  Essential hypertension  Afib  No significant past surgical history      ROS:  No Heartburn, regurgitation, dysphagia, odynophagia.  No dyspepsia  No abdominal pain.    No Nausea, vomiting.  No Bleeding.  No hematemesis.   No diarrhea.    No hematochesia.  No weight loss, anorexia.  No edema.      MEDICATIONS  (STANDING):  amiodarone    Tablet 200 milliGRAM(s) Oral daily  atorvastatin 10 milliGRAM(s) Oral at bedtime  azithromycin   Tablet 500 milliGRAM(s) Oral daily  buDESOnide 160 MICROgram(s)/formoterol 4.5 MICROgram(s) Inhaler 2 Puff(s) Inhalation two times a day  cefTRIAXone   IVPB 1 Gram(s) IV Intermittent every 24 hours  dextrose 5%. 1000 milliLiter(s) (50 mL/Hr) IV Continuous <Continuous>  dextrose 50% Injectable 12.5 Gram(s) IV Push once  folic acid 1 milliGRAM(s) Oral daily  insulin lispro (HumaLOG) corrective regimen sliding scale   SubCutaneous Before meals and at bedtime  pantoprazole  Injectable 40 milliGRAM(s) IV Push two times a day  saccharomyces boulardii 250 milliGRAM(s) Oral two times a day  sodium chloride 0.9% lock flush 3 milliLiter(s) IV Push every 8 hours    MEDICATIONS  (PRN):  acetaminophen   Tablet 650 milliGRAM(s) Oral every 6 hours PRN For Temp greater than 38.5 C (101.3 F)  acetaminophen   Tablet. 650 milliGRAM(s) Oral every 6 hours PRN Mild Pain (1 - 3)  dextrose Gel 1 Dose(s) Oral once PRN Blood Glucose LESS THAN 70 milliGRAM(s)/deciliter  glucagon  Injectable 1 milliGRAM(s) IntraMuscular once PRN Glucose LESS THAN 70 milligrams/deciliter  ondansetron Injectable 4 milliGRAM(s) IV Push every 6 hours PRN Nausea      Allergies    No Known Allergies    Intolerances        SOCIAL HISTORY:    ENDOSCOPIC/GI HISTORY:    FAMILY HISTORY:  No pertinent family history in first degree relatives      Vital Signs Last 24 Hrs  T(C): 36.6 (25 Dec 2017 09:03), Max: 37.2 (24 Dec 2017 20:30)  T(F): 97.9 (25 Dec 2017 09:03), Max: 98.9 (24 Dec 2017 20:30)  HR: 74 (25 Dec 2017 09:) (60 - 74)  BP: 122/68 (25 Dec 2017 09:03) (100/73 - 132/72)  BP(mean): --  RR: 18 (25 Dec 2017 09:03) (18 - 21)  SpO2: 97% (25 Dec 2017 09:03) (96% - 99%)    PHYSICAL EXAM:    GENERAL: NAD, well-groomed, well-developed  HEAD:  Atraumatic, Normocephalic  EYES: EOMI, PERRLA, conjunctiva and sclera clear  ENMT: No tonsillar erythema, exudates, or enlargement; Moist mucous membranes, Good dentition, No lesions  NECK: Supple, No JVD, Normal thyroid  CHEST/LUNG: Clear to percussion bilaterally; No rales, rhonchi, wheezing, or rubs  HEART: Regular rate and rhythm; No murmurs, rubs, or gallops  ABDOMEN: Soft, Nontender, Nondistended; Bowel sounds present  EXTREMITIES:  2+ Peripheral Pulses, No clubbing, cyanosis, or edema  LYMPH: No lymphadenopathy noted  SKIN: No rashes or lesions      LABS:                        7.9    4.7   )-----------( 126      ( 25 Dec 2017 06:20 )             25.2     12-    142  |  101  |  24.0<H>  ----------------------------<  117<H>  4.4   |  29.0  |  1.43<H>    Ca    8.6      25 Dec 2017 06:20  Phos  2.9     12-25  Mg     2.1     12-    TPro  6.4<L>  /  Alb  3.6  /  TBili  0.9  /  DBili  x   /  AST  25  /  ALT  11  /  AlkPhos  81  12-       Urinalysis Basic - ( 24 Dec 2017 12:13 )    Color: Yellow / Appearance: Clear / S.010 / pH: x  Gluc: x / Ketone: Negative  / Bili: Negative / Urobili: 1 mg/dL   Blood: x / Protein: 15 mg/dL / Nitrite: Positive   Leuk Esterase: Moderate / RBC: 3-5 /HPF / WBC >50   Sq Epi: x / Non Sq Epi: Occasional / Bacteria: Moderate        LIVER FUNCTIONS - ( 24 Dec 2017 03:24 )  Alb: 3.6 g/dL / Pro: 6.4 g/dL / ALK PHOS: 81 U/L / ALT: 11 U/L / AST: 25 U/L / GGT: x               RADIOLOGY & ADDITIONAL STUDIES: HPI:  84 y/o male from home with Daughter with history of COPD on home 02 3 liters cont, DM-2, HTN, HLD, Chronic LE edema, Afib on Xarelto presents with rigors, cough, that started 3 days ago. He denies any sick contacts, travel, or hx of PNA. Patient went to bed last night and called to his Daughter after he was shaking in bed and coughing with thick sputum. In the ED patient with LLL infiltrate on cxr, ronchi at left base, left shift. No fever. Patient also noted to have significant anemia with no history of anemia or hx of transfusion. Denies any BRBPR or black stool. He was noted to have heme positive stools. No history of abdominal pain. No prior history of colonoscopy or EGD. He was noted to have iron deficiency.      PAST MEDICAL & SURGICAL HISTORY:  COPD (chronic obstructive pulmonary disease)  Hyperlipidemia, unspecified hyperlipidemia type  Essential hypertension  Afib  CABG    ROS:  No Heartburn, regurgitation, dysphagia, odynophagia.  No dyspepsia  No abdominal pain.    No Nausea, vomiting.  No Bleeding.  No hematemesis.   No diarrhea.    No hematochezia  No weight loss, anorexia.  No edema.      MEDICATIONS  (STANDING):  amiodarone    Tablet 200 milliGRAM(s) Oral daily  atorvastatin 10 milliGRAM(s) Oral at bedtime  azithromycin   Tablet 500 milliGRAM(s) Oral daily  buDESOnide 160 MICROgram(s)/formoterol 4.5 MICROgram(s) Inhaler 2 Puff(s) Inhalation two times a day  cefTRIAXone   IVPB 1 Gram(s) IV Intermittent every 24 hours  dextrose 5%. 1000 milliLiter(s) (50 mL/Hr) IV Continuous <Continuous>  dextrose 50% Injectable 12.5 Gram(s) IV Push once  folic acid 1 milliGRAM(s) Oral daily  insulin lispro (HumaLOG) corrective regimen sliding scale   SubCutaneous Before meals and at bedtime  pantoprazole  Injectable 40 milliGRAM(s) IV Push two times a day  saccharomyces boulardii 250 milliGRAM(s) Oral two times a day  sodium chloride 0.9% lock flush 3 milliLiter(s) IV Push every 8 hours    MEDICATIONS  (PRN):  acetaminophen   Tablet 650 milliGRAM(s) Oral every 6 hours PRN For Temp greater than 38.5 C (101.3 F)  acetaminophen   Tablet. 650 milliGRAM(s) Oral every 6 hours PRN Mild Pain (1 - 3)  dextrose Gel 1 Dose(s) Oral once PRN Blood Glucose LESS THAN 70 milliGRAM(s)/deciliter  glucagon  Injectable 1 milliGRAM(s) IntraMuscular once PRN Glucose LESS THAN 70 milligrams/deciliter  ondansetron Injectable 4 milliGRAM(s) IV Push every 6 hours PRN Nausea      Allergies    No Known Allergies    Intolerances        SOCIAL HISTORY:Denies x 3    ENDOSCOPIC/GI HISTORY:No EGD or colonoscopy    FAMILY HISTORY:  No pertinent family history in first degree relatives      Vital Signs Last 24 Hrs  T(C): 36.6 (25 Dec 2017 09:03), Max: 37.2 (24 Dec 2017 20:30)  T(F): 97.9 (25 Dec 2017 09:03), Max: 98.9 (24 Dec 2017 20:30)  HR: 74 (25 Dec 2017 09:03) (60 - 74)  BP: 122/68 (25 Dec 2017 09:03) (100/73 - 132/72)  BP(mean): --  RR: 18 (25 Dec 2017 09:03) (18 - 21)  SpO2: 97% (25 Dec 2017 09:03) (96% - 99%)    PHYSICAL EXAM:    GENERAL: NAD, well-groomed, well-developed  HEAD:  Atraumatic, Normocephalic  EYES: EOMI, PERRLA, conjunctiva and sclera clear  ENMT: No tonsillar erythema, exudates, or enlargement; Moist mucous membranes, Good dentition, No lesions  NECK: Supple, No JVD, Normal thyroid  CHEST/LUNG: Clear to percussion bilaterally; No rales, rhonchi, wheezing, or rubs  HEART: Regular rate and rhythm; No murmurs, rubs, or gallops  ABDOMEN: Soft, Nontender, Nondistended; Bowel sounds present  RECTAL: Bright red blood per rectum. Hemorrhoids. No masses  EXTREMITIES:  2+ Peripheral Pulses, No clubbing, cyanosis, or edema  LYMPH: No lymphadenopathy noted  SKIN: No rashes or lesions      LABS:                        7.9    4.7   )-----------( 126      ( 25 Dec 2017 06:20 )             25.2     12-    142  |  101  |  24.0<H>  ----------------------------<  117<H>  4.4   |  29.0  |  1.43<H>    Ca    8.6      25 Dec 2017 06:20  Phos  2.9     12-25  Mg     2.1     12-25    TPro  6.4<L>  /  Alb  3.6  /  TBili  0.9  /  DBili  x   /  AST  25  /  ALT  11  /  AlkPhos  81  12-24       Urinalysis Basic - ( 24 Dec 2017 12:13 )    Color: Yellow / Appearance: Clear / S.010 / pH: x  Gluc: x / Ketone: Negative  / Bili: Negative / Urobili: 1 mg/dL   Blood: x / Protein: 15 mg/dL / Nitrite: Positive   Leuk Esterase: Moderate / RBC: 3-5 /HPF / WBC >50   Sq Epi: x / Non Sq Epi: Occasional / Bacteria: Moderate        LIVER FUNCTIONS - ( 24 Dec 2017 03:24 )  Alb: 3.6 g/dL / Pro: 6.4 g/dL / ALK PHOS: 81 U/L / ALT: 11 U/L / AST: 25 U/L / GGT: x               RADIOLOGY & ADDITIONAL STUDIES:  < from: CT Chest No Cont (17 @ 14:08) >  FINDINGS:    The lungs are clear. No pneumonia, CHF, pleural effusion, pneumothorax.    The heart is enlarged. Status post CABG procedure. Visualized thyroid   gland is unremarkable. Ascending aorta measures 4.9 cm in diameter. The   aortic knob measures 4.2 cm in diameter. Dense coronary artery   calcifications. Dense aortic valve calcifications.     Limited visualized upper abdomen shows moderate bilateral nonspecific   perinephric stranding. No acute osseous abnormality.    IMPRESSION:  No pneumonia.     < end of copied text >  < from: Xray Chest 2 Views PA/Lat (17 @ 02:58) >  TECHNIQUE:  Frontal and lateral views of the chest were obtained.    FINDINGS:  No previous examinations are available for review.    The lungs demonstrate patchy airspace opacity at the left base suspicious   for pneumonia. No pleural effusion is seen. The heart is difficultto   evaluate. The patient is status post median sternotomy.           IMPRESSION:  Left lower lobe pneumonia. Status post sternotomy.           < end of copied text >

## 2017-12-26 ENCOUNTER — TRANSCRIPTION ENCOUNTER (OUTPATIENT)
Age: 82
End: 2017-12-26

## 2017-12-26 DIAGNOSIS — R19.5 OTHER FECAL ABNORMALITIES: ICD-10-CM

## 2017-12-26 DIAGNOSIS — D50.0 IRON DEFICIENCY ANEMIA SECONDARY TO BLOOD LOSS (CHRONIC): ICD-10-CM

## 2017-12-26 LAB
-  AMIKACIN: SIGNIFICANT CHANGE UP
-  AMPICILLIN/SULBACTAM: SIGNIFICANT CHANGE UP
-  AMPICILLIN: SIGNIFICANT CHANGE UP
-  AZTREONAM: SIGNIFICANT CHANGE UP
-  CEFAZOLIN: SIGNIFICANT CHANGE UP
-  CEFEPIME: SIGNIFICANT CHANGE UP
-  CEFOXITIN: SIGNIFICANT CHANGE UP
-  CEFTAZIDIME: SIGNIFICANT CHANGE UP
-  CEFTRIAXONE: SIGNIFICANT CHANGE UP
-  CIPROFLOXACIN: SIGNIFICANT CHANGE UP
-  ERTAPENEM: SIGNIFICANT CHANGE UP
-  GENTAMICIN: SIGNIFICANT CHANGE UP
-  IMIPENEM: SIGNIFICANT CHANGE UP
-  LEVOFLOXACIN: SIGNIFICANT CHANGE UP
-  MEROPENEM: SIGNIFICANT CHANGE UP
-  NITROFURANTOIN: SIGNIFICANT CHANGE UP
-  PIPERACILLIN/TAZOBACTAM: SIGNIFICANT CHANGE UP
-  TOBRAMYCIN: SIGNIFICANT CHANGE UP
-  TRIMETHOPRIM/SULFAMETHOXAZOLE: SIGNIFICANT CHANGE UP
CULTURE RESULTS: SIGNIFICANT CHANGE UP
GLUCOSE BLDC GLUCOMTR-MCNC: 107 MG/DL — HIGH (ref 70–99)
GLUCOSE BLDC GLUCOMTR-MCNC: 110 MG/DL — HIGH (ref 70–99)
GLUCOSE BLDC GLUCOMTR-MCNC: 115 MG/DL — HIGH (ref 70–99)
GLUCOSE BLDC GLUCOMTR-MCNC: 120 MG/DL — HIGH (ref 70–99)
METHOD TYPE: SIGNIFICANT CHANGE UP
ORGANISM # SPEC MICROSCOPIC CNT: SIGNIFICANT CHANGE UP
ORGANISM # SPEC MICROSCOPIC CNT: SIGNIFICANT CHANGE UP
SPECIMEN SOURCE: SIGNIFICANT CHANGE UP

## 2017-12-26 PROCEDURE — 99232 SBSQ HOSP IP/OBS MODERATE 35: CPT

## 2017-12-26 PROCEDURE — 99233 SBSQ HOSP IP/OBS HIGH 50: CPT

## 2017-12-26 RX ORDER — AZITHROMYCIN 500 MG/1
1 TABLET, FILM COATED ORAL
Qty: 1 | Refills: 0 | OUTPATIENT
Start: 2017-12-26 | End: 2017-12-26

## 2017-12-26 RX ORDER — CEPHALEXIN 500 MG
1 CAPSULE ORAL
Qty: 9 | Refills: 0 | OUTPATIENT
Start: 2017-12-26 | End: 2017-12-28

## 2017-12-26 RX ORDER — SACCHAROMYCES BOULARDII 250 MG
1 POWDER IN PACKET (EA) ORAL
Qty: 0 | Refills: 0 | COMMUNITY
Start: 2017-12-26

## 2017-12-26 RX ORDER — ATORVASTATIN CALCIUM 80 MG/1
1 TABLET, FILM COATED ORAL
Qty: 30 | Refills: 0 | OUTPATIENT
Start: 2017-12-26 | End: 2018-01-24

## 2017-12-26 RX ORDER — PANTOPRAZOLE SODIUM 20 MG/1
1 TABLET, DELAYED RELEASE ORAL
Qty: 60 | Refills: 0 | OUTPATIENT
Start: 2017-12-26 | End: 2018-01-24

## 2017-12-26 RX ORDER — HYDROCORTISONE 1 %
1 OINTMENT (GRAM) TOPICAL
Qty: 0 | Refills: 0 | COMMUNITY
Start: 2017-12-26

## 2017-12-26 RX ADMIN — SODIUM CHLORIDE 3 MILLILITER(S): 9 INJECTION INTRAMUSCULAR; INTRAVENOUS; SUBCUTANEOUS at 06:18

## 2017-12-26 RX ADMIN — Medication 250 MILLIGRAM(S): at 05:46

## 2017-12-26 RX ADMIN — Medication 650 MILLIGRAM(S): at 22:28

## 2017-12-26 RX ADMIN — AMIODARONE HYDROCHLORIDE 200 MILLIGRAM(S): 400 TABLET ORAL at 05:46

## 2017-12-26 RX ADMIN — BUDESONIDE AND FORMOTEROL FUMARATE DIHYDRATE 2 PUFF(S): 160; 4.5 AEROSOL RESPIRATORY (INHALATION) at 08:06

## 2017-12-26 RX ADMIN — SODIUM CHLORIDE 3 MILLILITER(S): 9 INJECTION INTRAMUSCULAR; INTRAVENOUS; SUBCUTANEOUS at 22:27

## 2017-12-26 RX ADMIN — Medication 250 MILLIGRAM(S): at 18:18

## 2017-12-26 RX ADMIN — Medication 650 MILLIGRAM(S): at 23:30

## 2017-12-26 RX ADMIN — Medication 40 MILLIGRAM(S): at 05:46

## 2017-12-26 RX ADMIN — SODIUM CHLORIDE 3 MILLILITER(S): 9 INJECTION INTRAMUSCULAR; INTRAVENOUS; SUBCUTANEOUS at 11:58

## 2017-12-26 RX ADMIN — BUDESONIDE AND FORMOTEROL FUMARATE DIHYDRATE 2 PUFF(S): 160; 4.5 AEROSOL RESPIRATORY (INHALATION) at 20:33

## 2017-12-26 RX ADMIN — Medication 1 SUPPOSITORY(S): at 08:58

## 2017-12-26 RX ADMIN — CEFTRIAXONE 100 GRAM(S): 500 INJECTION, POWDER, FOR SOLUTION INTRAMUSCULAR; INTRAVENOUS at 05:46

## 2017-12-26 RX ADMIN — ATORVASTATIN CALCIUM 10 MILLIGRAM(S): 80 TABLET, FILM COATED ORAL at 22:27

## 2017-12-26 RX ADMIN — AZITHROMYCIN 500 MILLIGRAM(S): 500 TABLET, FILM COATED ORAL at 11:57

## 2017-12-26 RX ADMIN — Medication 1 MILLIGRAM(S): at 11:57

## 2017-12-26 RX ADMIN — PANTOPRAZOLE SODIUM 40 MILLIGRAM(S): 20 TABLET, DELAYED RELEASE ORAL at 05:46

## 2017-12-26 RX ADMIN — PANTOPRAZOLE SODIUM 40 MILLIGRAM(S): 20 TABLET, DELAYED RELEASE ORAL at 18:18

## 2017-12-26 RX ADMIN — Medication 650 MILLIGRAM(S): at 09:02

## 2017-12-26 RX ADMIN — Medication 1 SUPPOSITORY(S): at 18:18

## 2017-12-26 NOTE — DISCHARGE NOTE ADULT - PLAN OF CARE
resolved. s/p antibiotic. seek immediate medical attention if fever/chest pain/worsening short of breathcough or any other concerning symptoms.  [ pMD] in 1-2 weeks improved continue inhalers. see lung doctor in 1-2 weeks. continue meds. follow up with Dr. Looney 1-2 weeks. hold xarelto 2 days before endoscopy. see  in 1 week for endoscopy. come to Blood in stool or black colored stool, abdominal pain. or any other concerning symptoms. continue protonix twice daily as above continue meds. see primary care MD in 1-2 weeks finish antibiotic course. seek immediate medical attention if fever/chest pain/worsening short of breathcough or any other concerning symptoms.  [ pMD] in 1-2 weeks improved. c/w and finish zithromax until 12/28 no pNA on CT. PNA ruled out. acute bronchitis

## 2017-12-26 NOTE — DISCHARGE NOTE ADULT - PATIENT PORTAL LINK FT
“You can access the [facility] Patient Portal, offered by [facility], by registering with the following website: [email or web address]”

## 2017-12-26 NOTE — PROGRESS NOTE ADULT - SUBJECTIVE AND OBJECTIVE BOX
PULMONARY PROGRESS NOTE      YAMIL [id number]    Patient is a 83y old  Male who presents with a chief complaint of Chills, Cough (24 Dec 2017 05:41)      INTERVAL HPI/OVERNIGHT EVENTS:  feels well, no pulmonary complaints  has home 02  and rescue inhaler  anxious to go home  MEDICATIONS  (STANDING):  amiodarone    Tablet 200 milliGRAM(s) Oral daily  atorvastatin 10 milliGRAM(s) Oral at bedtime  azithromycin   Tablet 500 milliGRAM(s) Oral daily  buDESOnide 160 MICROgram(s)/formoterol 4.5 MICROgram(s) Inhaler 2 Puff(s) Inhalation two times a day  cefTRIAXone   IVPB 1 Gram(s) IV Intermittent every 24 hours  dextrose 5%. 1000 milliLiter(s) (50 mL/Hr) IV Continuous <Continuous>  dextrose 50% Injectable 12.5 Gram(s) IV Push once  folic acid 1 milliGRAM(s) Oral daily  furosemide    Tablet 40 milliGRAM(s) Oral daily  hydrocortisone hemorrhoidal Suppository 1 Suppository(s) Rectal two times a day  insulin lispro (HumaLOG) corrective regimen sliding scale   SubCutaneous Before meals and at bedtime  pantoprazole  Injectable 40 milliGRAM(s) IV Push two times a day  saccharomyces boulardii 250 milliGRAM(s) Oral two times a day  sodium chloride 0.9% lock flush 3 milliLiter(s) IV Push every 8 hours      MEDICATIONS  (PRN):  acetaminophen   Tablet 650 milliGRAM(s) Oral every 6 hours PRN For Temp greater than 38.5 C (101.3 F)  acetaminophen   Tablet. 650 milliGRAM(s) Oral every 6 hours PRN Mild Pain (1 - 3)  dextrose Gel 1 Dose(s) Oral once PRN Blood Glucose LESS THAN 70 milliGRAM(s)/deciliter  glucagon  Injectable 1 milliGRAM(s) IntraMuscular once PRN Glucose LESS THAN 70 milligrams/deciliter  ondansetron Injectable 4 milliGRAM(s) IV Push every 6 hours PRN Nausea      Allergies    No Known Allergies    Intolerances        PAST MEDICAL & SURGICAL HISTORY:  COPD (chronic obstructive pulmonary disease)  Hyperlipidemia, unspecified hyperlipidemia type  Essential hypertension  Afib  No significant past surgical history      SOCIAL HISTORY  Smoking History:       REVIEW OF SYSTEMS:    CONSTITUTIONAL:  No distress    HEENT:  Eyes:  No diplopia or blurred vision. ENT:  No earache, sore throat or runny nose.    CARDIOVASCULAR:  No pressure, squeezing, tightness, heaviness or aching about the chest; no palpitations.    RESPIRATORY:  see hpi    GASTROINTESTINAL:  No nausea, vomiting or diarrhea.    GENITOURINARY:  No dysuria, frequency or urgency.    NEUROLOGIC:  No paresthesias, fasciculations, seizures or weakness.    PSYCHIATRIC:  No disorder of thought or mood.    Vital Signs Last 24 Hrs  T(C): 36.7 (26 Dec 2017 05:15), Max: 36.7 (26 Dec 2017 05:15)  T(F): 98.1 (26 Dec 2017 05:15), Max: 98.1 (26 Dec 2017 05:15)  HR: 64 (26 Dec 2017 05:15) (62 - 64)  BP: 132/82 (26 Dec 2017 05:15) (132/82 - 156/80)  BP(mean): --  RR: 20 (26 Dec 2017 05:15) (18 - 20)  SpO2: 94% (26 Dec 2017 05:15) (94% - 97%)    PHYSICAL EXAMINATION:    GENERAL: The patient is awake and alert in no apparent distress.     HEENT: Head is normocephalic and atraumatic. Extraocular muscles are intact. Mucous membranes are moist.    NECK: Supple.    LUNGS: moderate air entry bilat without wheezing, rales or rhonchi; respirations unlabored    HEART: Regular rate and rhythm without murmur.    ABDOMEN: Soft, nontender, and nondistended.      EXTREMITIES: Without any cyanosis, clubbing, rash, lesions or edema.    NEUROLOGIC: Grossly intact.    LABS:                        7.9    4.7   )-----------( 126      ( 25 Dec 2017 06:20 )             25.2     12-    142  |  101  |  24.0<H>  ----------------------------<  117<H>  4.4   |  29.0  |  1.43<H>    Ca    8.6      25 Dec 2017 06:20  Phos  2.9     12-25  Mg     2.1     12-25      PT/INR - ( 25 Dec 2017 13:25 )   PT: 13.6 sec;   INR: 1.23 ratio           Urinalysis Basic - ( 24 Dec 2017 12:13 )    Color: Yellow / Appearance: Clear / S.010 / pH: x  Gluc: x / Ketone: Negative  / Bili: Negative / Urobili: 1 mg/dL   Blood: x / Protein: 15 mg/dL / Nitrite: Positive   Leuk Esterase: Moderate / RBC: 3-5 /HPF / WBC >50   Sq Epi: x / Non Sq Epi: Occasional / Bacteria: Moderate                      MICROBIOLOGY:    RADIOLOGY & ADDITIONAL STUDIES:  cxr and CT scan reviewed

## 2017-12-26 NOTE — PROGRESS NOTE ADULT - SUBJECTIVE AND OBJECTIVE BOX
DAVID VINCENT is a 83y Male with HPI:  82 y/o male from home with Daughter with history of COPD on home 02 3 liters cont, DM-2, HTN, HLD, Chronic LE edema, Afib on Xarelto presents with rigors, cough, that started 3 days ago. He denies any sick contacts, travel, or hx of PNA. Patient went to bed last night and called to his Daughter after he was shaking in bed and coughing with thick sputum. In the ED patient with LLL infiltrate on cxr, ronchi at left base, left shift. No fever. Patient also noted to have significant anemia with no history of anemia or hx of transfusion. Denies any BRBPR or black stool.  pt started on iv abx for pneumonia cxr with pneumonia ct scan ?no pneumonia  STOOL GUIAC POSTIVE FEELS WELL        Allergies:  No Known Allergies      Medications:  acetaminophen   Tablet 650 milliGRAM(s) Oral every 6 hours PRN  acetaminophen   Tablet. 650 milliGRAM(s) Oral every 6 hours PRN  amiodarone    Tablet 200 milliGRAM(s) Oral daily  atorvastatin 10 milliGRAM(s) Oral at bedtime  azithromycin   Tablet 500 milliGRAM(s) Oral daily  buDESOnide 160 MICROgram(s)/formoterol 4.5 MICROgram(s) Inhaler 2 Puff(s) Inhalation two times a day  cefTRIAXone   IVPB 1 Gram(s) IV Intermittent every 24 hours  dextrose 5%. 1000 milliLiter(s) IV Continuous <Continuous>  dextrose 50% Injectable 12.5 Gram(s) IV Push once  dextrose Gel 1 Dose(s) Oral once PRN  folic acid 1 milliGRAM(s) Oral daily  furosemide    Tablet 40 milliGRAM(s) Oral daily  glucagon  Injectable 1 milliGRAM(s) IntraMuscular once PRN  hydrocortisone hemorrhoidal Suppository 1 Suppository(s) Rectal two times a day  insulin lispro (HumaLOG) corrective regimen sliding scale   SubCutaneous Before meals and at bedtime  ondansetron Injectable 4 milliGRAM(s) IV Push every 6 hours PRN  pantoprazole  Injectable 40 milliGRAM(s) IV Push two times a day  saccharomyces boulardii 250 milliGRAM(s) Oral two times a day  sodium chloride 0.9% lock flush 3 milliLiter(s) IV Push every 8 hours      ANTIBIOTICS:         Review of Systems: - Negative except as mentioned above     Physical Exam:  ICU Vital Signs Last 24 Hrs  T(C): 36.6 (25 Dec 2017 09:03), Max: 37.2 (24 Dec 2017 20:30)  T(F): 97.9 (25 Dec 2017 09:03), Max: 98.9 (24 Dec 2017 20:30)  HR: 74 (25 Dec 2017 09:03) (60 - 74)  BP: 122/68 (25 Dec 2017 09:03) (122/68 - 132/72)  BP(mean): --  ABP: --  ABP(mean): --  RR: 18 (25 Dec 2017 09:03) (18 - 20)  SpO2: 97% (25 Dec 2017 09:03) (97% - 99%)    GEN: NAD, pleasant  HEENT: normocephalic and atraumatic. EOMI. FAUZIA...  NECK: Supple. No carotid bruits.  No lymphadenopathy or thyromegaly.  LUNGS: Clear to auscultation.  HEART: Regular rate and rhythm without murmur.  ABDOMEN: Soft, nontender, and nondistended.  Positive bowel sounds.  No hepatosplenomegaly was noted.  NO REBOUND NO GUARDING  EXTREMITIES: Without any cyanosis, clubbing, rash, lesions or edema.  NEUROLOGIC: Cranial nerves II through XII are grossly intact.    SKIN: No ulceration or induration present.      Labs:

## 2017-12-26 NOTE — PROGRESS NOTE ADULT - ASSESSMENT
obesity, no sig obstruction on PFTS, home 02 depd  CT scan without pneumonia  has not had sleep study  bronchitis and UTI, abx per ID  GI input noted, can do work up as needed form pulmonary perspective, he appears to be close to baseline  serum c02 not significantly elevated  Remains at increased risk secondary to obesity/hypoxemia  risk/benefit favors  empiric bipap 16/8 , rate of 10 if any significant desaturation during procedure  Pulmonary follow up as out pt

## 2017-12-26 NOTE — PROGRESS NOTE ADULT - ASSESSMENT
84 y/o male with LLL PNA w/ elevated pneumonia severity index, hx of HTN, HLD, DM-2, COPD, chronic hypoxia, Afib       Problem/Plan - 1:  ·  Problem: Pneumonia of left lower lobe due to infectious organism.  resolved. Plan:   on monitored/pulse ox bed, CT chest reported no pneumonia. Id no PNA. cleared for DC today  on. ceftriaxone. No fever. Mild leucopenia.   Nebs, oxygen, probiotics, incentive spirometer, chest PT.        Problem/Plan - 2:  ·  Problem:  Anemia, unspecified  Plan: Patient with significant anemia, which patient and family report as new. Patient started on Xarelto 1 month ago for afib, and on ASA. No reports of BRBPR/Black stool. Reports normal colonoscopy 10 years ago, no hx of PUD/GERD.hemorrhoids reported by GI. annusol supp added. protonix IV BID added can changed to PO in DC   Positive occult  blood in stool.   retic count, iron studies noted.    GI eval appreciated  No indication for transfusion at this time.   spoke to .patient is cleared for DC with OP f/u.      Problem/Plan - 3:  ·  Problem: Chronic obstructive pulmonary disease, unspecified COPD type.  Plan: Stable cont. 02, Symbicort in place of Breo while in hospital.      Problem/Plan - 4:  ·  Problem: Hyperlipidemia, unspecified hyperlipidemia type.  Plan: cardiac diet, Statin.      Problem/Plan - 5:  ·  Problem: Essential hypertension.  Plan: DASH diet, cont. o/p meds.      Problem/Plan - 6:  Problem: Atrial fibrillation, unspecified type. Plan:    Problem/pla6: UTI. DC with PO keflex for complete total of 7 days    disposition NAA 84 y/o male with LLL PNA w/ elevated pneumonia severity index, hx of HTN, HLD, DM-2, COPD, chronic hypoxia, Afib       Problem/Plan - 1:  ·  Problem: Pneumonia of left lower lobe due to infectious organism.  resolved. Plan:   on monitored/pulse ox bed, CT chest reported no pneumonia. Id no PNA. cleared for DC today  on. ceftriaxone/zithromax. No fever. Mild leucopenia. z-pack to be finished 12/27   Nebs, oxygen, probiotics, incentive spirometer, chest PT.        Problem/Plan - 2:  ·  Problem:  Anemia, unspecified  Plan: Patient with significant anemia, which patient and family report as new. Patient started on Xarelto 1 month ago for afib, and on ASA. No reports of BRBPR/Black stool. Reports normal colonoscopy 10 years ago, no hx of PUD/GERD.hemorrhoids reported by GI. annusol supp added. protonix IV BID added can changed to PO in DC   Positive occult  blood in stool.   retic count, iron studies noted.    GI eval appreciated  No indication for transfusion at this time.   spoke to .patient is cleared for DC with OP f/u.      Problem/Plan - 3:  ·  Problem: Chronic obstructive pulmonary disease, unspecified COPD type.  Plan: Stable cont. 02, Symbicort in place of Breo while in hospital.   possible Juan R. serum c02 not significantly elevated  Remains at increased risk secondary to obesity/hypoxemia  risk/benefit favors  empiric bipap 16/8 , rate of 10 if any significant desaturation during procedure  Pulmonary follow up as out pt     Problem/Plan - 4:  ·  Problem: Hyperlipidemia, unspecified hyperlipidemia type.  Plan: cardiac diet, Statin.      Problem/Plan - 5:  ·  Problem: Essential hypertension.  Plan: DASH diet, cont. o/p meds.      Problem/Plan - 6:  Problem: Atrial fibrillation, unspecified type. Plan: restart xarelto on DC. hold for 2-3 days before EGD.colonoscopy [ as per ]. hold xarelto if active bleed. [id number] score 5. high risk of stroke. AC should not held longer period of time unless contraindicated.cardiology input appreciated    Problem/pla6: UTI. DC with PO keflex for complete total of 7 days    disposition NAA 82 y/o male with LLL PNA w/ elevated pneumonia severity index, hx of HTN, HLD, DM-2, COPD, chronic hypoxia, Afib       Problem/Plan - 1:  ·  Problem: Pneumonia of left lower lobe due to infectious organism.  resolved. Plan:   on monitored/pulse ox bed, CT chest reported no pneumonia. Id no PNA. cleared for DC today  on. ceftriaxone/zithromax. No fever. Mild leucopenia. z-pack to be finished 12/28 and PO keflex until 12/30.    Nebs, oxygen, probiotics, incentive spirometer, chest PT.        Problem/Plan - 2:  ·  Problem:  Anemia, unspecified  Plan: Patient with significant anemia, which patient and family report as new. Patient started on Xarelto 1 month ago for afib, and on ASA. No reports of BRBPR/Black stool. Reports normal colonoscopy 10 years ago, no hx of PUD/GERD.hemorrhoids reported by GI. annusol supp added. protonix IV BID added can changed to PO in DC   Positive occult  blood in stool.   retic count, iron studies noted.    GI eval appreciated  No indication for transfusion at this time.   spoke to .patient is cleared for DC with OP f/u.   f/u h/h in am before DC.     Problem/Plan - 3:  ·  Problem: Chronic obstructive pulmonary disease, unspecified COPD type.  Plan: Stable cont. 02, Symbicort in place of Breo while in hospital.   possible Juan R. serum c02 not significantly elevated  Remains at increased risk secondary to obesity/hypoxemia  risk/benefit favors  empiric bipap 16/8 , rate of 10 if any significant desaturation during procedure  Pulmonary follow up as out pt     Problem/Plan - 4:  ·  Problem: Hyperlipidemia, unspecified hyperlipidemia type.  Plan: cardiac diet, Statin.      Problem/Plan - 5:  ·  Problem: Essential hypertension.  Plan: DASH diet, cont. o/p meds.      Problem/Plan - 6:  Problem: Atrial fibrillation, unspecified type. Plan: restart xarelto on DC. hold for 2-3 days before EGD.colonoscopy [ as per ]. hold xarelto if active bleed. [id number] score 5. high risk of stroke. AC should not held longer period of time unless contraindicated.cardiology input appreciated    Problem/pla6: UTI. DC with PO keflex for complete total of 7 days [ until 12/30]    Disposition: PT eval for safe DC.

## 2017-12-26 NOTE — PROGRESS NOTE ADULT - ASSESSMENT
82 y/o male from home with Daughter with history of COPD on home 02 3 liters cont, DM-2, HTN, HLD, Chronic LE edema, Afib on Xarelto presents with rigors, cough, that started 3 days ago. He denies any sick contacts, travel, or hx of PNA. Patient went to bed last night and called to his Daughter after he was shaking in bed and coughing with thick sputum. In the ED patient with LLL infiltrate on cxr, ronchi at left base, left shift. No fever. Patient also noted to have significant anemia with no history of anemia or hx of transfusion. Denies any BRBPR or black stool.  pt started on iv abx for pneumonia cxr with pneumonia ct scan ?no pneumonia  STOOL GUAIAC  POSTIVE   SEEN BY GI WILL CONTINUE OUTPT WORK UP OF ANEMIA FOLLOW UPWITH DR MEL LIRA TO D/C FROM ID STANDPOINT  CT NO PNEUMONIA POSSIBLE BRONCHITIS  URINE POSSIBLE UTI

## 2017-12-26 NOTE — DISCHARGE NOTE ADULT - MEDICATION SUMMARY - MEDICATIONS TO TAKE
I will START or STAY ON the medications listed below when I get home from the hospital:    Aspirin Enteric Coated 81 mg oral delayed release tablet  -- 1 tab(s) by mouth once a day  -- Indication: For Afib    amiodarone 200 mg oral tablet  -- 1 tab(s) by mouth once a day  -- Indication: For Atrial fibrillation, unspecified type    rivaroxaban 15 mg oral tablet  -- 1 tab(s) by mouth once a day   -- Check with your doctor before becoming pregnant.  It is very important that you take or use this exactly as directed.  Do not skip doses or discontinue unless directed by your doctor.  Obtain medical advice before taking any non-prescription drugs as some may affect the action of this medication.  Take with food.    -- Indication: For Afib    Neurontin 800 mg oral tablet  -- 1 tab(s) by mouth 2 times a day  -- Indication: For Controlled type 2 diabetes mellitus without complication, unspecified long term insulin use status    Fortamet 500 mg oral tablet, extended release  -- 1 tab(s) by mouth once a day   -- Check with your doctor before becoming pregnant.  Do not drink alcoholic beverages when taking this medication.  Obtain medical advice before taking any non-prescription drugs as some may affect the action of this medication.  Swallow whole.  Do not crush.  Take with food or milk.    -- Indication: For Controlled type 2 diabetes mellitus without complication, unspecified long term insulin use status    atorvastatin 10 mg oral tablet  -- 1 tab(s) by mouth once a day (at bedtime)  -- Indication: For Controlled type 2 diabetes mellitus without complication, unspecified long term insulin use status    Breo Ellipta 100 mcg-25 mcg/inh inhalation powder  -- 1 puff(s) inhaled once a day  -- Indication: For COPD (chronic obstructive pulmonary disease)    cephalexin 500 mg oral capsule  -- 1 cap(s) by mouth every 8 hours   -- Finish all this medication unless otherwise directed by prescriber.    -- Indication: For COPD (chronic obstructive pulmonary disease)    hydrocortisone 25 mg rectal suppository  -- 1 suppository(ies) rectally 2 times a day  -- Indication: For Hemorrhoid     furosemide 40 mg oral tablet  -- 1 tab(s) by mouth once a day   -- Avoid prolonged or excessive exposure to direct and/or artificial sunlight while taking this medication.  It is very important that you take or use this exactly as directed.  Do not skip doses or discontinue unless directed by your doctor.  It may be advisable to drink a full glass orange juice or eat a banana daily while taking this medication.    -- Indication: For Essential hypertension    azithromycin 500 mg oral tablet  -- 1 tab(s) by mouth once a day   -- Do not take dairy products, antacids, or iron preparations within one hour of this medication.  Finish all this medication unless otherwise directed by prescriber.    -- Indication: For COPD (chronic obstructive pulmonary disease)    saccharomyces boulardii lyo 250 mg oral capsule  -- 1 cap(s) by mouth 2 times a day  -- Indication: For Occult blood positive stool    pantoprazole 40 mg oral delayed release tablet  -- 1 tab(s) by mouth 2 times a day   -- Indication: For Occult blood positive stool    folic acid 1 mg oral tablet  -- 1 tab(s) by mouth once a day   -- Indication: For Iron deficiency anemia due to chronic blood loss I will START or STAY ON the medications listed below when I get home from the hospital:    Aspirin Enteric Coated 81 mg oral delayed release tablet  -- 1 tab(s) by mouth once a day  -- Indication: For Afib    amiodarone 200 mg oral tablet  -- 1 tab(s) by mouth once a day  -- Indication: For Atrial fibrillation, unspecified type    rivaroxaban 15 mg oral tablet  -- 1 tab(s) by mouth once a day   -- Check with your doctor before becoming pregnant.  It is very important that you take or use this exactly as directed.  Do not skip doses or discontinue unless directed by your doctor.  Obtain medical advice before taking any non-prescription drugs as some may affect the action of this medication.  Take with food.    -- Indication: For Afib    Fortamet 500 mg oral tablet, extended release  -- 1 tab(s) by mouth once a day   -- Check with your doctor before becoming pregnant.  Do not drink alcoholic beverages when taking this medication.  Obtain medical advice before taking any non-prescription drugs as some may affect the action of this medication.  Swallow whole.  Do not crush.  Take with food or milk.    -- Indication: For Controlled type 2 diabetes mellitus without complication, unspecified long term insulin use status    atorvastatin 10 mg oral tablet  -- 1 tab(s) by mouth once a day (at bedtime)  -- Indication: For Controlled type 2 diabetes mellitus without complication, unspecified long term insulin use status    Breo Ellipta 100 mcg-25 mcg/inh inhalation powder  -- 1 puff(s) inhaled once a day  -- Indication: For COPD (chronic obstructive pulmonary disease)    cephalexin 500 mg oral capsule  -- 1 cap(s) by mouth every 8 hours   -- Finish all this medication unless otherwise directed by prescriber.    -- Indication: For COPD (chronic obstructive pulmonary disease)    hydrocortisone 25 mg rectal suppository  -- 1 suppository(ies) rectally 2 times a day  -- Indication: For Hemorrhoid     furosemide 40 mg oral tablet  -- 1 tab(s) by mouth once a day   -- Avoid prolonged or excessive exposure to direct and/or artificial sunlight while taking this medication.  It is very important that you take or use this exactly as directed.  Do not skip doses or discontinue unless directed by your doctor.  It may be advisable to drink a full glass orange juice or eat a banana daily while taking this medication.    -- Indication: For Essential hypertension    azithromycin 500 mg oral tablet  -- 1 tab(s) by mouth once a day   -- Do not take dairy products, antacids, or iron preparations within one hour of this medication.  Finish all this medication unless otherwise directed by prescriber.    -- Indication: For COPD (chronic obstructive pulmonary disease)    saccharomyces boulardii lyo 250 mg oral capsule  -- 1 cap(s) by mouth 2 times a day  -- Indication: For Occult blood positive stool    pantoprazole 40 mg oral delayed release tablet  -- 1 tab(s) by mouth 2 times a day   -- Indication: For Occult blood positive stool    folic acid 1 mg oral tablet  -- 1 tab(s) by mouth once a day   -- Indication: For Iron deficiency anemia due to chronic blood loss

## 2017-12-26 NOTE — PROGRESS NOTE ADULT - SUBJECTIVE AND OBJECTIVE BOX
DAVID VINCENT  [id number]        Chief Complaint: f/u SOB/GIB/bronchitis      Subjective: feels better, no cp/sob/palps      24 hour Tele: AF rate controlled, no events, artifact        acetaminophen   Tablet 650 milliGRAM(s) Oral every 6 hours PRN  acetaminophen   Tablet. 650 milliGRAM(s) Oral every 6 hours PRN  amiodarone    Tablet 200 milliGRAM(s) Oral daily  atorvastatin 10 milliGRAM(s) Oral at bedtime  azithromycin   Tablet 500 milliGRAM(s) Oral daily  buDESOnide 160 MICROgram(s)/formoterol 4.5 MICROgram(s) Inhaler 2 Puff(s) Inhalation two times a day  cefTRIAXone   IVPB 1 Gram(s) IV Intermittent every 24 hours  dextrose 5%. 1000 milliLiter(s) IV Continuous <Continuous>  dextrose 50% Injectable 12.5 Gram(s) IV Push once  dextrose Gel 1 Dose(s) Oral once PRN  folic acid 1 milliGRAM(s) Oral daily  furosemide    Tablet 40 milliGRAM(s) Oral daily  glucagon  Injectable 1 milliGRAM(s) IntraMuscular once PRN  hydrocortisone hemorrhoidal Suppository 1 Suppository(s) Rectal two times a day  insulin lispro (HumaLOG) corrective regimen sliding scale   SubCutaneous Before meals and at bedtime  ondansetron Injectable 4 milliGRAM(s) IV Push every 6 hours PRN  pantoprazole  Injectable 40 milliGRAM(s) IV Push two times a day  saccharomyces boulardii 250 milliGRAM(s) Oral two times a day  sodium chloride 0.9% lock flush 3 milliLiter(s) IV Push every 8 hours          Physical Exam:  T(C): 36.7 (12-26-17 @ 05:15), Max: 36.7 (12-26-17 @ 05:15)  HR: 64 (12-26-17 @ 05:15) (62 - 64)  BP: 132/82 (12-26-17 @ 05:15) (132/82 - 156/80)  RR: 20 (12-26-17 @ 05:15) (18 - 20)  SpO2: 94% (12-26-17 @ 05:15) (94% - 97%)  Wt(kg): --  GEN: NAD  HEENT: MMM, sclera anicteric  RESP: reduced air movement, faint right basilar crackles  CVS: soft S1S2,irregular, , no JVD/S3 no M/R/G  GI: Soft, NT, ND, BS+  EXT: 1+ edema bilaterally  NEURO: AAOX3  PSYCH: Normal affect    I&O's Summary    25 Dec 2017 07:01  -  26 Dec 2017 07:00  --------------------------------------------------------  IN: 0 mL / OUT: 1250 mL / NET: -1250 mL          Labs:   25 Dec 2017 06:20    142    |  101    |  24.0   ----------------------------<  117    4.4     |  29.0   |  1.43     Ca    8.6        25 Dec 2017 06:20  Phos  2.9       25 Dec 2017 06:20  Mg     2.1       25 Dec 2017 06:20                            7.9    4.7   )-----------( 126      ( 25 Dec 2017 06:20 )             25.2     PT/INR - ( 25 Dec 2017 13:25 )   PT: 13.6 sec;   INR: 1.23 ratio        Echo (done in office 7/2017)  1. Hyperdynamic global left ventricular systolic function.  2. Left ventricular ejection fraction, by visual estimation, is 70 to 75%.  3. Moderate concentric left ventricular hypertrophy.  4. Normal left ventricular internal cavity size.  5. Mildly enlarged right ventricle with normal systolic function..  6. Moderately dilated left atrium.  7. Calculated LA Volume Index 48ml/ml2.  8. Borderline dilated right atrium.  9. Mild to moderate mitral annular calcification.  10. Moderate thickening and calcification of the anterior and posterior mitral valve leaflets.  11. Mild mitral valve regurgitation.  12. Mild tricuspid regurgitation.  13. Mildly elevated pulmonary artery systolic pressure.  14. Trace pulmonic valve regurgitation.  15. Bovine bioprosthesis in the aortic position.  16. Aortic valve is normally functioning bioprosthetic valve.  17. Interatrial and interventricular septa appear intact, with no echocardiographic evidence of  intracardiac shunting.  18. There is no evidence of pericardial effusion.  19. In comparison to prior study, the PA pressure is slightly lower otherwise, there is no  significant interval change.      Assessment:   Patient is a  83y Male with h/o bio AVR, CABG, recent onset AF, COPD on home O2, here with fevers/chills and found to have ? PNA, GIB and UTI. No infiltrate on CT howerver and likely acute bronchitis causing SOB and cough. Evidence of UTI as well contributing to chills/fever.   -AF is rate controlled and no evidence significant decompensated CHF. No evidence of ischemic.   -Has GIB with positive guiaic and anemia, no active bleeding at this time  -Recent echo with normal systolic function and normally functioning Bio AVR  -[id number] score of 5, high risk, recommend continuing Xarelto and then hold prior to EGD/colo, recommend against prolonged time off A/C    Plan:  1. CV stable for discharge  2. continue Xarelto 15mg daily as long as no active bleeding  3. Outpatient EGD/colo, can then hold Xarelto 48hours prior  4. follow up with Dr. Looney 1-2 weeks post dc

## 2017-12-26 NOTE — DISCHARGE NOTE ADULT - ADDITIONAL INSTRUCTIONS
follow up with  in 1 week for endoscopy. follow up with Dr. Looney, Dr. Suarez, dr. escobedo 1-2 weeks. call for appoinment

## 2017-12-26 NOTE — DISCHARGE NOTE ADULT - HOSPITAL COURSE
84 y/o male from home with Daughter with history of COPD on home 02 3 liters cont, DM-2, HTN, HLD, Chronic LE edema, Afib on Xarelto presents with rigors, cough, that started 3 days ago. He denies any sick contacts, travel, or hx of PNA. Patient went to bed last night and called to his Daughter after he was shaking in bed and coughing with thick sputum. In the ED patient with LLL infiltrate on cxr, ronchi at left base, left shift. No fever. Patient also noted to have significant anemia with no history of anemia or hx of transfusion. Denies any BRBPR or black stool. pt started on iv abx for pneumonia cxr with pneumonia ct scan ?no pneumonia. ua showed UTI. seen by ID. antibiotics continued until DC. completed course of zithromax on 12/27. patient currently afebrile, no leucocytosis. Hb dropped. FOBt positive. s/p PRBC transfusion. h/h stable and no active bleeding. seen by GI. annusol was started for hemorrhoids. IV Protonix continued. will be dced with PO Protonix and GI f/u in 1 week for outpatient EGD/Colonoscopy. h/o afib, on xarelto which was held for Gi bleed, restarted after consulting with GI. [id number] score 5. high risk of stroke. cardiology recomended to hold xarelto only for 48 hrs before EGd/colonoscopy. Dr. Bee [ GI] was informed and agreed. pulmonary was consulted. h/o COPD on home oxygen, possible KRYSTIN. needs outpatient sleep study. pulmonary cleared for GI procedure, he appears to be close to baseline.  serum C02 not significantly elevated. Remains at increased risk secondary to obesity/hypoxemia.risk/benefit favors. empiric bipap 16/8 , rate of 10 if any significant desaturation during procedure. Pulmonary follow up as out pt. cardiology cleared for DC. follow up with Dr. Looney 1-2 weeks post dc. Id cleared for DC. patient currently denied any complaints. PT consulted for safe disposition. plan of care discussed with the patient. return precaution discussed. advised fall and injury precaution 82 y/o male from home with Daughter with history of COPD on home 02 3 liters cont, DM-2, HTN, HLD, Chronic LE edema, Afib on Xarelto presents with rigors, cough, that started 3 days ago. He denies any sick contacts, travel, or hx of PNA. Patient went to bed last night and called to his Daughter after he was shaking in bed and coughing with thick sputum. In the ED patient with LLL infiltrate on cxr, ronchi at left base, left shift. No fever. Patient also noted to have significant anemia with no history of anemia or hx of transfusion. Denies any BRBPR or black stool. pt started on iv abx for pneumonia cxr with pneumonia ct scan ?no pneumonia. ua showed UTI. seen by ID. antibiotics continued until DC. completed course of zithromax on 12/28, keflex 12/30. patient currently afebrile, no leucocytosis. Hb dropped. FOBt positive. s/p PRBC transfusion. h/h stable and no active bleeding. seen by GI. annusol was started for hemorrhoids. IV Protonix continued. will be dced with PO Protonix and GI f/u in 1 week for outpatient EGD/Colonoscopy. h/o afib, on xarelto which was held for Gi bleed, restarted after consulting with GI. [id number] score 5. high risk of stroke. cardiology recomended to hold xarelto only for 48 hrs before EGd/colonoscopy. Dr. Bee [ GI] was informed and agreed. pulmonary was consulted. h/o COPD on home oxygen, possible KRYSTIN. needs outpatient sleep study. pulmonary cleared for GI procedure, he appears to be close to baseline.  serum C02 not significantly elevated. Remains at increased risk secondary to obesity/hypoxemia.risk/benefit favors. empiric bipap 16/8 , rate of 10 if any significant desaturation during procedure. Pulmonary follow up as out pt. cardiology cleared for DC. follow up with Dr. Looney 1-2 weeks post dc. Id cleared for DC. patient currently denied any complaints. PT consulted for safe disposition. plan of care discussed with the patient. return precaution discussed. advised fall and injury precaution

## 2017-12-26 NOTE — PROGRESS NOTE ADULT - SUBJECTIVE AND OBJECTIVE BOX
Pt seen and examined. In NAD when seen. he states that his breathing has improved and was eating breakfast when seen.     REVIEW OF SYSTEMS:  Constitutional: No fever, weight loss or fatigue  Cardiovascular: No chest pain, palpitations, dizziness or leg swelling  Gastrointestinal: No abdominal or epigastric pain. No nausea, vomiting or hematemesis; No diarrhea or constipation. No melena or hematochezia.  Skin: No itching, burning, rashes or lesions       MEDICATIONS:  MEDICATIONS  (STANDING):  amiodarone    Tablet 200 milliGRAM(s) Oral daily  atorvastatin 10 milliGRAM(s) Oral at bedtime  azithromycin   Tablet 500 milliGRAM(s) Oral daily  buDESOnide 160 MICROgram(s)/formoterol 4.5 MICROgram(s) Inhaler 2 Puff(s) Inhalation two times a day  cefTRIAXone   IVPB 1 Gram(s) IV Intermittent every 24 hours  dextrose 5%. 1000 milliLiter(s) (50 mL/Hr) IV Continuous <Continuous>  dextrose 50% Injectable 12.5 Gram(s) IV Push once  folic acid 1 milliGRAM(s) Oral daily  furosemide    Tablet 40 milliGRAM(s) Oral daily  hydrocortisone hemorrhoidal Suppository 1 Suppository(s) Rectal two times a day  insulin lispro (HumaLOG) corrective regimen sliding scale   SubCutaneous Before meals and at bedtime  pantoprazole  Injectable 40 milliGRAM(s) IV Push two times a day  saccharomyces boulardii 250 milliGRAM(s) Oral two times a day  sodium chloride 0.9% lock flush 3 milliLiter(s) IV Push every 8 hours    MEDICATIONS  (PRN):  acetaminophen   Tablet 650 milliGRAM(s) Oral every 6 hours PRN For Temp greater than 38.5 C (101.3 F)  acetaminophen   Tablet. 650 milliGRAM(s) Oral every 6 hours PRN Mild Pain (1 - 3)  dextrose Gel 1 Dose(s) Oral once PRN Blood Glucose LESS THAN 70 milliGRAM(s)/deciliter  glucagon  Injectable 1 milliGRAM(s) IntraMuscular once PRN Glucose LESS THAN 70 milligrams/deciliter  ondansetron Injectable 4 milliGRAM(s) IV Push every 6 hours PRN Nausea      Allergies    No Known Allergies    Intolerances        Vital Signs Last 24 Hrs  T(C): 36.7 (26 Dec 2017 05:15), Max: 36.7 (26 Dec 2017 05:15)  T(F): 98.1 (26 Dec 2017 05:15), Max: 98.1 (26 Dec 2017 05:15)  HR: 64 (26 Dec 2017 05:15) (62 - 74)  BP: 132/82 (26 Dec 2017 05:15) (122/68 - 156/80)  BP(mean): --  RR: 20 (26 Dec 2017 05:15) (18 - 20)  SpO2: 94% (26 Dec 2017 05:15) (94% - 97%)     @ 07:01  -   @ 07:00  --------------------------------------------------------  IN: 0 mL / OUT: 1250 mL / NET: -1250 mL        PHYSICAL EXAM:    General: Well developed; well nourished; in no acute distress  HEENT: MMM, conjunctiva pink and sclera anicteric.  Lungs: decreased breath sounds bilaterally.  Cor: RRR S1, S2 only.  Gastrointestinal: Abdomen: Soft non-tender non-distended; Normal bowel sounds; No hepatosplenomegaly  Extremities: no cyanosis, clubbing or edema.  Skin: Warm and dry. No obvious rash  Neuro: Pt. a + o x 3    LABS:      CBC Full  -  ( 25 Dec 2017 06:20 )  WBC Count : 4.7 K/uL  Hemoglobin : 7.9 g/dL  Hematocrit : 25.2 %  Platelet Count - Automated : 126 K/uL  Mean Cell Volume : 81.8 fl  Mean Cell Hemoglobin : 25.6 pg  Mean Cell Hemoglobin Concentration : 31.3 g/dL  Auto Neutrophil # : 3.6 K/uL  Auto Lymphocyte # : 0.6 K/uL  Auto Monocyte # : 0.4 K/uL  Auto Eosinophil # : 0.0 K/uL  Auto Basophil # : 0.0 K/uL  Auto Neutrophil % : 76.6 %  Auto Lymphocyte % : 13.3 %  Auto Monocyte % : 8.4 %  Auto Eosinophil % : 1.1 %  Auto Basophil % : 0.4 %        142  |  101  |  24.0<H>  ----------------------------<  117<H>  4.4   |  29.0  |  1.43<H>    Ca    8.6      25 Dec 2017 06:20  Phos  2.9     12-25  Mg     2.1     12-25      PT/INR - ( 25 Dec 2017 13:25 )   PT: 13.6 sec;   INR: 1.23 ratio               Urinalysis Basic - ( 24 Dec 2017 12:13 )    Color: Yellow / Appearance: Clear / S.010 / pH: x  Gluc: x / Ketone: Negative  / Bili: Negative / Urobili: 1 mg/dL   Blood: x / Protein: 15 mg/dL / Nitrite: Positive   Leuk Esterase: Moderate / RBC: 3-5 /HPF / WBC >50   Sq Epi: x / Non Sq Epi: Occasional / Bacteria: Moderate                RADIOLOGY & ADDITIONAL STUDIES (The following images were personally reviewed):

## 2017-12-26 NOTE — DISCHARGE NOTE ADULT - CARE PLAN
Principal Discharge DX:	Pneumonia of left lower lobe due to infectious organism  Goal:	resolved.  Instructions for follow-up, activity and diet:	s/p antibiotic. seek immediate medical attention if fever/chest pain/worsening short of breathcough or any other concerning symptoms.  [ pMD] in 1-2 weeks  Secondary Diagnosis:	Bronchitis  Instructions for follow-up, activity and diet:	improved  Secondary Diagnosis:	Chronic obstructive pulmonary disease, unspecified COPD type  Instructions for follow-up, activity and diet:	continue inhalers. see lung doctor in 1-2 weeks.  Secondary Diagnosis:	Chronic atrial fibrillation  Instructions for follow-up, activity and diet:	continue meds. follow up with Dr. Looney 1-2 weeks. hold xarelto 2 days before endoscopy.  Secondary Diagnosis:	Occult blood positive stool  Instructions for follow-up, activity and diet:	see  in 1 week for endoscopy. come to Blood in stool or black colored stool, abdominal pain. or any other concerning symptoms. continue protonix twice daily  Secondary Diagnosis:	Iron deficiency anemia due to chronic blood loss  Instructions for follow-up, activity and diet:	as above  Secondary Diagnosis:	Essential hypertension  Instructions for follow-up, activity and diet:	continue meds. see primary care MD in 1-2 weeks Principal Discharge DX:	Pneumonia of left lower lobe due to infectious organism  Goal:	resolved.  Instructions for follow-up, activity and diet:	finish antibiotic course. seek immediate medical attention if fever/chest pain/worsening short of breathcough or any other concerning symptoms.  [ pMD] in 1-2 weeks  Secondary Diagnosis:	Bronchitis  Instructions for follow-up, activity and diet:	improved. c/w and finish zithromax until 12/28  Secondary Diagnosis:	Chronic obstructive pulmonary disease, unspecified COPD type  Instructions for follow-up, activity and diet:	continue inhalers. see lung doctor in 1-2 weeks.  Secondary Diagnosis:	Chronic atrial fibrillation  Instructions for follow-up, activity and diet:	continue meds. follow up with Dr. Looney 1-2 weeks. hold xarelto 2 days before endoscopy.  Secondary Diagnosis:	Occult blood positive stool  Instructions for follow-up, activity and diet:	see  in 1 week for endoscopy. come to Blood in stool or black colored stool, abdominal pain. or any other concerning symptoms. continue protonix twice daily  Secondary Diagnosis:	Iron deficiency anemia due to chronic blood loss  Instructions for follow-up, activity and diet:	as above  Secondary Diagnosis:	Essential hypertension  Instructions for follow-up, activity and diet:	continue meds. see primary care MD in 1-2 weeks Principal Discharge DX:	Pneumonia of left lower lobe due to infectious organism  Goal:	no pNA on CT. PNA ruled out. acute bronchitis  Instructions for follow-up, activity and diet:	finish antibiotic course. seek immediate medical attention if fever/chest pain/worsening short of breathcough or any other concerning symptoms.  [ pMD] in 1-2 weeks  Secondary Diagnosis:	Bronchitis  Instructions for follow-up, activity and diet:	improved. c/w and finish zithromax until 12/28  Secondary Diagnosis:	Chronic obstructive pulmonary disease, unspecified COPD type  Instructions for follow-up, activity and diet:	continue inhalers. see lung doctor in 1-2 weeks.  Secondary Diagnosis:	Chronic atrial fibrillation  Instructions for follow-up, activity and diet:	continue meds. follow up with Dr. Looney 1-2 weeks. hold xarelto 2 days before endoscopy.  Secondary Diagnosis:	Occult blood positive stool  Instructions for follow-up, activity and diet:	see  in 1 week for endoscopy. come to Blood in stool or black colored stool, abdominal pain. or any other concerning symptoms. continue protonix twice daily  Secondary Diagnosis:	Iron deficiency anemia due to chronic blood loss  Instructions for follow-up, activity and diet:	as above  Secondary Diagnosis:	Essential hypertension  Instructions for follow-up, activity and diet:	continue meds. see primary care MD in 1-2 weeks

## 2017-12-26 NOTE — DISCHARGE NOTE ADULT - SECONDARY DIAGNOSIS.
Bronchitis Chronic obstructive pulmonary disease, unspecified COPD type Chronic atrial fibrillation Occult blood positive stool Iron deficiency anemia due to chronic blood loss Essential hypertension

## 2017-12-26 NOTE — PROGRESS NOTE ADULT - SUBJECTIVE AND OBJECTIVE BOX
Patient is a 83y old  Male who presents with a chief complaint of Chills, Cough (24 Dec 2017 05:41)      Interval: FOBt positive,. Hb slightly dropped to 7.9 [ 8.4]. Seen by Id and pulmonary and GI. Gi reported hemorrhoids and BRBPR. on protonix BID. egd/colonosocopy can be done OP. Dr. ballard spoke to . patient can be dced with OP follow up, no sign of active bleeding. h/o afib , AC was on hold due to bleed and anemia. cardiology consulted with GI. Ok to restart xarelto now. needs to be on hold before EGD/colonoscopy or any active GI bleed. Id signed off today. no PNa, uti on keflex. Id/GI/Cardiolgy cleared for DC    patient denied complaints, he wants to go home or rehab today.       hpi:    82 y/o male from home with Daughter with history of COPD on home 02 3 liters cont, DM-2, HTN, HLD, Chronic LE edema, Afib on Xarelto presents with rigors, cough, that started 3 days ago. He denies any sick contacts, travel, or hx of PNA. Patient went to bed last night and called to his Daughter after he was shaking in bed and coughing with thick sputum. In the ED patient with LLL infiltrate on cxr, ronchi at left base, left shift. No fever. Patient also noted to have significant anemia with no history of anemia or hx of transfusion. Denies any BRBPR or black stool.      ROS:  CONSTITUTIONAL:  No distress. no fever/chills/fatigue/weight loss  HEENT:  Eyes:  No diplopia or blurred vision.   CARDIOVASCULAR:  No pressure, squeezing, tightness, heaviness or aching about the chest; no palpitations. no leg swelling, no orthopnea or PND  RESPIRATORY:  no SOB. no wheezing. no cough or sputum.  No hemoptysis  GI: no nausea, no vomiting, no diarrhea, no constipation. No hematochezia or melena  EXT:No joint pain or joint swelling or redness  SKIN: no skin break or ulcer. No cellulitis.   CNS: No headaches. No weakness.no numbness. No depression or anxiety. No SI    MEDICATIONS  (STANDING):  amiodarone    Tablet 200 milliGRAM(s) Oral daily  atorvastatin 10 milliGRAM(s) Oral at bedtime  azithromycin   Tablet 500 milliGRAM(s) Oral daily  buDESOnide 160 MICROgram(s)/formoterol 4.5 MICROgram(s) Inhaler 2 Puff(s) Inhalation two times a day  cefTRIAXone   IVPB 1 Gram(s) IV Intermittent every 24 hours  dextrose 5%. 1000 milliLiter(s) (50 mL/Hr) IV Continuous <Continuous>  dextrose 50% Injectable 12.5 Gram(s) IV Push once  folic acid 1 milliGRAM(s) Oral daily  furosemide    Tablet 40 milliGRAM(s) Oral daily  hydrocortisone hemorrhoidal Suppository 1 Suppository(s) Rectal two times a day  insulin lispro (HumaLOG) corrective regimen sliding scale   SubCutaneous Before meals and at bedtime  pantoprazole  Injectable 40 milliGRAM(s) IV Push two times a day  saccharomyces boulardii 250 milliGRAM(s) Oral two times a day  sodium chloride 0.9% lock flush 3 milliLiter(s) IV Push every 8 hours    MEDICATIONS  (PRN):  acetaminophen   Tablet 650 milliGRAM(s) Oral every 6 hours PRN For Temp greater than 38.5 C (101.3 F)  acetaminophen   Tablet. 650 milliGRAM(s) Oral every 6 hours PRN Mild Pain (1 - 3)  dextrose Gel 1 Dose(s) Oral once PRN Blood Glucose LESS THAN 70 milliGRAM(s)/deciliter  glucagon  Injectable 1 milliGRAM(s) IntraMuscular once PRN Glucose LESS THAN 70 milligrams/deciliter  ondansetron Injectable 4 milliGRAM(s) IV Push every 6 hours PRN Nausea      P/E:    Vital Signs Last 24 Hrs  T(C): 36.8 (26 Dec 2017 13:32), Max: 36.8 (26 Dec 2017 13:32)  T(F): 98.2 (26 Dec 2017 13:32), Max: 98.2 (26 Dec 2017 13:32)  HR: 87 (26 Dec 2017 13:32) (62 - 87)  BP: 136/85 (26 Dec 2017 13:32) (132/82 - 156/80)  BP(mean): --  RR: 19 (26 Dec 2017 13:32) (18 - 20)  SpO2: 97% (26 Dec 2017 13:32) (94% - 97%)    GENERAL: Not in distress. Alert    HEENT:  Normocephalic and atraumatic. PEARLA,EOMI  NECK: Supple.  No JVD.    CARDIOVASCULAR: RRR S1, S2. No murmur/rubs/gallop  LUNGS: BLAE+, reduced. no rales, no wheezing, no rhonchi.    ABDOMEN: ND. Soft,  NT, no guarding / rebound / rigidity. BS normoactive. No CVA tenderness.    BACK: No spine tenderness.  EXTREMITIES: no cyanosis, no clubbing, no edema.   SKIN: no rash. No skin break or ulcer. No cellulitis.  NEUROLOGIC: AAO*3.strength is symmetric, sensation intact, speech fluent.    PSYCHIATRIC: Calm.  No agitation. Patient is a 83y old  Male who presents with a chief complaint of Chills, Cough (24 Dec 2017 05:41)      Interval: FOBt positive,. Hb slightly dropped to 7.9 [ 8.4]. Seen by Id and pulmonary and GI. Gi reported hemorrhoids and BRBPR. on protonix BID. egd/colonosocopy can be done OP. Dr. ballard spoke to . patient can be dced with OP follow up, no sign of active bleeding. h/o afib , AC was on hold due to bleed and anemia. cardiology consulted with GI. Ok to restart xarelto now. needs to be on hold before EGD/colonoscopy or any active GI bleed. Id signed off today. no PNa, uti on keflex. Id/GI/Cardiolgy cleared for DC    patient denied complaints, he wants to go home or rehab today.       hpi:    82 y/o male from home with Daughter with history of COPD on home 02 3 liters cont, DM-2, HTN, HLD, Chronic LE edema, Afib on Xarelto presents with rigors, cough, that started 3 days ago. He denies any sick contacts, travel, or hx of PNA. Patient went to bed last night and called to his Daughter after he was shaking in bed and coughing with thick sputum. In the ED patient with LLL infiltrate on cxr, ronchi at left base, left shift. No fever. Patient also noted to have significant anemia with no history of anemia or hx of transfusion. Denies any BRBPR or black stool.      ROS:  CONSTITUTIONAL:  No distress. no fever/chills/fatigue/weight loss  HEENT:  Eyes:  No diplopia or blurred vision.   CARDIOVASCULAR:  No pressure, squeezing, tightness, heaviness or aching about the chest; no palpitations. no leg swelling, no orthopnea or PND  RESPIRATORY:  no SOB. no wheezing. no cough or sputum.  No hemoptysis  GI: no nausea, no vomiting, no diarrhea, no constipation. No hematochezia or melena  EXT:No joint pain or joint swelling or redness  SKIN: no skin break or ulcer. No cellulitis.   CNS: No headaches. No weakness.no numbness. No depression or anxiety. No SI    MEDICATIONS  (STANDING):  amiodarone    Tablet 200 milliGRAM(s) Oral daily  atorvastatin 10 milliGRAM(s) Oral at bedtime  azithromycin   Tablet 500 milliGRAM(s) Oral daily  buDESOnide 160 MICROgram(s)/formoterol 4.5 MICROgram(s) Inhaler 2 Puff(s) Inhalation two times a day  cefTRIAXone   IVPB 1 Gram(s) IV Intermittent every 24 hours  dextrose 5%. 1000 milliLiter(s) (50 mL/Hr) IV Continuous <Continuous>  dextrose 50% Injectable 12.5 Gram(s) IV Push once  folic acid 1 milliGRAM(s) Oral daily  furosemide    Tablet 40 milliGRAM(s) Oral daily  hydrocortisone hemorrhoidal Suppository 1 Suppository(s) Rectal two times a day  insulin lispro (HumaLOG) corrective regimen sliding scale   SubCutaneous Before meals and at bedtime  pantoprazole  Injectable 40 milliGRAM(s) IV Push two times a day  saccharomyces boulardii 250 milliGRAM(s) Oral two times a day  sodium chloride 0.9% lock flush 3 milliLiter(s) IV Push every 8 hours    MEDICATIONS  (PRN):  acetaminophen   Tablet 650 milliGRAM(s) Oral every 6 hours PRN For Temp greater than 38.5 C (101.3 F)  acetaminophen   Tablet. 650 milliGRAM(s) Oral every 6 hours PRN Mild Pain (1 - 3)  dextrose Gel 1 Dose(s) Oral once PRN Blood Glucose LESS THAN 70 milliGRAM(s)/deciliter  glucagon  Injectable 1 milliGRAM(s) IntraMuscular once PRN Glucose LESS THAN 70 milligrams/deciliter  ondansetron Injectable 4 milliGRAM(s) IV Push every 6 hours PRN Nausea      P/E:    Vital Signs Last 24 Hrs  T(C): 36.8 (26 Dec 2017 13:32), Max: 36.8 (26 Dec 2017 13:32)  T(F): 98.2 (26 Dec 2017 13:32), Max: 98.2 (26 Dec 2017 13:32)  HR: 87 (26 Dec 2017 13:32) (62 - 87)  BP: 136/85 (26 Dec 2017 13:32) (132/82 - 156/80)  BP(mean): --  RR: 19 (26 Dec 2017 13:32) (18 - 20)  SpO2: 97% (26 Dec 2017 13:32) (94% - 97%)    GENERAL: Not in distress. Alert    HEENT:  Normocephalic and atraumatic. PEARLA,EOMI  NECK: Supple.  No JVD.    CARDIOVASCULAR: RRR S1, S2. No murmur/rubs/gallop  LUNGS: BLAE+, reduced. no rales, no wheezing, no rhonchi.    ABDOMEN: ND. Soft,  NT, no guarding / rebound / rigidity. BS normoactive. No CVA tenderness.    BACK: No spine tenderness.  EXTREMITIES: no cyanosis, no clubbing, no edema.   SKIN: no rash. No skin break or ulcer. No cellulitis.  NEUROLOGIC: AAO*3.strength is symmetric, sensation intact, speech fluent.    PSYCHIATRIC: Calm.  No agitation.                          7.9    4.7   )-----------( 126      ( 25 Dec 2017 06:20 )             25.2     12-25    142  |  101  |  24.0<H>  ----------------------------<  117<H>  4.4   |  29.0  |  1.43<H>    Ca    8.6      25 Dec 2017 06:20  Phos  2.9     12-25  Mg     2.1     12-25 Patient is a 83y old  Male who presents with a chief complaint of Chills, Cough (24 Dec 2017 05:41)      Interval: FOBt positive,. Hb slightly dropped to 7.9 [ 8.4]. Seen by Id and pulmonary and GI. Gi reported hemorrhoids and BRBPR. on protonix BID. egd/colonosocopy can be done OP. Dr. ballard spoke to . patient can be dced with OP follow up, no sign of active bleeding. h/o afib , AC was on hold due to bleed and anemia. cardiology consulted with GI. Ok to restart xarelto now. needs to be on hold before EGD/colonoscopy or any active GI bleed. Id signed off today. no PNa, uti on keflex. Id/GI/Cardiolgy cleared for DC    patient denied complaints, he wants to go home.        hpi:    84 y/o male from home with Daughter with history of COPD on home 02 3 liters cont, DM-2, HTN, HLD, Chronic LE edema, Afib on Xarelto presents with rigors, cough, that started 3 days ago. He denies any sick contacts, travel, or hx of PNA. Patient went to bed last night and called to his Daughter after he was shaking in bed and coughing with thick sputum. In the ED patient with LLL infiltrate on cxr, ronchi at left base, left shift. No fever. Patient also noted to have significant anemia with no history of anemia or hx of transfusion. Denies any BRBPR or black stool.      ROS:  CONSTITUTIONAL:  No distress. no fever/chills/fatigue/weight loss  HEENT:  Eyes:  No diplopia or blurred vision.   CARDIOVASCULAR:  No pressure, squeezing, tightness, heaviness or aching about the chest; no palpitations. no leg swelling, no orthopnea or PND  RESPIRATORY:  no SOB. no wheezing. no cough or sputum.  No hemoptysis  GI: no nausea, no vomiting, no diarrhea, no constipation. No hematochezia or melena  EXT:No joint pain or joint swelling or redness  SKIN: no skin break or ulcer. No cellulitis.   CNS: No headaches. No weakness.no numbness. No depression or anxiety. No SI    MEDICATIONS  (STANDING):  amiodarone    Tablet 200 milliGRAM(s) Oral daily  atorvastatin 10 milliGRAM(s) Oral at bedtime  azithromycin   Tablet 500 milliGRAM(s) Oral daily  buDESOnide 160 MICROgram(s)/formoterol 4.5 MICROgram(s) Inhaler 2 Puff(s) Inhalation two times a day  cefTRIAXone   IVPB 1 Gram(s) IV Intermittent every 24 hours  dextrose 5%. 1000 milliLiter(s) (50 mL/Hr) IV Continuous <Continuous>  dextrose 50% Injectable 12.5 Gram(s) IV Push once  folic acid 1 milliGRAM(s) Oral daily  furosemide    Tablet 40 milliGRAM(s) Oral daily  hydrocortisone hemorrhoidal Suppository 1 Suppository(s) Rectal two times a day  insulin lispro (HumaLOG) corrective regimen sliding scale   SubCutaneous Before meals and at bedtime  pantoprazole  Injectable 40 milliGRAM(s) IV Push two times a day  saccharomyces boulardii 250 milliGRAM(s) Oral two times a day  sodium chloride 0.9% lock flush 3 milliLiter(s) IV Push every 8 hours    MEDICATIONS  (PRN):  acetaminophen   Tablet 650 milliGRAM(s) Oral every 6 hours PRN For Temp greater than 38.5 C (101.3 F)  acetaminophen   Tablet. 650 milliGRAM(s) Oral every 6 hours PRN Mild Pain (1 - 3)  dextrose Gel 1 Dose(s) Oral once PRN Blood Glucose LESS THAN 70 milliGRAM(s)/deciliter  glucagon  Injectable 1 milliGRAM(s) IntraMuscular once PRN Glucose LESS THAN 70 milligrams/deciliter  ondansetron Injectable 4 milliGRAM(s) IV Push every 6 hours PRN Nausea      P/E:    Vital Signs Last 24 Hrs  T(C): 36.8 (26 Dec 2017 13:32), Max: 36.8 (26 Dec 2017 13:32)  T(F): 98.2 (26 Dec 2017 13:32), Max: 98.2 (26 Dec 2017 13:32)  HR: 87 (26 Dec 2017 13:32) (62 - 87)  BP: 136/85 (26 Dec 2017 13:32) (132/82 - 156/80)  BP(mean): --  RR: 19 (26 Dec 2017 13:32) (18 - 20)  SpO2: 97% (26 Dec 2017 13:32) (94% - 97%)    GENERAL: Not in distress. Alert    HEENT:  Normocephalic and atraumatic. PEARLA,EOMI  NECK: Supple.  No JVD.    CARDIOVASCULAR: RRR S1, S2. No murmur/rubs/gallop  LUNGS: BLAE+, reduced. no rales, no wheezing, no rhonchi.    ABDOMEN: ND. Soft,  NT, no guarding / rebound / rigidity. BS normoactive. No CVA tenderness.    BACK: No spine tenderness.  EXTREMITIES: no cyanosis, no clubbing, no edema.   SKIN: no rash. No skin break or ulcer. No cellulitis.  NEUROLOGIC: AAO*3.strength is symmetric, sensation intact, speech fluent.    PSYCHIATRIC: Calm.  No agitation.                                       7.9    4.7   )-----------( 126      ( 25 Dec 2017 06:20 )             25.2         12-25    142  |  101  |  24.0<H>  ----------------------------<  117<H>  4.4   |  29.0  |  1.43<H>    Ca    8.6      25 Dec 2017 06:20  Phos  2.9     12-25  Mg     2.1     12-25

## 2017-12-26 NOTE — PROGRESS NOTE ADULT - PROBLEM SELECTOR PLAN 1
Eventual GI w/u when pneumonia resolves. Can be done as an OPT. Repeat labs ordered for tomorrow AM.

## 2017-12-26 NOTE — DISCHARGE NOTE ADULT - CARE PROVIDER_API CALL
Geremias Suarez), Infectious Disease; Internal Medicine  [address]  [address]  [place] [address]  Phone: [phone]  Fax: [phone]    Jarad Bee), Gastroenterology; Internal Medicine  [address]  [place] [address]  Phone: [phone]  Fax: [phone]    Jameson Looney (MD), Cardiovascular Disease  [address] [place] [address]  Phone: [phone]  Fax: [phone]    Christiano Osei (DO), Critical Care Medicine; Internal Medicine; Pulmonary Disease  [address]  [place] [address]  Phone: [phone]  Fax: [phone]

## 2017-12-26 NOTE — DISCHARGE NOTE ADULT - CARE PROVIDERS DIRECT ADDRESSES
,DirectAddress_Unknown,[email or web address],DirectAddress_Unknown,[email or web address]

## 2017-12-27 VITALS
SYSTOLIC BLOOD PRESSURE: 130 MMHG | DIASTOLIC BLOOD PRESSURE: 83 MMHG | RESPIRATION RATE: 18 BRPM | TEMPERATURE: 98 F | HEART RATE: 86 BPM | OXYGEN SATURATION: 98 %

## 2017-12-27 LAB
ANION GAP SERPL CALC-SCNC: 10 MMOL/L — SIGNIFICANT CHANGE UP (ref 5–17)
BASOPHILS # BLD AUTO: 0 K/UL — SIGNIFICANT CHANGE UP (ref 0–0.2)
BASOPHILS NFR BLD AUTO: 1 % — SIGNIFICANT CHANGE UP (ref 0–2)
BUN SERPL-MCNC: 18 MG/DL — SIGNIFICANT CHANGE UP (ref 8–20)
CALCIUM SERPL-MCNC: 9.2 MG/DL — SIGNIFICANT CHANGE UP (ref 8.6–10.2)
CHLORIDE SERPL-SCNC: 98 MMOL/L — SIGNIFICANT CHANGE UP (ref 98–107)
CO2 SERPL-SCNC: 31 MMOL/L — HIGH (ref 22–29)
CREAT SERPL-MCNC: 1.32 MG/DL — HIGH (ref 0.5–1.3)
EOSINOPHIL # BLD AUTO: 0.2 K/UL — SIGNIFICANT CHANGE UP (ref 0–0.5)
EOSINOPHIL NFR BLD AUTO: 4 % — SIGNIFICANT CHANGE UP (ref 0–5)
GLUCOSE BLDC GLUCOMTR-MCNC: 108 MG/DL — HIGH (ref 70–99)
GLUCOSE BLDC GLUCOMTR-MCNC: 94 MG/DL — SIGNIFICANT CHANGE UP (ref 70–99)
GLUCOSE SERPL-MCNC: 92 MG/DL — SIGNIFICANT CHANGE UP (ref 70–115)
HCT VFR BLD CALC: 30.3 % — LOW (ref 42–52)
HGB BLD-MCNC: 9.5 G/DL — LOW (ref 14–18)
LYMPHOCYTES # BLD AUTO: 0.8 K/UL — LOW (ref 1–4.8)
LYMPHOCYTES # BLD AUTO: 20.8 % — SIGNIFICANT CHANGE UP (ref 20–55)
MCHC RBC-ENTMCNC: 25.5 PG — LOW (ref 27–31)
MCHC RBC-ENTMCNC: 31.4 G/DL — LOW (ref 32–36)
MCV RBC AUTO: 81.5 FL — SIGNIFICANT CHANGE UP (ref 80–94)
MONOCYTES # BLD AUTO: 0.4 K/UL — SIGNIFICANT CHANGE UP (ref 0–0.8)
MONOCYTES NFR BLD AUTO: 10.9 % — HIGH (ref 3–10)
NEUTROPHILS # BLD AUTO: 2.5 K/UL — SIGNIFICANT CHANGE UP (ref 1.8–8)
NEUTROPHILS NFR BLD AUTO: 62.8 % — SIGNIFICANT CHANGE UP (ref 37–73)
PLATELET # BLD AUTO: 151 K/UL — SIGNIFICANT CHANGE UP (ref 150–400)
POTASSIUM SERPL-MCNC: 4 MMOL/L — SIGNIFICANT CHANGE UP (ref 3.5–5.3)
POTASSIUM SERPL-SCNC: 4 MMOL/L — SIGNIFICANT CHANGE UP (ref 3.5–5.3)
RBC # BLD: 3.72 M/UL — LOW (ref 4.6–6.2)
RBC # FLD: 16.5 % — HIGH (ref 11–15.6)
SODIUM SERPL-SCNC: 139 MMOL/L — SIGNIFICANT CHANGE UP (ref 135–145)
WBC # BLD: 4 K/UL — LOW (ref 4.8–10.8)
WBC # FLD AUTO: 4 K/UL — LOW (ref 4.8–10.8)

## 2017-12-27 PROCEDURE — 84100 ASSAY OF PHOSPHORUS: CPT

## 2017-12-27 PROCEDURE — 80048 BASIC METABOLIC PNL TOTAL CA: CPT

## 2017-12-27 PROCEDURE — 85027 COMPLETE CBC AUTOMATED: CPT

## 2017-12-27 PROCEDURE — 82607 VITAMIN B-12: CPT

## 2017-12-27 PROCEDURE — 83605 ASSAY OF LACTIC ACID: CPT

## 2017-12-27 PROCEDURE — 87633 RESP VIRUS 12-25 TARGETS: CPT

## 2017-12-27 PROCEDURE — 80053 COMPREHEN METABOLIC PANEL: CPT

## 2017-12-27 PROCEDURE — 82272 OCCULT BLD FECES 1-3 TESTS: CPT

## 2017-12-27 PROCEDURE — 87486 CHLMYD PNEUM DNA AMP PROBE: CPT

## 2017-12-27 PROCEDURE — 83550 IRON BINDING TEST: CPT

## 2017-12-27 PROCEDURE — 87581 M.PNEUMON DNA AMP PROBE: CPT

## 2017-12-27 PROCEDURE — 85610 PROTHROMBIN TIME: CPT

## 2017-12-27 PROCEDURE — 81001 URINALYSIS AUTO W/SCOPE: CPT

## 2017-12-27 PROCEDURE — 93005 ELECTROCARDIOGRAM TRACING: CPT

## 2017-12-27 PROCEDURE — 84484 ASSAY OF TROPONIN QUANT: CPT

## 2017-12-27 PROCEDURE — 99285 EMERGENCY DEPT VISIT HI MDM: CPT | Mod: 25

## 2017-12-27 PROCEDURE — 82728 ASSAY OF FERRITIN: CPT

## 2017-12-27 PROCEDURE — 97163 PT EVAL HIGH COMPLEX 45 MIN: CPT

## 2017-12-27 PROCEDURE — T1013: CPT

## 2017-12-27 PROCEDURE — 99239 HOSP IP/OBS DSCHRG MGMT >30: CPT

## 2017-12-27 PROCEDURE — 96375 TX/PRO/DX INJ NEW DRUG ADDON: CPT

## 2017-12-27 PROCEDURE — 99233 SBSQ HOSP IP/OBS HIGH 50: CPT

## 2017-12-27 PROCEDURE — 87186 SC STD MICRODIL/AGAR DIL: CPT

## 2017-12-27 PROCEDURE — 84466 ASSAY OF TRANSFERRIN: CPT

## 2017-12-27 PROCEDURE — 71250 CT THORAX DX C-: CPT

## 2017-12-27 PROCEDURE — 94640 AIRWAY INHALATION TREATMENT: CPT

## 2017-12-27 PROCEDURE — 87040 BLOOD CULTURE FOR BACTERIA: CPT

## 2017-12-27 PROCEDURE — 96374 THER/PROPH/DIAG INJ IV PUSH: CPT

## 2017-12-27 PROCEDURE — 85045 AUTOMATED RETICULOCYTE COUNT: CPT

## 2017-12-27 PROCEDURE — 87798 DETECT AGENT NOS DNA AMP: CPT

## 2017-12-27 PROCEDURE — 87449 NOS EACH ORGANISM AG IA: CPT

## 2017-12-27 PROCEDURE — 83036 HEMOGLOBIN GLYCOSYLATED A1C: CPT

## 2017-12-27 PROCEDURE — 83735 ASSAY OF MAGNESIUM: CPT

## 2017-12-27 PROCEDURE — 36415 COLL VENOUS BLD VENIPUNCTURE: CPT

## 2017-12-27 PROCEDURE — 87086 URINE CULTURE/COLONY COUNT: CPT

## 2017-12-27 PROCEDURE — 71046 X-RAY EXAM CHEST 2 VIEWS: CPT

## 2017-12-27 PROCEDURE — 86713 LEGIONELLA ANTIBODY: CPT

## 2017-12-27 PROCEDURE — 82962 GLUCOSE BLOOD TEST: CPT

## 2017-12-27 PROCEDURE — 82746 ASSAY OF FOLIC ACID SERUM: CPT

## 2017-12-27 RX ORDER — ATORVASTATIN CALCIUM 80 MG/1
1 TABLET, FILM COATED ORAL
Qty: 30 | Refills: 0 | OUTPATIENT
Start: 2017-12-27 | End: 2018-01-25

## 2017-12-27 RX ORDER — CEPHALEXIN 500 MG
1 CAPSULE ORAL
Qty: 9 | Refills: 0 | OUTPATIENT
Start: 2017-12-27 | End: 2017-12-29

## 2017-12-27 RX ORDER — METFORMIN HYDROCHLORIDE 850 MG/1
1 TABLET ORAL
Qty: 30 | Refills: 5 | OUTPATIENT
Start: 2017-12-27 | End: 2018-06-24

## 2017-12-27 RX ORDER — FOLIC ACID 0.8 MG
1 TABLET ORAL
Qty: 30 | Refills: 0 | OUTPATIENT
Start: 2017-12-27 | End: 2018-01-25

## 2017-12-27 RX ORDER — AMIODARONE HYDROCHLORIDE 400 MG/1
1 TABLET ORAL
Qty: 30 | Refills: 0 | OUTPATIENT
Start: 2017-12-27 | End: 2018-01-25

## 2017-12-27 RX ORDER — FUROSEMIDE 40 MG
1 TABLET ORAL
Qty: 30 | Refills: 0 | OUTPATIENT
Start: 2017-12-27 | End: 2018-01-25

## 2017-12-27 RX ORDER — SACCHAROMYCES BOULARDII 250 MG
1 POWDER IN PACKET (EA) ORAL
Qty: 14 | Refills: 0 | OUTPATIENT
Start: 2017-12-27 | End: 2018-01-02

## 2017-12-27 RX ORDER — ASPIRIN/CALCIUM CARB/MAGNESIUM 324 MG
1 TABLET ORAL
Qty: 30 | Refills: 0 | OUTPATIENT
Start: 2017-12-27 | End: 2018-01-25

## 2017-12-27 RX ORDER — RIVAROXABAN 15 MG-20MG
1 KIT ORAL
Qty: 30 | Refills: 0 | OUTPATIENT
Start: 2017-12-27 | End: 2018-01-25

## 2017-12-27 RX ORDER — AZITHROMYCIN 500 MG/1
1 TABLET, FILM COATED ORAL
Qty: 1 | Refills: 0 | OUTPATIENT
Start: 2017-12-27 | End: 2017-12-27

## 2017-12-27 RX ORDER — FLUTICASONE FUROATE AND VILANTEROL TRIFENATATE 100; 25 UG/1; UG/1
1 POWDER RESPIRATORY (INHALATION)
Qty: 30 | Refills: 0 | OUTPATIENT
Start: 2017-12-27 | End: 2018-01-25

## 2017-12-27 RX ADMIN — Medication 250 MILLIGRAM(S): at 05:33

## 2017-12-27 RX ADMIN — Medication 40 MILLIGRAM(S): at 05:33

## 2017-12-27 RX ADMIN — SODIUM CHLORIDE 3 MILLILITER(S): 9 INJECTION INTRAMUSCULAR; INTRAVENOUS; SUBCUTANEOUS at 14:36

## 2017-12-27 RX ADMIN — AMIODARONE HYDROCHLORIDE 200 MILLIGRAM(S): 400 TABLET ORAL at 05:33

## 2017-12-27 RX ADMIN — Medication 1 MILLIGRAM(S): at 12:20

## 2017-12-27 RX ADMIN — BUDESONIDE AND FORMOTEROL FUMARATE DIHYDRATE 2 PUFF(S): 160; 4.5 AEROSOL RESPIRATORY (INHALATION) at 08:08

## 2017-12-27 RX ADMIN — AZITHROMYCIN 500 MILLIGRAM(S): 500 TABLET, FILM COATED ORAL at 12:20

## 2017-12-27 RX ADMIN — SODIUM CHLORIDE 3 MILLILITER(S): 9 INJECTION INTRAMUSCULAR; INTRAVENOUS; SUBCUTANEOUS at 05:37

## 2017-12-27 RX ADMIN — Medication 1 SUPPOSITORY(S): at 09:16

## 2017-12-27 RX ADMIN — PANTOPRAZOLE SODIUM 40 MILLIGRAM(S): 20 TABLET, DELAYED RELEASE ORAL at 05:34

## 2017-12-27 RX ADMIN — CEFTRIAXONE 100 GRAM(S): 500 INJECTION, POWDER, FOR SOLUTION INTRAMUSCULAR; INTRAVENOUS at 05:34

## 2017-12-27 NOTE — PHYSICAL THERAPY INITIAL EVALUATION ADULT - ADDITIONAL COMMENTS
Pt lives in a house with 3 steps to enter with handrail and no stairs inside. Pt has 24/7 assistance of his daughter in law.    Pt owns  [address].

## 2017-12-27 NOTE — PROGRESS NOTE ADULT - ASSESSMENT
82 y/o male from home with Daughter with history of COPD on home 02 3 liters cont, DM-2, HTN, HLD, Chronic LE edema, Afib on Xarelto presents with rigors, cough, that started 3 days ago. He denies any sick contacts, travel, or hx of PNA. Patient went to bed last night and called to his Daughter after he was shaking in bed and coughing with thick sputum. In the ED patient with LLL infiltrate on cxr, ronchi at left base, left shift. No fever. Patient also noted to have significant anemia with no history of anemia or hx of transfusion. Denies any BRBPR or black stool.  pt started on iv abx for pneumonia cxr with pneumonia ct scan ?no pneumonia  STOOL GUAIAC  POSTIVE   SEEN BY GI WILL CONTINUE OUTPT WORK UP OF ANEMIA FOLLOW UPWITH DR MEL LIRA TO D/C FROM ID STANDPOINT  CT NO PNEUMONIA POSSIBLE BRONCHITIS  URINE   UTI KLEBSIELLA  WILL FOLLOW UP AS NEEDED PLEASE CALL IF QUESTIONS

## 2017-12-27 NOTE — PROGRESS NOTE ADULT - PROVIDER SPECIALTY LIST ADULT
Cardiology
Gastroenterology
Hospitalist
Hospitalist
Infectious Disease
Infectious Disease
Pulmonology
Pulmonology
Infectious Disease

## 2017-12-27 NOTE — PROGRESS NOTE ADULT - SUBJECTIVE AND OBJECTIVE BOX
DAVID VINCENT is a 83y Male with HPI:  84 y/o male from home with Daughter with history of COPD on home 02 3 liters cont, DM-2, HTN, HLD, Chronic LE edema, Afib on Xarelto presents with rigors, cough, that started 3 days ago. He denies any sick contacts, travel, or hx of PNA. Patient went to bed last night and called to his Daughter after he was shaking in bed and coughing with thick sputum. In the ED patient with LLL infiltrate on cxr, ronchi at left base, left shift. No fever. Patient also noted to have significant anemia with no history of anemia or hx of transfusion. Denies any BRBPR or black stool.  pt started on iv abx for pneumonia cxr with pneumonia ct scan ?no pneumonia  STOOL GUIAC POSTIVE FEELS WELL  FOR POSSIBLE  D/C TODAY        Allergies:  No Known Allergies      Medications:  acetaminophen   Tablet 650 milliGRAM(s) Oral every 6 hours PRN  acetaminophen   Tablet. 650 milliGRAM(s) Oral every 6 hours PRN  amiodarone    Tablet 200 milliGRAM(s) Oral daily  atorvastatin 10 milliGRAM(s) Oral at bedtime  azithromycin   Tablet 500 milliGRAM(s) Oral daily  buDESOnide 160 MICROgram(s)/formoterol 4.5 MICROgram(s) Inhaler 2 Puff(s) Inhalation two times a day  cefTRIAXone   IVPB 1 Gram(s) IV Intermittent every 24 hours  dextrose 5%. 1000 milliLiter(s) IV Continuous <Continuous>  dextrose 50% Injectable 12.5 Gram(s) IV Push once  dextrose Gel 1 Dose(s) Oral once PRN  folic acid 1 milliGRAM(s) Oral daily  furosemide    Tablet 40 milliGRAM(s) Oral daily  glucagon  Injectable 1 milliGRAM(s) IntraMuscular once PRN  hydrocortisone hemorrhoidal Suppository 1 Suppository(s) Rectal two times a day  insulin lispro (HumaLOG) corrective regimen sliding scale   SubCutaneous Before meals and at bedtime  ondansetron Injectable 4 milliGRAM(s) IV Push every 6 hours PRN  pantoprazole  Injectable 40 milliGRAM(s) IV Push two times a day  saccharomyces boulardii 250 milliGRAM(s) Oral two times a day  sodium chloride 0.9% lock flush 3 milliLiter(s) IV Push every 8 hours      ANTIBIOTICS:         Review of Systems: - Negative except as mentioned above     Physical Exam:  I Vital Signs Last 24 Hrs  T(C): 36.6 (27 Dec 2017 09:38), Max: 37 (26 Dec 2017 22:26)  T(F): 97.8 (27 Dec 2017 09:38), Max: 98.6 (26 Dec 2017 22:26)  HR: 86 (27 Dec 2017 09:38) (69 - 93)  BP: 130/83 (27 Dec 2017 09:38) (130/83 - 153/89)  BP(mean): --  RR: 18 (27 Dec 2017 09:38) (18 - 20)  SpO2: 98% (27 Dec 2017 09:38) (97% - 98%)    GEN: NAD, pleasant  HEENT: normocephalic and atraumatic. EOMI. FAUZIA...  NECK: Supple. No carotid bruits.  No lymphadenopathy or thyromegaly.  LUNGS: Clear to auscultation.  HEART: Regular rate and rhythm without murmur.  ABDOMEN: Soft, nontender, and nondistended.  Positive bowel sounds.  No hepatosplenomegaly was noted.  NO REBOUND NO GUARDING  EXTREMITIES: Without any cyanosis, clubbing, rash, lesions or edema.  NEUROLOGIC: Cranial nerves II through XII are grossly intact.    SKIN: No ulceration or induration present.      Labs:                        9.5    4.0   )-----------( 151      ( 27 Dec 2017 06:29 )             30.3   12-27    139  |  98  |  18.0  ----------------------------<  92  4.0   |  31.0<H>  |  1.32<H>    Ca    9.2      27 Dec 2017 06:29

## 2017-12-29 LAB
CULTURE RESULTS: SIGNIFICANT CHANGE UP
SPECIMEN SOURCE: SIGNIFICANT CHANGE UP

## 2017-12-29 RX ORDER — ASPIRIN/CALCIUM CARB/MAGNESIUM 324 MG
1 TABLET ORAL
Qty: 0 | Refills: 0 | COMMUNITY

## 2017-12-29 RX ORDER — RIVAROXABAN 15 MG-20MG
0 KIT ORAL
Qty: 0 | Refills: 0 | COMMUNITY

## 2017-12-29 RX ORDER — METFORMIN HYDROCHLORIDE 850 MG/1
500 TABLET ORAL
Qty: 0 | Refills: 0 | COMMUNITY

## 2017-12-29 RX ORDER — LOVASTATIN 20 MG
1 TABLET ORAL
Qty: 0 | Refills: 0 | COMMUNITY

## 2017-12-29 RX ORDER — FOLIC ACID 0.8 MG
1 TABLET ORAL
Qty: 0 | Refills: 0 | COMMUNITY

## 2017-12-29 RX ORDER — AMIODARONE HYDROCHLORIDE 400 MG/1
1 TABLET ORAL
Qty: 0 | Refills: 0 | COMMUNITY

## 2017-12-29 RX ORDER — FLUTICASONE FUROATE AND VILANTEROL TRIFENATATE 100; 25 UG/1; UG/1
1 POWDER RESPIRATORY (INHALATION)
Qty: 0 | Refills: 0 | COMMUNITY

## 2017-12-29 RX ORDER — RIVAROXABAN 15 MG-20MG
15 KIT ORAL
Qty: 0 | Refills: 0 | COMMUNITY

## 2017-12-29 RX ORDER — FUROSEMIDE 40 MG
40 TABLET ORAL
Qty: 0 | Refills: 0 | COMMUNITY

## 2017-12-29 RX ORDER — GABAPENTIN 400 MG/1
1 CAPSULE ORAL
Qty: 0 | Refills: 0 | COMMUNITY

## 2018-01-02 LAB — LEGIONELLA AB SER-ACNC: NEGATIVE — SIGNIFICANT CHANGE UP

## 2018-01-03 ENCOUNTER — APPOINTMENT (OUTPATIENT)
Dept: INTERNAL MEDICINE | Facility: CLINIC | Age: 83
End: 2018-01-03
Payer: MEDICARE

## 2018-01-03 PROBLEM — I10 ESSENTIAL (PRIMARY) HYPERTENSION: Chronic | Status: ACTIVE | Noted: 2017-12-24

## 2018-01-03 PROBLEM — I48.91 UNSPECIFIED ATRIAL FIBRILLATION: Chronic | Status: ACTIVE | Noted: 2017-12-24

## 2018-01-03 PROBLEM — E78.5 HYPERLIPIDEMIA, UNSPECIFIED: Chronic | Status: ACTIVE | Noted: 2017-12-24

## 2018-01-03 PROCEDURE — 36415 COLL VENOUS BLD VENIPUNCTURE: CPT

## 2018-01-03 PROCEDURE — 99215 OFFICE O/P EST HI 40 MIN: CPT | Mod: 25

## 2018-01-08 ENCOUNTER — APPOINTMENT (OUTPATIENT)
Dept: ORTHOPEDIC SURGERY | Facility: CLINIC | Age: 83
End: 2018-01-08
Payer: MEDICARE

## 2018-01-08 VITALS
DIASTOLIC BLOOD PRESSURE: 67 MMHG | WEIGHT: 241 LBS | SYSTOLIC BLOOD PRESSURE: 113 MMHG | HEIGHT: 65 IN | HEART RATE: 75 BPM | BODY MASS INDEX: 40.15 KG/M2

## 2018-01-08 PROCEDURE — 99213 OFFICE O/P EST LOW 20 MIN: CPT | Mod: 25

## 2018-01-08 PROCEDURE — 20610 DRAIN/INJ JOINT/BURSA W/O US: CPT | Mod: RT

## 2018-01-15 ENCOUNTER — RX RENEWAL (OUTPATIENT)
Age: 83
End: 2018-01-15

## 2018-01-19 ENCOUNTER — RECORD ABSTRACTING (OUTPATIENT)
Age: 83
End: 2018-01-19

## 2018-02-14 ENCOUNTER — APPOINTMENT (OUTPATIENT)
Dept: CARDIOLOGY | Facility: CLINIC | Age: 83
End: 2018-02-14
Payer: MEDICARE

## 2018-02-14 VITALS
SYSTOLIC BLOOD PRESSURE: 150 MMHG | HEART RATE: 77 BPM | RESPIRATION RATE: 14 BRPM | BODY MASS INDEX: 44.56 KG/M2 | DIASTOLIC BLOOD PRESSURE: 85 MMHG | HEIGHT: 61 IN | WEIGHT: 236 LBS

## 2018-02-14 DIAGNOSIS — Z87.891 PERSONAL HISTORY OF NICOTINE DEPENDENCE: ICD-10-CM

## 2018-02-14 PROCEDURE — 93000 ELECTROCARDIOGRAM COMPLETE: CPT

## 2018-02-14 PROCEDURE — 99215 OFFICE O/P EST HI 40 MIN: CPT

## 2018-02-14 RX ORDER — AMOXICILLIN AND CLAVULANATE POTASSIUM 875; 125 MG/1; MG/1
875-125 TABLET, COATED ORAL
Qty: 14 | Refills: 0 | Status: DISCONTINUED | COMMUNITY
Start: 2017-05-23 | End: 2018-02-14

## 2018-02-14 RX ORDER — LEVOFLOXACIN 500 MG/1
500 TABLET, FILM COATED ORAL
Qty: 7 | Refills: 0 | Status: DISCONTINUED | COMMUNITY
Start: 2017-05-26 | End: 2018-02-14

## 2018-02-14 RX ORDER — LOSARTAN POTASSIUM 100 MG/1
TABLET, FILM COATED ORAL
Refills: 0 | Status: DISCONTINUED | COMMUNITY
End: 2018-02-14

## 2018-02-15 ENCOUNTER — APPOINTMENT (OUTPATIENT)
Dept: PULMONOLOGY | Facility: CLINIC | Age: 83
End: 2018-02-15
Payer: MEDICARE

## 2018-02-15 VITALS
DIASTOLIC BLOOD PRESSURE: 80 MMHG | SYSTOLIC BLOOD PRESSURE: 124 MMHG | HEART RATE: 81 BPM | OXYGEN SATURATION: 97 % | RESPIRATION RATE: 16 BRPM | BODY MASS INDEX: 44.59 KG/M2 | WEIGHT: 236 LBS

## 2018-02-15 PROCEDURE — 99215 OFFICE O/P EST HI 40 MIN: CPT | Mod: 25

## 2018-02-15 PROCEDURE — 94010 BREATHING CAPACITY TEST: CPT

## 2018-03-06 ENCOUNTER — OUTPATIENT (OUTPATIENT)
Dept: OUTPATIENT SERVICES | Facility: HOSPITAL | Age: 83
LOS: 1 days | End: 2018-03-06
Payer: MEDICARE

## 2018-03-06 DIAGNOSIS — J44.9 CHRONIC OBSTRUCTIVE PULMONARY DISEASE, UNSPECIFIED: ICD-10-CM

## 2018-03-06 DIAGNOSIS — Z98.89 OTHER SPECIFIED POSTPROCEDURAL STATES: Chronic | ICD-10-CM

## 2018-03-06 DIAGNOSIS — R06.09 OTHER FORMS OF DYSPNEA: ICD-10-CM

## 2018-03-06 LAB
BASE EXCESS BLDA CALC-SCNC: 10.9 MMOL/L — HIGH (ref -2–2)
GAS PNL BLDA: SIGNIFICANT CHANGE UP
HCO3 BLDA-SCNC: 35 MMOL/L — HIGH (ref 20–26)
PCO2 BLDA: 68 MMHG — HIGH (ref 35–45)
PH BLDA: 7.35 — SIGNIFICANT CHANGE UP (ref 7.35–7.45)
PO2 BLDA: 104 MMHG — SIGNIFICANT CHANGE UP (ref 83–108)
SAO2 % BLDA: 99 % — SIGNIFICANT CHANGE UP (ref 95–99)

## 2018-03-06 PROCEDURE — 82803 BLOOD GASES ANY COMBINATION: CPT

## 2018-03-13 ENCOUNTER — INPATIENT (INPATIENT)
Facility: HOSPITAL | Age: 83
LOS: 2 days | Discharge: HOME | DRG: 811 | End: 2018-03-16
Attending: HOSPITALIST | Admitting: GENERAL ACUTE CARE HOSPITAL
Payer: MEDICARE

## 2018-03-13 VITALS
RESPIRATION RATE: 22 BRPM | OXYGEN SATURATION: 97 % | HEIGHT: 67 IN | HEART RATE: 70 BPM | TEMPERATURE: 98 F | DIASTOLIC BLOOD PRESSURE: 74 MMHG | SYSTOLIC BLOOD PRESSURE: 124 MMHG | WEIGHT: 240.08 LBS

## 2018-03-13 DIAGNOSIS — D64.9 ANEMIA, UNSPECIFIED: ICD-10-CM

## 2018-03-13 DIAGNOSIS — Z98.89 OTHER SPECIFIED POSTPROCEDURAL STATES: Chronic | ICD-10-CM

## 2018-03-13 LAB
ANION GAP SERPL CALC-SCNC: 11 MMOL/L — SIGNIFICANT CHANGE UP (ref 5–17)
APPEARANCE UR: CLEAR — SIGNIFICANT CHANGE UP
BACTERIA # UR AUTO: ABNORMAL
BASE EXCESS BLDA CALC-SCNC: 11 MMOL/L — HIGH (ref -2–2)
BILIRUB UR-MCNC: NEGATIVE — SIGNIFICANT CHANGE UP
BLD GP AB SCN SERPL QL: SIGNIFICANT CHANGE UP
BLOOD GAS COMMENTS ARTERIAL: SIGNIFICANT CHANGE UP
BUN SERPL-MCNC: 27 MG/DL — HIGH (ref 8–20)
CALCIUM SERPL-MCNC: 9.1 MG/DL — SIGNIFICANT CHANGE UP (ref 8.6–10.2)
CHLORIDE SERPL-SCNC: 94 MMOL/L — LOW (ref 98–107)
CO2 SERPL-SCNC: 35 MMOL/L — HIGH (ref 22–29)
COLOR SPEC: YELLOW — SIGNIFICANT CHANGE UP
CREAT SERPL-MCNC: 1.62 MG/DL — HIGH (ref 0.5–1.3)
DIFF PNL FLD: NEGATIVE — SIGNIFICANT CHANGE UP
EPI CELLS # UR: SIGNIFICANT CHANGE UP
FERRITIN SERPL-MCNC: 19 NG/ML — LOW (ref 30–400)
GAS PNL BLDA: SIGNIFICANT CHANGE UP
GLUCOSE BLDC GLUCOMTR-MCNC: 134 MG/DL — HIGH (ref 70–99)
GLUCOSE SERPL-MCNC: 133 MG/DL — HIGH (ref 70–115)
GLUCOSE UR QL: NEGATIVE MG/DL — SIGNIFICANT CHANGE UP
HCO3 BLDA-SCNC: 35 MMOL/L — HIGH (ref 20–26)
HCT VFR BLD CALC: 22.6 % — LOW (ref 42–52)
HGB BLD-MCNC: 6.5 G/DL — CRITICAL LOW (ref 14–18)
HOROWITZ INDEX BLDA+IHG-RTO: SIGNIFICANT CHANGE UP
IRON SATN MFR SERPL: 24 UG/DL — LOW (ref 59–158)
IRON SATN MFR SERPL: 6 % — LOW (ref 16–55)
KETONES UR-MCNC: NEGATIVE — SIGNIFICANT CHANGE UP
LEUKOCYTE ESTERASE UR-ACNC: ABNORMAL
MCHC RBC-ENTMCNC: 22.6 PG — LOW (ref 27–31)
MCHC RBC-ENTMCNC: 28.8 G/DL — LOW (ref 32–36)
MCV RBC AUTO: 78.5 FL — LOW (ref 80–94)
NITRITE UR-MCNC: NEGATIVE — SIGNIFICANT CHANGE UP
PCO2 BLDA: 86 MMHG — CRITICAL HIGH (ref 35–45)
PH BLDA: 7.26 — LOW (ref 7.35–7.45)
PH UR: 6 — SIGNIFICANT CHANGE UP (ref 5–8)
PLATELET # BLD AUTO: 133 K/UL — LOW (ref 150–400)
PO2 BLDA: 135 MMHG — HIGH (ref 83–108)
POTASSIUM SERPL-MCNC: 4.6 MMOL/L — SIGNIFICANT CHANGE UP (ref 3.5–5.3)
POTASSIUM SERPL-SCNC: 4.6 MMOL/L — SIGNIFICANT CHANGE UP (ref 3.5–5.3)
PROT UR-MCNC: NEGATIVE MG/DL — SIGNIFICANT CHANGE UP
RBC # BLD: 2.88 M/UL — LOW (ref 4.6–6.2)
RBC # FLD: 17.2 % — HIGH (ref 11–15.6)
SAO2 % BLDA: 100 % — HIGH (ref 95–99)
SODIUM SERPL-SCNC: 140 MMOL/L — SIGNIFICANT CHANGE UP (ref 135–145)
SP GR SPEC: 1.01 — SIGNIFICANT CHANGE UP (ref 1.01–1.02)
TIBC SERPL-MCNC: 395 UG/DL — SIGNIFICANT CHANGE UP (ref 220–430)
TRANSFERRIN SERPL-MCNC: 276 MG/DL — SIGNIFICANT CHANGE UP (ref 180–329)
TYPE + AB SCN PNL BLD: SIGNIFICANT CHANGE UP
UROBILINOGEN FLD QL: NEGATIVE MG/DL — SIGNIFICANT CHANGE UP
WBC # BLD: 4.2 K/UL — LOW (ref 4.8–10.8)
WBC # FLD AUTO: 4.2 K/UL — LOW (ref 4.8–10.8)
WBC UR QL: SIGNIFICANT CHANGE UP /HPF (ref 0–5)

## 2018-03-13 PROCEDURE — 99223 1ST HOSP IP/OBS HIGH 75: CPT

## 2018-03-13 PROCEDURE — 99222 1ST HOSP IP/OBS MODERATE 55: CPT

## 2018-03-13 PROCEDURE — 71045 X-RAY EXAM CHEST 1 VIEW: CPT | Mod: 26

## 2018-03-13 PROCEDURE — 99285 EMERGENCY DEPT VISIT HI MDM: CPT

## 2018-03-13 RX ORDER — GLUCAGON INJECTION, SOLUTION 0.5 MG/.1ML
1 INJECTION, SOLUTION SUBCUTANEOUS ONCE
Qty: 0 | Refills: 0 | Status: DISCONTINUED | OUTPATIENT
Start: 2018-03-13 | End: 2018-03-16

## 2018-03-13 RX ORDER — ALBUTEROL 90 UG/1
2.5 AEROSOL, METERED ORAL ONCE
Qty: 0 | Refills: 0 | Status: COMPLETED | OUTPATIENT
Start: 2018-03-13 | End: 2018-03-13

## 2018-03-13 RX ORDER — IRON SUCROSE 20 MG/ML
100 INJECTION, SOLUTION INTRAVENOUS EVERY 24 HOURS
Qty: 0 | Refills: 0 | Status: COMPLETED | OUTPATIENT
Start: 2018-03-14 | End: 2018-03-16

## 2018-03-13 RX ORDER — DEXTROSE 50 % IN WATER 50 %
1 SYRINGE (ML) INTRAVENOUS ONCE
Qty: 0 | Refills: 0 | Status: DISCONTINUED | OUTPATIENT
Start: 2018-03-13 | End: 2018-03-16

## 2018-03-13 RX ORDER — PANTOPRAZOLE SODIUM 20 MG/1
40 TABLET, DELAYED RELEASE ORAL EVERY 12 HOURS
Qty: 0 | Refills: 0 | Status: DISCONTINUED | OUTPATIENT
Start: 2018-03-13 | End: 2018-03-15

## 2018-03-13 RX ORDER — FOLIC ACID 0.8 MG
1 TABLET ORAL DAILY
Qty: 0 | Refills: 0 | Status: DISCONTINUED | OUTPATIENT
Start: 2018-03-13 | End: 2018-03-16

## 2018-03-13 RX ORDER — IPRATROPIUM/ALBUTEROL SULFATE 18-103MCG
3 AEROSOL WITH ADAPTER (GRAM) INHALATION EVERY 6 HOURS
Qty: 0 | Refills: 0 | Status: DISCONTINUED | OUTPATIENT
Start: 2018-03-13 | End: 2018-03-16

## 2018-03-13 RX ORDER — DEXTROSE 50 % IN WATER 50 %
25 SYRINGE (ML) INTRAVENOUS ONCE
Qty: 0 | Refills: 0 | Status: DISCONTINUED | OUTPATIENT
Start: 2018-03-13 | End: 2018-03-16

## 2018-03-13 RX ORDER — DIAZEPAM 5 MG
5 TABLET ORAL ONCE
Qty: 0 | Refills: 0 | Status: DISCONTINUED | OUTPATIENT
Start: 2018-03-13 | End: 2018-03-13

## 2018-03-13 RX ORDER — IPRATROPIUM/ALBUTEROL SULFATE 18-103MCG
3 AEROSOL WITH ADAPTER (GRAM) INHALATION ONCE
Qty: 0 | Refills: 0 | Status: COMPLETED | OUTPATIENT
Start: 2018-03-13 | End: 2018-03-13

## 2018-03-13 RX ORDER — CEFTRIAXONE 500 MG/1
1 INJECTION, POWDER, FOR SOLUTION INTRAMUSCULAR; INTRAVENOUS ONCE
Qty: 0 | Refills: 0 | Status: COMPLETED | OUTPATIENT
Start: 2018-03-13 | End: 2018-03-13

## 2018-03-13 RX ORDER — PREGABALIN 225 MG/1
1000 CAPSULE ORAL DAILY
Qty: 0 | Refills: 0 | Status: DISCONTINUED | OUTPATIENT
Start: 2018-03-13 | End: 2018-03-16

## 2018-03-13 RX ORDER — AMIODARONE HYDROCHLORIDE 400 MG/1
200 TABLET ORAL DAILY
Qty: 0 | Refills: 0 | Status: DISCONTINUED | OUTPATIENT
Start: 2018-03-13 | End: 2018-03-16

## 2018-03-13 RX ORDER — FUROSEMIDE 40 MG
40 TABLET ORAL ONCE
Qty: 0 | Refills: 0 | Status: COMPLETED | OUTPATIENT
Start: 2018-03-13 | End: 2018-03-13

## 2018-03-13 RX ORDER — SACCHAROMYCES BOULARDII 250 MG
250 POWDER IN PACKET (EA) ORAL
Qty: 0 | Refills: 0 | Status: DISCONTINUED | OUTPATIENT
Start: 2018-03-13 | End: 2018-03-16

## 2018-03-13 RX ORDER — OXYCODONE HYDROCHLORIDE 5 MG/1
5 TABLET ORAL EVERY 6 HOURS
Qty: 0 | Refills: 0 | Status: DISCONTINUED | OUTPATIENT
Start: 2018-03-13 | End: 2018-03-16

## 2018-03-13 RX ORDER — DEXTROSE 50 % IN WATER 50 %
12.5 SYRINGE (ML) INTRAVENOUS ONCE
Qty: 0 | Refills: 0 | Status: DISCONTINUED | OUTPATIENT
Start: 2018-03-13 | End: 2018-03-16

## 2018-03-13 RX ORDER — ATORVASTATIN CALCIUM 80 MG/1
10 TABLET, FILM COATED ORAL AT BEDTIME
Qty: 0 | Refills: 0 | Status: DISCONTINUED | OUTPATIENT
Start: 2018-03-13 | End: 2018-03-16

## 2018-03-13 RX ORDER — INSULIN GLARGINE 100 [IU]/ML
10 INJECTION, SOLUTION SUBCUTANEOUS AT BEDTIME
Qty: 0 | Refills: 0 | Status: DISCONTINUED | OUTPATIENT
Start: 2018-03-13 | End: 2018-03-16

## 2018-03-13 RX ORDER — SODIUM CHLORIDE 9 MG/ML
1000 INJECTION, SOLUTION INTRAVENOUS
Qty: 0 | Refills: 0 | Status: DISCONTINUED | OUTPATIENT
Start: 2018-03-13 | End: 2018-03-16

## 2018-03-13 RX ORDER — CEFTRIAXONE 500 MG/1
1 INJECTION, POWDER, FOR SOLUTION INTRAMUSCULAR; INTRAVENOUS EVERY 24 HOURS
Qty: 0 | Refills: 0 | Status: DISCONTINUED | OUTPATIENT
Start: 2018-03-14 | End: 2018-03-15

## 2018-03-13 RX ORDER — INSULIN LISPRO 100/ML
VIAL (ML) SUBCUTANEOUS
Qty: 0 | Refills: 0 | Status: DISCONTINUED | OUTPATIENT
Start: 2018-03-13 | End: 2018-03-16

## 2018-03-13 RX ADMIN — Medication 5 MILLIGRAM(S): at 12:15

## 2018-03-13 RX ADMIN — INSULIN GLARGINE 10 UNIT(S): 100 INJECTION, SOLUTION SUBCUTANEOUS at 22:57

## 2018-03-13 RX ADMIN — PANTOPRAZOLE SODIUM 40 MILLIGRAM(S): 20 TABLET, DELAYED RELEASE ORAL at 14:18

## 2018-03-13 RX ADMIN — Medication 40 MILLIGRAM(S): at 20:22

## 2018-03-13 RX ADMIN — CEFTRIAXONE 100 GRAM(S): 500 INJECTION, POWDER, FOR SOLUTION INTRAMUSCULAR; INTRAVENOUS at 14:18

## 2018-03-13 RX ADMIN — ALBUTEROL 2.5 MILLIGRAM(S): 90 AEROSOL, METERED ORAL at 12:16

## 2018-03-13 RX ADMIN — Medication 250 MILLIGRAM(S): at 17:39

## 2018-03-13 RX ADMIN — Medication 3 MILLILITER(S): at 12:16

## 2018-03-13 NOTE — ED PROVIDER NOTE - CONSTITUTIONAL, MLM
normal... ILL appearing, OBESE, awake, alert, oriented to person, place, time/situation and in no apparent distress.

## 2018-03-13 NOTE — CONSULT NOTE ADULT - SUBJECTIVE AND OBJECTIVE BOX
Patient is a 83y old  Male who presents with a chief complaint of neck pain referred for GI evaluation of symptomatic anemia.    HPI:  84 y/o male with history of COPD on home 02 3 liters cont, chronic blood loss anemia, ckd stage 4, DM-2, HTN, HLD, Chronic LE edema, Afib on Xarelto presents from home with neck pain for a couple days for which he took motrin. PAtient is a poor historian and much of the history was taken from chart and er physician, Patient has been having 10 out of neck pain at home and was taking motrin every few hours. PAtient also had hypoxia at home and had to increase his home o2 to 4l. Patient was at [facility] in 12/2017 for possible GI bleed and was told to follow up [facility] GI but it in unclear if he did so. PAtient seen at bedside and is lethargic ABG ordered with show elevated pco2. Pt. is an extremely poor historian and is an oxygen dependent COPD'er with CO2 retention. It is unclear whether he has had any previous GI testing and has no family with him to talk to. He appears to have no gross hematochezia or melena and has no c/o abdominal pain just neck pain. and he was given IV antibiotics for UTI here in the ED.      REVIEW OF SYSTEMS:  Constitutional: No fever, weight loss or fatigue  ENMT:  No difficulty hearing, tinnitus, vertigo; No sinus or throat pain  Respiratory: No cough, wheezing, chills or hemoptysis  Cardiovascular: No chest pain, palpitations, dizziness or leg swelling  Gastrointestinal: No abdominal or epigastric pain. No nausea, vomiting or hematemesis; No diarrhea or constipation. No melena or hematochezia.  Skin: No itching, burning, rashes or lesions   Musculoskeletal: Positive neck pain  Patient has no cardiopulmonary, peripheral vascular, musculoskeletal, dermatological, neurological, gynecological or psychological symptoms or complaints at this time.    PAST MEDICAL & SURGICAL HISTORY:  COPD (chronic obstructive pulmonary disease)  Hyperlipidemia, unspecified hyperlipidemia type  Essential hypertension  Afib  Chronic systolic CHF (congestive heart failure)  Hypertrophic cardiomyopathy  CKD (chronic kidney disease), stage 4 (severe)  Gassiness  Diabetes  Asthma  Prostate cancer  Hypertension  No significant past surgical history  History of valvuloplasty  History of knee surgery      FAMILY HISTORY:  No pertinent family history in first degree relatives  No pertinent family history in first degree relatives      SOCIAL HISTORY:  Smoking Status: [ ] Current, [ ] Former, [ ] Never  Pack Years: Unknown. Unknown ETOH or drug abuse history.    MEDICATIONS:  MEDICATIONS  (STANDING):  amiodarone    Tablet 200 milliGRAM(s) Oral daily  atorvastatin 10 milliGRAM(s) Oral at bedtime  cyanocobalamin 1000 MICROGram(s) Oral daily  dextrose 5%. 1000 milliLiter(s) (50 mL/Hr) IV Continuous <Continuous>  dextrose 50% Injectable 12.5 Gram(s) IV Push once  dextrose 50% Injectable 25 Gram(s) IV Push once  dextrose 50% Injectable 25 Gram(s) IV Push once  folic acid 1 milliGRAM(s) Oral daily  furosemide   Injectable 40 milliGRAM(s) IV Push once  insulin glargine Injectable (LANTUS) 10 Unit(s) SubCutaneous at bedtime  insulin lispro (HumaLOG) corrective regimen sliding scale   SubCutaneous three times a day before meals  pantoprazole  Injectable 40 milliGRAM(s) IV Push every 12 hours  saccharomyces boulardii 250 milliGRAM(s) Oral two times a day    MEDICATIONS  (PRN):  ALBUTerol/ipratropium for Nebulization 3 milliLiter(s) Nebulizer every 6 hours PRN Bronchospasm  dextrose Gel 1 Dose(s) Oral once PRN Blood Glucose LESS THAN 70 milliGRAM(s)/deciliter  glucagon  Injectable 1 milliGRAM(s) IntraMuscular once PRN Glucose LESS THAN 70 milligrams/deciliter  oxyCODONE    IR 5 milliGRAM(s) Oral every 6 hours PRN Moderate Pain (4 - 6)      Allergies    No Known Allergies    Intolerances        Vital Signs Last 24 Hrs  T(C): 36.9 (13 Mar 2018 15:20), Max: 37.1 (13 Mar 2018 14:36)  T(F): 98.4 (13 Mar 2018 15:20), Max: 98.7 (13 Mar 2018 14:36)  HR: 73 (13 Mar 2018 15:20) (70 - 76)  BP: 130/69 (13 Mar 2018 15:20) (124/74 - 132/87)  BP(mean): --  RR: 16 (13 Mar 2018 15:20) (16 - 22)  SpO2: 97% (13 Mar 2018 15:20) (94% - 97%)        PHYSICAL EXAM:    General: Well developed; morbidly obese male, tachypneic at rest in no acute distress  HEENT: MMM, conjunctiva pale and sclera anicteric  Lungs: Markedly decreased breath sounds bilaterally.  Cor: Irregular rhythm, rate roughly 100.  Gastrointestinal: Abdomen: Soft, obese, non-tender non-distended; Normal bowel sounds; No rebound or guarding unable to palpate liver or spleen.  RAMONA: Unable to perform where pt. located in ED.  Extremities: 3+ pitting edema bilateral lower extremities to his thighs bilaterally.  Neurological: Alert and oriented to person and place but not time and appears lethargic and somnolent possibly secondary to hypercarbia.  Skin: Warm and dry. No obvious rash      LABS:                        6.5    4.2   )-----------( 133      ( 13 Mar 2018 11:57 )             22.6     03-13    140  |  94<L>  |  27.0<H>  ----------------------------<  133<H>  4.6   |  35.0<H>  |  1.62<H>    Ca    9.1      13 Mar 2018 11:57            RADIOLOGY & ADDITIONAL STUDIES:
CHIEF COMPLAINT: neck pain    HPI: Patient is a  83y Male with h/o CAD s/p CABG, Bio AVR, recent persistent AF, COPD, KRYSTIN here with neck pain. Started a few days ago, moderate in intensity. Worse on the left and with movement of the neck or flexion. Denies chest pain, sob, pnd, orthopnea or edema. No blood in urine or stool. Recently had lasix increased by Dr. Looney for signs of CHFpEF.     PAST MEDICAL & SURGICAL HISTORY:  COPD (chronic obstructive pulmonary disease)  Hyperlipidemia, unspecified hyperlipidemia type  Essential hypertension  Afib  Chronic systolic CHF (congestive heart failure)  Hypertrophic cardiomyopathy  CKD (chronic kidney disease), stage 4 (severe)  Gassiness  Diabetes  Asthma  Prostate cancer  Hypertension  No significant past surgical history  History of valvuloplasty  History of knee surgery      MEDICATIONS:           folic acid 1 mg oral tablet: Last Dose Taken:  , 1 tab(s) orally once a day   · 	rivaroxaban 15 mg oral tablet: Last Dose Taken:  , 1 tab(s) orally once a day   · 	saccharomyces boulardii lyo 250 mg oral capsule: Last Dose Taken:  , 1 cap(s) orally 2 times a day  · 	furosemide 40 mg oral tablet: Last Dose Taken:  , 1 tab(s) orally once a day   · 	Breo Ellipta 100 mcg-25 mcg/inh inhalation powder: Last Dose Taken:  , 1 puff(s) inhaled once a day  · 	atorvastatin 10 mg oral tablet: Last Dose Taken:  , 1 tab(s) orally once a day (at bedtime)  · 	Fortamet 500 mg oral tablet, extended release: Last Dose Taken:  , 1 tab(s) orally once a day   · 	amiodarone 200 mg oral tablet: Last Dose Taken:  , 1 tab(s) orally once a day  · 	Aspirin Enteric Coated 81 mg oral delayed release tablet: Last Dose Taken:  , 1 tab(s) orally once a day  · 	pantoprazole 40 mg oral delayed release tablet: Last Dose Taken:  , 1 tab(s) orally 2 times a day   · 	hydrocortisone 25 mg rectal suppository: Last Dose Taken:  , 1 suppository(ies) rectal 2 times a day  · 	Pepcid 20 mg oral tablet: Last Dose Taken:  , 1 tab(s) orally 2 times a day   · 	predniSONE 50 mg oral tablet: Last Dose Taken:  , 1 tab(s) orally once a day   · 	Benadryl 25 mg oral capsule: Last Dose Taken:  , 1 cap(s) orally 3 times a day   · 	insulin lispro: Last Dose Taken:  , 15 unit(s) subcutaneous once a day MDD:dispense 5 pens  · 	Cordarone 200 mg oral tablet: Last Dose Taken:  , 1 tab(s) orally once a day  · 	Neurontin 300 mg oral capsule: Last Dose Taken:  , 1 cap(s) orally 3 times a day  · 	ocular lubricant ophthalmic solution: Last Dose Taken:  , 1 drop(s) to each affected eye 2 times a day  · 	Klor-Con 20 mEq oral powder for reconstitution: Last Dose Taken:  , 1 packet(s) orally once a day  · 	aspirin 81 mg oral delayed release tablet: Last Dose Taken:  , 1 tab(s) orally once a day  · 	DME: Last Dose Taken:  , 4L NASAL OXYGEN  	DX:COPD AND KRYSTIN  	CONTINUOUS AND PORTABLE  · 	Protonix 40 mg oral delayed release tablet: Last Dose Taken:  , 1 tab(s) orally once a day  · 	lactulose 10 g/15 mL oral syrup: Last Dose Taken:  , 15 milliliter(s) orally once a day  · 	Spiriva 18 mcg inhalation capsule: Last Dose Taken:  , 1 cap(s) inhaled once a day  · 	Breo Ellipta 200 mcg-25 mcg/inh inhalation powder: Last Dose Taken:  , 1 puff(s) inhaled once a day  · 	lovastatin 40 mg oral tablet: Last Dose Taken:  , 1 tab(s) orally once a day    FAMILY HISTORY:  FAMILY HISTORY:  No pertinent family history in first degree relatives  No pertinent family history in first degree relatives      SOCIAL HISTORY: no EtOH, drugs or tobacco    ROS: GI negative, All others negative    PHYSCIAL EXAM:  Vital Signs Last 24 Hrs  T(C): 37.1 (13 Mar 2018 14:36), Max: 37.1 (13 Mar 2018 14:36)  T(F): 98.7 (13 Mar 2018 14:36), Max: 98.7 (13 Mar 2018 14:36)  HR: 76 (13 Mar 2018 14:36) (70 - 76)  BP: 132/87 (13 Mar 2018 14:36) (124/74 - 132/87)  BP(mean): --  RR: 16 (13 Mar 2018 14:36) (16 - 22)  SpO2: 94% (13 Mar 2018 14:36) (94% - 97%)  I&O's Summary    GEN: NAD  HEENT: MMM, sclera anicteric  RESP: CTA bilaterally  CVS: S1S2, RRR, no JVD, no M/R/G  GI: Soft, NT, ND, BS+  EXT: no C/C/E  NEURO: AAOX3  PSYCH: Normal affect    ECG: pending    LABS:                        6.5    4.2   )-----------( 133      ( 13 Mar 2018 11:57 )             22.6     03-13    140  |  94<L>  |  27.0<H>  ----------------------------<  133<H>  4.6   |  35.0<H>  |  1.62<H>    Ca    9.1      13 Mar 2018 11:57          PT/INR - ( 13 Mar 2018 12:11 )   PT: 20.1 sec;   INR: 1.81 ratio         PTT - ( 13 Mar 2018 12:11 )  PTT:33.6 sec      RADIOLOGY & ADDITIONAL STUDIES:    Assessment:    Patient is a 83yoM with a PMH of CAD s/p CABG, Bio AVR, persistent AF, COPD, KRYSTIN, GIB presenting with neck pain and found to be severely anemic and with UTI. NEck pain likely musculoskeletal. No signs of decompensated CHF at this time and no CP    Plan:  1. Will need to hold A/C until further work up of anemia  2. Continue CV meds including lasix to keep euvolemic, would give IV lasix after transfusion  3. GI evaluation  4. Antibiotics for UTI  5. Will follow, thank you

## 2018-03-13 NOTE — ED ADULT TRIAGE NOTE - CHIEF COMPLAINT QUOTE
Patient brought in by ambulance A/Ox3, c/o left neck pain and shoulder for several days, denies any sort of trauma.  Oxygen sat at home 84%-placed on 4L NC.

## 2018-03-13 NOTE — H&P ADULT - NSHPPHYSICALEXAM_GEN_ALL_CORE
PHYSICAL EXAM:    GENERAL: lethargic   HEAD:  Atraumatic, Normocephalic  EYES: EOMI, PERRLA,   ENMT: No tonsillar erythema, exudates, or enlargement;  NECK: Supple, No JVD, Normal thyroid  NERVOUS SYSTEM:  lethargic  CHEST/LUNG: dimiinished   HEART: Regular rate and rhythm; No murmurs,  ABDOMEN: Soft, Nontender,   EXTREMITIES:  edema  LYMPH: No lymphadenopathy noted  SKIN: No rashes or lesions

## 2018-03-13 NOTE — ED PROVIDER NOTE - ENMT, MLM
Airway patent,  Mouth with normal mucosa. Throat has no vesicles, no oropharyngeal exudates and uvula is midline.

## 2018-03-13 NOTE — ED PROVIDER NOTE - OBJECTIVE STATEMENT
PT PRESENTS WITH COMPLAINT OF NECK PAIN AFTER FALLING ASLEEP WITH HIS HEAD AND NECK FLEXED FORWARD FROM A LPILLOW. PT HAS DECREASED ROM AND PAIN TO THE SIDES OF HIS NECK. HE IS CARED FOR BY THE DAUGHTER IN LAW WHO HAS TRIED MASSAGE AND WARM COMPRESSES AND VICKS RUB TO ALLEVIATE PAIN. PT STATES HE HAS TAKEN IBUPROFEN EVERY 2 HOURS FOR PAIN. DAUGHTER IN LAW ALSO STATES HE HAS BEEN VERY SLEEPY THE PAST FEW DAYS. NO TRAUMA, FALLS OR CHEST PAIN. ALSO MILD COUGH. PCP IS DR LEAL AND CARDIO IS ENDY  MED HX : CKD, COPD, DM, HTN, ASTHMA, AFIB, PROSTATE CANCER, CHF, HYPERTROPHIC CARDIOMYOPATHY

## 2018-03-13 NOTE — H&P ADULT - NSHPLABSRESULTS_GEN_ALL_CORE
6.5    4.2    )----------(  133       ( 13 Mar 2018 11:57 )               22.6      140    |  94     |  27.0   ----------------------------<  133        ( 13 Mar 2018 11:57 )  4.6     |  35.0   |  1.62     Ca    9.1        ( 13 Mar 2018 11:57 )        PT/INR -  20.1 sec / 1.81 ratio   ( 13 Mar 2018 12:11 )       PTT -  33.6 sec   ( 13 Mar 2018 12:11 )  CAPILLARY BLOOD GLUCOSE        Urinalysis Basic - ( 13 Mar 2018 11:34 )    Color: Yellow / Appearance: Clear / S.010 / pH: x  Gluc: x / Ketone: Negative  / Bili: Negative / Urobili: Negative mg/dL   Blood: x / Protein: Negative mg/dL / Nitrite: Negative   Leuk Esterase: Moderate / RBC: x / WBC 30-35 /HPF   Sq Epi: x / Non Sq Epi: Few / Bacteria: Moderate

## 2018-03-13 NOTE — H&P ADULT - ASSESSMENT
1) Acute on chronic blood loss anemia --> agree with 2 units prbvcs  --> will hold asa and xarelto  --> gi consulted  --> ppi iv bid     2) Acute toxic metabolic encephalopathy --> uti vs co2 narcosis  --> agree with iv abx  --> bipap tonighjt  -> hold gabapentin     3) uti --> iv ceft  --> florastor    4) acute on chornic renal failure --> bmp in am  --> hold nephrotoxic agents    5) dm2 --> ssi  --> lantus qhs    6) afib --> cardio following  --> hold off on AC  --> home amiodarone    7) diet --. npo until he wakes up more    8_ thrombocytopenia --> hold all AC  scds

## 2018-03-13 NOTE — H&P ADULT - HISTORY OF PRESENT ILLNESS
82 y/o male with history of COPD on home 02 3 liters cont, chronic blood loss anemia, ckd stage 4, DM-2, HTN, HLD, Chronic LE edema, Afib on Xarelto presents from home with neck pain for a couple days for which he took motrin. PAtient is a poor historian and much of the history was taken from chart and er physician, Patient has been having 10 out of neck pain at home and was taking motrin every few hours. PAtient also had hypoxia at home and had to increase his home o2 to 4l. Patient was at [facility] in 12/2017 for possible GI bleed and was told to follow up [facility] GI but it in unclear if he did so. PAtient seen at bedside and is lethargic ABG ordered with show elevated pco2.     given iv ceft for uti

## 2018-03-13 NOTE — ED ADULT NURSE REASSESSMENT NOTE - NS ED NURSE REASSESS COMMENT FT1
pt sitting calm in bed. a and o x3. breathing even and unlabored on bipap. nsr on cm. pt has no complaints at this time. prbc's infusing. will continue to monitor.

## 2018-03-13 NOTE — CONSULT NOTE ADULT - PROBLEM SELECTOR RECOMMENDATION 9
Pt's anemia likely multifactorial and iron studies ordered. May well have exacerbation of anemia of chronic disease secondary to CKD. Pt's respiratory status and hypercarbia prohibit IV sedation for endoscopic procedures. Pulmonary consult if not already done. Transfuse to Hb of greater than 8 grams. IV Pantoprazole for GI mucosal cytoprotection. Cardiology evaluation also recommended if not already done for pt's h/o Afib and CHF. Would hold Xarelto for now if OK with Cardiology but the pt. appears to have no evidence of active GI bleeding presently. Repeat labs ordered for tomorrow including PT/ INR off of Xarelto. Overall prognosis appears extremely poor and his functional status is equally poor at this time. Would try to obtain information from his family if available regarding previous GI w/u if any were done and when.

## 2018-03-13 NOTE — ED ADULT NURSE NOTE - CHPI ED SYMPTOMS NEG
no difficulty bearing weight/no abrasion/no fever/no numbness/no weakness/no bruising/no back pain/no deformity/no tingling

## 2018-03-13 NOTE — ED PROVIDER NOTE - NEURO NEGATIVE STATEMENT, MLM
no loss of consciousness, no gait abnormality, no headache, no sensory deficits, and no weakness. LETHARGIC AS PER DIL

## 2018-03-13 NOTE — ED ADULT NURSE NOTE - PMH
Afib    Asthma    Chronic systolic CHF (congestive heart failure)    CKD (chronic kidney disease), stage 4 (severe)    COPD (chronic obstructive pulmonary disease)    Diabetes    Essential hypertension    Gassiness    Hyperlipidemia, unspecified hyperlipidemia type    Hypertension    Hypertrophic cardiomyopathy    Prostate cancer

## 2018-03-13 NOTE — ED ADULT NURSE REASSESSMENT NOTE - NS ED NURSE REASSESS COMMENT FT1
pt sitting calm in bed. lethargic and o x3. breathing even and unlabored on 4l o2 via nc. nsr on cm. pt has no complaints at this time. will continue to monitor.

## 2018-03-13 NOTE — ED ADULT NURSE NOTE - OBJECTIVE STATEMENT
pt reports generalized neck pain x 1 week. pt also has ronchi on auscultation with productive cough. pt has no other complaints. afebrile here. pt and family deny sob, cp, dizziness, fevers, chills. lethargic and o x3. mccormick. perrl. breathing even and unlabored on 3l o2 via nc. hypoxic on ra. nsr on cm. + and = peripheral pulses. no le edema. abdomen soft nontender nondistended. will continue to monitor.

## 2018-03-13 NOTE — ED PROVIDER NOTE - MEDICAL DECISION MAKING DETAILS
PT WITH MULTIPLE COMPLAINTS - LETHARGY NECK PAIN. ALSO SLIGHT COUGH  COMPLICATED MED HX. EVAL SIG UTI AND ANEMIA  ANEMIA LIKELY FROM IBUPROFEN Q 2 HR   ADMIT

## 2018-03-14 LAB
ANION GAP SERPL CALC-SCNC: 9 MMOL/L — SIGNIFICANT CHANGE UP (ref 5–17)
ANISOCYTOSIS BLD QL: SLIGHT — SIGNIFICANT CHANGE UP
BASE EXCESS BLDA CALC-SCNC: 16.1 MMOL/L — HIGH (ref -2–2)
BASOPHILS # BLD AUTO: 0 K/UL — SIGNIFICANT CHANGE UP (ref 0–0.2)
BASOPHILS NFR BLD AUTO: 0.4 % — SIGNIFICANT CHANGE UP (ref 0–2)
BLOOD GAS COMMENTS ARTERIAL: SIGNIFICANT CHANGE UP
BUN SERPL-MCNC: 23 MG/DL — HIGH (ref 8–20)
CALCIUM SERPL-MCNC: 9.2 MG/DL — SIGNIFICANT CHANGE UP (ref 8.6–10.2)
CHLORIDE SERPL-SCNC: 97 MMOL/L — LOW (ref 98–107)
CO2 SERPL-SCNC: 40 MMOL/L — HIGH (ref 22–29)
CREAT SERPL-MCNC: 1.36 MG/DL — HIGH (ref 0.5–1.3)
EOSINOPHIL # BLD AUTO: 0.2 K/UL — SIGNIFICANT CHANGE UP (ref 0–0.5)
EOSINOPHIL NFR BLD AUTO: 3.5 % — SIGNIFICANT CHANGE UP (ref 0–5)
GAS PNL BLDA: SIGNIFICANT CHANGE UP
GLUCOSE BLDC GLUCOMTR-MCNC: 126 MG/DL — HIGH (ref 70–99)
GLUCOSE BLDC GLUCOMTR-MCNC: 147 MG/DL — HIGH (ref 70–99)
GLUCOSE BLDC GLUCOMTR-MCNC: 86 MG/DL — SIGNIFICANT CHANGE UP (ref 70–99)
GLUCOSE BLDC GLUCOMTR-MCNC: 95 MG/DL — SIGNIFICANT CHANGE UP (ref 70–99)
GLUCOSE SERPL-MCNC: 81 MG/DL — SIGNIFICANT CHANGE UP (ref 70–115)
HCO3 BLDA-SCNC: 40 MMOL/L — HIGH (ref 20–26)
HCT VFR BLD CALC: 26.3 % — LOW (ref 42–52)
HGB BLD-MCNC: 7.7 G/DL — LOW (ref 14–18)
HOROWITZ INDEX BLDA+IHG-RTO: SIGNIFICANT CHANGE UP
HYPOCHROMIA BLD QL: SLIGHT — SIGNIFICANT CHANGE UP
INR BLD: 1.37 RATIO — HIGH (ref 0.88–1.16)
LYMPHOCYTES # BLD AUTO: 0.9 K/UL — LOW (ref 1–4.8)
LYMPHOCYTES # BLD AUTO: 17.7 % — LOW (ref 20–55)
MACROCYTES BLD QL: SLIGHT — SIGNIFICANT CHANGE UP
MAGNESIUM SERPL-MCNC: 1.6 MG/DL — SIGNIFICANT CHANGE UP (ref 1.6–2.6)
MCHC RBC-ENTMCNC: 22.7 PG — LOW (ref 27–31)
MCHC RBC-ENTMCNC: 29.3 G/DL — LOW (ref 32–36)
MCV RBC AUTO: 77.6 FL — LOW (ref 80–94)
MICROCYTES BLD QL: SLIGHT — SIGNIFICANT CHANGE UP
MONOCYTES # BLD AUTO: 0.6 K/UL — SIGNIFICANT CHANGE UP (ref 0–0.8)
MONOCYTES NFR BLD AUTO: 12.6 % — HIGH (ref 3–10)
NEUTROPHILS # BLD AUTO: 3.2 K/UL — SIGNIFICANT CHANGE UP (ref 1.8–8)
NEUTROPHILS NFR BLD AUTO: 65.4 % — SIGNIFICANT CHANGE UP (ref 37–73)
NT-PROBNP SERPL-SCNC: 1061 PG/ML — HIGH (ref 0–300)
OVALOCYTES BLD QL SMEAR: SLIGHT — SIGNIFICANT CHANGE UP
PCO2 BLDA: 65 MMHG — HIGH (ref 35–45)
PH BLDA: 7.42 — SIGNIFICANT CHANGE UP (ref 7.35–7.45)
PLAT MORPH BLD: NORMAL — SIGNIFICANT CHANGE UP
PLATELET # BLD AUTO: 167 K/UL — SIGNIFICANT CHANGE UP (ref 150–400)
PO2 BLDA: 79 MMHG — LOW (ref 83–108)
POIKILOCYTOSIS BLD QL AUTO: SLIGHT — SIGNIFICANT CHANGE UP
POTASSIUM SERPL-MCNC: 3.9 MMOL/L — SIGNIFICANT CHANGE UP (ref 3.5–5.3)
POTASSIUM SERPL-SCNC: 3.9 MMOL/L — SIGNIFICANT CHANGE UP (ref 3.5–5.3)
PROTHROM AB SERPL-ACNC: 15.2 SEC — HIGH (ref 9.8–12.7)
RBC # BLD: 3.39 M/UL — LOW (ref 4.6–6.2)
RBC # FLD: 17.3 % — HIGH (ref 11–15.6)
RBC BLD AUTO: ABNORMAL
SAO2 % BLDA: 98 % — SIGNIFICANT CHANGE UP (ref 95–99)
SODIUM SERPL-SCNC: 146 MMOL/L — HIGH (ref 135–145)
WBC # BLD: 4.9 K/UL — SIGNIFICANT CHANGE UP (ref 4.8–10.8)
WBC # FLD AUTO: 4.9 K/UL — SIGNIFICANT CHANGE UP (ref 4.8–10.8)

## 2018-03-14 PROCEDURE — 99232 SBSQ HOSP IP/OBS MODERATE 35: CPT

## 2018-03-14 PROCEDURE — 99223 1ST HOSP IP/OBS HIGH 75: CPT

## 2018-03-14 PROCEDURE — 99233 SBSQ HOSP IP/OBS HIGH 50: CPT

## 2018-03-14 RX ORDER — AZITHROMYCIN 500 MG/1
TABLET, FILM COATED ORAL
Qty: 0 | Refills: 0 | Status: DISCONTINUED | OUTPATIENT
Start: 2018-03-14 | End: 2018-03-16

## 2018-03-14 RX ORDER — AZITHROMYCIN 500 MG/1
500 TABLET, FILM COATED ORAL ONCE
Qty: 0 | Refills: 0 | Status: COMPLETED | OUTPATIENT
Start: 2018-03-14 | End: 2018-03-14

## 2018-03-14 RX ORDER — MAGNESIUM SULFATE 500 MG/ML
2 VIAL (ML) INJECTION ONCE
Qty: 0 | Refills: 0 | Status: COMPLETED | OUTPATIENT
Start: 2018-03-14 | End: 2018-03-14

## 2018-03-14 RX ORDER — AZITHROMYCIN 500 MG/1
250 TABLET, FILM COATED ORAL DAILY
Qty: 0 | Refills: 0 | Status: DISCONTINUED | OUTPATIENT
Start: 2018-03-15 | End: 2018-03-16

## 2018-03-14 RX ORDER — FUROSEMIDE 40 MG
40 TABLET ORAL
Qty: 0 | Refills: 0 | Status: DISCONTINUED | OUTPATIENT
Start: 2018-03-14 | End: 2018-03-16

## 2018-03-14 RX ADMIN — CEFTRIAXONE 100 GRAM(S): 500 INJECTION, POWDER, FOR SOLUTION INTRAMUSCULAR; INTRAVENOUS at 15:43

## 2018-03-14 RX ADMIN — Medication 40 MILLIGRAM(S): at 23:28

## 2018-03-14 RX ADMIN — Medication 250 MILLIGRAM(S): at 17:06

## 2018-03-14 RX ADMIN — AMIODARONE HYDROCHLORIDE 200 MILLIGRAM(S): 400 TABLET ORAL at 05:42

## 2018-03-14 RX ADMIN — OXYCODONE HYDROCHLORIDE 5 MILLIGRAM(S): 5 TABLET ORAL at 00:37

## 2018-03-14 RX ADMIN — Medication 1 MILLIGRAM(S): at 11:02

## 2018-03-14 RX ADMIN — PANTOPRAZOLE SODIUM 40 MILLIGRAM(S): 20 TABLET, DELAYED RELEASE ORAL at 23:28

## 2018-03-14 RX ADMIN — OXYCODONE HYDROCHLORIDE 5 MILLIGRAM(S): 5 TABLET ORAL at 09:45

## 2018-03-14 RX ADMIN — Medication 250 MILLIGRAM(S): at 05:42

## 2018-03-14 RX ADMIN — PANTOPRAZOLE SODIUM 40 MILLIGRAM(S): 20 TABLET, DELAYED RELEASE ORAL at 00:37

## 2018-03-14 RX ADMIN — ATORVASTATIN CALCIUM 10 MILLIGRAM(S): 80 TABLET, FILM COATED ORAL at 23:28

## 2018-03-14 RX ADMIN — Medication 40 MILLIGRAM(S): at 17:06

## 2018-03-14 RX ADMIN — OXYCODONE HYDROCHLORIDE 5 MILLIGRAM(S): 5 TABLET ORAL at 09:00

## 2018-03-14 RX ADMIN — PREGABALIN 1000 MICROGRAM(S): 225 CAPSULE ORAL at 11:02

## 2018-03-14 RX ADMIN — IRON SUCROSE 210 MILLIGRAM(S): 20 INJECTION, SOLUTION INTRAVENOUS at 12:22

## 2018-03-14 RX ADMIN — PANTOPRAZOLE SODIUM 40 MILLIGRAM(S): 20 TABLET, DELAYED RELEASE ORAL at 11:02

## 2018-03-14 RX ADMIN — INSULIN GLARGINE 10 UNIT(S): 100 INJECTION, SOLUTION SUBCUTANEOUS at 23:27

## 2018-03-14 RX ADMIN — OXYCODONE HYDROCHLORIDE 5 MILLIGRAM(S): 5 TABLET ORAL at 01:30

## 2018-03-14 RX ADMIN — Medication 50 GRAM(S): at 11:02

## 2018-03-14 RX ADMIN — AZITHROMYCIN 500 MILLIGRAM(S): 500 TABLET, FILM COATED ORAL at 13:06

## 2018-03-14 NOTE — PROGRESS NOTE ADULT - SUBJECTIVE AND OBJECTIVE BOX
INTERVAL HPI/OVERNIGHT EVENTS:    MEDICATIONS  (STANDING):  amiodarone    Tablet 200 milliGRAM(s) Oral daily  atorvastatin 10 milliGRAM(s) Oral at bedtime  azithromycin   Tablet      azithromycin   Tablet 500 milliGRAM(s) Oral once  cefTRIAXone   IVPB 1 Gram(s) IV Intermittent every 24 hours  cyanocobalamin 1000 MICROGram(s) Oral daily  dextrose 5%. 1000 milliLiter(s) (50 mL/Hr) IV Continuous <Continuous>  dextrose 50% Injectable 12.5 Gram(s) IV Push once  dextrose 50% Injectable 25 Gram(s) IV Push once  dextrose 50% Injectable 25 Gram(s) IV Push once  folic acid 1 milliGRAM(s) Oral daily  furosemide    Tablet 40 milliGRAM(s) Oral two times a day  insulin glargine Injectable (LANTUS) 10 Unit(s) SubCutaneous at bedtime  insulin lispro (HumaLOG) corrective regimen sliding scale   SubCutaneous three times a day before meals  iron sucrose IVPB 100 milliGRAM(s) IV Intermittent every 24 hours  methylPREDNISolone sodium succinate Injectable 40 milliGRAM(s) IV Push every 6 hours  pantoprazole  Injectable 40 milliGRAM(s) IV Push every 12 hours  saccharomyces boulardii 250 milliGRAM(s) Oral two times a day    MEDICATIONS  (PRN):  ALBUTerol/ipratropium for Nebulization 3 milliLiter(s) Nebulizer every 6 hours PRN Bronchospasm  dextrose Gel 1 Dose(s) Oral once PRN Blood Glucose LESS THAN 70 milliGRAM(s)/deciliter  glucagon  Injectable 1 milliGRAM(s) IntraMuscular once PRN Glucose LESS THAN 70 milligrams/deciliter  oxyCODONE    IR 5 milliGRAM(s) Oral every 6 hours PRN Moderate Pain (4 - 6)      Allergies    No Known Allergies    Intolerances        Vital Signs Last 24 Hrs  T(C): 36.8 (14 Mar 2018 11:52), Max: 37.2 (14 Mar 2018 00:34)  T(F): 98.2 (14 Mar 2018 11:52), Max: 99 (14 Mar 2018 00:34)  HR: 72 (14 Mar 2018 12:00) (61 - 76)  BP: 147/65 (14 Mar 2018 12:00) (116/65 - 147/65)  BP(mean): 87 (14 Mar 2018 12:00) (87 - 89)  RR: 18 (14 Mar 2018 12:00) (16 - 18)  SpO2: 100% (14 Mar 2018 12:00) (94% - 100%)    LABS:                        7.7    4.9   )-----------( x        ( 14 Mar 2018 06:50 )             26.3     03-14    146<H>  |  97<L>  |  23.0<H>  ----------------------------<  81  3.9   |  40.0<H>  |  1.36<H>    Ca    9.2      14 Mar 2018 06:50  Mg     1.6     03-14      PT/INR - ( 14 Mar 2018 06:50 )   PT: 15.2 sec;   INR: 1.37 ratio         PTT - ( 13 Mar 2018 12:11 )  PTT:33.6 sec  Urinalysis Basic - ( 13 Mar 2018 11:34 )    Color: Yellow / Appearance: Clear / S.010 / pH: x  Gluc: x / Ketone: Negative  / Bili: Negative / Urobili: Negative mg/dL   Blood: x / Protein: Negative mg/dL / Nitrite: Negative   Leuk Esterase: Moderate / RBC: x / WBC 30-35 /HPF   Sq Epi: x / Non Sq Epi: Few / Bacteria: Moderate        RADIOLOGY & ADDITIONAL TESTS: INTERVAL HPI/OVERNIGHT EVENTS:No complaints. More awake this morning. Does not recall when he had colonoscopy. No GI complaints at this time.    MEDICATIONS  (STANDING):  amiodarone    Tablet 200 milliGRAM(s) Oral daily  atorvastatin 10 milliGRAM(s) Oral at bedtime  azithromycin   Tablet      azithromycin   Tablet 500 milliGRAM(s) Oral once  cefTRIAXone   IVPB 1 Gram(s) IV Intermittent every 24 hours  cyanocobalamin 1000 MICROGram(s) Oral daily  dextrose 5%. 1000 milliLiter(s) (50 mL/Hr) IV Continuous <Continuous>  dextrose 50% Injectable 12.5 Gram(s) IV Push once  dextrose 50% Injectable 25 Gram(s) IV Push once  dextrose 50% Injectable 25 Gram(s) IV Push once  folic acid 1 milliGRAM(s) Oral daily  furosemide    Tablet 40 milliGRAM(s) Oral two times a day  insulin glargine Injectable (LANTUS) 10 Unit(s) SubCutaneous at bedtime  insulin lispro (HumaLOG) corrective regimen sliding scale   SubCutaneous three times a day before meals  iron sucrose IVPB 100 milliGRAM(s) IV Intermittent every 24 hours  methylPREDNISolone sodium succinate Injectable 40 milliGRAM(s) IV Push every 6 hours  pantoprazole  Injectable 40 milliGRAM(s) IV Push every 12 hours  saccharomyces boulardii 250 milliGRAM(s) Oral two times a day    MEDICATIONS  (PRN):  ALBUTerol/ipratropium for Nebulization 3 milliLiter(s) Nebulizer every 6 hours PRN Bronchospasm  dextrose Gel 1 Dose(s) Oral once PRN Blood Glucose LESS THAN 70 milliGRAM(s)/deciliter  glucagon  Injectable 1 milliGRAM(s) IntraMuscular once PRN Glucose LESS THAN 70 milligrams/deciliter  oxyCODONE    IR 5 milliGRAM(s) Oral every 6 hours PRN Moderate Pain (4 - 6)      Allergies    No Known Allergies    Intolerances        Vital Signs Last 24 Hrs  T(C): 36.8 (14 Mar 2018 11:52), Max: 37.2 (14 Mar 2018 00:34)  T(F): 98.2 (14 Mar 2018 11:52), Max: 99 (14 Mar 2018 00:34)  HR: 72 (14 Mar 2018 12:00) (61 - 76)  BP: 147/65 (14 Mar 2018 12:00) (116/65 - 147/65)  BP(mean): 87 (14 Mar 2018 12:00) (87 - 89)  RR: 18 (14 Mar 2018 12:00) (16 - 18)  SpO2: 100% (14 Mar 2018 12:00) (94% - 100%)    LABS:                        7.7    4.9   )-----------( x        ( 14 Mar 2018 06:50 )             26.3     03-14    146<H>  |  97<L>  |  23.0<H>  ----------------------------<  81  3.9   |  40.0<H>  |  1.36<H>    Ca    9.2      14 Mar 2018 06:50  Mg     1.6     03-14      PT/INR - ( 14 Mar 2018 06:50 )   PT: 15.2 sec;   INR: 1.37 ratio         PTT - ( 13 Mar 2018 12:11 )  PTT:33.6 sec  Urinalysis Basic - ( 13 Mar 2018 11:34 )    Color: Yellow / Appearance: Clear / S.010 / pH: x  Gluc: x / Ketone: Negative  / Bili: Negative / Urobili: Negative mg/dL   Blood: x / Protein: Negative mg/dL / Nitrite: Negative   Leuk Esterase: Moderate / RBC: x / WBC 30-35 /HPF   Sq Epi: x / Non Sq Epi: Few / Bacteria: Moderate    Iron with Total Binding Capacity (18 @ 21:11)    Iron - Total Binding Capacity.: 395 ug/dL    % Saturation, Iron: 6 %    Iron Total, Serum: 24 ug/dL        RADIOLOGY & ADDITIONAL TESTS:

## 2018-03-14 NOTE — PROGRESS NOTE ADULT - ASSESSMENT
Patient with significant anemia with iron deficiency with no evidence of over GI bleeding    1. Iron supplementation  2. Once better, can do noninvasive work up as he is not a candidate for sedation  3. PPI bid  4. Call GI prn

## 2018-03-14 NOTE — PROGRESS NOTE ADULT - ASSESSMENT
S/p acute on chronic hypercarbic respiratory failure  Improved pCO2 likely to normal levels c/w chronic, compensated hypercarbia  COPD  Hypoxia - on home O2  Obese  Former smoker  No leukocytosis  Anemia  CRI    Plan:    Nocturnal and prn BiPAP  Supplemental O2  Drug nebs  IV steroids  Abx - add Z-camacho  Weight loss  Follow H/H  Follow renal function  OOB

## 2018-03-14 NOTE — PROGRESS NOTE ADULT - SUBJECTIVE AND OBJECTIVE BOX
DAVID VINCENT    [id number]    83y      Male    INTERVAL HPI/OVERNIGHT EVENTS:    patient being seen for uti, co2 narcosis and COPD. Patient seen at bedside and is much more awake and alert.       REVIEW OF SYSTEMS:    CONSTITUTIONAL: No fever, weight loss, or fatigue  RESPIRATORY: No cough, wheezing, hemoptysis; No shortness of breath  CARDIOVASCULAR: No chest pain, palpitations  GASTROINTESTINAL: No abdominal or epigastric pain. No nausea, vomiting  NEUROLOGICAL: No headaches, memory loss, loss of strength.  MISCELLANEOUS:      Vital Signs Last 24 Hrs  T(C): 36.7 (14 Mar 2018 08:21), Max: 37.2 (14 Mar 2018 00:34)  T(F): 98.1 (14 Mar 2018 08:21), Max: 99 (14 Mar 2018 00:34)  HR: 64 (14 Mar 2018 04:00) (63 - 76)  BP: 120/74 (14 Mar 2018 04:00) (116/65 - 132/87)  BP(mean): 88 (14 Mar 2018 04:00) (88 - 89)  RR: 16 (14 Mar 2018 04:00) (16 - 22)  SpO2: 99% (14 Mar 2018 04:00) (94% - 100%)    PHYSICAL EXAM:    GENERAL: NAD, awake   HEENT: PERRL, +EOMI  NECK: soft, Supple, No JVD,   CHEST/LUNG: diminished but improved  HEART: iregular,   ABDOMEN: Soft, Nontender, Nondistended  EXTREMITIES:  edema  SKIN: No rashes or lesions  NEURO: AAOX3, no focal deficits,   PSYCH: normal mood      LABS:                        7.7    4.9   )-----------( x        ( 14 Mar 2018 06:50 )             26.3     03-14    146<H>  |  97<L>  |  23.0<H>  ----------------------------<  81  3.9   |  40.0<H>  |  1.36<H>    Ca    9.2      14 Mar 2018 06:50  Mg     1.6     03-14      PT/INR - ( 14 Mar 2018 06:50 )   PT: 15.2 sec;   INR: 1.37 ratio         PTT - ( 13 Mar 2018 12:11 )  PTT:33.6 sec  Urinalysis Basic - ( 13 Mar 2018 11:34 )    Color: Yellow / Appearance: Clear / S.010 / pH: x  Gluc: x / Ketone: Negative  / Bili: Negative / Urobili: Negative mg/dL   Blood: x / Protein: Negative mg/dL / Nitrite: Negative   Leuk Esterase: Moderate / RBC: x / WBC 30-35 /HPF   Sq Epi: x / Non Sq Epi: Few / Bacteria: Moderate          MEDICATIONS  (STANDING):  amiodarone    Tablet 200 milliGRAM(s) Oral daily  atorvastatin 10 milliGRAM(s) Oral at bedtime  cefTRIAXone   IVPB 1 Gram(s) IV Intermittent every 24 hours  cyanocobalamin 1000 MICROGram(s) Oral daily  dextrose 5%. 1000 milliLiter(s) (50 mL/Hr) IV Continuous <Continuous>  dextrose 50% Injectable 12.5 Gram(s) IV Push once  dextrose 50% Injectable 25 Gram(s) IV Push once  dextrose 50% Injectable 25 Gram(s) IV Push once  folic acid 1 milliGRAM(s) Oral daily  insulin glargine Injectable (LANTUS) 10 Unit(s) SubCutaneous at bedtime  insulin lispro (HumaLOG) corrective regimen sliding scale   SubCutaneous three times a day before meals  iron sucrose IVPB 100 milliGRAM(s) IV Intermittent every 24 hours  pantoprazole  Injectable 40 milliGRAM(s) IV Push every 12 hours  saccharomyces boulardii 250 milliGRAM(s) Oral two times a day    MEDICATIONS  (PRN):  ALBUTerol/ipratropium for Nebulization 3 milliLiter(s) Nebulizer every 6 hours PRN Bronchospasm  dextrose Gel 1 Dose(s) Oral once PRN Blood Glucose LESS THAN 70 milliGRAM(s)/deciliter  glucagon  Injectable 1 milliGRAM(s) IntraMuscular once PRN Glucose LESS THAN 70 milligrams/deciliter  oxyCODONE    IR 5 milliGRAM(s) Oral every 6 hours PRN Moderate Pain (4 - 6)      RADIOLOGY & ADDITIONAL TESTS:

## 2018-03-14 NOTE — PROGRESS NOTE ADULT - ASSESSMENT
1) Acute on chronic blood loss anemia -->s/p 2 units prbcs  --> will order 1 more unit  -->GI following    2) Acute toxic metabolic encephalopathy --> uti vs co2 narcosis  --> agree with iv abx  --> resolved  --> continue with bipap at [facility]     3) uti --> iv ceft  --> florastor    4) acute on chornic renal failure --> bmp improved  --> hold nephrotoxic agents    5) dm2 --> ssi  --> lantus qhs    6) afib --> cardio following  --> hold off on AC  --> home amiodarone    7) COPd--> will consult pulmonary     7) diet --. npo until he wakes up more    8_ thrombocytopenia --> hold all AC  scds

## 2018-03-14 NOTE — PROGRESS NOTE ADULT - SUBJECTIVE AND OBJECTIVE BOX
PULMONARY CONSULT NOTE      DAVID VINCENT  MRN-[id number]    Patient is a 83y old  Male who presents with a chief complaint of SOB/respiratory failure    HISTORY OF PRESENT ILLNESS:    84 y/o male with history of COPD on home 02 3 lpm continuous, chronic blood loss with anemia, ckd stage 4, DM-2, HTN, HLD, Chronic LE edema, Afib on Xarelto presents from home with neck pain for a couple days for which he took motrin.  Patient is a poor historian and much of the history was taken from chart though he does admit to extensive smoking hx of multiple ppd for 40 years though he quit in .  Patient has been having 10 out of neck pain at home and was taking motrin every few hours. Patient also had hypoxia at home and had to increase his home o2 to 4l. He was found to have hypercarbia and decreased MS thought to be due to elevated CO2 so was started on BiPAP support. He is now off of BiPAP and is comfortable. He is awake and alert. Pt with AVR per prior echo.      MEDICATIONS  (STANDING):  amiodarone    Tablet 200 milliGRAM(s) Oral daily  atorvastatin 10 milliGRAM(s) Oral at bedtime  cefTRIAXone   IVPB 1 Gram(s) IV Intermittent every 24 hours  cyanocobalamin 1000 MICROGram(s) Oral daily  dextrose 5%. 1000 milliLiter(s) (50 mL/Hr) IV Continuous <Continuous>  dextrose 50% Injectable 12.5 Gram(s) IV Push once  dextrose 50% Injectable 25 Gram(s) IV Push once  dextrose 50% Injectable 25 Gram(s) IV Push once  folic acid 1 milliGRAM(s) Oral daily  furosemide    Tablet 40 milliGRAM(s) Oral two times a day  insulin glargine Injectable (LANTUS) 10 Unit(s) SubCutaneous at bedtime  insulin lispro (HumaLOG) corrective regimen sliding scale   SubCutaneous three times a day before meals  iron sucrose IVPB 100 milliGRAM(s) IV Intermittent every 24 hours  magnesium sulfate  IVPB 2 Gram(s) IV Intermittent once  pantoprazole  Injectable 40 milliGRAM(s) IV Push every 12 hours  saccharomyces boulardii 250 milliGRAM(s) Oral two times a day      MEDICATIONS  (PRN):  ALBUTerol/ipratropium for Nebulization 3 milliLiter(s) Nebulizer every 6 hours PRN Bronchospasm  dextrose Gel 1 Dose(s) Oral once PRN Blood Glucose LESS THAN 70 milliGRAM(s)/deciliter  glucagon  Injectable 1 milliGRAM(s) IntraMuscular once PRN Glucose LESS THAN 70 milligrams/deciliter  oxyCODONE    IR 5 milliGRAM(s) Oral every 6 hours PRN Moderate Pain (4 - 6)      Allergies    No Known Allergies    Intolerances        PAST MEDICAL & SURGICAL HISTORY:  COPD (chronic obstructive pulmonary disease)  Hyperlipidemia, unspecified hyperlipidemia type  Essential hypertension  Afib  Chronic systolic CHF (congestive heart failure)  Hypertrophic cardiomyopathy  CKD (chronic kidney disease), stage 4 (severe)  Gassiness  Diabetes  Asthma  Prostate cancer  Hypertension  No significant past surgical history  History of valvuloplasty  History of knee surgery      FAMILY HISTORY:  No pertinent family history in first degree relatives      SOCIAL HISTORY  Smoking History: as above    REVIEW OF SYSTEMS:    CONSTITUTIONAL:  No fevers, chills, sweats    HEENT:  Eyes:  No diplopia or blurred vision. ENT:  No earache, sore throat or runny nose.    CARDIOVASCULAR:  No pressure, squeezing, tightness, or heaviness about the chest; no palpitations.    RESPIRATORY:  Per HPI    GASTROINTESTINAL:  No abdominal pain, nausea, vomiting or diarrhea.    GENITOURINARY:  No dysuria, frequency or urgency.    NEUROLOGIC:  No paresthesias, fasciculations, seizures or weakness.    PSYCHIATRIC:  No disorder of thought or mood.    Vital Signs Last 24 Hrs  T(C): 36.7 (14 Mar 2018 08:21), Max: 37.2 (14 Mar 2018 00:34)  T(F): 98.1 (14 Mar 2018 08:21), Max: 99 (14 Mar 2018 00:34)  HR: 61 (14 Mar 2018 08:21) (61 - 76)  BP: 127/74 (14 Mar 2018 08:21) (116/65 - 132/87)  BP(mean): 88 (14 Mar 2018 04:00) (88 - 89)  RR: 16 (14 Mar 2018 08:21) (16 - 18)  SpO2: 98% (14 Mar 2018 08:21) (94% - 100%)    PHYSICAL EXAMINATION:    GENERAL: The patient is a well-developed, well-nourished obese male in no apparent distress.     HEENT: Head is normocephalic and atraumatic. Extraocular muscles are intact. Mucous membranes are moist.     NECK: Supple.     LUNGS: Clear to auscultation without wheezing, rales, or rhonchi. Respirations unlabored    HEART: Regular rate and rhythm with ? murmur.    ABDOMEN: Soft, nontender, and nondistended.  No hepatosplenomegaly is noted.    EXTREMITIES: Without any cyanosis, clubbing, rash, lesions or edema.    NEUROLOGIC: Grossly intact.      LABS:                        7.7    4.9   )-----------( x        ( 14 Mar 2018 06:50 )             26.3     03-14    146<H>  |  97<L>  |  23.0<H>  ----------------------------<  81  3.9   |  40.0<H>  |  1.36<H>    Ca    9.2      14 Mar 2018 06:50  Mg     1.6     03-14      PT/INR - ( 14 Mar 2018 06:50 )   PT: 15.2 sec;   INR: 1.37 ratio         PTT - ( 13 Mar 2018 12:11 )  PTT:33.6 sec  Urinalysis Basic - ( 13 Mar 2018 11:34 )    Color: Yellow / Appearance: Clear / S.010 / pH: x  Gluc: x / Ketone: Negative  / Bili: Negative / Urobili: Negative mg/dL   Blood: x / Protein: Negative mg/dL / Nitrite: Negative   Leuk Esterase: Moderate / RBC: x / WBC 30-35 /HPF   Sq Epi: x / Non Sq Epi: Few / Bacteria: Moderate      ABG - ( 14 Mar 2018 04:50 )  pH: 7.42  /  pCO2: 65    /  pO2: 79    / HCO3: 40    / Base Excess: 16.1  /  SaO2: 98          Serum Pro-Brain Natriuretic Peptide: 1061 pg/mL (18 @ 06:52)        RADIOLOGY & ADDITIONAL STUDIES:     EXAM:  XR CHEST AP OR PA 1V                          PROCEDURE DATE:  2018          INTERPRETATION:  Portable chest radiograph        CLINICAL INFORMATION:   Short of breath.    TECHNIQUE:  Portable  AP view of the chest was obtained.    COMPARISON: 2017 chest available for review.    FINDINGS:   The lungs  are clear.  No pleural abnormality is seen.         The  heart is enlarged in transverse diameter. No hilar mass. Trachea   midline.  Status post median sternotomy.   Visualized osseous structures are intact.        IMPRESSION:   No evidence of active chest disease.   No significant   interval change. Cardiomegaly.        KIMI GUZMÁN M.D., ATTENDING RADIOLOGIST  This document has been electronically signed. Mar 13 2018 11:55AM

## 2018-03-15 ENCOUNTER — APPOINTMENT (OUTPATIENT)
Dept: GASTROENTEROLOGY | Facility: CLINIC | Age: 83
End: 2018-03-15

## 2018-03-15 LAB
ANION GAP SERPL CALC-SCNC: 10 MMOL/L — SIGNIFICANT CHANGE UP (ref 5–17)
BUN SERPL-MCNC: 24 MG/DL — HIGH (ref 8–20)
CALCIUM SERPL-MCNC: 9.5 MG/DL — SIGNIFICANT CHANGE UP (ref 8.6–10.2)
CHLORIDE SERPL-SCNC: 95 MMOL/L — LOW (ref 98–107)
CO2 SERPL-SCNC: 38 MMOL/L — HIGH (ref 22–29)
CREAT SERPL-MCNC: 1.18 MG/DL — SIGNIFICANT CHANGE UP (ref 0.5–1.3)
CULTURE RESULTS: NO GROWTH — SIGNIFICANT CHANGE UP
GLUCOSE BLDC GLUCOMTR-MCNC: 151 MG/DL — HIGH (ref 70–99)
GLUCOSE BLDC GLUCOMTR-MCNC: 155 MG/DL — HIGH (ref 70–99)
GLUCOSE BLDC GLUCOMTR-MCNC: 197 MG/DL — HIGH (ref 70–99)
GLUCOSE BLDC GLUCOMTR-MCNC: 232 MG/DL — HIGH (ref 70–99)
GLUCOSE SERPL-MCNC: 145 MG/DL — HIGH (ref 70–115)
HCT VFR BLD CALC: 29.3 % — LOW (ref 42–52)
HCT VFR BLD CALC: 29.8 % — LOW (ref 42–52)
HGB BLD-MCNC: 8.8 G/DL — LOW (ref 14–18)
HGB BLD-MCNC: 8.9 G/DL — LOW (ref 14–18)
MAGNESIUM SERPL-MCNC: 1.9 MG/DL — SIGNIFICANT CHANGE UP (ref 1.6–2.6)
MCHC RBC-ENTMCNC: 23.3 PG — LOW (ref 27–31)
MCHC RBC-ENTMCNC: 30 G/DL — LOW (ref 32–36)
MCV RBC AUTO: 77.7 FL — LOW (ref 80–94)
OB PNL STL: POSITIVE
PLATELET # BLD AUTO: 177 K/UL — SIGNIFICANT CHANGE UP (ref 150–400)
POTASSIUM SERPL-MCNC: 4.1 MMOL/L — SIGNIFICANT CHANGE UP (ref 3.5–5.3)
POTASSIUM SERPL-SCNC: 4.1 MMOL/L — SIGNIFICANT CHANGE UP (ref 3.5–5.3)
RBC # BLD: 3.77 M/UL — LOW (ref 4.6–6.2)
RBC # FLD: 17.2 % — HIGH (ref 11–15.6)
SODIUM SERPL-SCNC: 143 MMOL/L — SIGNIFICANT CHANGE UP (ref 135–145)
SPECIMEN SOURCE: SIGNIFICANT CHANGE UP
WBC # BLD: 4.3 K/UL — LOW (ref 4.8–10.8)
WBC # FLD AUTO: 4.3 K/UL — LOW (ref 4.8–10.8)

## 2018-03-15 PROCEDURE — 99232 SBSQ HOSP IP/OBS MODERATE 35: CPT

## 2018-03-15 PROCEDURE — 99233 SBSQ HOSP IP/OBS HIGH 50: CPT

## 2018-03-15 RX ORDER — PANTOPRAZOLE SODIUM 20 MG/1
40 TABLET, DELAYED RELEASE ORAL
Qty: 0 | Refills: 0 | Status: DISCONTINUED | OUTPATIENT
Start: 2018-03-15 | End: 2018-03-16

## 2018-03-15 RX ORDER — CEFTRIAXONE 500 MG/1
1000 INJECTION, POWDER, FOR SOLUTION INTRAMUSCULAR; INTRAVENOUS ONCE
Qty: 0 | Refills: 0 | Status: COMPLETED | OUTPATIENT
Start: 2018-03-15 | End: 2018-03-15

## 2018-03-15 RX ORDER — RIVAROXABAN 15 MG-20MG
15 KIT ORAL EVERY 24 HOURS
Qty: 0 | Refills: 0 | Status: DISCONTINUED | OUTPATIENT
Start: 2018-03-15 | End: 2018-03-16

## 2018-03-15 RX ADMIN — Medication 40 MILLIGRAM(S): at 17:45

## 2018-03-15 RX ADMIN — OXYCODONE HYDROCHLORIDE 5 MILLIGRAM(S): 5 TABLET ORAL at 12:26

## 2018-03-15 RX ADMIN — INSULIN GLARGINE 10 UNIT(S): 100 INJECTION, SOLUTION SUBCUTANEOUS at 21:46

## 2018-03-15 RX ADMIN — Medication 1: at 17:45

## 2018-03-15 RX ADMIN — Medication 1 MILLIGRAM(S): at 12:19

## 2018-03-15 RX ADMIN — PANTOPRAZOLE SODIUM 40 MILLIGRAM(S): 20 TABLET, DELAYED RELEASE ORAL at 17:45

## 2018-03-15 RX ADMIN — Medication 40 MILLIGRAM(S): at 21:38

## 2018-03-15 RX ADMIN — PANTOPRAZOLE SODIUM 40 MILLIGRAM(S): 20 TABLET, DELAYED RELEASE ORAL at 12:23

## 2018-03-15 RX ADMIN — PREGABALIN 1000 MICROGRAM(S): 225 CAPSULE ORAL at 12:19

## 2018-03-15 RX ADMIN — IRON SUCROSE 210 MILLIGRAM(S): 20 INJECTION, SOLUTION INTRAVENOUS at 12:20

## 2018-03-15 RX ADMIN — Medication 250 MILLIGRAM(S): at 06:24

## 2018-03-15 RX ADMIN — RIVAROXABAN 15 MILLIGRAM(S): KIT at 17:45

## 2018-03-15 RX ADMIN — Medication 2: at 12:26

## 2018-03-15 RX ADMIN — Medication 40 MILLIGRAM(S): at 06:24

## 2018-03-15 RX ADMIN — AZITHROMYCIN 250 MILLIGRAM(S): 500 TABLET, FILM COATED ORAL at 17:44

## 2018-03-15 RX ADMIN — ATORVASTATIN CALCIUM 10 MILLIGRAM(S): 80 TABLET, FILM COATED ORAL at 21:38

## 2018-03-15 RX ADMIN — Medication 1: at 08:31

## 2018-03-15 RX ADMIN — AMIODARONE HYDROCHLORIDE 200 MILLIGRAM(S): 400 TABLET ORAL at 06:24

## 2018-03-15 RX ADMIN — Medication 250 MILLIGRAM(S): at 17:45

## 2018-03-15 RX ADMIN — CEFTRIAXONE 1000 MILLIGRAM(S): 500 INJECTION, POWDER, FOR SOLUTION INTRAMUSCULAR; INTRAVENOUS at 12:20

## 2018-03-15 NOTE — PROGRESS NOTE ADULT - ASSESSMENT
S/p acute on chronic hypercarbic respiratory failure  Improved pCO2 likely to normal levels c/w chronic, compensated hypercarbia  COPD  Hypoxia - on home O2  Obese  Former smoker  No leukocytosis  Anemia  CRI  Clinically improved    Plan:    Nocturnal and prn BiPAP  Supplemental O2  Drug nebs  IV steroids - decrease as tolerates  Eventual prednisone taper  Abx - Z-camacho  Weight loss  Follow H/H  Follow renal function  OOB

## 2018-03-15 NOTE — PROGRESS NOTE ADULT - SUBJECTIVE AND OBJECTIVE BOX
DAVID VINCENT  [id number]      Chief Complaint:  Neck Pain/Anemia/UTI/COPD    Interval History:  Patient feeling better, no c/o at this time.  No CP/SOB/palps.    Tele:  SR, no events      ALBUTerol/ipratropium for Nebulization 3 milliLiter(s) Nebulizer every 6 hours PRN  amiodarone    Tablet 200 milliGRAM(s) Oral daily  atorvastatin 10 milliGRAM(s) Oral at bedtime  azithromycin   Tablet      azithromycin   Tablet 250 milliGRAM(s) Oral daily  cyanocobalamin 1000 MICROGram(s) Oral daily  dextrose 5%. 1000 milliLiter(s) IV Continuous <Continuous>  dextrose 50% Injectable 12.5 Gram(s) IV Push once  dextrose 50% Injectable 25 Gram(s) IV Push once  dextrose 50% Injectable 25 Gram(s) IV Push once  dextrose Gel 1 Dose(s) Oral once PRN  folic acid 1 milliGRAM(s) Oral daily  furosemide    Tablet 40 milliGRAM(s) Oral two times a day  glucagon  Injectable 1 milliGRAM(s) IntraMuscular once PRN  insulin glargine Injectable (LANTUS) 10 Unit(s) SubCutaneous at bedtime  insulin lispro (HumaLOG) corrective regimen sliding scale   SubCutaneous three times a day before meals  iron sucrose IVPB 100 milliGRAM(s) IV Intermittent every 24 hours  methylPREDNISolone sodium succinate Injectable 40 milliGRAM(s) IV Push every 8 hours  oxyCODONE    IR 5 milliGRAM(s) Oral every 6 hours PRN  pantoprazole    Tablet 40 milliGRAM(s) Oral two times a day  rivaroxaban 15 milliGRAM(s) Oral every 24 hours  saccharomyces boulardii 250 milliGRAM(s) Oral two times a day          Physical Exam:  T(C): 36.5 (03-15-18 @ 07:47), Max: 37.5 (03-14-18 @ 20:05)  HR: 81 (03-15-18 @ 07:47) (64 - 81)  BP: 150/80 (03-15-18 @ 07:47) (138/83 - 153/82)  RR: 18 (03-15-18 @ 07:47) (17 - 18)  SpO2: 99% (03-15-18 @ 07:47) (95% - 100%)  Wt(kg): --  General: Comfortable in NAD  Neck: ?JVD  CVS: nl s1s2, no s3  Pulm: CTA b/l  Abd: soft, non-tender  Ext: 1+ b/l LE edema  Neuro A&O x3  Psych: Normal affect      Labs:   15 Mar 2018 07:27    143    |  95     |  24.0   ----------------------------<  145    4.1     |  38.0   |  1.18     Ca    9.5        15 Mar 2018 07:27  Mg     1.9       15 Mar 2018 07:27                            8.8    4.3   )-----------( 177      ( 15 Mar 2018 07:27 )             29.3     PT/INR - ( 14 Mar 2018 06:50 )   PT: 15.2 sec;   INR: 1.37 ratio         Assessment:  Patient is a 83yoM with a PMH of CAD s/p CABG, Bio AVR, persistent AF, COPD, KRYSTIN, GIB presenting with neck pain and found to be severely anemic and with UTI. NEck pain likely musculoskeletal. No signs of decompensated CHF at this time and no CP. SR on monitor.  H/H stable, no GI w/u planned now.      Plan:  1. Continue current CV meds at current doses  2. If needs EGD/colo, CV stable at moderate risk, not planned now.  3. Restart Xarelto today as H/H stable and no GI w/u planned.  4. No further cardiac work up at this time.  5. Abx  6. F/u pulm for COPD  7. Will sign off.  Please call with questions.  Thanks!

## 2018-03-15 NOTE — PROGRESS NOTE ADULT - SUBJECTIVE AND OBJECTIVE BOX
PULMONARY PROGRESS NOTE      DAVID VINCENT  MRN-[id number]    Patient is a 83y old  Male who presents with a chief complaint of SOB    INTERVAL HPI/OVERNIGHT EVENTS:    Patient is awake and alert  Sitting in chair  Feels better    MEDICATIONS  (STANDING):  amiodarone    Tablet 200 milliGRAM(s) Oral daily  atorvastatin 10 milliGRAM(s) Oral at bedtime  azithromycin   Tablet      azithromycin   Tablet 250 milliGRAM(s) Oral daily  cyanocobalamin 1000 MICROGram(s) Oral daily  dextrose 5%. 1000 milliLiter(s) (50 mL/Hr) IV Continuous <Continuous>  dextrose 50% Injectable 12.5 Gram(s) IV Push once  dextrose 50% Injectable 25 Gram(s) IV Push once  dextrose 50% Injectable 25 Gram(s) IV Push once  folic acid 1 milliGRAM(s) Oral daily  furosemide    Tablet 40 milliGRAM(s) Oral two times a day  insulin glargine Injectable (LANTUS) 10 Unit(s) SubCutaneous at bedtime  insulin lispro (HumaLOG) corrective regimen sliding scale   SubCutaneous three times a day before meals  iron sucrose IVPB 100 milliGRAM(s) IV Intermittent every 24 hours  methylPREDNISolone sodium succinate Injectable 40 milliGRAM(s) IV Push every 8 hours  pantoprazole    Tablet 40 milliGRAM(s) Oral two times a day  rivaroxaban 15 milliGRAM(s) Oral every 24 hours  saccharomyces boulardii 250 milliGRAM(s) Oral two times a day      MEDICATIONS  (PRN):  ALBUTerol/ipratropium for Nebulization 3 milliLiter(s) Nebulizer every 6 hours PRN Bronchospasm  dextrose Gel 1 Dose(s) Oral once PRN Blood Glucose LESS THAN 70 milliGRAM(s)/deciliter  glucagon  Injectable 1 milliGRAM(s) IntraMuscular once PRN Glucose LESS THAN 70 milligrams/deciliter  oxyCODONE    IR 5 milliGRAM(s) Oral every 6 hours PRN Moderate Pain (4 - 6)      Allergies    No Known Allergies    Intolerances        PAST MEDICAL & SURGICAL HISTORY:  COPD (chronic obstructive pulmonary disease)  Hyperlipidemia, unspecified hyperlipidemia type  Essential hypertension  Afib  Chronic systolic CHF (congestive heart failure)  Hypertrophic cardiomyopathy  CKD (chronic kidney disease), stage 4 (severe)  Gassiness  Diabetes  Asthma  Prostate cancer  Hypertension  No significant past surgical history  History of valvuloplasty  History of knee surgery        REVIEW OF SYSTEMS:    CONSTITUTIONAL:  No distress    HEENT:  Eyes:  No diplopia or blurred vision. ENT:  No earache, sore throat or runny nose.    CARDIOVASCULAR:  No pressure, squeezing, tightness, heaviness or aching about the chest; no palpitations.    RESPIRATORY:  Improved cough, shortness of breath, no PND or orthopnea. + SOBOE    GASTROINTESTINAL:  No nausea, vomiting or diarrhea.    GENITOURINARY:  No dysuria, frequency or urgency.    NEUROLOGIC:  No paresthesias, fasciculations, seizures or weakness.    PSYCHIATRIC:  No disorder of thought or mood.    Vital Signs Last 24 Hrs  T(C): 36.5 (15 Mar 2018 07:47), Max: 37.5 (14 Mar 2018 20:05)  T(F): 97.7 (15 Mar 2018 07:47), Max: 99.5 (14 Mar 2018 20:05)  HR: 81 (15 Mar 2018 07:47) (64 - 81)  BP: 150/80 (15 Mar 2018 07:47) (138/83 - 153/82)  BP(mean): 106 (14 Mar 2018 20:45) (101 - 106)  RR: 18 (15 Mar 2018 07:47) (17 - 18)  SpO2: 99% (15 Mar 2018 07:47) (95% - 100%)    PHYSICAL EXAMINATION:    GENERAL: The patient is awake and alert in no apparent distress. Obese.    HEENT: Head is normocephalic and atraumatic. Extraocular muscles are intact. Mucous membranes are moist.    NECK: Supple.    LUNGS: Clear to auscultation without wheezing, rales or rhonchi; respirations unlabored    HEART: Regular rate and rhythm without murmur.    ABDOMEN: Soft, nontender, and nondistended.      EXTREMITIES: Without any cyanosis, clubbing, rash, lesions or edema.    NEUROLOGIC: Grossly intact.    LABS:                        8.8    4.3   )-----------( 177      ( 15 Mar 2018 07:27 )             29.3     03-15    143  |  95<L>  |  24.0<H>  ----------------------------<  145<H>  4.1   |  38.0<H>  |  1.18    Ca    9.5      15 Mar 2018 07:27  Mg     1.9     03-15      PT/INR - ( 14 Mar 2018 06:50 )   PT: 15.2 sec;   INR: 1.37 ratio             ABG - ( 14 Mar 2018 04:50 )  pH: 7.42  /  pCO2: 65    /  pO2: 79    / HCO3: 40    / Base Excess: 16.1  /  SaO2: 98          Serum Pro-Brain Natriuretic Peptide: 1061 pg/mL (03-14-18 @ 06:52)      MICROBIOLOGY:    Culture - Urine (03.14.18 @ 14:31)    Specimen Source: .Urine Clean Catch (Midstream)    Culture Results:   No growth      RADIOLOGY & ADDITIONAL STUDIES:       EXAM:  XR CHEST AP OR PA 1V                          PROCEDURE DATE:  03/13/2018          INTERPRETATION:  Portable chest radiograph        CLINICAL INFORMATION:   Short of breath.    TECHNIQUE:  Portable  AP view of the chest was obtained.    COMPARISON: 12/24/2017 chest available for review.    FINDINGS:   The lungs  are clear.  No pleural abnormality is seen.         The  heart is enlarged in transverse diameter. No hilar mass. Trachea   midline.  Status post median sternotomy.   Visualized osseous structures are intact.        IMPRESSION:   No evidence of active chest disease.   No significant   interval change. Cardiomegaly.      KIMI GUZMÁN M.D., ATTENDING RADIOLOGIST  This document has been electronically signed. Mar 13 2018 11:55AM

## 2018-03-15 NOTE — PROGRESS NOTE ADULT - ASSESSMENT
84 y/o male with history of COPD on home 02 3 liters cont, chronic blood loss anemia, ckd stage 4, DM-2, HTN, HLD, Chronic LE edema, Afib on Xarelto presents from home with neck pain for a couple days for which he took Motrin. Patient is a poor historian and much of the history was taken from chart and er physician. Patient had been having 10 out of 10 neck pain at home and was taking Motrin every few hours. Patient also had hypoxia at home and had to increase his home o2 to 4l. Patient was at [facility] in 12/2017 for possible GI bleed and was told to follow up with GI but it is unclear if he did so.  ABG ordered with show elevated pco2. Started on Ceftriaxone for UTI.  Hgb 6.5 on admission.    1) Acute on chronic blood loss anemia -->s/p 3 units prbcs total  --> Fecal occult pending.  -->GI following - Recommended noninvasive work up when improves, as he is not a candidate for sedation    2) Acute toxic metabolic encephalopathy --> Improving, uti vs co2 narcosis  --> agree with iv abx - Will complete Ceftriaxone today.  --> continue with bipap at night  - Per RN, patient has been refusing.    3) uti --> Started on iv ceftriaxone D3, will discontinue after today's dose.  --> Urine culture with no growth.  --> Follow up blood cultures.  --> florastor    4) acute on chronic renal failure --> bmp improved  --> hold nephrotoxic agents    5) dm2 --> ssi  --> lantus qhs    6) afib --> cardio following  --> hold off on AC  --> home amiodarone    7) COPD--> pulmonary consult appreciated.  -->IV steroids - will     7) diet --. npo until he wakes up more    8_ thrombocytopenia --> hold all AC  scds 82 y/o male with history of COPD on home 02 3 liters cont, chronic blood loss anemia, ckd stage 4, DM-2, HTN, HLD, Chronic LE edema, Afib on Xarelto presents from home with neck pain for a couple days for which he took Motrin. Patient is a poor historian and much of the history was taken from chart and er physician. Patient had been having 10 out of 10 neck pain at home and was taking Motrin every few hours. Patient also had hypoxia at home and had to increase his home o2 to 4l. Patient was at [facility] in 12/2017 for possible GI bleed and was told to follow up with GI but it is unclear if he did so.  ABG ordered with show elevated pco2. Started on Ceftriaxone for UTI.  Hgb 6.5 on admission.    1) Acute on chronic blood loss anemia -->s/p 3 units prbcs total  --> Fecal occult pending.  -->GI following - Recommended noninvasive work up when improves, as he is not a candidate for sedation    2) Acute toxic metabolic encephalopathy --> Improving, uti vs co2 narcosis  --> agree with iv abx - Will complete Ceftriaxone today.  --> continue with bipap at night  - Per RN, patient has been refusing.    3) uti --> Started on iv ceftriaxone D3, will discontinue after today's dose.  --> Urine culture with no growth.  --> Follow up blood cultures.  --> florastor    4) acute on chronic renal failure --> bmp improved  --> hold nephrotoxic agents    5) dm2 --> ssi  --> lantus qhs    6) afib --> cardio following  --> Will restart Xarelto today.  --> home amiodarone    7) COPD--> pulmonary consult appreciated.  -->IV steroids - will decrease to q8 today.  -->Complete Z-pack.  -->Duonebs.     7) diet -- tolerating PO.    8_ thrombocytopenia --> Improved, will restart Xarelto today.  Monitor CBC.  scds

## 2018-03-15 NOTE — PROGRESS NOTE ADULT - SUBJECTIVE AND OBJECTIVE BOX
CC: Neck pain    HPI:  82 y/o male with history of COPD on home 02 3 liters cont, chronic blood loss anemia, ckd stage 4, DM-2, HTN, HLD, Chronic LE edema, Afib on Xarelto presents from home with neck pain for a couple days for which he took Motrin. Patient is a poor historian and much of the history was taken from chart and er physician. Patient had been having 10 out of 10 neck pain at home and was taking Motrin every few hours. Patient also had hypoxia at home and had to increase his home o2 to 4l. Patient was at [facility] in 2017 for possible GI bleed and was told to follow up with GI but it is unclear if he did so.  ABG ordered with show elevated pco2. Started on Ceftriaxone for UTI.      INTERVAL HPI/OVERNIGHT EVENTS: Patient seen and examined lying in bed.  Patient reports feeling better.  Patient states that he is using the bipap at night, but Rn states that patient was refusing Bipap at night.  Patient denies any headache, dizziness, SOB, CP, abdominal pain, nausea, vomiting, dysuria.  Other ROS reviewed and are negative.    Vital Signs Last 24 Hrs  T(C): 36.5 (15 Mar 2018 07:47), Max: 37.5 (14 Mar 2018 20:05)  T(F): 97.7 (15 Mar 2018 07:47), Max: 99.5 (14 Mar 2018 20:05)  HR: 81 (15 Mar 2018 07:47) (64 - 81)  BP: 150/80 (15 Mar 2018 07:47) (138/83 - 153/82)  BP(mean): 106 (14 Mar 2018 20:45) (87 - 106)  RR: 18 (15 Mar 2018 07:47) (17 - 18)  SpO2: 99% (15 Mar 2018 07:47) (95% - 100%)  I&O's Detail    14 Mar 2018 07:01  -  15 Mar 2018 07:00  --------------------------------------------------------  IN:    IV PiggyBack: 50 mL  Total IN: 50 mL    OUT:    Voided: 925 mL  Total OUT: 925 mL    Total NET: -875 mL      PHYSICAL EXAM:  GENERAL: NAD  HEAD:  Atraumatic, Normocephalic  NECK: Supple, No JVD, Normal thyroid  NERVOUS SYSTEM:  Alert & Oriented X3, Good concentration; Motor Strength 5/5 B/L upper and lower extremities  CHEST/LUNG: Clear to auscultation bilaterally; No rales, rhonchi, wheezing, or rubs  HEART: Regular rate and rhythm; No murmurs, rubs, or gallops  ABDOMEN: Soft, Nontender, obese; Bowel sounds present  EXTREMITIES:  2+ Peripheral Pulses, No clubbing, cyanosis, or edema                                8.8    4.3   )-----------( 177      ( 15 Mar 2018 07:27 )             29.3     15 Mar 2018 07:27    143    |  95     |  24.0   ----------------------------<  145    4.1     |  38.0   |  1.18     Ca    9.5        15 Mar 2018 07:27  Mg     1.9       15 Mar 2018 07:27      PT/INR - ( 14 Mar 2018 06:50 )   PT: 15.2 sec;   INR: 1.37 ratio         PTT - ( 13 Mar 2018 12:11 )  PTT:33.6 sec    CAPILLARY BLOOD GLUCOSE  POCT Blood Glucose.: 151 mg/dL (15 Mar 2018 08:30)  POCT Blood Glucose.: 147 mg/dL (14 Mar 2018 23:24)  POCT Blood Glucose.: 126 mg/dL (14 Mar 2018 16:25)  POCT Blood Glucose.: 95 mg/dL (14 Mar 2018 11:19)      Urinalysis Basic - ( 13 Mar 2018 11:34 )    Color: Yellow / Appearance: Clear / S.010 / pH: x  Gluc: x / Ketone: Negative  / Bili: Negative / Urobili: Negative mg/dL   Blood: x / Protein: Negative mg/dL / Nitrite: Negative   Leuk Esterase: Moderate / RBC: x / WBC 30-35 /HPF   Sq Epi: x / Non Sq Epi: Few / Bacteria: Moderate    Culture - Urine (18 @ 14:31)    Specimen Source: .Urine Clean Catch (Midstream)    Culture Results:   No growth        MEDICATIONS  (STANDING):  amiodarone    Tablet 200 milliGRAM(s) Oral daily  atorvastatin 10 milliGRAM(s) Oral at bedtime  azithromycin   Tablet      azithromycin   Tablet 250 milliGRAM(s) Oral daily  cefTRIAXone   IVPB 1 Gram(s) IV Intermittent every 24 hours  cyanocobalamin 1000 MICROGram(s) Oral daily  dextrose 5%. 1000 milliLiter(s) (50 mL/Hr) IV Continuous <Continuous>  dextrose 50% Injectable 12.5 Gram(s) IV Push once  dextrose 50% Injectable 25 Gram(s) IV Push once  dextrose 50% Injectable 25 Gram(s) IV Push once  folic acid 1 milliGRAM(s) Oral daily  furosemide    Tablet 40 milliGRAM(s) Oral two times a day  insulin glargine Injectable (LANTUS) 10 Unit(s) SubCutaneous at bedtime  insulin lispro (HumaLOG) corrective regimen sliding scale   SubCutaneous three times a day before meals  iron sucrose IVPB 100 milliGRAM(s) IV Intermittent every 24 hours  methylPREDNISolone sodium succinate Injectable 40 milliGRAM(s) IV Push every 6 hours  pantoprazole  Injectable 40 milliGRAM(s) IV Push every 12 hours  saccharomyces boulardii 250 milliGRAM(s) Oral two times a day    MEDICATIONS  (PRN):  ALBUTerol/ipratropium for Nebulization 3 milliLiter(s) Nebulizer every 6 hours PRN Bronchospasm  dextrose Gel 1 Dose(s) Oral once PRN Blood Glucose LESS THAN 70 milliGRAM(s)/deciliter  glucagon  Injectable 1 milliGRAM(s) IntraMuscular once PRN Glucose LESS THAN 70 milligrams/deciliter  oxyCODONE    IR 5 milliGRAM(s) Oral every 6 hours PRN Moderate Pain (4 - 6)      RADIOLOGY & ADDITIONAL TESTS:  < from: Xray Chest 1 View AP/PA. (18 @ 10:59) >     EXAM:  XR CHEST AP OR PA 1V                          PROCEDURE DATE:  2018          INTERPRETATION:  Portable chest radiograph        CLINICAL INFORMATION:   Short of breath.    TECHNIQUE:  Portable  AP view of the chest was obtained.    COMPARISON: 2017 chest available for review.    FINDINGS:   The lungs  are clear.  No pleural abnormality is seen.         The  heart is enlarged in transverse diameter. No hilar mass. Trachea   midline.  Status post median sternotomy.   Visualizedosseous structures are intact.        IMPRESSION:   No evidence of active chest disease.   No significant   interval change. Cardiomegaly.                      KIMI GUZMÁN M.D., ATTENDING RADIOLOGIST  This document has been electronically signed. Mar 13 2018 11:55AM                  < end of copied text > CC: Neck pain    HPI:  82 y/o male with history of COPD on home 02 3 liters cont, chronic blood loss anemia, ckd stage 4, DM-2, HTN, HLD, Chronic LE edema, Afib on Xarelto presents from home with neck pain for a couple days for which he took Motrin. Patient is a poor historian and much of the history was taken from chart and er physician. Patient had been having 10 out of 10 neck pain at home and was taking Motrin every few hours. Patient also had hypoxia at home and had to increase his home o2 to 4l. Patient was at [facility] in 2017 for possible GI bleed and was told to follow up with GI but it is unclear if he did so.  ABG ordered with show elevated pco2. Started on Ceftriaxone for UTI.      INTERVAL HPI/OVERNIGHT EVENTS: Patient seen and examined lying in bed.  Patient reports feeling better.  Patient states that he is using the bipap at night, but Rn states that patient was refusing Bipap at night.  Patient denies any headache, dizziness, SOB, CP, abdominal pain, nausea, vomiting, dysuria.  Other ROS reviewed and are negative. (+) BM this am with no visible blood as per staff.    Vital Signs Last 24 Hrs  T(C): 36.5 (15 Mar 2018 07:47), Max: 37.5 (14 Mar 2018 20:05)  T(F): 97.7 (15 Mar 2018 07:47), Max: 99.5 (14 Mar 2018 20:05)  HR: 81 (15 Mar 2018 07:47) (64 - 81)  BP: 150/80 (15 Mar 2018 07:47) (138/83 - 153/82)  BP(mean): 106 (14 Mar 2018 20:45) (87 - 106)  RR: 18 (15 Mar 2018 07:47) (17 - 18)  SpO2: 99% (15 Mar 2018 07:47) (95% - 100%)  I&O's Detail    14 Mar 2018 07:01  -  15 Mar 2018 07:00  --------------------------------------------------------  IN:    IV PiggyBack: 50 mL  Total IN: 50 mL    OUT:    Voided: 925 mL  Total OUT: 925 mL    Total NET: -875 mL      PHYSICAL EXAM:  GENERAL: NAD  HEAD:  Atraumatic, Normocephalic  NECK: Supple, No JVD, Normal thyroid  NERVOUS SYSTEM:  Alert & Oriented X3, Good concentration; Motor Strength 5/5 B/L upper and lower extremities  CHEST/LUNG: Clear to auscultation bilaterally; No rales, rhonchi, wheezing, or rubs  HEART: Regular rate and rhythm; No murmurs, rubs, or gallops  ABDOMEN: Soft, Nontender, obese; Bowel sounds present  EXTREMITIES:  2+ Peripheral Pulses, No clubbing, cyanosis, or edema                                8.8    4.3   )-----------( 177      ( 15 Mar 2018 07:27 )             29.3     15 Mar 2018 07:27    143    |  95     |  24.0   ----------------------------<  145    4.1     |  38.0   |  1.18     Ca    9.5        15 Mar 2018 07:27  Mg     1.9       15 Mar 2018 07:27      PT/INR - ( 14 Mar 2018 06:50 )   PT: 15.2 sec;   INR: 1.37 ratio         PTT - ( 13 Mar 2018 12:11 )  PTT:33.6 sec    CAPILLARY BLOOD GLUCOSE  POCT Blood Glucose.: 151 mg/dL (15 Mar 2018 08:30)  POCT Blood Glucose.: 147 mg/dL (14 Mar 2018 23:24)  POCT Blood Glucose.: 126 mg/dL (14 Mar 2018 16:25)  POCT Blood Glucose.: 95 mg/dL (14 Mar 2018 11:19)      Urinalysis Basic - ( 13 Mar 2018 11:34 )    Color: Yellow / Appearance: Clear / S.010 / pH: x  Gluc: x / Ketone: Negative  / Bili: Negative / Urobili: Negative mg/dL   Blood: x / Protein: Negative mg/dL / Nitrite: Negative   Leuk Esterase: Moderate / RBC: x / WBC 30-35 /HPF   Sq Epi: x / Non Sq Epi: Few / Bacteria: Moderate    Culture - Urine (18 @ 14:31)    Specimen Source: .Urine Clean Catch (Midstream)    Culture Results:   No growth        MEDICATIONS  (STANDING):  amiodarone    Tablet 200 milliGRAM(s) Oral daily  atorvastatin 10 milliGRAM(s) Oral at bedtime  azithromycin   Tablet      azithromycin   Tablet 250 milliGRAM(s) Oral daily  cefTRIAXone   IVPB 1 Gram(s) IV Intermittent every 24 hours  cyanocobalamin 1000 MICROGram(s) Oral daily  dextrose 5%. 1000 milliLiter(s) (50 mL/Hr) IV Continuous <Continuous>  dextrose 50% Injectable 12.5 Gram(s) IV Push once  dextrose 50% Injectable 25 Gram(s) IV Push once  dextrose 50% Injectable 25 Gram(s) IV Push once  folic acid 1 milliGRAM(s) Oral daily  furosemide    Tablet 40 milliGRAM(s) Oral two times a day  insulin glargine Injectable (LANTUS) 10 Unit(s) SubCutaneous at bedtime  insulin lispro (HumaLOG) corrective regimen sliding scale   SubCutaneous three times a day before meals  iron sucrose IVPB 100 milliGRAM(s) IV Intermittent every 24 hours  methylPREDNISolone sodium succinate Injectable 40 milliGRAM(s) IV Push every 6 hours  pantoprazole  Injectable 40 milliGRAM(s) IV Push every 12 hours  saccharomyces boulardii 250 milliGRAM(s) Oral two times a day    MEDICATIONS  (PRN):  ALBUTerol/ipratropium for Nebulization 3 milliLiter(s) Nebulizer every 6 hours PRN Bronchospasm  dextrose Gel 1 Dose(s) Oral once PRN Blood Glucose LESS THAN 70 milliGRAM(s)/deciliter  glucagon  Injectable 1 milliGRAM(s) IntraMuscular once PRN Glucose LESS THAN 70 milligrams/deciliter  oxyCODONE    IR 5 milliGRAM(s) Oral every 6 hours PRN Moderate Pain (4 - 6)      RADIOLOGY & ADDITIONAL TESTS:  < from: Xray Chest 1 View AP/PA. (18 @ 10:59) >     EXAM:  XR CHEST AP OR PA 1V                          PROCEDURE DATE:  2018          INTERPRETATION:  Portable chest radiograph        CLINICAL INFORMATION:   Short of breath.    TECHNIQUE:  Portable  AP view of the chest was obtained.    COMPARISON: 2017 chest available for review.    FINDINGS:   The lungs  are clear.  No pleural abnormality is seen.         The  heart is enlarged in transverse diameter. No hilar mass. Trachea   midline.  Status post median sternotomy.   Visualizedosseous structures are intact.        IMPRESSION:   No evidence of active chest disease.   No significant   interval change. Cardiomegaly.                      KIMI GUZMÁN M.D., ATTENDING RADIOLOGIST  This document has been electronically signed. Mar 13 2018 11:55AM                  < end of copied text >

## 2018-03-16 ENCOUNTER — TRANSCRIPTION ENCOUNTER (OUTPATIENT)
Age: 83
End: 2018-03-16

## 2018-03-16 VITALS
SYSTOLIC BLOOD PRESSURE: 135 MMHG | DIASTOLIC BLOOD PRESSURE: 70 MMHG | RESPIRATION RATE: 18 BRPM | OXYGEN SATURATION: 99 % | HEART RATE: 65 BPM | TEMPERATURE: 98 F

## 2018-03-16 LAB
ANION GAP SERPL CALC-SCNC: 9 MMOL/L — SIGNIFICANT CHANGE UP (ref 5–17)
BUN SERPL-MCNC: 36 MG/DL — HIGH (ref 8–20)
CALCIUM SERPL-MCNC: 10 MG/DL — SIGNIFICANT CHANGE UP (ref 8.6–10.2)
CHLORIDE SERPL-SCNC: 97 MMOL/L — LOW (ref 98–107)
CO2 SERPL-SCNC: 42 MMOL/L — HIGH (ref 22–29)
CREAT SERPL-MCNC: 1.4 MG/DL — HIGH (ref 0.5–1.3)
GLUCOSE BLDC GLUCOMTR-MCNC: 140 MG/DL — HIGH (ref 70–99)
GLUCOSE BLDC GLUCOMTR-MCNC: 199 MG/DL — HIGH (ref 70–99)
GLUCOSE SERPL-MCNC: 147 MG/DL — HIGH (ref 70–115)
HBA1C BLD-MCNC: 5.5 % — SIGNIFICANT CHANGE UP (ref 4–5.6)
HCT VFR BLD CALC: 29.6 % — LOW (ref 42–52)
HGB BLD-MCNC: 8.8 G/DL — LOW (ref 14–18)
MCHC RBC-ENTMCNC: 23.1 PG — LOW (ref 27–31)
MCHC RBC-ENTMCNC: 29.7 G/DL — LOW (ref 32–36)
MCV RBC AUTO: 77.7 FL — LOW (ref 80–94)
PLATELET # BLD AUTO: 194 K/UL — SIGNIFICANT CHANGE UP (ref 150–400)
POTASSIUM SERPL-MCNC: 4.3 MMOL/L — SIGNIFICANT CHANGE UP (ref 3.5–5.3)
POTASSIUM SERPL-SCNC: 4.3 MMOL/L — SIGNIFICANT CHANGE UP (ref 3.5–5.3)
RBC # BLD: 3.81 M/UL — LOW (ref 4.6–6.2)
RBC # FLD: 17.7 % — HIGH (ref 11–15.6)
SODIUM SERPL-SCNC: 148 MMOL/L — HIGH (ref 135–145)
WBC # BLD: 7.8 K/UL — SIGNIFICANT CHANGE UP (ref 4.8–10.8)
WBC # FLD AUTO: 7.8 K/UL — SIGNIFICANT CHANGE UP (ref 4.8–10.8)

## 2018-03-16 PROCEDURE — 36600 WITHDRAWAL OF ARTERIAL BLOOD: CPT

## 2018-03-16 PROCEDURE — 80048 BASIC METABOLIC PNL TOTAL CA: CPT

## 2018-03-16 PROCEDURE — T1013: CPT

## 2018-03-16 PROCEDURE — 96375 TX/PRO/DX INJ NEW DRUG ADDON: CPT

## 2018-03-16 PROCEDURE — 82962 GLUCOSE BLOOD TEST: CPT

## 2018-03-16 PROCEDURE — P9016: CPT

## 2018-03-16 PROCEDURE — 83036 HEMOGLOBIN GLYCOSYLATED A1C: CPT

## 2018-03-16 PROCEDURE — 85027 COMPLETE CBC AUTOMATED: CPT

## 2018-03-16 PROCEDURE — 96374 THER/PROPH/DIAG INJ IV PUSH: CPT

## 2018-03-16 PROCEDURE — 83735 ASSAY OF MAGNESIUM: CPT

## 2018-03-16 PROCEDURE — 87086 URINE CULTURE/COLONY COUNT: CPT

## 2018-03-16 PROCEDURE — 82803 BLOOD GASES ANY COMBINATION: CPT

## 2018-03-16 PROCEDURE — 86850 RBC ANTIBODY SCREEN: CPT

## 2018-03-16 PROCEDURE — 71045 X-RAY EXAM CHEST 1 VIEW: CPT

## 2018-03-16 PROCEDURE — 82272 OCCULT BLD FECES 1-3 TESTS: CPT

## 2018-03-16 PROCEDURE — 85730 THROMBOPLASTIN TIME PARTIAL: CPT

## 2018-03-16 PROCEDURE — 86900 BLOOD TYPING SEROLOGIC ABO: CPT

## 2018-03-16 PROCEDURE — 85018 HEMOGLOBIN: CPT

## 2018-03-16 PROCEDURE — 86923 COMPATIBILITY TEST ELECTRIC: CPT

## 2018-03-16 PROCEDURE — 85610 PROTHROMBIN TIME: CPT

## 2018-03-16 PROCEDURE — 83880 ASSAY OF NATRIURETIC PEPTIDE: CPT

## 2018-03-16 PROCEDURE — 81001 URINALYSIS AUTO W/SCOPE: CPT

## 2018-03-16 PROCEDURE — 86901 BLOOD TYPING SEROLOGIC RH(D): CPT

## 2018-03-16 PROCEDURE — 36430 TRANSFUSION BLD/BLD COMPNT: CPT

## 2018-03-16 PROCEDURE — 94660 CPAP INITIATION&MGMT: CPT

## 2018-03-16 PROCEDURE — 94640 AIRWAY INHALATION TREATMENT: CPT

## 2018-03-16 PROCEDURE — 99285 EMERGENCY DEPT VISIT HI MDM: CPT | Mod: 25

## 2018-03-16 PROCEDURE — 84466 ASSAY OF TRANSFERRIN: CPT

## 2018-03-16 PROCEDURE — 83550 IRON BINDING TEST: CPT

## 2018-03-16 PROCEDURE — 82728 ASSAY OF FERRITIN: CPT

## 2018-03-16 PROCEDURE — 87040 BLOOD CULTURE FOR BACTERIA: CPT

## 2018-03-16 PROCEDURE — 36415 COLL VENOUS BLD VENIPUNCTURE: CPT

## 2018-03-16 PROCEDURE — 99239 HOSP IP/OBS DSCHRG MGMT >30: CPT

## 2018-03-16 PROCEDURE — 97163 PT EVAL HIGH COMPLEX 45 MIN: CPT

## 2018-03-16 RX ORDER — PREGABALIN 225 MG/1
1 CAPSULE ORAL
Qty: 30 | Refills: 0 | OUTPATIENT
Start: 2018-03-16 | End: 2018-04-14

## 2018-03-16 RX ORDER — FERROUS SULFATE 325(65) MG
1 TABLET ORAL
Qty: 30 | Refills: 0 | OUTPATIENT
Start: 2018-03-16 | End: 2018-04-14

## 2018-03-16 RX ORDER — GABAPENTIN 400 MG/1
2 CAPSULE ORAL
Qty: 180 | Refills: 0 | OUTPATIENT
Start: 2018-03-16 | End: 2018-04-14

## 2018-03-16 RX ORDER — AMIODARONE HYDROCHLORIDE 400 MG/1
1 TABLET ORAL
Qty: 0 | Refills: 0 | COMMUNITY
Start: 2018-03-16

## 2018-03-16 RX ORDER — PANTOPRAZOLE SODIUM 20 MG/1
1 TABLET, DELAYED RELEASE ORAL
Qty: 60 | Refills: 0 | OUTPATIENT
Start: 2018-03-16 | End: 2018-04-14

## 2018-03-16 RX ORDER — POTASSIUM CHLORIDE 20 MEQ
1 PACKET (EA) ORAL
Qty: 30 | Refills: 0 | OUTPATIENT
Start: 2018-03-16 | End: 2018-04-14

## 2018-03-16 RX ORDER — AZITHROMYCIN 500 MG/1
0.5 TABLET, FILM COATED ORAL
Qty: 1 | Refills: 0 | OUTPATIENT
Start: 2018-03-16 | End: 2018-03-17

## 2018-03-16 RX ORDER — GABAPENTIN 400 MG/1
1.5 CAPSULE ORAL
Qty: 0 | Refills: 0 | COMMUNITY

## 2018-03-16 RX ORDER — RIVAROXABAN 15 MG-20MG
1 KIT ORAL
Qty: 0 | Refills: 0 | COMMUNITY
Start: 2018-03-16

## 2018-03-16 RX ORDER — FERROUS SULFATE 325(65) MG
1 TABLET ORAL
Qty: 0 | Refills: 0 | COMMUNITY
Start: 2018-03-16 | End: 2018-04-14

## 2018-03-16 RX ORDER — SACCHAROMYCES BOULARDII 250 MG
1 POWDER IN PACKET (EA) ORAL
Qty: 4 | Refills: 0 | OUTPATIENT
Start: 2018-03-16 | End: 2018-03-17

## 2018-03-16 RX ADMIN — Medication 1 MILLIGRAM(S): at 11:30

## 2018-03-16 RX ADMIN — AMIODARONE HYDROCHLORIDE 200 MILLIGRAM(S): 400 TABLET ORAL at 06:21

## 2018-03-16 RX ADMIN — PANTOPRAZOLE SODIUM 40 MILLIGRAM(S): 20 TABLET, DELAYED RELEASE ORAL at 06:21

## 2018-03-16 RX ADMIN — Medication 1: at 12:14

## 2018-03-16 RX ADMIN — IRON SUCROSE 210 MILLIGRAM(S): 20 INJECTION, SOLUTION INTRAVENOUS at 10:27

## 2018-03-16 RX ADMIN — Medication 250 MILLIGRAM(S): at 06:21

## 2018-03-16 RX ADMIN — AZITHROMYCIN 250 MILLIGRAM(S): 500 TABLET, FILM COATED ORAL at 11:30

## 2018-03-16 RX ADMIN — Medication 40 MILLIGRAM(S): at 06:21

## 2018-03-16 RX ADMIN — PREGABALIN 1000 MICROGRAM(S): 225 CAPSULE ORAL at 11:30

## 2018-03-16 NOTE — DISCHARGE NOTE ADULT - SECONDARY DIAGNOSIS.
Afib Anemia Diabetes CKD (chronic kidney disease), stage IV Essential hypertension COPD (chronic obstructive pulmonary disease)

## 2018-03-16 NOTE — DISCHARGE NOTE ADULT - MEDICATION SUMMARY - MEDICATIONS TO CHANGE
I will SWITCH the dose or number of times a day I take the medications listed below when I get home from the hospital:    Neurontin 300 mg oral capsule  -- 1 cap(s) by mouth 3 times a day    predniSONE 50 mg oral tablet  -- 1 tab(s) by mouth once a day   -- It is very important that you take or use this exactly as directed.  Do not skip doses or discontinue unless directed by your doctor.  Obtain medical advice before taking any non-prescription drugs as some may affect the action of this medication.  Take with food or milk.

## 2018-03-16 NOTE — DISCHARGE NOTE ADULT - PLAN OF CARE
resolved back to baseline secondary to co2 narcosis and uti  follow up with pulmonary xarelto, and home meds s/p 3 units prbcs conservative manageemnt as per GI  dc with iron home crow stop metformin improved informed family to avoid neprhtotoxic agents home emds home inhalers on home o2, refusing bipap at night  follow up with pulmonau

## 2018-03-16 NOTE — DISCHARGE NOTE ADULT - CARE PLAN
Principal Discharge DX:	Toxic metabolic encephalopathy  Goal:	resolved back to baseline  Assessment and plan of treatment:	secondary to co2 narcosis and uti  follow up with pulmonary  Secondary Diagnosis:	Afib  Goal:	xarelto, and home meds  Secondary Diagnosis:	Anemia  Goal:	s/p 3 units prbcs  Assessment and plan of treatment:	conservative manageemnt as per GI  dc with iron  Secondary Diagnosis:	Diabetes  Goal:	home lantus  Assessment and plan of treatment:	stop metformin  Secondary Diagnosis:	CKD (chronic kidney disease), stage IV  Goal:	improved  Assessment and plan of treatment:	informed family to avoid neprhtotoxic agents  Secondary Diagnosis:	Essential hypertension  Goal:	home emds  Secondary Diagnosis:	COPD (chronic obstructive pulmonary disease)  Goal:	home inhalers  Assessment and plan of treatment:	on home o2, refusing bipap at night  follow up with pulmonau

## 2018-03-16 NOTE — DISCHARGE NOTE ADULT - HOSPITAL COURSE
84 y/o male with history of COPD on home 02 3 liters cont, chronic blood loss anemia, ckd stage 4, DM-2, HTN, HLD, Chronic LE edema, Afib on Xarelto presents from home with neck pain for a couple days for which he took motrin. PAtient is a poor historian and much of the history was taken from chart and er physician, Patient has been having 10 out of neck pain at home and was taking motrin every few hours. PAtient also had hypoxia at home and had to increase his home o2 to 4l. Patient was at [facility] in 12/2017 for possible GI bleed and was told to follow up [facility] GI but it in unclear if he did so. PAtient seen at bedside and is lethargic ABG ordered with show elevated pco2.     given iv ceft for uti and started on bipap. Patient seen by cardio, GI pulmonary in consult. Patient given 3 units prbcs and deemed not a candidate for EGD. Patient also refused bipap but improved. Patient is now ready for dc home with steorid taper and abx. Patient medically stable for dc home    time spent on dc 32m inutes

## 2018-03-16 NOTE — DISCHARGE NOTE ADULT - PATIENT PORTAL LINK FT
You can access the [facility]irst [facility] Patient Portal, offered by [facility], by registering with the following website: [email or web address]

## 2018-03-16 NOTE — DISCHARGE NOTE ADULT - CARE PROVIDER_API CALL
Geremias Suarez), Infectious Disease; Internal Medicine  [address] [facility]  [address]  [place] [address]  Phone: [phone]  Fax: [phone]    Henrietta Khan), Internal Medicine; Nephrology  [address] [place] [address]  Phone: [phone]  Fax: [phone]    Eloy Cruz), Cardiovascular Disease; Internal Medicine  [address] [place] [address]  Phone: [phone]  Fax: [phone]    Jarad Bee), Gastroenterology; Internal Medicine  [address] [facility] [address]  [place] [address]  Phone: [phone]  Fax: [phone]

## 2018-03-16 NOTE — DISCHARGE NOTE ADULT - MEDICATION SUMMARY - MEDICATIONS TO TAKE
I will START or STAY ON the medications listed below when I get home from the hospital:    DME  -- 4L into nose OXYGEN  DX:COPD AND KRYSTIN  CONTINUOUS AND PORTABLE    -- Indication: For copd    predniSONE 10 mg oral tablet  -- 4 tab(s) po daily x 2 days then 3tabs x 2 days then 2tabs for 2 days then 1 tab daily x2 days then stop   -- It is very important that you take or use this exactly as directed.  Do not skip doses or discontinue unless directed by your doctor.  Obtain medical advice before taking any non-prescription drugs as some may affect the action of this medication.  Take with food or milk.    -- Indication: For copd     aspirin 81 mg oral delayed release tablet  -- 1 tab(s) by mouth once a day  -- Indication: For heart health    amiodarone 200 mg oral tablet  -- 1 tab(s) by mouth once a day  -- Indication: For Afib    rivaroxaban 15 mg oral tablet  -- 1 tab(s) by mouth every 24 hours  -- Indication: For Afib    gabapentin 100 mg oral capsule  -- 2 cap(s) by mouth 3 times a day   -- It is very important that you take or use this exactly as directed.  Do not skip doses or discontinue unless directed by your doctor.  May cause drowsiness.  Alcohol may intensify this effect.  Use care when operating dangerous machinery.    -- Indication: For pain    Levemir FlexPen 100 units/mL subcutaneous solution  -- 15 unit(s) subcutaneous once a day MDD:dispense 5 pens  -- Check with your doctor before becoming pregnant.  Do not drink alcoholic beverages when taking this medication.  Keep in refrigerator.  Do not freeze.    -- Indication: For diabetes    insulin lispro 100 units/mL subcutaneous solution  --  subcutaneous 4 times a day (before meals and at bedtime); 2 Unit(s) if Glucose 151 - 200  4 Unit(s) if Glucose 201 - 250  6 Unit(s) if Glucose 251 - 300  8 Unit(s) if Glucose 301 - 350  10 Unit(s) if Glucose 351 - 400  12 Unit(s) if Glucose Greater Than 400  -- Indication: For diabetes    lovastatin 40 mg oral tablet  -- 1 tab(s) by mouth once a day  -- Indication: For hyperlipidmeia    ipratropium 500 mcg/2.5 mL inhalation solution  -- 2.5 milliliter(s) inhaled 3 times a day  -- Indication: For copd    albuterol 2.5 mg/3 mL (0.083%) inhalation solution  -- 3 milliliter(s) inhaled every 6 hours  -- Indication: For copd    Breo Ellipta 200 mcg-25 mcg/inh inhalation powder  -- 1 puff(s) inhaled once a day  -- Indication: For copd    furosemide 40 mg oral tablet  -- 1 tab(s) by mouth once a day   -- Avoid prolonged or excessive exposure to direct and/or artificial sunlight while taking this medication.  It is very important that you take or use this exactly as directed.  Do not skip doses or discontinue unless directed by your doctor.  It may be advisable to drink a full glass orange juice or eat a banana daily while taking this medication.    -- Indication: For breathjing    ferrous sulfate 325 mg (65 mg elemental iron) oral delayed release tablet  -- 1 tab(s) by mouth once a day   -- May discolor urine or feces.  Swallow whole.  Do not crush.    -- Indication: For Anemia    lactulose 10 g/15 mL oral syrup  -- 30 milliliter(s) by mouth 2 times a day  -- Indication: For constipation    azithromycin 500 mg oral tablet  -- 0.5 tab(s) by mouth once a day   -- Do not take dairy products, antacids, or iron preparations within one hour of this medication.  Finish all this medication unless otherwise directed by prescriber.    -- Indication: For copd    K-Tab 20 mEq oral tablet, extended release  -- 1 tab(s) by mouth once a day   -- It is very important that you take or use this exactly as directed.  Do not skip doses or discontinue unless directed by your doctor.  Medication should be taken with plenty of water.  Take with food or milk.    -- Indication: For replacement     saccharomyces boulardii lyo 250 mg oral capsule  -- 1 cap(s) by mouth 2 times a day  -- Indication: For probiotic    pantoprazole 40 mg oral delayed release tablet  -- 1 tab(s) by mouth 2 times a day  -- Indication: For gerd    cyanocobalamin 1000 mcg oral tablet  -- 1 tab(s) by mouth once a day  -- Indication: For vit b12 def I will START or STAY ON the medications listed below when I get home from the hospital:    DME  -- 4L into nose OXYGEN  DX:COPD AND KRYSTIN  CONTINUOUS AND PORTABLE    -- Indication: For copd    predniSONE 10 mg oral tablet  -- 4 tab(s) po daily x 2 days then 3tabs x 2 days then 2tabs for 2 days then 1 tab daily x2 days then stop   -- It is very important that you take or use this exactly as directed.  Do not skip doses or discontinue unless directed by your doctor.  Obtain medical advice before taking any non-prescription drugs as some may affect the action of this medication.  Take with food or milk.    -- Indication: For copd     aspirin 81 mg oral delayed release tablet  -- 1 tab(s) by mouth once a day  -- Indication: For heart health    amiodarone 200 mg oral tablet  -- 1 tab(s) by mouth once a day  -- Indication: For Afib    rivaroxaban 15 mg oral tablet  -- 1 tab(s) by mouth every 24 hours  -- Indication: For Afib    gabapentin 100 mg oral capsule  -- 2 cap(s) by mouth 3 times a day   -- It is very important that you take or use this exactly as directed.  Do not skip doses or discontinue unless directed by your doctor.  May cause drowsiness.  Alcohol may intensify this effect.  Use care when operating dangerous machinery.    -- Indication: For pain    lovastatin 40 mg oral tablet  -- 1 tab(s) by mouth once a day  -- Indication: For hyperlipidmeia    ipratropium 500 mcg/2.5 mL inhalation solution  -- 2.5 milliliter(s) inhaled 3 times a day  -- Indication: For copd    albuterol 2.5 mg/3 mL (0.083%) inhalation solution  -- 3 milliliter(s) inhaled every 6 hours  -- Indication: For copd    Breo Ellipta 200 mcg-25 mcg/inh inhalation powder  -- 1 puff(s) inhaled once a day  -- Indication: For copd    furosemide 40 mg oral tablet  -- 1 tab(s) by mouth once a day   -- Avoid prolonged or excessive exposure to direct and/or artificial sunlight while taking this medication.  It is very important that you take or use this exactly as directed.  Do not skip doses or discontinue unless directed by your doctor.  It may be advisable to drink a full glass orange juice or eat a banana daily while taking this medication.    -- Indication: For breathjing    ferrous sulfate 325 mg (65 mg elemental iron) oral delayed release tablet  -- 1 tab(s) by mouth once a day   -- May discolor urine or feces.  Swallow whole.  Do not crush.    -- Indication: For Anemia    lactulose 10 g/15 mL oral syrup  -- 30 milliliter(s) by mouth 2 times a day  -- Indication: For constipation    azithromycin 500 mg oral tablet  -- 0.5 tab(s) by mouth once a day   -- Do not take dairy products, antacids, or iron preparations within one hour of this medication.  Finish all this medication unless otherwise directed by prescriber.    -- Indication: For copd    K-Tab 20 mEq oral tablet, extended release  -- 1 tab(s) by mouth once a day   -- It is very important that you take or use this exactly as directed.  Do not skip doses or discontinue unless directed by your doctor.  Medication should be taken with plenty of water.  Take with food or milk.    -- Indication: For replacement     saccharomyces boulardii lyo 250 mg oral capsule  -- 1 cap(s) by mouth 2 times a day  -- Indication: For probiotic    pantoprazole 40 mg oral delayed release tablet  -- 1 tab(s) by mouth 2 times a day  -- Indication: For gerd    cyanocobalamin 1000 mcg oral tablet  -- 1 tab(s) by mouth once a day  -- Indication: For vit b12 def

## 2018-03-16 NOTE — DISCHARGE NOTE ADULT - CARE PROVIDERS DIRECT ADDRESSES
,DirectAddress_[email or web address],[email or web address]

## 2018-03-16 NOTE — DISCHARGE NOTE ADULT - MEDICATION SUMMARY - MEDICATIONS TO STOP TAKING
I will STOP taking the medications listed below when I get home from the hospital:    folic acid 0.8 mg oral tablet  -- 1 tab(s) by mouth once a day    Spiriva 18 mcg inhalation capsule  -- 1 cap(s) inhaled once a day    ocular lubricant ophthalmic solution  -- 1 drop(s) to each affected eye 2 times a day    Benadryl 25 mg oral capsule  -- 1 cap(s) by mouth 3 times a day   -- May cause drowsiness.  Alcohol may intensify this effect.  Use care when operating dangerous machinery.  Obtain medical advice before taking any non-prescription drugs as some may affect the action of this medication.    Pepcid 20 mg oral tablet  -- 1 tab(s) by mouth 2 times a day   -- It is very important that you take or use this exactly as directed.  Do not skip doses or discontinue unless directed by your doctor.  Obtain medical advice before taking any non-prescription drugs as some may affect the action of this medication. I will STOP taking the medications listed below when I get home from the hospital:    folic acid 0.8 mg oral tablet  -- 1 tab(s) by mouth once a day    Spiriva 18 mcg inhalation capsule  -- 1 cap(s) inhaled once a day    Levemir FlexPen 100 units/mL subcutaneous solution  -- 15 unit(s) subcutaneous once a day MDD:dispense 5 pens  -- Check with your doctor before becoming pregnant.  Do not drink alcoholic beverages when taking this medication.  Keep in refrigerator.  Do not freeze.    insulin lispro 100 units/mL subcutaneous solution  --  subcutaneous 4 times a day (before meals and at bedtime); 2 Unit(s) if Glucose 151 - 200  4 Unit(s) if Glucose 201 - 250  6 Unit(s) if Glucose 251 - 300  8 Unit(s) if Glucose 301 - 350  10 Unit(s) if Glucose 351 - 400  12 Unit(s) if Glucose Greater Than 400    ocular lubricant ophthalmic solution  -- 1 drop(s) to each affected eye 2 times a day    Benadryl 25 mg oral capsule  -- 1 cap(s) by mouth 3 times a day   -- May cause drowsiness.  Alcohol may intensify this effect.  Use care when operating dangerous machinery.  Obtain medical advice before taking any non-prescription drugs as some may affect the action of this medication.    Pepcid 20 mg oral tablet  -- 1 tab(s) by mouth 2 times a day   -- It is very important that you take or use this exactly as directed.  Do not skip doses or discontinue unless directed by your doctor.  Obtain medical advice before taking any non-prescription drugs as some may affect the action of this medication.

## 2018-03-19 LAB
CULTURE RESULTS: SIGNIFICANT CHANGE UP
CULTURE RESULTS: SIGNIFICANT CHANGE UP
SPECIMEN SOURCE: SIGNIFICANT CHANGE UP
SPECIMEN SOURCE: SIGNIFICANT CHANGE UP

## 2018-03-26 ENCOUNTER — APPOINTMENT (OUTPATIENT)
Dept: INTERNAL MEDICINE | Facility: CLINIC | Age: 83
End: 2018-03-26
Payer: MEDICARE

## 2018-03-26 PROCEDURE — 99215 OFFICE O/P EST HI 40 MIN: CPT | Mod: 25

## 2018-03-26 PROCEDURE — 36415 COLL VENOUS BLD VENIPUNCTURE: CPT

## 2018-03-29 ENCOUNTER — INPATIENT (INPATIENT)
Facility: HOSPITAL | Age: 83
LOS: 7 days | Discharge: HOME | DRG: 177 | End: 2018-04-06
Attending: INTERNAL MEDICINE | Admitting: HOSPITALIST
Payer: MEDICARE

## 2018-03-29 VITALS
OXYGEN SATURATION: 95 % | WEIGHT: 199.96 LBS | HEART RATE: 62 BPM | TEMPERATURE: 98 F | SYSTOLIC BLOOD PRESSURE: 146 MMHG | DIASTOLIC BLOOD PRESSURE: 94 MMHG | HEIGHT: 62 IN | RESPIRATION RATE: 18 BRPM

## 2018-03-29 DIAGNOSIS — Z98.89 OTHER SPECIFIED POSTPROCEDURAL STATES: Chronic | ICD-10-CM

## 2018-03-29 DIAGNOSIS — J18.9 PNEUMONIA, UNSPECIFIED ORGANISM: ICD-10-CM

## 2018-03-29 DIAGNOSIS — N18.4 CHRONIC KIDNEY DISEASE, STAGE 4 (SEVERE): ICD-10-CM

## 2018-03-29 DIAGNOSIS — J44.9 CHRONIC OBSTRUCTIVE PULMONARY DISEASE, UNSPECIFIED: ICD-10-CM

## 2018-03-29 LAB
-  STREPTOCOCCUS SP. (NOT GRP A, B OR S PNEUMONIAE): SIGNIFICANT CHANGE UP
ALBUMIN SERPL ELPH-MCNC: 3.6 G/DL — SIGNIFICANT CHANGE UP (ref 3.3–5.2)
ALP SERPL-CCNC: 77 U/L — SIGNIFICANT CHANGE UP (ref 40–120)
ALT FLD-CCNC: 15 U/L — SIGNIFICANT CHANGE UP
ANION GAP SERPL CALC-SCNC: 14 MMOL/L — SIGNIFICANT CHANGE UP (ref 5–17)
APPEARANCE UR: CLEAR — SIGNIFICANT CHANGE UP
AST SERPL-CCNC: 21 U/L — SIGNIFICANT CHANGE UP
BASE EXCESS BLDA CALC-SCNC: 4.4 MMOL/L — HIGH (ref -2–2)
BASOPHILS # BLD AUTO: 0 K/UL — SIGNIFICANT CHANGE UP (ref 0–0.2)
BASOPHILS NFR BLD AUTO: 0.1 % — SIGNIFICANT CHANGE UP (ref 0–2)
BILIRUB SERPL-MCNC: 1.2 MG/DL — SIGNIFICANT CHANGE UP (ref 0.4–2)
BILIRUB UR-MCNC: NEGATIVE — SIGNIFICANT CHANGE UP
BLOOD GAS COMMENTS ARTERIAL: SIGNIFICANT CHANGE UP
BUN SERPL-MCNC: 27 MG/DL — HIGH (ref 8–20)
CALCIUM SERPL-MCNC: 9 MG/DL — SIGNIFICANT CHANGE UP (ref 8.6–10.2)
CHLORIDE SERPL-SCNC: 90 MMOL/L — LOW (ref 98–107)
CO2 SERPL-SCNC: 31 MMOL/L — HIGH (ref 22–29)
COLOR SPEC: YELLOW — SIGNIFICANT CHANGE UP
CREAT SERPL-MCNC: 1.89 MG/DL — HIGH (ref 0.5–1.3)
DIFF PNL FLD: NEGATIVE — SIGNIFICANT CHANGE UP
EOSINOPHIL # BLD AUTO: 0 K/UL — SIGNIFICANT CHANGE UP (ref 0–0.5)
EOSINOPHIL NFR BLD AUTO: 0.5 % — SIGNIFICANT CHANGE UP (ref 0–5)
GAS PNL BLDA: SIGNIFICANT CHANGE UP
GLUCOSE SERPL-MCNC: 123 MG/DL — HIGH (ref 70–115)
GLUCOSE UR QL: NEGATIVE MG/DL — SIGNIFICANT CHANGE UP
HCO3 BLDA-SCNC: 28 MMOL/L — HIGH (ref 20–26)
HCT VFR BLD CALC: 33.1 % — LOW (ref 42–52)
HGB BLD-MCNC: 10.1 G/DL — LOW (ref 14–18)
HOROWITZ INDEX BLDA+IHG-RTO: SIGNIFICANT CHANGE UP
KETONES UR-MCNC: NEGATIVE — SIGNIFICANT CHANGE UP
LACTATE BLDV-MCNC: 1 MMOL/L — SIGNIFICANT CHANGE UP (ref 0.5–2)
LEUKOCYTE ESTERASE UR-ACNC: NEGATIVE — SIGNIFICANT CHANGE UP
LYMPHOCYTES # BLD AUTO: 0.5 K/UL — LOW (ref 1–4.8)
LYMPHOCYTES # BLD AUTO: 5.4 % — LOW (ref 20–55)
MCHC RBC-ENTMCNC: 24 PG — LOW (ref 27–31)
MCHC RBC-ENTMCNC: 30.5 G/DL — LOW (ref 32–36)
MCV RBC AUTO: 78.6 FL — LOW (ref 80–94)
METHOD TYPE: SIGNIFICANT CHANGE UP
MONOCYTES # BLD AUTO: 0.5 K/UL — SIGNIFICANT CHANGE UP (ref 0–0.8)
MONOCYTES NFR BLD AUTO: 5 % — SIGNIFICANT CHANGE UP (ref 3–10)
NEUTROPHILS # BLD AUTO: 8.6 K/UL — HIGH (ref 1.8–8)
NEUTROPHILS NFR BLD AUTO: 88.6 % — HIGH (ref 37–73)
NITRITE UR-MCNC: NEGATIVE — SIGNIFICANT CHANGE UP
PCO2 BLDA: 57 MMHG — HIGH (ref 35–45)
PH BLDA: 7.35 — SIGNIFICANT CHANGE UP (ref 7.35–7.45)
PH UR: 6 — SIGNIFICANT CHANGE UP (ref 5–8)
PLATELET # BLD AUTO: 152 K/UL — SIGNIFICANT CHANGE UP (ref 150–400)
PO2 BLDA: 94 MMHG — SIGNIFICANT CHANGE UP (ref 83–108)
POTASSIUM SERPL-MCNC: 4.3 MMOL/L — SIGNIFICANT CHANGE UP (ref 3.5–5.3)
POTASSIUM SERPL-SCNC: 4.3 MMOL/L — SIGNIFICANT CHANGE UP (ref 3.5–5.3)
PROT SERPL-MCNC: 6.7 G/DL — SIGNIFICANT CHANGE UP (ref 6.6–8.7)
PROT UR-MCNC: NEGATIVE MG/DL — SIGNIFICANT CHANGE UP
RBC # BLD: 4.21 M/UL — LOW (ref 4.6–6.2)
RBC # FLD: 20.7 % — HIGH (ref 11–15.6)
SAO2 % BLDA: 98 % — SIGNIFICANT CHANGE UP (ref 95–99)
SODIUM SERPL-SCNC: 135 MMOL/L — SIGNIFICANT CHANGE UP (ref 135–145)
SP GR SPEC: 1.01 — SIGNIFICANT CHANGE UP (ref 1.01–1.02)
UROBILINOGEN FLD QL: NEGATIVE MG/DL — SIGNIFICANT CHANGE UP
WBC # BLD: 9.7 K/UL — SIGNIFICANT CHANGE UP (ref 4.8–10.8)
WBC # FLD AUTO: 9.7 K/UL — SIGNIFICANT CHANGE UP (ref 4.8–10.8)

## 2018-03-29 PROCEDURE — 99222 1ST HOSP IP/OBS MODERATE 55: CPT

## 2018-03-29 PROCEDURE — 71046 X-RAY EXAM CHEST 2 VIEWS: CPT | Mod: 26

## 2018-03-29 PROCEDURE — 99223 1ST HOSP IP/OBS HIGH 75: CPT

## 2018-03-29 PROCEDURE — 72100 X-RAY EXAM L-S SPINE 2/3 VWS: CPT | Mod: 26

## 2018-03-29 PROCEDURE — 99285 EMERGENCY DEPT VISIT HI MDM: CPT | Mod: 25

## 2018-03-29 RX ORDER — FUROSEMIDE 40 MG
40 TABLET ORAL DAILY
Qty: 0 | Refills: 0 | Status: DISCONTINUED | OUTPATIENT
Start: 2018-03-29 | End: 2018-04-06

## 2018-03-29 RX ORDER — ATORVASTATIN CALCIUM 80 MG/1
10 TABLET, FILM COATED ORAL AT BEDTIME
Qty: 0 | Refills: 0 | Status: DISCONTINUED | OUTPATIENT
Start: 2018-03-29 | End: 2018-04-06

## 2018-03-29 RX ORDER — AMIODARONE HYDROCHLORIDE 400 MG/1
200 TABLET ORAL DAILY
Qty: 0 | Refills: 0 | Status: DISCONTINUED | OUTPATIENT
Start: 2018-03-29 | End: 2018-04-06

## 2018-03-29 RX ORDER — VANCOMYCIN HCL 1 G
1350 VIAL (EA) INTRAVENOUS EVERY 12 HOURS
Qty: 0 | Refills: 0 | Status: DISCONTINUED | OUTPATIENT
Start: 2018-03-29 | End: 2018-03-29

## 2018-03-29 RX ORDER — ACETAMINOPHEN 500 MG
650 TABLET ORAL EVERY 6 HOURS
Qty: 0 | Refills: 0 | Status: DISCONTINUED | OUTPATIENT
Start: 2018-03-29 | End: 2018-04-06

## 2018-03-29 RX ORDER — AZITHROMYCIN 500 MG/1
500 TABLET, FILM COATED ORAL ONCE
Qty: 0 | Refills: 0 | Status: DISCONTINUED | OUTPATIENT
Start: 2018-03-29 | End: 2018-03-29

## 2018-03-29 RX ORDER — FERROUS SULFATE 325(65) MG
325 TABLET ORAL DAILY
Qty: 0 | Refills: 0 | Status: DISCONTINUED | OUTPATIENT
Start: 2018-03-29 | End: 2018-04-06

## 2018-03-29 RX ORDER — MORPHINE SULFATE 50 MG/1
4 CAPSULE, EXTENDED RELEASE ORAL ONCE
Qty: 0 | Refills: 0 | Status: DISCONTINUED | OUTPATIENT
Start: 2018-03-29 | End: 2018-03-29

## 2018-03-29 RX ORDER — VANCOMYCIN HCL 1 G
1000 VIAL (EA) INTRAVENOUS EVERY 24 HOURS
Qty: 0 | Refills: 0 | Status: DISCONTINUED | OUTPATIENT
Start: 2018-03-29 | End: 2018-03-29

## 2018-03-29 RX ORDER — PIPERACILLIN AND TAZOBACTAM 4; .5 G/20ML; G/20ML
3.38 INJECTION, POWDER, LYOPHILIZED, FOR SOLUTION INTRAVENOUS ONCE
Qty: 0 | Refills: 0 | Status: COMPLETED | OUTPATIENT
Start: 2018-03-29 | End: 2018-03-29

## 2018-03-29 RX ORDER — ASPIRIN/CALCIUM CARB/MAGNESIUM 324 MG
81 TABLET ORAL DAILY
Qty: 0 | Refills: 0 | Status: DISCONTINUED | OUTPATIENT
Start: 2018-03-29 | End: 2018-04-06

## 2018-03-29 RX ORDER — SACCHAROMYCES BOULARDII 250 MG
250 POWDER IN PACKET (EA) ORAL
Qty: 0 | Refills: 0 | Status: DISCONTINUED | OUTPATIENT
Start: 2018-03-29 | End: 2018-04-06

## 2018-03-29 RX ORDER — IPRATROPIUM/ALBUTEROL SULFATE 18-103MCG
3 AEROSOL WITH ADAPTER (GRAM) INHALATION EVERY 6 HOURS
Qty: 0 | Refills: 0 | Status: DISCONTINUED | OUTPATIENT
Start: 2018-03-29 | End: 2018-04-04

## 2018-03-29 RX ORDER — PIPERACILLIN AND TAZOBACTAM 4; .5 G/20ML; G/20ML
3.38 INJECTION, POWDER, LYOPHILIZED, FOR SOLUTION INTRAVENOUS EVERY 8 HOURS
Qty: 0 | Refills: 0 | Status: DISCONTINUED | OUTPATIENT
Start: 2018-03-29 | End: 2018-04-02

## 2018-03-29 RX ORDER — CEFTRIAXONE 500 MG/1
1 INJECTION, POWDER, FOR SOLUTION INTRAMUSCULAR; INTRAVENOUS ONCE
Qty: 0 | Refills: 0 | Status: DISCONTINUED | OUTPATIENT
Start: 2018-03-29 | End: 2018-03-29

## 2018-03-29 RX ORDER — RIVAROXABAN 15 MG-20MG
15 KIT ORAL EVERY 24 HOURS
Qty: 0 | Refills: 0 | Status: DISCONTINUED | OUTPATIENT
Start: 2018-03-29 | End: 2018-04-06

## 2018-03-29 RX ORDER — IPRATROPIUM/ALBUTEROL SULFATE 18-103MCG
3 AEROSOL WITH ADAPTER (GRAM) INHALATION ONCE
Qty: 0 | Refills: 0 | Status: COMPLETED | OUTPATIENT
Start: 2018-03-29 | End: 2018-03-29

## 2018-03-29 RX ORDER — PIPERACILLIN AND TAZOBACTAM 4; .5 G/20ML; G/20ML
3.38 INJECTION, POWDER, LYOPHILIZED, FOR SOLUTION INTRAVENOUS EVERY 8 HOURS
Qty: 0 | Refills: 0 | Status: DISCONTINUED | OUTPATIENT
Start: 2018-03-29 | End: 2018-03-29

## 2018-03-29 RX ORDER — VANCOMYCIN HCL 1 G
1350 VIAL (EA) INTRAVENOUS ONCE
Qty: 0 | Refills: 0 | Status: COMPLETED | OUTPATIENT
Start: 2018-03-29 | End: 2018-03-29

## 2018-03-29 RX ORDER — VANCOMYCIN HCL 1 G
VIAL (EA) INTRAVENOUS
Qty: 0 | Refills: 0 | Status: DISCONTINUED | OUTPATIENT
Start: 2018-03-29 | End: 2018-03-29

## 2018-03-29 RX ADMIN — Medication 650 MILLIGRAM(S): at 16:52

## 2018-03-29 RX ADMIN — Medication 650 MILLIGRAM(S): at 23:30

## 2018-03-29 RX ADMIN — Medication 250 MILLIGRAM(S): at 11:10

## 2018-03-29 RX ADMIN — Medication 3 MILLILITER(S): at 10:37

## 2018-03-29 RX ADMIN — RIVAROXABAN 15 MILLIGRAM(S): KIT at 16:53

## 2018-03-29 RX ADMIN — MORPHINE SULFATE 4 MILLIGRAM(S): 50 CAPSULE, EXTENDED RELEASE ORAL at 10:24

## 2018-03-29 RX ADMIN — Medication 3 MILLILITER(S): at 09:30

## 2018-03-29 RX ADMIN — Medication 250 MILLIGRAM(S): at 16:53

## 2018-03-29 RX ADMIN — Medication 3 MILLILITER(S): at 15:14

## 2018-03-29 RX ADMIN — PIPERACILLIN AND TAZOBACTAM 200 GRAM(S): 4; .5 INJECTION, POWDER, LYOPHILIZED, FOR SOLUTION INTRAVENOUS at 11:01

## 2018-03-29 RX ADMIN — Medication 650 MILLIGRAM(S): at 22:45

## 2018-03-29 RX ADMIN — ATORVASTATIN CALCIUM 10 MILLIGRAM(S): 80 TABLET, FILM COATED ORAL at 21:47

## 2018-03-29 RX ADMIN — Medication 650 MILLIGRAM(S): at 18:18

## 2018-03-29 RX ADMIN — PIPERACILLIN AND TAZOBACTAM 25 GRAM(S): 4; .5 INJECTION, POWDER, LYOPHILIZED, FOR SOLUTION INTRAVENOUS at 21:47

## 2018-03-29 RX ADMIN — Medication 3 MILLILITER(S): at 21:25

## 2018-03-29 RX ADMIN — Medication 125 MILLIGRAM(S): at 10:37

## 2018-03-29 RX ADMIN — MORPHINE SULFATE 4 MILLIGRAM(S): 50 CAPSULE, EXTENDED RELEASE ORAL at 09:30

## 2018-03-29 NOTE — CONSULT NOTE ADULT - PROBLEM SELECTOR RECOMMENDATION 9
- check urine legionella  check sputum cx  follow up all outstanding cultures  d/c vancomycin  - continue zosyn for lobar PNA

## 2018-03-29 NOTE — ED PROVIDER NOTE - MEDICAL DECISION MAKING DETAILS
Pt with back pain, possible PNA, possible Kidney stone, Possible UTI. Labs, x-ray, and nebulizer treatment.

## 2018-03-29 NOTE — ED PROVIDER NOTE - CARE PLAN
Principal Discharge DX:	Pneumonia due to infectious organism, unspecified laterality, unspecified part of lung  Secondary Diagnosis:	COPD exacerbation

## 2018-03-29 NOTE — CONSULT NOTE ADULT - ASSESSMENT
-Pneumonia  -OHS by hx  -acute on chronic hypoxic resp failure  -No COPD; possible asthma  -Obesity, likely KRYSTIN  -Cough  -Back pain  -on Xarelto for afib    RECC:   IV steroids. Nebs. Abx. Sputum culture. Check ABG. O2. Cardiology eval. Need outpt PSG. Need to follow cxr to resolution. Wt loss.

## 2018-03-29 NOTE — CONSULT NOTE ADULT - SUBJECTIVE AND OBJECTIVE BOX
NPP INFECTIOUS DISEASES AND INTERNAL MEDICINE at [place]  =======================================================  Geremias Suarez MD [facility]   Holger Leija MD  Diplomates American Board of Internal Medicine and Infectious Diseases  =======================================================    [facility]-[id number]  DAVID VINCENT   This is a 84 y/o male with COPD on 3L/m home oxygen, chronic blood loss anemia, GIB, ckd stage 4, DM-2, HTN, HLD, Chronic LE edema, Afib on Xarelto, CAD s/p CABG presents from home with greenish productive cough, chills, body aches, pleuritic chest pain/ back pain x 2 days. Pt is not feeling well since being discharged from [facility] recently with URI symptoms since discharge.     The back pain/ chest pain is worse with coughing.  Workup in the ER with CXR showed RLL infiltrate.     patient was at [facility] from 3/13/18 - 3/16/18 for neck pain. he was found with acute on chronic anemia, no active bleeding and transfused with 3 units of packed cells. He was also given 3 days of Ceftriaxone for UTI.     =======================================================  Past Medical & Surgical Hx:  =====================  PAST MEDICAL & SURGICAL HISTORY:  COPD (chronic obstructive pulmonary disease)  Hyperlipidemia, unspecified hyperlipidemia type  Essential hypertension  Afib  Chronic systolic CHF (congestive heart failure)  Hypertrophic cardiomyopathy  CKD (chronic kidney disease), stage 4 (severe)  Gassiness  Diabetes  Asthma  Prostate cancer  Hypertension  No significant past surgical history  History of valvuloplasty  History of knee surgery      Problem List:  ==========  HEALTH ISSUES - PROBLEM Dx:    Social Hx:  =======  no toxic habits currently    Family History:  no significant family history of immunosuppressive disorders.  FAMILY HISTORY:  No pertinent family history in first degree relatives  No pertinent family history in first degree relatives    Allergies  No Known Allergies  Intolerances    MEDICATIONS  (STANDING):  ALBUTerol/ipratropium for Nebulization 3 milliLiter(s) Nebulizer every 6 hours  amiodarone    Tablet 200 milliGRAM(s) Oral daily  aspirin  chewable 81 milliGRAM(s) Oral daily  atorvastatin 10 milliGRAM(s) Oral at bedtime  ferrous    sulfate 325 milliGRAM(s) Oral daily  furosemide    Tablet 40 milliGRAM(s) Oral daily  piperacillin/tazobactam IVPB. 3.375 Gram(s) IV Intermittent every 8 hours  rivaroxaban 15 milliGRAM(s) Oral every 24 hours  saccharomyces boulardii 250 milliGRAM(s) Oral two times a day  vancomycin  IVPB 1000 milliGRAM(s) IV Intermittent every 24 hours    MEDICATIONS  (PRN):  acetaminophen   Tablet. 650 milliGRAM(s) Oral every 6 hours PRN Mild Pain (1 - 3)        =======================================================  REVIEW OF SYSTEMS:  as above    ======================================================  Physical Exam:  ============  Vital Signs Last 24 Hrs  T(C): 36.5 (29 Mar 2018 16:03), Max: 37.2 (29 Mar 2018 09:26)  T(F): 97.7 (29 Mar 2018 16:03), Max: 99 (29 Mar 2018 09:26)  HR: 126 (29 Mar 2018 16:03) (62 - 126)  BP: 107/72 (29 Mar 2018 16:03) (101/68 - 146/94)  RR: 18 (29 Mar 2018 16:03) (18 - 22)  SpO2: 98% (29 Mar 2018 16:03) (94% - 100%)  Height (cm): 157.48 ( @ 06:44)  Weight (kg): 90.7 ( @ 06:44)  BMI (kg/m2): 36.6 ( @ 06:44)  BSA (m2): 1.91 ( @ 06:44)    General: Alert and oriented, No acute distress.  Eye: Pupils are equal, round and reactive to light, Extraocular movements are intact, Normal conjunctiva.  HENT: Normocephalic, Oral mucosa is moist, No pharyngeal erythema, No sinus tenderness.  Neck: Supple, No lymphadenopathy.  Respiratory: Lungs are clear to auscultation, Respirations are non-labored.  Cardiovascular: Normal rate, Regular rhythm, No murmur, Good pulses equal in all extremities, No edema.  Gastrointestinal: Soft, Non-tender, Non-distended, Normal bowel sounds.  Genitourinary: No costovertebral angle tenderness.  Lymphatics: No lymphadenopathy neck, axilla, groin.  Musculoskeletal: Normal range of motion, Normal strength.  Integumentary: No rash.  Neurologic: Alert, Oriented, No focal deficits, Cranial Nerves II-XII are grossly intact.  Psychiatric: Appropriate mood & affect.      =======================================================  Labs:  ====      135  |  90<L>  |  27.0<H>  ----------------------------<  123<H>  4.3   |  31.0<H>  |  1.89<H>    Ca    9.0      29 Mar 2018 08:43    TPro  6.7  /  Alb  3.6  /  TBili  1.2  /  DBili  x   /  AST  21  /  ALT  15  /  AlkPhos  77                            10.1   9.7   )-----------( 152      ( 29 Mar 2018 08:43 )             33.1         Urinalysis Basic - ( 29 Mar 2018 15:49 )    Color: Yellow / Appearance: Clear / S.015 / pH: x  Gluc: x / Ketone: Negative  / Bili: Negative / Urobili: Negative mg/dL   Blood: x / Protein: Negative mg/dL / Nitrite: Negative   Leuk Esterase: Negative / RBC: x / WBC x   Sq Epi: x / Non Sq Epi: x / Bacteria: x      LIVER FUNCTIONS - ( 29 Mar 2018 08:43 )  Alb: 3.6 g/dL / Pro: 6.7 g/dL / ALK PHOS: 77 U/L / ALT: 15 U/L / AST: 21 U/L / GGT: x               CAPILLARY BLOOD GLUCOSE        ABG - ( 29 Mar 2018 15:19 )  pH: 7.35  /  pCO2: 57    /  pO2: 94    / HCO3: 28    / Base Excess: 4.4   /  SaO2: 98               ============================   EXAM:  XR CHEST PA LAT 2V                        PROCEDURE DATE:  2018    INTERPRETATION:    CHEST X-RAY PA AND LATERAL:  History: Cough and fever.  Date and time of exam: 3/29/2018 8:56 AM.  Comparison exam: 2017.  Technique: PA and lateral views of the chest were obtained.  Findings:  There is a right lower lobe infiltrate. The left lung is clear. The heart is enlarged. No hilar or mediastinal abnormality. No evidence of pleural effusion. Post sternotomy wires are noted.    Impression:  Right lower lobe infiltrate..        DAVON ALANIS M.D., ATTENDING RADIOLOGIST  This document has been electronically signed. Mar 29 2018  9:53AM                 NPP INFECTIOUS DISEASES AND INTERNAL MEDICINE at [place]  =======================================================  [facility]   Holger Leija MD  Diplomates American Board of Internal Medicine and Infectious Diseases  =======================================================    N-[id number]  DAVID VINCENT   This is a 82 y/o male with COPD on 3L/m home oxygen, chronic blood loss anemia, GIB, ckd stage 4, DM-2, HTN, HLD, Chronic LE edema, Afib on Xarelto, CAD s/p CABG presents from home with greenish productive cough, chills, body aches, pleuritic chest pain/ back pain x 2 days. Pt is not feeling well since being discharged from [facility] recently with URI symptoms since discharge.     of note, patient was at [facility] from 3/13/18 - 3/16/18 for neck pain. he was found with acute on chronic anemia, no active bleeding and transfused with 3 units of packed cells. He was also given 3 days of Ceftriaxone for UTI.     The back pain/ chest pain is worse with coughing.  Workup in the ER with CXR showed RLL infiltrate.   His aide was sick.     =======================================================  Past Medical & Surgical Hx:  =====================  PAST MEDICAL & SURGICAL HISTORY:  COPD (chronic obstructive pulmonary disease)  Hyperlipidemia, unspecified hyperlipidemia type  Essential hypertension  Afib  Chronic systolic CHF (congestive heart failure)  Hypertrophic cardiomyopathy  CKD (chronic kidney disease), stage 4 (severe)  Gassiness  Diabetes  Asthma  Prostate cancer  Hypertension  No significant past surgical history  History of valvuloplasty  History of knee surgery      Problem List:  ==========  HEALTH ISSUES - PROBLEM Dx:    Social Hx:  =======  no toxic habits currently    Family History:  no significant family history of immunosuppressive disorders.  FAMILY HISTORY:  No pertinent family history in first degree relatives  No pertinent family history in first degree relatives    Allergies  No Known Allergies  Intolerances    MEDICATIONS  (STANDING):  ALBUTerol/ipratropium for Nebulization 3 milliLiter(s) Nebulizer every 6 hours  amiodarone    Tablet 200 milliGRAM(s) Oral daily  aspirin  chewable 81 milliGRAM(s) Oral daily  atorvastatin 10 milliGRAM(s) Oral at bedtime  ferrous    sulfate 325 milliGRAM(s) Oral daily  furosemide    Tablet 40 milliGRAM(s) Oral daily  piperacillin/tazobactam IVPB. 3.375 Gram(s) IV Intermittent every 8 hours  rivaroxaban 15 milliGRAM(s) Oral every 24 hours  saccharomyces boulardii 250 milliGRAM(s) Oral two times a day  vancomycin  IVPB 1000 milliGRAM(s) IV Intermittent every 24 hours    MEDICATIONS  (PRN):  acetaminophen   Tablet. 650 milliGRAM(s) Oral every 6 hours PRN Mild Pain (1 - 3)        =======================================================  REVIEW OF SYSTEMS:  as above    ======================================================  Physical Exam:  ============  Vital Signs Last 24 Hrs  T(C): 36.5 (29 Mar 2018 16:03), Max: 37.2 (29 Mar 2018 09:26)  T(F): 97.7 (29 Mar 2018 16:03), Max: 99 (29 Mar 2018 09:26)  HR: 126 (29 Mar 2018 16:03) (62 - 126)  BP: 107/72 (29 Mar 2018 16:03) (101/68 - 146/94)  RR: 18 (29 Mar 2018 16:03) (18 - 22)  SpO2: 98% (29 Mar 2018 16:03) (94% - 100%)  Height (cm): 157.48 ( @ 06:44)  Weight (kg): 90.7 ( @ 06:44)  BMI (kg/m2): 36.6 ( @ 06:44)  BSA (m2): 1.91 ( @ 06:44)    General: Alert and oriented, No acute distress.  obese  Eye: Pupils are equal, round and reactive to light, Extraocular movements are intact, Normal conjunctiva.  HENT: Normocephalic, Oral mucosa is moist, No pharyngeal erythema, No sinus tenderness.  Neck: Supple, No lymphadenopathy.  Respiratory: coarse breath sounds, decreased at RIGHT base  Cardiovascular: Normal rate, Regular rhythm, No murmur, Good pulses equal in all extremities, No edema.  Gastrointestinal: Soft, Non-tender, Non-distended, Normal bowel sounds.  Genitourinary: No costovertebral angle tenderness.  Musculoskeletal: Normal range of motion, Normal strength.  Integumentary: No rash.  Neurologic: Alert, Oriented, No focal deficits, Cranial Nerves II-XII are grossly intact.  Psychiatric: Appropriate mood & affect.      =======================================================  Labs:  ====      135  |  90<L>  |  27.0<H>  ----------------------------<  123<H>  4.3   |  31.0<H>  |  1.89<H>    Ca    9.0      29 Mar 2018 08:43    TPro  6.7  /  Alb  3.6  /  TBili  1.2  /  DBili  x   /  AST  21  /  ALT  15  /  AlkPhos  77                            10.1   9.7   )-----------( 152      ( 29 Mar 2018 08:43 )             33.1         Urinalysis Basic - ( 29 Mar 2018 15:49 )    Color: Yellow / Appearance: Clear / S.015 / pH: x  Gluc: x / Ketone: Negative  / Bili: Negative / Urobili: Negative mg/dL   Blood: x / Protein: Negative mg/dL / Nitrite: Negative   Leuk Esterase: Negative / RBC: x / WBC x   Sq Epi: x / Non Sq Epi: x / Bacteria: x      LIVER FUNCTIONS - ( 29 Mar 2018 08:43 )  Alb: 3.6 g/dL / Pro: 6.7 g/dL / ALK PHOS: 77 U/L / ALT: 15 U/L / AST: 21 U/L / GGT: x                  ABG - ( 29 Mar 2018 15:19 )  pH: 7.35  /  pCO2: 57    /  pO2: 94    / HCO3: 28    / Base Excess: 4.4   /  SaO2: 98               ============================   EXAM:  XR CHEST PA LAT 2V                        PROCEDURE DATE:  2018    INTERPRETATION:    CHEST X-RAY PA AND LATERAL:  History: Cough and fever.  Date and time of exam: 3/29/2018 8:56 AM.  Comparison exam: 2017.  Technique: PA and lateral views of the chest were obtained.  Findings:  There is a right lower lobe infiltrate. The left lung is clear. The heart is enlarged. No hilar or mediastinal abnormality. No evidence of pleural effusion. Post sternotomy wires are noted.    Impression:  Right lower lobe infiltrate..        DAVON ALANIS M.D., ATTENDING RADIOLOGIST  This document has been electronically signed. Mar 29 2018  9:53AM                 NPP INFECTIOUS DISEASES AND INTERNAL MEDICINE at [place]  =======================================================  [facility] MD [facility]   Holger Leija MD  Diplomates American Board of Internal Medicine and Infectious Diseases  =======================================================    N-[id number]  DAVID VINCENT   This is a 82 y/o male with COPD on 3L/m home oxygen, chronic blood loss anemia, GIB, ckd stage 4, DM-2, HTN, HLD, Chronic LE edema, Afib on Xarelto, CAD s/p CABG presents from home with greenish productive cough, chills, body aches, pleuritic chest pain/ back pain x 2 days. Pt is not feeling well since being discharged from [facility] recently with URI symptoms since discharge.     of note, patient was at [facility] from 3/13/18 - 3/16/18 for neck pain. he was found with acute on chronic anemia, no active bleeding and transfused with 3 units of packed cells. He was also given 3 days of Ceftriaxone for UTI.     The back pain/ chest pain is worse with coughing.  Workup in the ER with CXR showed RLL infiltrate.   His aide was sick.     =======================================================  Past Medical & Surgical Hx:  =====================  PAST MEDICAL & SURGICAL HISTORY:  COPD (chronic obstructive pulmonary disease)  Hyperlipidemia, unspecified hyperlipidemia type  Essential hypertension  Afib  Chronic systolic CHF (congestive heart failure)  Hypertrophic cardiomyopathy  CKD (chronic kidney disease), stage 4 (severe)  Gassiness  Diabetes  Asthma  Prostate cancer  Hypertension  No significant past surgical history  History of valvuloplasty  History of knee surgery      Problem List:  ==========  HEALTH ISSUES - PROBLEM Dx:    Social Hx:  =======  no toxic habits currently    Family History:  no significant family history of immunosuppressive disorders.  FAMILY HISTORY:  No pertinent family history in first degree relatives  No pertinent family history in first degree relatives    Allergies  No Known Allergies  Intolerances    MEDICATIONS  (STANDING):  ALBUTerol/ipratropium for Nebulization 3 milliLiter(s) Nebulizer every 6 hours  amiodarone    Tablet 200 milliGRAM(s) Oral daily  aspirin  chewable 81 milliGRAM(s) Oral daily  atorvastatin 10 milliGRAM(s) Oral at bedtime  ferrous    sulfate 325 milliGRAM(s) Oral daily  furosemide    Tablet 40 milliGRAM(s) Oral daily  piperacillin/tazobactam IVPB. 3.375 Gram(s) IV Intermittent every 8 hours  rivaroxaban 15 milliGRAM(s) Oral every 24 hours  saccharomyces boulardii 250 milliGRAM(s) Oral two times a day  vancomycin  IVPB 1000 milliGRAM(s) IV Intermittent every 24 hours    MEDICATIONS  (PRN):  acetaminophen   Tablet. 650 milliGRAM(s) Oral every 6 hours PRN Mild Pain (1 - 3)        =======================================================  REVIEW OF SYSTEMS:  as above    ======================================================  Physical Exam:  ============  Vital Signs Last 24 Hrs  T(C): 36.5 (29 Mar 2018 16:03), Max: 37.2 (29 Mar 2018 09:26)  T(F): 97.7 (29 Mar 2018 16:03), Max: 99 (29 Mar 2018 09:26)  HR: 126 (29 Mar 2018 16:03) (62 - 126)  BP: 107/72 (29 Mar 2018 16:03) (101/68 - 146/94)  RR: 18 (29 Mar 2018 16:03) (18 - 22)  SpO2: 98% (29 Mar 2018 16:03) (94% - 100%)  Height (cm): 157.48 ( @ 06:44)  Weight (kg): 90.7 ( @ 06:44)  BMI (kg/m2): 36.6 ( @ 06:44)  BSA (m2): 1.91 ( @ 06:44)    General: Alert and oriented, No acute distress.  obese  Eye: Pupils are equal, round and reactive to light, Extraocular movements are intact, Normal conjunctiva.  HENT: Normocephalic, Oral mucosa is moist, No pharyngeal erythema, No sinus tenderness.  Neck: Supple, No lymphadenopathy.  Respiratory: coarse breath sounds, decreased at RIGHT base  Cardiovascular: Normal rate, Regular rhythm, No murmur, Good pulses equal in all extremities, No edema.  Gastrointestinal: Soft, Non-tender, Non-distended, Normal bowel sounds.  Genitourinary: No costovertebral angle tenderness.  Musculoskeletal: Normal range of motion, Normal strength.  Integumentary: No rash.  Neurologic: Alert, Oriented, No focal deficits, Cranial Nerves II-XII are grossly intact.  Psychiatric: Appropriate mood & affect.      =======================================================  Labs:  ====      135  |  90<L>  |  27.0<H>  ----------------------------<  123<H>  4.3   |  31.0<H>  |  1.89<H>    Ca    9.0      29 Mar 2018 08:43    TPro  6.7  /  Alb  3.6  /  TBili  1.2  /  DBili  x   /  AST  21  /  ALT  15  /  AlkPhos  77                            10.1   9.7   )-----------( 152      ( 29 Mar 2018 08:43 )             33.1         Urinalysis Basic - ( 29 Mar 2018 15:49 )    Color: Yellow / Appearance: Clear / S.015 / pH: x  Gluc: x / Ketone: Negative  / Bili: Negative / Urobili: Negative mg/dL   Blood: x / Protein: Negative mg/dL / Nitrite: Negative   Leuk Esterase: Negative / RBC: x / WBC x   Sq Epi: x / Non Sq Epi: x / Bacteria: x      LIVER FUNCTIONS - ( 29 Mar 2018 08:43 )  Alb: 3.6 g/dL / Pro: 6.7 g/dL / ALK PHOS: 77 U/L / ALT: 15 U/L / AST: 21 U/L / GGT: x                  ABG - ( 29 Mar 2018 15:19 )  pH: 7.35  /  pCO2: 57    /  pO2: 94    / HCO3: 28    / Base Excess: 4.4   /  SaO2: 98               ============================   EXAM:  XR CHEST PA LAT 2V                        PROCEDURE DATE:  2018    INTERPRETATION:    CHEST X-RAY PA AND LATERAL:  History: Cough and fever.  Date and time of exam: 3/29/2018 8:56 AM.  Comparison exam: 2017.  Technique: PA and lateral views of the chest were obtained.  Findings:  There is a right lower lobe infiltrate. The left lung is clear. The heart is enlarged. No hilar or mediastinal abnormality. No evidence of pleural effusion. Post sternotomy wires are noted.    Impression:  Right lower lobe infiltrate..        DAVON ALANIS M.D., ATTENDING RADIOLOGIST  This document has been electronically signed. Mar 29 2018  9:53AM

## 2018-03-29 NOTE — CONSULT NOTE ADULT - SUBJECTIVE AND OBJECTIVE BOX
PULMONARY CONSULT NOTE      LILY VINCENTJOE-[id number]    Patient is a 83y old  Male who presents with a chief complaint of cough, chills (29 Mar 2018 13:21)      HISTORY OF PRESENT ILLNESS: Hx of restrictive lung disease, obesity, OHS, no COPD seen on PFTs, on home 02 3 liters cont, chronic blood loss anemia, GIB, ckd stage 4, DM-2, HTN, HLD, Chronic LE edema, Afib on Xarelto, CAD s/p CABG presents from home with greenish productive cough, chills, body aches, pleuritic chest pain/ back pain x 2 days. Pt has not been feeling well since being discharged from [facility] recently with URI symptoms since discharge. The back pain/ chest pain is worse with coughing. Denies any  palpitations,  light headedness/dizziness, fevers, abdominal pain, n/v, diarrhea/constipation, dysuria or increased urinary frequency, worsening LE edema, PND, orthopnea. Was not requiring more O2 than usual. CXR with RLL infiltrate. Pt received IV steroids & IV abx. Feeling better today with breathing but still with back pain.        MEDICATIONS  (STANDING):  ALBUTerol/ipratropium for Nebulization 3 milliLiter(s) Nebulizer every 6 hours  amiodarone    Tablet 200 milliGRAM(s) Oral daily  aspirin  chewable 81 milliGRAM(s) Oral daily  atorvastatin 10 milliGRAM(s) Oral at bedtime  ferrous    sulfate 325 milliGRAM(s) Oral daily  furosemide    Tablet 40 milliGRAM(s) Oral daily  piperacillin/tazobactam IVPB. 3.375 Gram(s) IV Intermittent every 8 hours  rivaroxaban 15 milliGRAM(s) Oral every 24 hours  saccharomyces boulardii 250 milliGRAM(s) Oral two times a day  vancomycin  IVPB 1000 milliGRAM(s) IV Intermittent every 24 hours      MEDICATIONS  (PRN):      Allergies    No Known Allergies    Intolerances        PAST MEDICAL & SURGICAL HISTORY:  COPD (chronic obstructive pulmonary disease)  Hyperlipidemia, unspecified hyperlipidemia type  Essential hypertension  Afib  Chronic systolic CHF (congestive heart failure)  Hypertrophic cardiomyopathy  CKD (chronic kidney disease), stage 4 (severe)  Gassiness  Diabetes  Asthma  Prostate cancer  Hypertension  No significant past surgical history  History of valvuloplasty  History of knee surgery      FAMILY HISTORY:  No pertinent family history in first degree relatives  No pertinent family history in first degree relatives      SOCIAL HISTORY  Smoking History: former smoker    REVIEW OF SYSTEMS:    CONSTITUTIONAL:  No fevers, chills, sweats    HEENT:  Eyes:  No diplopia or blurred vision. ENT:  No earache, sore throat or runny nose.    CARDIOVASCULAR:  No pressure, squeezing, tightness, or heaviness about the chest; no palpitations.    RESPIRATORY: per HPI      GASTROINTESTINAL:  No abdominal pain, nausea, vomiting or diarrhea.    GENITOURINARY:  No dysuria, frequency or urgency.    NEUROLOGIC:  No paresthesias, fasciculations, seizures or weakness.    PSYCHIATRIC:  No disorder of thought or mood.    Vital Signs Last 24 Hrs  T(C): 36.6 (29 Mar 2018 13:08), Max: 37.2 (29 Mar 2018 09:26)  T(F): 97.9 (29 Mar 2018 13:08), Max: 99 (29 Mar 2018 09:26)  HR: 110 (29 Mar 2018 13:08) (62 - 126)  BP: 101/68 (29 Mar 2018 13:08) (101/68 - 146/94)  BP(mean): --  RR: 19 (29 Mar 2018 13:08) (18 - 22)  SpO2: 94% (29 Mar 2018 13:08) (94% - 100%)    PHYSICAL EXAMINATION:    GENERAL: The patient is  in no apparent distress.     HEENT: Head is normocephalic and atraumatic. Extraocular muscles are intact. Mucous membranes are moist.     NECK: Supple.     LUNGS: scattered b/l wheeze, no rales, or rhonchi. Respirations unlabored    HEART: Regular rate and rhythm  r.    ABDOMEN: Soft, nontender, and obese.    EXTREMITIES: Without any cyanosis, clubbing, rash, lesions or edema.    NEUROLOGIC: Grossly intact.      LABS:                        10.1   9.7   )-----------( 152      ( 29 Mar 2018 08:43 )             33.1     03-29    135  |  90<L>  |  27.0<H>  ----------------------------<  123<H>  4.3   |  31.0<H>  |  1.89<H>    Ca    9.0      29 Mar 2018 08:43    TPro  6.7  /  Alb  3.6  /  TBili  1.2  /  DBili  x   /  AST  21  /  ALT  15  /  AlkPhos  77  03-29                  RADIOLOGY & ADDITIONAL STUDIES:  < from: Xray Chest 2 Views PA/Lat (03.29.18 @ 09:06) >   EXAM:  XR CHEST PA LAT 2V                          PROCEDURE DATE:  03/29/2018          INTERPRETATION:    CHEST X-RAY PA AND LATERAL:    History: Cough and fever.    Date and time of exam: 3/29/2018 8:56 AM.    Comparison exam: 12/24/2017.    Technique: PA and lateral views of the chest were obtained.    Findings:  There is a right lower lobe infiltrate. The left lung is clear. The heart   is enlarged. No hilar or mediastinal abnormality. No evidence of pleural   effusion. Post sternotomy wires are noted.    Impression:  Right lower lobe infiltrate..                DAVON ALANIS M.D., ATTENDING RADIOLOGIST    < end of copied text >  < from: CT Chest No Cont (12.24.17 @ 14:08) >    PREVIOUS CT CHEST:                           PROCEDURE DATE:  12/24/2017          INTERPRETATION:  TECHNIQUE: Noncontrast CT chest    COMPARISON: None    CLINICAL HISTORY: Shortness of breath and fever    FINDINGS:    The lungs are clear. No pneumonia, CHF, pleural effusion, pneumothorax.    The heart is enlarged. Status post CABG procedure. Visualized thyroid   gland is unremarkable. Ascending aorta measures 4.9 cm in diameter. The   aortic knob measures 4.2 cm in diameter. Dense coronary artery   calcifications. Dense aortic valve calcifications.     Limited visualized upper abdomen shows moderate bilateral nonspecific   perinephric stranding. No acute osseous abnormality.    IMPRESSION:  No pneumonia.                 JORDAN HERNANDEZ M.D., ATTENDING RADIOLOGIST    < end of copied text >

## 2018-03-29 NOTE — H&P ADULT - HISTORY OF PRESENT ILLNESS
84 y/o male with history of COPD on home 02 3 liters cont, chronic blood loss anemia, GIB, ckd stage 4, DM-2, HTN, HLD, Chronic LE edema, Afib on Xarelto, CAD s/p CABG presents from home with greenish productive cough, chills, body aches, pleuritic chest pain/ back pain x 2 days. Pt has not been feeling well since being discharged from [facility] recently with URI symptoms since discharge. The back pain/ chest pain is worse with coughing. Pt's daughter is at bedside who is giving much of the history. Pt is Setsw[place] speaking. Denies any  palpitations,  light headedness/dizziness, fevers, abdominal pain, n/v, diarrhea/constipation, dysuria or increased urinary frequency, worsening LE edema, PND, orthopnea. Was not requiring more O2 than usual. CXR with RLL infiltrate. Pt received IV steroids & IV abx. ABG pending.

## 2018-03-29 NOTE — H&P ADULT - ASSESSMENT
82 y/o male with history of COPD on home 02 3 liters cont, chronic blood loss anemia, GIB, ckd stage 4, DM-2, HTN, HLD, Chronic LE edema, Afib on Xarelto, CAD s/p CABG presents from home with greenish productive cough, chills, body aches, pleuritic chest pain/ back pain x 2 days. Pt has not been feeling well since being discharged from [facility] recently with URI symptoms since discharge. The back pain/ chest pain is worse with coughing. Pt's daughter is at bedside who is giving much of the history. Pt is Slovenian speaking. Denies any  palpitations,  light headedness/dizziness, fevers, abdominal pain, n/v, diarrhea/constipation, dysuria or increased urinary frequency, worsening LE edema, PND, orthopnea. Was not requiring more O2 than usual. CXR with RLL infiltrate. Pt received IV steroids & IV abx. ABG pending.         >RLL PNA (HCAP Gram +ve/-ve) causing COPD exacerbation- Admit, pt received Azithromycin course ion discharge recently. Will start Zosyn IV, sputum cx, blood cx, UA, Ucx, legionella ag urine, ABG, IV steroids, nebs, O2 prn, LI lung called    >chronic blood loss anemia from GIB- not candidate for sedation & colonoscopy as per GI notes on last admission, monitor H/H    >ckd stage 4- avoid nephrotoxics  >hx of DM-2- A1c 5.5, non diabetic  >HTN/ HLD- c/w home meds  >CHF- appears euvolemic, c/w home lasix  > Afib on Xarelto- c/w home meds  >CAD s/p CABG- c/w home meds    DVT ppx on AC 82 y/o male with history of COPD on home 02 3 liters cont, chronic blood loss anemia, GIB, ckd stage 4, DM-2, HTN, HLD, Chronic LE edema, Afib on Xarelto, CAD s/p CABG presents from home with greenish productive cough, chills, body aches, pleuritic chest pain/ back pain x 2 days. Pt has not been feeling well since being discharged from [facility] recently with URI symptoms since discharge. The back pain/ chest pain is worse with coughing. Pt's daughter is at bedside who is giving much of the history. Pt is Icelandic speaking. Denies any  palpitations,  light headedness/dizziness, fevers, abdominal pain, n/v, diarrhea/constipation, dysuria or increased urinary frequency, worsening LE edema, PND, orthopnea. Was not requiring more O2 than usual. CXR with RLL infiltrate. Pt received IV steroids & IV abx. ABG pending.         >RLL PNA (HCAP Gram +ve/-ve) causing COPD exacerbation- Admit, pt received Azithromycin course ion discharge recently. Pt received IV Vanco x 1 dose in the ED. Will start Zosyn IV, sputum cx, blood cx, UA, Ucx, legionella ag urine, ABG, IV steroids, nebs, O2 prn, LI lung called    >chronic blood loss anemia from GIB- not candidate for sedation & colonoscopy as per GI notes on last admission, monitor H/H    >ckd stage 4- avoid nephrotoxics  >hx of DM-2- A1c 5.5, non diabetic  >HTN/ HLD- c/w home meds  >CHF- appears euvolemic, c/w home lasix  > Afib on Xarelto- c/w home meds  >CAD s/p CABG- c/w home meds    DVT ppx on AC

## 2018-03-29 NOTE — ED PROVIDER NOTE - OBJECTIVE STATEMENT
82 y/o M with a hx of COPD, asthma, DM, HTN, and anemia presents to the ED c/o back pain that started last night. This morning pt's back pain has worsened and now has chills and a subjective fever. Pt states his back pain is hard. When he breaths it worsens. He has a productive cough. His back pain does not radiate. Has had symptoms in past. no hx of back problems or herniated disc. no hx of kidney stones. Had a lapsed valve and had heart surgery 6 years ago. no recent fall. No other complaints at this time.

## 2018-03-29 NOTE — PROGRESS NOTE ADULT - SUBJECTIVE AND OBJECTIVE BOX
PULMONARY ADDENDUM    ABG done:   Blood Gas Profile - Arterial (03.29.18 @ 15:19)    pH, Arterial: 7.35    pCO2, Arterial: 57 mmHg    pO2, Arterial: 94 mmHg    HCO3, Arterial: 28 mmoL/L    Base Excess, Arterial: 4.4 mmol/L    Oxygen Saturation, Arterial: 98 %    FIO2, Arterial: 3L    Blood Gas Comments Arterial: 3L nasal cannula    Blood Gas Source Arterial: Arterial    IMP:  Compensated hypercapnic resp failure.     RECC:  Will start noct BPAP. D/W RT.

## 2018-03-29 NOTE — ED ADULT NURSE NOTE - OBJECTIVE STATEMENT
pt alert and awake x3, arrived to ED with lower back and SOB since yesterday, pt has hx of COPD, breathing slightly labored, Rhonchi/rales heard with some slight wheezes bilat, denies abd pain, abd soft, denies chest pain, oxygen applied 3 LPM, tolerating well, edema present bilat lower extremities 2+, will continue to monitor.

## 2018-03-29 NOTE — ED ADULT TRIAGE NOTE - CHIEF COMPLAINT QUOTE
Pt c/o lower back pain x's 2 days, hx of copd with slightly labored breathing, denies chest pain, denies trauma, denies pain/burning upon urination

## 2018-03-30 DIAGNOSIS — A49.1 STREPTOCOCCAL INFECTION, UNSPECIFIED SITE: ICD-10-CM

## 2018-03-30 LAB
ANION GAP SERPL CALC-SCNC: 13 MMOL/L — SIGNIFICANT CHANGE UP (ref 5–17)
ANISOCYTOSIS BLD QL: SLIGHT — SIGNIFICANT CHANGE UP
BUN SERPL-MCNC: 35 MG/DL — HIGH (ref 8–20)
CALCIUM SERPL-MCNC: 8.8 MG/DL — SIGNIFICANT CHANGE UP (ref 8.6–10.2)
CHLORIDE SERPL-SCNC: 91 MMOL/L — LOW (ref 98–107)
CO2 SERPL-SCNC: 31 MMOL/L — HIGH (ref 22–29)
CREAT SERPL-MCNC: 1.85 MG/DL — HIGH (ref 0.5–1.3)
ELLIPTOCYTES BLD QL SMEAR: SLIGHT — SIGNIFICANT CHANGE UP
GLUCOSE SERPL-MCNC: 213 MG/DL — HIGH (ref 70–115)
HCT VFR BLD CALC: 30.3 % — LOW (ref 42–52)
HGB BLD-MCNC: 9.3 G/DL — LOW (ref 14–18)
HYPOCHROMIA BLD QL: SLIGHT — SIGNIFICANT CHANGE UP
LYMPHOCYTES # BLD AUTO: 8 % — LOW (ref 20–55)
MACROCYTES BLD QL: SLIGHT — SIGNIFICANT CHANGE UP
MAGNESIUM SERPL-MCNC: 2 MG/DL — SIGNIFICANT CHANGE UP (ref 1.6–2.6)
MCHC RBC-ENTMCNC: 23.6 PG — LOW (ref 27–31)
MCHC RBC-ENTMCNC: 30.7 G/DL — LOW (ref 32–36)
MCV RBC AUTO: 76.9 FL — LOW (ref 80–94)
MICROCYTES BLD QL: SLIGHT — SIGNIFICANT CHANGE UP
MONOCYTES NFR BLD AUTO: 4 % — SIGNIFICANT CHANGE UP (ref 3–10)
NEUTROPHILS NFR BLD AUTO: 88 % — HIGH (ref 37–73)
OVALOCYTES BLD QL SMEAR: SLIGHT — SIGNIFICANT CHANGE UP
PHOSPHATE SERPL-MCNC: 4.3 MG/DL — SIGNIFICANT CHANGE UP (ref 2.4–4.7)
PLAT MORPH BLD: NORMAL — SIGNIFICANT CHANGE UP
PLATELET # BLD AUTO: 101 K/UL — LOW (ref 150–400)
POIKILOCYTOSIS BLD QL AUTO: SLIGHT — SIGNIFICANT CHANGE UP
POTASSIUM SERPL-MCNC: 4.2 MMOL/L — SIGNIFICANT CHANGE UP (ref 3.5–5.3)
POTASSIUM SERPL-SCNC: 4.2 MMOL/L — SIGNIFICANT CHANGE UP (ref 3.5–5.3)
RBC # BLD: 3.94 M/UL — LOW (ref 4.6–6.2)
RBC # FLD: 20.2 % — HIGH (ref 11–15.6)
RBC BLD AUTO: ABNORMAL
SODIUM SERPL-SCNC: 135 MMOL/L — SIGNIFICANT CHANGE UP (ref 135–145)
WBC # BLD: 10.2 K/UL — SIGNIFICANT CHANGE UP (ref 4.8–10.8)
WBC # FLD AUTO: 10.2 K/UL — SIGNIFICANT CHANGE UP (ref 4.8–10.8)

## 2018-03-30 PROCEDURE — 99232 SBSQ HOSP IP/OBS MODERATE 35: CPT

## 2018-03-30 PROCEDURE — 93306 TTE W/DOPPLER COMPLETE: CPT | Mod: 26

## 2018-03-30 PROCEDURE — 99233 SBSQ HOSP IP/OBS HIGH 50: CPT

## 2018-03-30 RX ORDER — VANCOMYCIN HCL 1 G
1000 VIAL (EA) INTRAVENOUS ONCE
Qty: 0 | Refills: 0 | Status: DISCONTINUED | OUTPATIENT
Start: 2018-03-30 | End: 2018-03-30

## 2018-03-30 RX ORDER — LIDOCAINE 4 G/100G
1 CREAM TOPICAL EVERY 24 HOURS
Qty: 0 | Refills: 0 | Status: DISCONTINUED | OUTPATIENT
Start: 2018-03-30 | End: 2018-04-06

## 2018-03-30 RX ORDER — DOCUSATE SODIUM 100 MG
100 CAPSULE ORAL
Qty: 0 | Refills: 0 | Status: DISCONTINUED | OUTPATIENT
Start: 2018-03-30 | End: 2018-04-06

## 2018-03-30 RX ORDER — PHENYLEPHRINE-SHARK LIVER OIL-MINERAL OIL-PETROLATUM RECTAL OINTMENT
1 OINTMENT (GRAM) RECTAL THREE TIMES A DAY
Qty: 0 | Refills: 0 | Status: DISCONTINUED | OUTPATIENT
Start: 2018-03-30 | End: 2018-04-06

## 2018-03-30 RX ORDER — ASCORBIC ACID 60 MG
500 TABLET,CHEWABLE ORAL DAILY
Qty: 0 | Refills: 0 | Status: DISCONTINUED | OUTPATIENT
Start: 2018-03-30 | End: 2018-04-06

## 2018-03-30 RX ORDER — SENNA PLUS 8.6 MG/1
2 TABLET ORAL AT BEDTIME
Qty: 0 | Refills: 0 | Status: DISCONTINUED | OUTPATIENT
Start: 2018-03-30 | End: 2018-04-06

## 2018-03-30 RX ORDER — POLYETHYLENE GLYCOL 3350 17 G/17G
17 POWDER, FOR SOLUTION ORAL DAILY
Qty: 0 | Refills: 0 | Status: DISCONTINUED | OUTPATIENT
Start: 2018-03-30 | End: 2018-04-06

## 2018-03-30 RX ADMIN — Medication 3 MILLILITER(S): at 09:24

## 2018-03-30 RX ADMIN — Medication 60 MILLIGRAM(S): at 18:47

## 2018-03-30 RX ADMIN — Medication 250 MILLIGRAM(S): at 18:48

## 2018-03-30 RX ADMIN — Medication 500 MILLIGRAM(S): at 18:43

## 2018-03-30 RX ADMIN — RIVAROXABAN 15 MILLIGRAM(S): KIT at 18:48

## 2018-03-30 RX ADMIN — Medication 3 MILLILITER(S): at 03:54

## 2018-03-30 RX ADMIN — Medication 650 MILLIGRAM(S): at 14:01

## 2018-03-30 RX ADMIN — Medication 40 MILLIGRAM(S): at 05:16

## 2018-03-30 RX ADMIN — Medication 3 MILLILITER(S): at 20:19

## 2018-03-30 RX ADMIN — AMIODARONE HYDROCHLORIDE 200 MILLIGRAM(S): 400 TABLET ORAL at 05:16

## 2018-03-30 RX ADMIN — PIPERACILLIN AND TAZOBACTAM 25 GRAM(S): 4; .5 INJECTION, POWDER, LYOPHILIZED, FOR SOLUTION INTRAVENOUS at 15:03

## 2018-03-30 RX ADMIN — Medication 650 MILLIGRAM(S): at 05:16

## 2018-03-30 RX ADMIN — Medication 100 MILLIGRAM(S): at 18:43

## 2018-03-30 RX ADMIN — Medication 650 MILLIGRAM(S): at 15:05

## 2018-03-30 RX ADMIN — ATORVASTATIN CALCIUM 10 MILLIGRAM(S): 80 TABLET, FILM COATED ORAL at 21:30

## 2018-03-30 RX ADMIN — LIDOCAINE 1 PATCH: 4 CREAM TOPICAL at 12:34

## 2018-03-30 RX ADMIN — Medication 60 MILLIGRAM(S): at 05:15

## 2018-03-30 RX ADMIN — SENNA PLUS 2 TABLET(S): 8.6 TABLET ORAL at 21:30

## 2018-03-30 RX ADMIN — Medication 650 MILLIGRAM(S): at 21:31

## 2018-03-30 RX ADMIN — Medication 250 MILLIGRAM(S): at 05:16

## 2018-03-30 RX ADMIN — Medication 650 MILLIGRAM(S): at 06:00

## 2018-03-30 RX ADMIN — Medication 81 MILLIGRAM(S): at 15:04

## 2018-03-30 RX ADMIN — PIPERACILLIN AND TAZOBACTAM 25 GRAM(S): 4; .5 INJECTION, POWDER, LYOPHILIZED, FOR SOLUTION INTRAVENOUS at 22:49

## 2018-03-30 RX ADMIN — Medication 650 MILLIGRAM(S): at 22:31

## 2018-03-30 RX ADMIN — Medication 325 MILLIGRAM(S): at 15:04

## 2018-03-30 RX ADMIN — PIPERACILLIN AND TAZOBACTAM 25 GRAM(S): 4; .5 INJECTION, POWDER, LYOPHILIZED, FOR SOLUTION INTRAVENOUS at 05:15

## 2018-03-30 NOTE — PROGRESS NOTE ADULT - SUBJECTIVE AND OBJECTIVE BOX
DAVID VINCENT Patient is a 83y old  Male who presents with a chief complaint of cough, chills (29 Mar 2018 13:21)     HPI:  82 y/o male with history of COPD on home 02 3 liters cont, chronic blood loss anemia, GIB, ckd stage 4, DM-2, HTN, HLD, Chronic LE edema, Afib on Xarelto, CAD s/p CABG presents from home with greenish productive cough, chills, body aches, pleuritic chest pain/ back pain x 2 days. Pt has not been feeling well since being discharged from [facility] recently with URI symptoms since discharge. The back pain/ chest pain is worse with coughing. Pt's daughter is at bedside who is giving much of the history. Pt is French speaking. Denies any  palpitations,  light headedness/dizziness, fevers, abdominal pain, n/v, diarrhea/constipation, dysuria or increased urinary frequency, worsening LE edema, PND, orthopnea. Was not requiring more O2 than usual. CXR with RLL infiltrate. Pt received IV steroids & IV abx. ABG pending. (29 Mar 2018 13:21)    HPI 3/30/18:    Patient seen and examined at the bedside. He appears in NAD. Sitting up on the edge of the bed eating breakfast. He complains of low back pain. He denies fevers, chills, nausea, vomiting, CP, SOB, diarrhea, constipation, or abdominal pain.     I&O's Summary    Allergies    No Known Allergies    Intolerances      HEALTH ISSUES - PROBLEM Dx:  Bacterial infection due to Streptococcus: Bacterial infection due to Streptococcus  CKD (chronic kidney disease), stage IV: CKD (chronic kidney disease), stage IV  Chronic obstructive pulmonary disease, unspecified COPD type: Chronic obstructive pulmonary disease, unspecified COPD type  Pneumonia due to infectious organism, unspecified laterality, unspecified part of lung: Pneumonia due to infectious organism, unspecified laterality, unspecified part of lung        PAST MEDICAL & SURGICAL HISTORY:  COPD (chronic obstructive pulmonary disease)  Hyperlipidemia, unspecified hyperlipidemia type  Essential hypertension  Afib  Chronic systolic CHF (congestive heart failure)  Hypertrophic cardiomyopathy  CKD (chronic kidney disease), stage 4 (severe)  Gassiness  Diabetes  Asthma  Prostate cancer  Hypertension  No significant past surgical history  History of valvuloplasty  History of knee surgery          Vital Signs Last 24 Hrs  T(C): 36.8 (30 Mar 2018 10:03), Max: 36.8 (29 Mar 2018 23:17)  T(F): 98.3 (30 Mar 2018 10:03), Max: 98.3 (30 Mar 2018 10:03)  HR: 101 (30 Mar 2018 10:03) (69 - 126)  BP: 105/64 (30 Mar 2018 10:03) (105/64 - 111/68)  RR: 16 (30 Mar 2018 10:03) (16 - 18)  SpO2: 95% (30 Mar 2018 10:03) (94% - 99%)    PHYSICAL EXAM:    GENERAL: NAD, obese  HEAD:  Atraumatic, Normocephalic  EYES: EOMI, PERRLA, conjunctiva and sclera clear  ENMT:  Moist mucous membranes,  No lesions  NERVOUS SYSTEM:  Alert & Oriented X3,  Moves upper and lower extremities  CHEST/LUNG: Good air movement, mild rhonchi heard Right base   HEART: Irregularly irregular, no murmurs, rubs or gallops  ABDOMEN: Soft, Nontender, Nondistended; Bowel sounds present  BACK: no pain to palpation of spinous processes, no pain to palpation of back musculature   EXTREMITIES:  Peripheral Pulses, No  cyanosis, or edema      acetaminophen   Tablet. 650 milliGRAM(s) Oral every 6 hours PRN  ALBUTerol/ipratropium for Nebulization 3 milliLiter(s) Nebulizer every 6 hours  amiodarone    Tablet 200 milliGRAM(s) Oral daily  aspirin  chewable 81 milliGRAM(s) Oral daily  atorvastatin 10 milliGRAM(s) Oral at bedtime  ferrous    sulfate 325 milliGRAM(s) Oral daily  furosemide    Tablet 40 milliGRAM(s) Oral daily  guaiFENesin ER 1200 milliGRAM(s) Oral every 12 hours  hemorrhoidal Ointment 1 Application(s) Rectal three times a day  lidocaine   Patch 1 Patch Transdermal every 24 hours  methylPREDNISolone sodium succinate Injectable 60 milliGRAM(s) IV Push every 12 hours  piperacillin/tazobactam IVPB. 3.375 Gram(s) IV Intermittent every 8 hours  rivaroxaban 15 milliGRAM(s) Oral every 24 hours  saccharomyces boulardii 250 milliGRAM(s) Oral two times a day      LABS:  ABG - ( 29 Mar 2018 15:19 )  pH: 7.35  /  pCO2: 57    /  pO2: 94    / HCO3: 28    / Base Excess: 4.4   /  SaO2: 98                                      9.3    10.2  )-----------( 101      ( 30 Mar 2018 07:54 )             30.3         135  |  91<L>  |  35.0<H>  ----------------------------<  213<H>  4.2   |  31.0<H>  |  1.85<H>    Ca    8.8      30 Mar 2018 07:54  Phos  4.3       Mg     2.0         TPro  6.7  /  Alb  3.6  /  TBili  1.2  /  DBili  x   /  AST  21  /  ALT  15  /  AlkPhos  77      LIVER FUNCTIONS - ( 29 Mar 2018 08:43 )  Alb: 3.6 g/dL / Pro: 6.7 g/dL / ALK PHOS: 77 U/L / ALT: 15 U/L / AST: 21 U/L / GGT: x                 Urinalysis Basic - ( 29 Mar 2018 15:49 )    Color: Yellow / Appearance: Clear / S.015 / pH: x  Gluc: x / Ketone: Negative  / Bili: Negative / Urobili: Negative mg/dL   Blood: x / Protein: Negative mg/dL / Nitrite: Negative   Leuk Esterase: Negative / RBC: x / WBC x   Sq Epi: x / Non Sq Epi: x / Bacteria: x      RADIOLOGY & ADDITIONAL TESTS:    EXAM:  XR CHEST PA LAT 2V                          PROCEDURE DATE:  2018          INTERPRETATION:    CHEST X-RAY PA AND LATERAL:    History: Cough and fever.    Date and time of exam: 3/29/2018 8:56 AM.    Comparison exam: 2017.    Technique: PA and lateral views of the chest were obtained.    Findings:  There is a right lower lobe infiltrate. The left lung is clear. The heart   is enlarged. No hilar or mediastinal abnormality. No evidence of pleural   effusion. Post sternotomy wires are noted.    Impression:  Right lower lobe infiltrate..            DAVON ALANIS M.D., ATTENDING RADIOLOGIST  This document has been electronically signed. Mar 29 2018  9:53AM      Consultant notes reviewed    Case discussed with consultant/provider/ family /patient DAVID VINCENT Patient is a 83y old  Male who presents with a chief complaint of cough, chills (29 Mar 2018 13:21)     HPI:  82 y/o male with history of COPD on home 02 3 liters cont, chronic blood loss anemia, GIB, ckd stage 4, DM-2, HTN, HLD, Chronic LE edema, Afib on Xarelto, CAD s/p CABG presents from home with greenish productive cough, chills, body aches, pleuritic chest pain/ back pain x 2 days. Pt has not been feeling well since being discharged from [facility] recently with URI symptoms since discharge. The back pain/ chest pain is worse with coughing. Pt's daughter is at bedside who is giving much of the history. Pt is [place] speaking. Denies any  palpitations,  light headedness/dizziness, fevers, abdominal pain, n/v, diarrhea/constipation, dysuria or increased urinary frequency, worsening LE edema, PND, orthopnea. Was not requiring more O2 than usual. CXR with RLL infiltrate. Pt received IV steroids & IV abx. ABG pending. (29 Mar 2018 13:21)    HPI 3/30/18:    Patient seen and examined at the bedside. He appears in NAD. Sitting up on the edge of the bed eating breakfast. He complains of low back pain, pleuritic in nature. He denies fevers, chills, nausea, vomiting, CP, SOB, diarrhea, constipation, or abdominal pain.     I&O's Summary    Allergies    No Known Allergies    Intolerances      HEALTH ISSUES - PROBLEM Dx:  Bacterial infection due to Streptococcus: Bacterial infection due to Streptococcus  CKD (chronic kidney disease), stage IV: CKD (chronic kidney disease), stage IV  Chronic obstructive pulmonary disease, unspecified COPD type: Chronic obstructive pulmonary disease, unspecified COPD type  Pneumonia due to infectious organism, unspecified laterality, unspecified part of lung: Pneumonia due to infectious organism, unspecified laterality, unspecified part of lung        PAST MEDICAL & SURGICAL HISTORY:  COPD (chronic obstructive pulmonary disease)  Hyperlipidemia, unspecified hyperlipidemia type  Essential hypertension  Afib  Chronic systolic CHF (congestive heart failure)  Hypertrophic cardiomyopathy  CKD (chronic kidney disease), stage 4 (severe)  Gassiness  Diabetes  Asthma  Prostate cancer  Hypertension  No significant past surgical history  History of valvuloplasty  History of knee surgery          Vital Signs Last 24 Hrs  T(C): 36.8 (30 Mar 2018 10:03), Max: 36.8 (29 Mar 2018 23:17)  T(F): 98.3 (30 Mar 2018 10:03), Max: 98.3 (30 Mar 2018 10:03)  HR: 101 (30 Mar 2018 10:03) (69 - 126)  BP: 105/64 (30 Mar 2018 10:03) (105/64 - 111/68)  RR: 16 (30 Mar 2018 10:03) (16 - 18)  SpO2: 95% (30 Mar 2018 10:03) (94% - 99%)    PHYSICAL EXAM:    GENERAL: NAD, obese  HEAD:  Atraumatic, Normocephalic  EYES: EOMI, PERRLA, conjunctiva and sclera clear  ENMT:  Moist mucous membranes,  No lesions  NERVOUS SYSTEM:  Alert & Oriented X3,  Moves upper and lower extremities  CHEST/LUNG: Good air movement, mild rhonchi heard Right base   HEART: Irregularly irregular, no murmurs, rubs or gallops  ABDOMEN: Soft, Nontender, Nondistended; Bowel sounds present  BACK: no pain to palpation of spinous processes, no pain to palpation of back musculature   EXTREMITIES:  Peripheral Pulses, No  cyanosis, or edema      acetaminophen   Tablet. 650 milliGRAM(s) Oral every 6 hours PRN  ALBUTerol/ipratropium for Nebulization 3 milliLiter(s) Nebulizer every 6 hours  amiodarone    Tablet 200 milliGRAM(s) Oral daily  aspirin  chewable 81 milliGRAM(s) Oral daily  atorvastatin 10 milliGRAM(s) Oral at bedtime  ferrous    sulfate 325 milliGRAM(s) Oral daily  furosemide    Tablet 40 milliGRAM(s) Oral daily  guaiFENesin ER 1200 milliGRAM(s) Oral every 12 hours  hemorrhoidal Ointment 1 Application(s) Rectal three times a day  lidocaine   Patch 1 Patch Transdermal every 24 hours  methylPREDNISolone sodium succinate Injectable 60 milliGRAM(s) IV Push every 12 hours  piperacillin/tazobactam IVPB. 3.375 Gram(s) IV Intermittent every 8 hours  rivaroxaban 15 milliGRAM(s) Oral every 24 hours  saccharomyces boulardii 250 milliGRAM(s) Oral two times a day      LABS:  ABG - ( 29 Mar 2018 15:19 )  pH: 7.35  /  pCO2: 57    /  pO2: 94    / HCO3: 28    / Base Excess: 4.4   /  SaO2: 98                                      9.3    10.2  )-----------( 101      ( 30 Mar 2018 07:54 )             30.3     03    135  |  91<L>  |  35.0<H>  ----------------------------<  213<H>  4.2   |  31.0<H>  |  1.85<H>    Ca    8.8      30 Mar 2018 07:54  Phos  4.3       Mg     2.0         TPro  6.7  /  Alb  3.6  /  TBili  1.2  /  DBili  x   /  AST  21  /  ALT  15  /  AlkPhos  77      LIVER FUNCTIONS - ( 29 Mar 2018 08:43 )  Alb: 3.6 g/dL / Pro: 6.7 g/dL / ALK PHOS: 77 U/L / ALT: 15 U/L / AST: 21 U/L / GGT: x                 Urinalysis Basic - ( 29 Mar 2018 15:49 )    Color: Yellow / Appearance: Clear / S.015 / pH: x  Gluc: x / Ketone: Negative  / Bili: Negative / Urobili: Negative mg/dL   Blood: x / Protein: Negative mg/dL / Nitrite: Negative   Leuk Esterase: Negative / RBC: x / WBC x   Sq Epi: x / Non Sq Epi: x / Bacteria: x      RADIOLOGY & ADDITIONAL TESTS:    EXAM:  XR CHEST PA LAT 2V                          PROCEDURE DATE:  2018          INTERPRETATION:    CHEST X-RAY PA AND LATERAL:    History: Cough and fever.    Date and time of exam: 3/29/2018 8:56 AM.    Comparison exam: 2017.    Technique: PA and lateral views of the chest were obtained.    Findings:  There is a right lower lobe infiltrate. The left lung is clear. The heart   is enlarged. No hilar or mediastinal abnormality. No evidence of pleural   effusion. Post sternotomy wires are noted.    Impression:  Right lower lobe infiltrate..            DAVON ALANIS M.D., ATTENDING RADIOLOGIST  This document has been electronically signed. Mar 29 2018  9:53AM      Consultant notes reviewed    Case discussed with consultant/provider/ family /patient

## 2018-03-30 NOTE — PROGRESS NOTE ADULT - ASSESSMENT
82 y/o male with history of COPD on home 02 3 liters cont, chronic blood loss anemia, GIB, ckd stage 4, DM-2, HTN, HLD, Chronic LE edema, Afib on Xarelto, CAD s/p CABG presents from home with greenish productive cough, chills, body aches, pleuritic chest pain/ back pain x 2 days. Patient was found to have pneumonia on CXR and started on treatment for HCAP with zosyn. He was started on steroids, nebulizers, and oxygen. Pulmonology and infectious disease was consulted. Blood cultures grew gram positive cocci in pairs and chains. ABG showed hypercapnia and pulm recommended nocturnal BiPAP and an outpatient KRYSTIN sleep study.       >RLL PNA (HCAP Gram +ve/-ve) causing COPD exacerbation- Continue vancomycin and zosyn renally dosed. Vancomycin once a day for now. Blood cultures with gram +ve cocci in lizette and chains f/u culture and sensitivity. Appreciate ID consult. Deescalate steroids to q12. Continue supplemental O2 and nebs. Add mucinex. Trend WBC and fever curve. Repeat blood cultures in AM.     >Low Back Pain - present for 2 weeks. Most likely muscular in nature. Lumbar spine x-ray with degenerative spondylosis. Lidocaine patch and tylenol for pain. Can do percocet if pain not controlled. Patient instructed to get out of bed to chair today for better back support.       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 Continue iron supplements. Add bowel regimen and vit C.     >ckd stage 4- avoid nephrotoxics, renally dose medications     >hx of DM-2- A1c 5.5, non diabetic    >HTN/ HLD- c/w home meds (lasix)    >CHF- appears euvolemic, c/w home lasix, last echo 2/2016, will obtain echocardiogram, consult cardio if echo abnormal from previous     > Afib on Xarelto- c/w amiodarone     >CAD s/p CABG- c/w ASA. xarelto, statin     DVT ppx on AC 84 y/o male with history of COPD on home 02 3 liters cont, chronic blood loss anemia, GIB, ckd stage 4, DM-2, HTN, HLD, Chronic LE edema, Afib on Xarelto, CAD s/p CABG presents from home with greenish productive cough, chills, body aches, pleuritic chest pain/ back pain x 2 days. Patient was found to have pneumonia on CXR and started on treatment for HCAP with zosyn. He was started on steroids, nebulizers, and oxygen. Pulmonology and infectious disease was consulted. Blood cultures grew gram positive cocci in pairs and chains (Streptococcus). ABG showed hypercapnia and pulm recommended nocturnal BiPAP and an outpatient KRYSTIN sleep study.       >RLL PNA (HCAP Gram +ve/-ve) causing COPD exacerbation/ Acute on chronic Hypoxic & hypercarbic RF- Continue zosyn renally dosed. Blood cultures with gram +ve cocci in pairss and chains (Streptoccus) f/u culture and sensitivity. Appreciate ID consult. Deescalate steroids to q12. Continue supplemental O2 and nebs. Add mucinex. Trend WBC and fever curve. Repeat blood cultures in AM.     >Low Back Pain, pleuritic in nature - present for 2 weeks. Lumbar spine x-ray with degenerative spondylosis. Lidocaine patch and tylenol for pain. Can do percocet if pain not controlled. 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 Continue iron supplements. Add bowel regimen and vit C.     >ckd stage 4- avoid nephrotoxics, renally dose medications     >hx of DM-2- A1c 5.5, non diabetic    >HTN/ HLD- c/w home meds (lasix)    >CHF- appears euvolemic, c/w home lasix, last echo 2/2016, will obtain echocardiogram    > Afib on Xarelto- c/w amiodarone     >CAD s/p CABG- c/w ASA. xarelto, statin     DVT ppx on AC

## 2018-03-30 NOTE — PROGRESS NOTE ADULT - SUBJECTIVE AND OBJECTIVE BOX
PULMONARY PROGRESS NOTE      KARLENE VINCENT-[id number]    Patient is a 83y old  Male who presents with a chief complaint of cough, chills (29 Mar 2018 13:21)      INTERVAL HPI/OVERNIGHT EVENTS:  Primary complaint is of right sided back pain  Some cough no sputum  Has +strep in blood    MEDICATIONS  (STANDING):  ALBUTerol/ipratropium for Nebulization 3 milliLiter(s) Nebulizer every 6 hours  amiodarone    Tablet 200 milliGRAM(s) Oral daily  ascorbic acid 500 milliGRAM(s) Oral daily  aspirin  chewable 81 milliGRAM(s) Oral daily  atorvastatin 10 milliGRAM(s) Oral at bedtime  docusate sodium 100 milliGRAM(s) Oral two times a day  ferrous    sulfate 325 milliGRAM(s) Oral daily  furosemide    Tablet 40 milliGRAM(s) Oral daily  guaiFENesin ER 1200 milliGRAM(s) Oral every 12 hours  hemorrhoidal Ointment 1 Application(s) Rectal three times a day  lidocaine   Patch 1 Patch Transdermal every 24 hours  methylPREDNISolone sodium succinate Injectable 60 milliGRAM(s) IV Push every 12 hours  piperacillin/tazobactam IVPB. 3.375 Gram(s) IV Intermittent every 8 hours  rivaroxaban 15 milliGRAM(s) Oral every 24 hours  saccharomyces boulardii 250 milliGRAM(s) Oral two times a day  senna 2 Tablet(s) Oral at bedtime      MEDICATIONS  (PRN):  acetaminophen   Tablet. 650 milliGRAM(s) Oral every 6 hours PRN Mild Pain (1 - 3)  polyethylene glycol 3350 17 Gram(s) Oral daily PRN Constipation      Allergies    No Known Allergies    Intolerances        PAST MEDICAL & SURGICAL HISTORY:  COPD (chronic obstructive pulmonary disease)  Hyperlipidemia, unspecified hyperlipidemia type  Essential hypertension  Afib  Chronic systolic CHF (congestive heart failure)  Hypertrophic cardiomyopathy  CKD (chronic kidney disease), stage 4 (severe)  Gassiness  Diabetes  Asthma  Prostate cancer  Hypertension  No significant past surgical history  History of valvuloplasty  History of knee surgery      SOCIAL HISTORY  Smoking History:       REVIEW OF SYSTEMS:    CONSTITUTIONAL:  No distress    HEENT:  Eyes:  No diplopia or blurred vision. ENT:  No earache, sore throat or runny nose.    CARDIOVASCULAR:  No pressure, squeezing, tightness, heaviness or aching about the chest; no palpitations.    RESPIRATORY:  Per HPI    GASTROINTESTINAL:  No nausea, vomiting or diarrhea.    GENITOURINARY:  No dysuria, frequency or urgency.    MUSCULOSKELETAL: Back pain    SKIN:  No new lesions.    NEUROLOGIC:  No paresthesias, fasciculations, seizures or weakness.    PSYCHIATRIC:  No disorder of thought or mood.    ENDOCRINE:  No heat or cold intolerance, polyuria or polydipsia.    HEMATOLOGICAL:  No easy bruising or bleeding.     Vital Signs Last 24 Hrs  T(C): 36.6 (30 Mar 2018 15:29), Max: 36.8 (29 Mar 2018 23:17)  T(F): 97.9 (30 Mar 2018 15:29), Max: 98.3 (30 Mar 2018 10:03)  HR: 68 (30 Mar 2018 15:29) (68 - 114)  BP: 101/67 (30 Mar 2018 15:29) (101/67 - 111/68)  BP(mean): --  RR: 18 (30 Mar 2018 15:29) (16 - 18)  SpO2: 96% (30 Mar 2018 15:29) (95% - 99%)    PHYSICAL EXAMINATION:    GENERAL: The patient is awake and alert in no apparent distress.     HEENT: Head is normocephalic and atraumatic. Extraocular muscles are intact. Mucous membranes are moist.    NECK: Supple.    LUNGS: Bronchial breath sounds and rhonchi RLL    HEART: Regular rate and rhythm without murmur.    ABDOMEN: Soft, nontender, and nondistended.      EXTREMITIES: Without any cyanosis, clubbing, rash, lesions or edema.    NEUROLOGIC: Grossly intact.    SKIN: No ulceration or induration present.      LABS:                        9.3    10.2  )-----------( 101      ( 30 Mar 2018 07:54 )             30.3     03-30    135  |  91<L>  |  35.0<H>  ----------------------------<  213<H>  4.2   |  31.0<H>  |  1.85<H>    Ca    8.8      30 Mar 2018 07:54  Phos  4.3     -30  Mg     2.0     30    TPro  6.7  /  Alb  3.6  /  TBili  1.2  /  DBili  x   /  AST  21  /  ALT  15  /  AlkPhos  77  03-29        Urinalysis Basic - ( 29 Mar 2018 15:49 )    Color: Yellow / Appearance: Clear / S.015 / pH: x  Gluc: x / Ketone: Negative  / Bili: Negative / Urobili: Negative mg/dL   Blood: x / Protein: Negative mg/dL / Nitrite: Negative   Leuk Esterase: Negative / RBC: x / WBC x   Sq Epi: x / Non Sq Epi: x / Bacteria: x      ABG - ( 29 Mar 2018 15:19 )  pH: 7.35  /  pCO2: 57    /  pO2: 94    / HCO3: 28    / Base Excess: 4.4   /  SaO2: 98          < from: Xray Chest 2 Views PA/Lat (18 @ 09:06) >   EXAM:  XR CHEST PA LAT 2V                          PROCEDURE DATE:  2018          INTERPRETATION:    CHEST X-RAY PA AND LATERAL:    History: Cough and fever.    Date and time of exam: 3/29/2018 8:56 AM.    Comparison exam: 2017.    Technique: PA and lateral views of the chest were obtained.    Findings:  There is a right lower lobe infiltrate. The left lung is clear. The heart   is enlarged. No hilar or mediastinal abnormality. No evidence of pleural   effusion. Post sternotomy wires are noted.    Impression:  Right lower lobe i    < end of copied text >                      MICROBIOLOGY:    RADIOLOGY & ADDITIONAL STUDIES:

## 2018-03-30 NOTE — PROVIDER CONTACT NOTE (CRITICAL VALUE NOTIFICATION) - SITUATION
MD was driving to hospital earlier and had seen report on arrival and addressed prior to this reported info, ABX were ordered

## 2018-03-30 NOTE — PROGRESS NOTE ADULT - SUBJECTIVE AND OBJECTIVE BOX
[facility] at [place]  =======================================================  Geremias Suarez MD [facility] CenterCANDY Leija MD  Diplomates American Board of Internal Medicine and Infectious Diseases  =======================================================    [facility]-[id number]  DAVID VINCENT   follow up for pneumonia.  labs / cultures reviewed.  strep species in blood cultures.   =======================================================        Allergies  No Known Allergies  Intolerances    MEDICATIONS  (STANDING):  ALBUTerol/ipratropium for Nebulization 3 milliLiter(s) Nebulizer every 6 hours  amiodarone    Tablet 200 milliGRAM(s) Oral daily  aspirin  chewable 81 milliGRAM(s) Oral daily  atorvastatin 10 milliGRAM(s) Oral at bedtime  ferrous    sulfate 325 milliGRAM(s) Oral daily  furosemide    Tablet 40 milliGRAM(s) Oral daily  guaiFENesin ER 1200 milliGRAM(s) Oral every 12 hours  hemorrhoidal Ointment 1 Application(s) Rectal three times a day  lidocaine   Patch 1 Patch Transdermal every 24 hours  methylPREDNISolone sodium succinate Injectable 60 milliGRAM(s) IV Push every 12 hours  piperacillin/tazobactam IVPB. 3.375 Gram(s) IV Intermittent every 8 hours  rivaroxaban 15 milliGRAM(s) Oral every 24 hours  saccharomyces boulardii 250 milliGRAM(s) Oral two times a day      =======================================================  REVIEW OF SYSTEMS:  as above    ======================================================  Physical Exam:  ============  Vital Signs Last 24 Hrs  T(C): 36.8 (30 Mar 2018 10:03), Max: 36.8 (29 Mar 2018 23:17)  T(F): 98.3 (30 Mar 2018 10:03), Max: 98.3 (30 Mar 2018 10:03)  HR: 101 (30 Mar 2018 10:03) (69 - 126)  BP: 105/64 (30 Mar 2018 10:03) (105/64 - 111/68)  R: 16 (30 Mar 2018 10:03) (16 - 18)  SpO2: 95% (30 Mar 2018 10:03) (94% - 99%)    General: Alert and oriented, No acute distress.  obese  Eye: Pupils are equal, round and reactive to light, Extraocular movements are intact, Normal conjunctiva.  HENT: Normocephalic, Oral mucosa is moist, No pharyngeal erythema, No sinus tenderness.  Neck: Supple, No lymphadenopathy.  Respiratory: coarse breath sounds, decreased at RIGHT base  Cardiovascular: Normal rate, Regular rhythm, No murmur, Good pulses equal in all extremities, No edema.  Gastrointestinal: Soft, Non-tender, Non-distended, Normal bowel sounds.  Genitourinary: No costovertebral angle tenderness.  Musculoskeletal: Normal range of motion, Normal strength.  Integumentary: No rash.  Neurologic: Alert, Oriented, No focal deficits, Cranial Nerves II-XII are grossly intact.  Psychiatric: Appropriate mood & affect.      =======================================================      Labs:      135  |  91<L>  |  35.0<H>  ----------------------------<  213<H>  4.2   |  31.0<H>  |  1.85<H>    Ca    8.8      30 Mar 2018 07:54  Phos  4.3       Mg     2.0     -30    TPro  6.7  /  Alb  3.6  /  TBili  1.2  /  DBili  x   /  AST  21  /  ALT  15  /  AlkPhos  77                            9.3    10.2  )-----------( 101      ( 30 Mar 2018 07:54 )             30.3         Urinalysis Basic - ( 29 Mar 2018 15:49 )    Color: Yellow / Appearance: Clear / S.015 / pH: x  Gluc: x / Ketone: Negative  / Bili: Negative / Urobili: Negative mg/dL   Blood: x / Protein: Negative mg/dL / Nitrite: Negative   Leuk Esterase: Negative / RBC: x / WBC x   Sq Epi: x / Non Sq Epi: x / Bacteria: x      LIVER FUNCTIONS - ( 29 Mar 2018 08:43 )  Alb: 3.6 g/dL / Pro: 6.7 g/dL / ALK PHOS: 77 U/L / ALT: 15 U/L / AST: 21 U/L / GGT: x               CAPILLARY BLOOD GLUCOSE        ABG - ( 29 Mar 2018 15:19 )  pH: 7.35  /  pCO2: 57    /  pO2: 94    / HCO3: 28    / Base Excess: 4.4   /  SaO2: 98                  RECENT CULTURES:   @ 08:57 .Blood Blood-Venous Blood Culture PCR    Growth in aerobic and anaerobic bottles: Gram Positive Cocci in Pairs and  Chains  Anaerobic Bottle: 9:04 Hours to positivity  Aerobic Bottle: 15:34 Hours to positivity  ***Blood Panel PCR results on this specimen are available  approximately 3 hours after the Gram stain result.***  Gram stain, PCR, and/or culture results may not always  correspond due to difference in methodologies.  ************************************************************  This PCR assay was performed using [employer].  The following targets are tested for: Enterococcus,  vancomycin resistant enterococci, Listeria monocytogenes,  coagulase negative staphylococci, S. aureus,  methicillin resistant S. aureus, Streptococcus agalactiae  (Group B), S. pneumoniae, S.pyogenes (Group A),  Acinetobacter baumannii, Enterobacter cloacae, E. coli,  Klebsiella oxytoca, K. pneumoniae, Proteus sp.,  Serratia marcescens, Haemophilus influenzae,  Neisseria meningitidis, Pseudomonas aeruginosa, Candida  albicans, C. glabrata, C krusei, C parapsilosis,  C. tropicalis and the KPC resistance gene.  "Due to technical problems, Proteus sp. will Not be reported as part of  the BCID panel until further notice"  .  TYPE: (C=Critical, N=Notification, A=Abnormal) C  TESTS:  _ Gram stain  DATE/TIME CALLED: _ 2018 19:01:45  CALLED TO: Fady Iverson  READ BACK (2 Patient Identifiers)(Y/N): _ y  READ BACK VALUES (Y/N): _ y  CALLED BY: Fady camargo  ,  TYPE: (C=Critical, N=Notification, A=Abnormal) c  TESTS:  _ gs  DATE/TIME CALLED: _ 2018 08:42:31  CALLED TO: Fady zafar rn  READ BACK (2 Patient Identifiers)(Y/N): _ y  READ BACK VALUES (Y/N): _ y  CALLED BY: Fady roew         @ 08:54 .Blood Blood-Venous     Growth in aerobic and anaerobic bottles: Gram Positive Cocci in Pairs and  Chains  Anaerobic Bottle: 9:05 Hours to positivity  Aerobic Bottle: 14:04   Hours to positivity  .  TYPE: (C=Critical, N=Notification, A=Abnormal) C  TESTS:  _ Gram stain  DATE/TIME CALLED: _ 2018 19:05:08  CALLED TO: Fady Iverson  READ BACK (2 Patient Identifiers)(Y/N): _ y  READ BACK VALUES (Y/N): _ y  CALLED BY: Fady camargo  ,  TYPE: (C=Critical, N=Notification, A=Abnormal) c  TESTS:  _ gs  DATE/TIME CALLED: _ 2018 08:40:31  CALLED TO: Fady zafar rn  READ BACK (2 Patient Identifiers)(Y/N): _ y  READ BACK VALUES (Y/N): _ y  CALLED BY: Fady rowe

## 2018-03-30 NOTE — PROGRESS NOTE ADULT - ASSESSMENT
Pneumonia RLL  Strep bacteremia  COPD  KRYSTIN  Back pain likely secondary to pleurisy from PNA    Plan:  1.Abx per ID  2.Continue nocturnal BIPAP  3.Bronchodilators

## 2018-03-31 LAB
ANION GAP SERPL CALC-SCNC: 16 MMOL/L — SIGNIFICANT CHANGE UP (ref 5–17)
BUN SERPL-MCNC: 41 MG/DL — HIGH (ref 8–20)
CALCIUM SERPL-MCNC: 9.1 MG/DL — SIGNIFICANT CHANGE UP (ref 8.6–10.2)
CHLORIDE SERPL-SCNC: 91 MMOL/L — LOW (ref 98–107)
CO2 SERPL-SCNC: 26 MMOL/L — SIGNIFICANT CHANGE UP (ref 22–29)
CREAT SERPL-MCNC: 1.79 MG/DL — HIGH (ref 0.5–1.3)
CULTURE RESULTS: SIGNIFICANT CHANGE UP
EOSINOPHIL # BLD AUTO: 0 K/UL — SIGNIFICANT CHANGE UP (ref 0–0.5)
EOSINOPHIL NFR BLD AUTO: 0 % — SIGNIFICANT CHANGE UP (ref 0–5)
GLUCOSE BLDC GLUCOMTR-MCNC: 208 MG/DL — HIGH (ref 70–99)
GLUCOSE SERPL-MCNC: 245 MG/DL — HIGH (ref 70–115)
HCT VFR BLD CALC: 30.5 % — LOW (ref 42–52)
HGB BLD-MCNC: 9.4 G/DL — LOW (ref 14–18)
LEGIONELLA AG UR QL: NEGATIVE — SIGNIFICANT CHANGE UP
LYMPHOCYTES # BLD AUTO: 0.2 K/UL — LOW (ref 1–4.8)
LYMPHOCYTES # BLD AUTO: 2.2 % — LOW (ref 20–55)
MAGNESIUM SERPL-MCNC: 2.2 MG/DL — SIGNIFICANT CHANGE UP (ref 1.6–2.6)
MCHC RBC-ENTMCNC: 23.3 PG — LOW (ref 27–31)
MCHC RBC-ENTMCNC: 30.8 G/DL — LOW (ref 32–36)
MCV RBC AUTO: 75.5 FL — LOW (ref 80–94)
MONOCYTES # BLD AUTO: 0.1 K/UL — SIGNIFICANT CHANGE UP (ref 0–0.8)
MONOCYTES NFR BLD AUTO: 1.2 % — LOW (ref 3–10)
NEUTROPHILS # BLD AUTO: 10.1 K/UL — HIGH (ref 1.8–8)
NEUTROPHILS NFR BLD AUTO: 96.3 % — HIGH (ref 37–73)
PHOSPHATE SERPL-MCNC: 3.9 MG/DL — SIGNIFICANT CHANGE UP (ref 2.4–4.7)
PLATELET # BLD AUTO: 114 K/UL — LOW (ref 150–400)
POTASSIUM SERPL-MCNC: 4.5 MMOL/L — SIGNIFICANT CHANGE UP (ref 3.5–5.3)
POTASSIUM SERPL-SCNC: 4.5 MMOL/L — SIGNIFICANT CHANGE UP (ref 3.5–5.3)
RBC # BLD: 4.04 M/UL — LOW (ref 4.6–6.2)
RBC # FLD: 20.6 % — HIGH (ref 11–15.6)
SODIUM SERPL-SCNC: 133 MMOL/L — LOW (ref 135–145)
SPECIMEN SOURCE: SIGNIFICANT CHANGE UP
WBC # BLD: 10.5 K/UL — SIGNIFICANT CHANGE UP (ref 4.8–10.8)
WBC # FLD AUTO: 10.5 K/UL — SIGNIFICANT CHANGE UP (ref 4.8–10.8)

## 2018-03-31 PROCEDURE — 99233 SBSQ HOSP IP/OBS HIGH 50: CPT

## 2018-03-31 RX ORDER — OXYCODONE AND ACETAMINOPHEN 5; 325 MG/1; MG/1
1 TABLET ORAL EVERY 6 HOURS
Qty: 0 | Refills: 0 | Status: DISCONTINUED | OUTPATIENT
Start: 2018-03-31 | End: 2018-04-05

## 2018-03-31 RX ADMIN — Medication 650 MILLIGRAM(S): at 12:45

## 2018-03-31 RX ADMIN — Medication 1200 MILLIGRAM(S): at 17:16

## 2018-03-31 RX ADMIN — PIPERACILLIN AND TAZOBACTAM 25 GRAM(S): 4; .5 INJECTION, POWDER, LYOPHILIZED, FOR SOLUTION INTRAVENOUS at 14:55

## 2018-03-31 RX ADMIN — Medication 325 MILLIGRAM(S): at 11:38

## 2018-03-31 RX ADMIN — Medication 500 MILLIGRAM(S): at 11:38

## 2018-03-31 RX ADMIN — Medication 3 MILLILITER(S): at 20:54

## 2018-03-31 RX ADMIN — PIPERACILLIN AND TAZOBACTAM 25 GRAM(S): 4; .5 INJECTION, POWDER, LYOPHILIZED, FOR SOLUTION INTRAVENOUS at 21:53

## 2018-03-31 RX ADMIN — Medication 1200 MILLIGRAM(S): at 06:10

## 2018-03-31 RX ADMIN — LIDOCAINE 1 PATCH: 4 CREAM TOPICAL at 06:11

## 2018-03-31 RX ADMIN — Medication 650 MILLIGRAM(S): at 11:38

## 2018-03-31 RX ADMIN — Medication 3 MILLILITER(S): at 02:36

## 2018-03-31 RX ADMIN — SENNA PLUS 2 TABLET(S): 8.6 TABLET ORAL at 21:53

## 2018-03-31 RX ADMIN — Medication 250 MILLIGRAM(S): at 15:01

## 2018-03-31 RX ADMIN — Medication 60 MILLIGRAM(S): at 04:43

## 2018-03-31 RX ADMIN — PIPERACILLIN AND TAZOBACTAM 25 GRAM(S): 4; .5 INJECTION, POWDER, LYOPHILIZED, FOR SOLUTION INTRAVENOUS at 06:11

## 2018-03-31 RX ADMIN — AMIODARONE HYDROCHLORIDE 200 MILLIGRAM(S): 400 TABLET ORAL at 06:09

## 2018-03-31 RX ADMIN — Medication 100 MILLIGRAM(S): at 17:15

## 2018-03-31 RX ADMIN — POLYETHYLENE GLYCOL 3350 17 GRAM(S): 17 POWDER, FOR SOLUTION ORAL at 17:16

## 2018-03-31 RX ADMIN — LIDOCAINE 1 PATCH: 4 CREAM TOPICAL at 21:53

## 2018-03-31 RX ADMIN — Medication 60 MILLIGRAM(S): at 17:16

## 2018-03-31 RX ADMIN — Medication 250 MILLIGRAM(S): at 06:10

## 2018-03-31 RX ADMIN — Medication 3 MILLILITER(S): at 15:11

## 2018-03-31 RX ADMIN — RIVAROXABAN 15 MILLIGRAM(S): KIT at 14:58

## 2018-03-31 RX ADMIN — Medication 3 MILLILITER(S): at 08:56

## 2018-03-31 RX ADMIN — Medication 100 MILLIGRAM(S): at 04:43

## 2018-03-31 RX ADMIN — ATORVASTATIN CALCIUM 10 MILLIGRAM(S): 80 TABLET, FILM COATED ORAL at 21:53

## 2018-03-31 RX ADMIN — Medication 40 MILLIGRAM(S): at 04:43

## 2018-03-31 RX ADMIN — LIDOCAINE 1 PATCH: 4 CREAM TOPICAL at 11:39

## 2018-03-31 RX ADMIN — Medication 81 MILLIGRAM(S): at 11:38

## 2018-03-31 NOTE — PROGRESS NOTE ADULT - SUBJECTIVE AND OBJECTIVE BOX
DAVID MELISA    [id number]    83y      Male    CC: productive sputum, chills, recent admission for COPD flare and UTI, NOw diagnosed with RLL PNA with strep bacteremia.     INTERVAL HPI/OVERNIGHT EVENTS: no acute events    REVIEW OF SYSTEMS:    CONSTITUTIONAL: No fever, weight loss, or fatigue  RESPIRATORY: No cough, wheezing, hemoptysis; No shortness of breath  CARDIOVASCULAR: No chest pain, palpitations  GASTROINTESTINAL: No abdominal or epigastric pain. No nausea, vomiting  NEUROLOGICAL: No headaches, memory loss, loss of strength.  MISCELLANEOUS:      Vital Signs Last 24 Hrs  T(C): 36.4 (31 Mar 2018 08:08), Max: 36.8 (30 Mar 2018 10:03)  T(F): 97.6 (31 Mar 2018 08:08), Max: 98.3 (30 Mar 2018 10:03)  HR: 86 (31 Mar 2018 09:00) (67 - 101)  BP: 130/81 (31 Mar 2018 08:08) (101/67 - 134/80)  BP(mean): --  RR: 19 (31 Mar 2018 08:08) (16 - 19)  SpO2: 95% (31 Mar 2018 08:26) (95% - 99%)    PHYSICAL EXAM:    GENERAL: NAD, well-groomed  HEENT: PERRL, +EOMI  NECK: soft, Supple, No JVD,   CHEST/LUNG: Clear to percussion bilaterally; No wheezing  HEART: S1S2+, Regular rate and rhythm; No murmurs, rubs, or gallops  ABDOMEN: Soft, Nontender, Nondistended; Bowel sounds present  EXTREMITIES:  2+ Peripheral Pulses, No clubbing, cyanosis, or edema  SKIN: No rashes or lesions  NEURO: AAOX3, no focal deficits, no motor r sensory loss  PSYCH: normal mood      -30 @ 07:01  -  03-31 @ 07:00  --------------------------------------------------------  IN: 1375 mL / OUT: 1100 mL / NET: 275 mL        LABS:                        9.4    10.5  )-----------( 114      ( 31 Mar 2018 09:03 )             30.5     03-30    135  |  91<L>  |  35.0<H>  ----------------------------<  213<H>  4.2   |  31.0<H>  |  1.85<H>    Ca    8.8      30 Mar 2018 07:54  Phos  4.3       Mg     2.0             Urinalysis Basic - ( 29 Mar 2018 15:49 )    Color: Yellow / Appearance: Clear / S.015 / pH: x  Gluc: x / Ketone: Negative  / Bili: Negative / Urobili: Negative mg/dL   Blood: x / Protein: Negative mg/dL / Nitrite: Negative   Leuk Esterase: Negative / RBC: x / WBC x   Sq Epi: x / Non Sq Epi: x / Bacteria: x          MEDICATIONS  (STANDING):  ALBUTerol/ipratropium for Nebulization 3 milliLiter(s) Nebulizer every 6 hours  amiodarone    Tablet 200 milliGRAM(s) Oral daily  ascorbic acid 500 milliGRAM(s) Oral daily  aspirin  chewable 81 milliGRAM(s) Oral daily  atorvastatin 10 milliGRAM(s) Oral at bedtime  docusate sodium 100 milliGRAM(s) Oral two times a day  ferrous    sulfate 325 milliGRAM(s) Oral daily  furosemide    Tablet 40 milliGRAM(s) Oral daily  guaiFENesin ER 1200 milliGRAM(s) Oral every 12 hours  hemorrhoidal Ointment 1 Application(s) Rectal three times a day  lidocaine   Patch 1 Patch Transdermal every 24 hours  methylPREDNISolone sodium succinate Injectable 60 milliGRAM(s) IV Push every 12 hours  piperacillin/tazobactam IVPB. 3.375 Gram(s) IV Intermittent every 8 hours  rivaroxaban 15 milliGRAM(s) Oral every 24 hours  saccharomyces boulardii 250 milliGRAM(s) Oral two times a day  senna 2 Tablet(s) Oral at bedtime    MEDICATIONS  (PRN):  acetaminophen   Tablet. 650 milliGRAM(s) Oral every 6 hours PRN Mild Pain (1 - 3)  polyethylene glycol 3350 17 Gram(s) Oral daily PRN Constipation      RADIOLOGY & ADDITIONAL TESTS:  ECHO - Summary:   1. Severely enlarged left atrium.   2. Left ventricular ejection fraction, by visual estimation, is >75%.   3. The right atrium is normal in size.   4. Normal right ventricular size and function.   5. Bioprosthesis in the aortic position. Acceptable gradients. No leak.   6. The ascending aorta is dilated, measuring 4.36 cm from the right   parasternal view.   7. There is no evidence of pericardial effusion.      CXR - RLL infiltrate DAVID MELISA    [id number]    83y      Male    CC: productive sputum, chills, recent admission for COPD flare and UTI, NOw diagnosed with RLL PNA with strep bacteremia.   feels cough is mildly better, breathing some better, rt sided low back pain    INTERVAL HPI/OVERNIGHT EVENTS: no acute events    REVIEW OF SYSTEMS:    CONSTITUTIONAL: No fever, weight loss, or fatigue  CARDIOVASCULAR: No chest pain, palpitations  GASTROINTESTINAL: No abdominal or epigastric pain. No nausea, vomiting        Vital Signs Last 24 Hrs  T(C): 36.4 (31 Mar 2018 08:08), Max: 36.8 (30 Mar 2018 10:03)  T(F): 97.6 (31 Mar 2018 08:08), Max: 98.3 (30 Mar 2018 10:03)  HR: 86 (31 Mar 2018 09:00) (67 - 101)  BP: 130/81 (31 Mar 2018 08:08) (101/67 - 134/80)  BP(mean): --  RR: 19 (31 Mar 2018 08:08) (16 - 19)  SpO2: 95% (31 Mar 2018 08:26) (95% - 99%)    PHYSICAL EXAM:    GENERAL: NAD, sitting in chair  HEENT: PERRL, +EOMI  NECK: soft, Supple  CHEST/LUNG: bilateral scattered wheezing+  HEART: S1S2+, Regular rate and rhythm; No murmurs  EXTREMITIES: No clubbing, cyanosis, or edema  NEURO: AAOX3     @ 07:01  -  03-31 @ 07:00  --------------------------------------------------------  IN: 1375 mL / OUT: 1100 mL / NET: 275 mL        LABS:                        9.4    10.5  )-----------( 114      ( 31 Mar 2018 09:03 )             30.5     03-30    135  |  91<L>  |  35.0<H>  ----------------------------<  213<H>  4.2   |  31.0<H>  |  1.85<H>    Ca    8.8      30 Mar 2018 07:54  Phos  4.3     03-30  Mg     2.0     03-30        Urinalysis Basic - ( 29 Mar 2018 15:49 )    Color: Yellow / Appearance: Clear / S.015 / pH: x  Gluc: x / Ketone: Negative  / Bili: Negative / Urobili: Negative mg/dL   Blood: x / Protein: Negative mg/dL / Nitrite: Negative   Leuk Esterase: Negative / RBC: x / WBC x   Sq Epi: x / Non Sq Epi: x / Bacteria: x          MEDICATIONS  (STANDING):  ALBUTerol/ipratropium for Nebulization 3 milliLiter(s) Nebulizer every 6 hours  amiodarone    Tablet 200 milliGRAM(s) Oral daily  ascorbic acid 500 milliGRAM(s) Oral daily  aspirin  chewable 81 milliGRAM(s) Oral daily  atorvastatin 10 milliGRAM(s) Oral at bedtime  docusate sodium 100 milliGRAM(s) Oral two times a day  ferrous    sulfate 325 milliGRAM(s) Oral daily  furosemide    Tablet 40 milliGRAM(s) Oral daily  guaiFENesin ER 1200 milliGRAM(s) Oral every 12 hours  hemorrhoidal Ointment 1 Application(s) Rectal three times a day  lidocaine   Patch 1 Patch Transdermal every 24 hours  methylPREDNISolone sodium succinate Injectable 60 milliGRAM(s) IV Push every 12 hours  piperacillin/tazobactam IVPB. 3.375 Gram(s) IV Intermittent every 8 hours  rivaroxaban 15 milliGRAM(s) Oral every 24 hours  saccharomyces boulardii 250 milliGRAM(s) Oral two times a day  senna 2 Tablet(s) Oral at bedtime    MEDICATIONS  (PRN):  acetaminophen   Tablet. 650 milliGRAM(s) Oral every 6 hours PRN Mild Pain (1 - 3)  polyethylene glycol 3350 17 Gram(s) Oral daily PRN Constipation      RADIOLOGY & ADDITIONAL TESTS:  ECHO - Summary:   1. Severely enlarged left atrium.   2. Left ventricular ejection fraction, by visual estimation, is >75%.   3. The right atrium is normal in size.   4. Normal right ventricular size and function.   5. Bioprosthesis in the aortic position. Acceptable gradients. No leak.   6. The ascending aorta is dilated, measuring 4.36 cm from the right   parasternal view.   7. There is no evidence of pericardial effusion.      CXR - RLL infiltrate

## 2018-03-31 NOTE — PROGRESS NOTE ADULT - ASSESSMENT
84 y/o male with history of COPD on home 02 3 liters cont, chronic blood loss anemia, GIB, ckd stage 4, DM-2, HTN, HLD, Chronic LE edema, Afib on Xarelto, CAD s/p CABG presents from home with greenish productive cough, chills, body aches, pleuritic chest pain/ back pain x 2 days. Patient was found to have pneumonia on CXR and started on treatment for HCAP with zosyn. He was started on steroids, nebulizers, and oxygen. Pulmonology and infectious disease was consulted. Blood cultures grew Streptococci. ABG showed hypercapnia and pulm recommended nocturnal BiPAP and an outpatient KRYSTIN sleep study.       >RLL PNA (HCAP Gram +ve/-ve) causing COPD exacerbation/ Acute on chronic Hypoxic & hypercarbic RF- Continue zosyn renally dosed. Blood cultures with Streptococci, f/u culture and sensitivity.. Deescalate steroids to q12. Continue supplemental O2 and nebs. Trend WBC and fever curve.    > Strep Bacteremia - on Zosyn, repeat blood cultures - pending, ID following.     >Low Back Pain, pleuritic in nature - present for 2 weeks. Lumbar spine x-ray with degenerative spondylosis. Lidocaine patch and tylenol for pain. Can do percocet if pain not controlled. 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 Continue iron supplements. Add bowel regimen and vit C.     >ckd stage 4- avoid nephrotoxics, renally dose medications     >hx of DM-2- A1c 5.5, non diabetic    >HTN/ HLD- c/w home meds (lasix)    >CHF- appears euvolemic, c/w home lasix, last echo 2/2016, will obtain echocardiogram    > Afib on Xarelto- c/w amiodarone     >CAD s/p CABG- c/w ASA. xarelto, statin     DVT ppx on AC 84 y/o male with history of COPD on home 02 3 liters cont, chronic blood loss anemia, GIB, ckd stage 4, DM-2, HTN, HLD, Chronic LE edema, Afib on Xarelto, CAD s/p CABG presents from home with greenish productive cough, chills, body aches, pleuritic chest pain/ back pain x 2 days. Patient was found to have pneumonia on CXR and started on treatment for HCAP with zosyn. He was started on steroids, nebulizers, and oxygen. Pulmonology and infectious disease was consulted. Blood cultures grew Streptococci. ABG showed hypercapnia and pulm recommended nocturnal BiPAP and an outpatient KRYSTIN sleep study.       >RLL PNA (HCAP Gram +ve/-ve) causing COPD exacerbation/ Acute on chronic Hypoxic & hypercarbic RF- Continue zosyn renally dosed. Blood cultures with Streptococci, f/u culture and sensitivity.. ct iv steroids to q12. Continue supplemental O2 and nebs. Trend WBC and fever curve.    > Strep Bacteremia - on Zosyn, repeat blood cultures - pending, ID following.     >Low Back Pain, pleuritic in nature - present for 2 weeks. Lumbar spine x-ray with degenerative spondylosis. Lidocaine patch and tylenol for pain. add percocet.    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 Continue iron supplements. Add bowel regimen and vit C.     >ckd stage 4- avoid nephrotoxics, renally dose medications     >hx of DM-2- A1c 5.5, non diabetic    >HTN/ HLD- c/w home meds (lasix)    >CHF- appears euvolemic, c/w home lasix, last echo 2/2016, will obtain echocardiogram    > Afib on Xarelto- c/w amiodarone     >CAD s/p CABG- c/w ASA. xarelto, statin     DVT ppx on AC

## 2018-04-01 LAB
-  CEFTRIAXONE: SIGNIFICANT CHANGE UP
-  CEFTRIAXONE: SIGNIFICANT CHANGE UP
-  CLINDAMYCIN: SIGNIFICANT CHANGE UP
-  CLINDAMYCIN: SIGNIFICANT CHANGE UP
-  ERYTHROMYCIN: SIGNIFICANT CHANGE UP
-  ERYTHROMYCIN: SIGNIFICANT CHANGE UP
-  PENICILLIN: SIGNIFICANT CHANGE UP
-  PENICILLIN: SIGNIFICANT CHANGE UP
-  VANCOMYCIN: SIGNIFICANT CHANGE UP
-  VANCOMYCIN: SIGNIFICANT CHANGE UP
CULTURE RESULTS: SIGNIFICANT CHANGE UP
CULTURE RESULTS: SIGNIFICANT CHANGE UP
METHOD TYPE: SIGNIFICANT CHANGE UP
METHOD TYPE: SIGNIFICANT CHANGE UP
ORGANISM # SPEC MICROSCOPIC CNT: SIGNIFICANT CHANGE UP
SPECIMEN SOURCE: SIGNIFICANT CHANGE UP
SPECIMEN SOURCE: SIGNIFICANT CHANGE UP

## 2018-04-01 PROCEDURE — 99233 SBSQ HOSP IP/OBS HIGH 50: CPT

## 2018-04-01 RX ADMIN — PIPERACILLIN AND TAZOBACTAM 25 GRAM(S): 4; .5 INJECTION, POWDER, LYOPHILIZED, FOR SOLUTION INTRAVENOUS at 22:42

## 2018-04-01 RX ADMIN — Medication 3 MILLILITER(S): at 20:29

## 2018-04-01 RX ADMIN — Medication 81 MILLIGRAM(S): at 09:54

## 2018-04-01 RX ADMIN — ATORVASTATIN CALCIUM 10 MILLIGRAM(S): 80 TABLET, FILM COATED ORAL at 22:42

## 2018-04-01 RX ADMIN — Medication 60 MILLIGRAM(S): at 06:03

## 2018-04-01 RX ADMIN — Medication 500 MILLIGRAM(S): at 09:53

## 2018-04-01 RX ADMIN — SENNA PLUS 2 TABLET(S): 8.6 TABLET ORAL at 22:42

## 2018-04-01 RX ADMIN — Medication 100 MILLIGRAM(S): at 06:02

## 2018-04-01 RX ADMIN — Medication 250 MILLIGRAM(S): at 06:03

## 2018-04-01 RX ADMIN — PIPERACILLIN AND TAZOBACTAM 25 GRAM(S): 4; .5 INJECTION, POWDER, LYOPHILIZED, FOR SOLUTION INTRAVENOUS at 15:54

## 2018-04-01 RX ADMIN — AMIODARONE HYDROCHLORIDE 200 MILLIGRAM(S): 400 TABLET ORAL at 06:02

## 2018-04-01 RX ADMIN — LIDOCAINE 1 PATCH: 4 CREAM TOPICAL at 09:52

## 2018-04-01 RX ADMIN — Medication 250 MILLIGRAM(S): at 17:10

## 2018-04-01 RX ADMIN — Medication 1200 MILLIGRAM(S): at 17:10

## 2018-04-01 RX ADMIN — LIDOCAINE 1 PATCH: 4 CREAM TOPICAL at 22:42

## 2018-04-01 RX ADMIN — Medication 1200 MILLIGRAM(S): at 06:02

## 2018-04-01 RX ADMIN — Medication 3 MILLILITER(S): at 10:07

## 2018-04-01 RX ADMIN — Medication 40 MILLIGRAM(S): at 06:03

## 2018-04-01 RX ADMIN — PHENYLEPHRINE-SHARK LIVER OIL-MINERAL OIL-PETROLATUM RECTAL OINTMENT 1 APPLICATION(S): at 09:53

## 2018-04-01 RX ADMIN — PIPERACILLIN AND TAZOBACTAM 25 GRAM(S): 4; .5 INJECTION, POWDER, LYOPHILIZED, FOR SOLUTION INTRAVENOUS at 06:03

## 2018-04-01 RX ADMIN — Medication 100 MILLIGRAM(S): at 17:10

## 2018-04-01 RX ADMIN — RIVAROXABAN 15 MILLIGRAM(S): KIT at 17:10

## 2018-04-01 RX ADMIN — Medication 325 MILLIGRAM(S): at 09:53

## 2018-04-01 RX ADMIN — Medication 3 MILLILITER(S): at 15:10

## 2018-04-01 NOTE — PROGRESS NOTE ADULT - ASSESSMENT
82 y/o male with history of COPD on home 02 3 liters cont, chronic blood loss anemia, GIB, ckd stage 4, DM-2, HTN, HLD, Chronic LE edema, Afib on Xarelto, CAD s/p CABG presents from home with greenish productive cough, chills, body aches, pleuritic chest pain/ back pain x 2 days. Patient was found to have pneumonia on CXR and started on treatment for HCAP with zosyn. He was started on steroids, nebulizers, and oxygen. Pulmonology and infectious disease was consulted. Blood cultures grew Streptococci bovis. ABG showed hypercapnia and pulm recommended nocturnal BiPAP and an outpatient KRYSTIN sleep study.       >RLL PNA (HCAP Gram +ve/-ve) causing COPD exacerbation/ Acute on chronic Hypoxic & hypercarbic RF- Continue zosyn renally dosed. Blood cultures with Streptococci, f/u culture and sensitivity.. ct iv steroids to q12. Continue supplemental O2 and nebs. Trend WBC and fever curve.    > Strep Bovis Bacteremia - on Zosyn, repeat blood cultures - pending, ? Source - GI/ oral. ID following.     >Low Back Pain, pleuritic in nature - present for 2 weeks. Lumbar spine x-ray with degenerative spondylosis. Lidocaine patch and tylenol for pain. add percocet.    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 Continue iron supplements. Add bowel regimen and vit C.     >ckd stage 4- avoid nephrotoxics, renally dose medications     >hx of DM-2- A1c 5.5, non diabetic    >HTN/ HLD- c/w home meds (lasix)    >CHF- appears euvolemic, c/w home lasix, last echo 2/2016, will obtain echocardiogram    > Afib on Xarelto- c/w amiodarone     >CAD s/p CABG- c/w ASA. xarelto, statin     DVT ppx on AC 84 y/o male with history of COPD on home 02 3 liters cont, chronic blood loss anemia, GIB, ckd stage 4, DM-2, HTN, HLD, Chronic LE edema, Afib on Xarelto, CAD s/p CABG presents from home with greenish productive cough, chills, body aches, pleuritic chest pain/ back pain x 2 days. Patient was found to have pneumonia on CXR and started on treatment for HCAP with zosyn. He was started on steroids, nebulizers, and oxygen. Pulmonology and infectious disease was consulted. Blood cultures grew Streptococci bovis. ABG showed hypercapnia and pulm recommended nocturnal BiPAP and an outpatient KRYSTIN sleep study.       >RLL PNA (HCAP Gram +ve/-ve) causing COPD exacerbation/ Acute on chronic Hypoxic & hypercarbic RF- Continue zosyn renally dosed. Blood cultures with Streptococci, f/u culture and sensitivity. tapersteroids. Continue supplemental O2 and nebs. Trend WBC and fever curve.    > Strep Bovis Bacteremia - on Zosyn, repeat blood cultures - pending, ? Source - GI/ oral. ID following.     >Low Back Pain, pleuritic in nature - present for 2 weeks. Lumbar spine x-ray with degenerative spondylosis. Lidocaine patch and tylenol for pain. add percocet.    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 Continue iron supplements. ct bowel regimen and vit C.     >ckd stage 4- avoid nephrotoxics, renally dose medications     >hx of DM-2- A1c 5.5, non diabetic    >HTN/ HLD- c/w home meds (lasix)    >CHF- appears euvolemic, c/w home lasix, last echo 2/2016, will obtain echocardiogram    > Afib on Xarelto- c/w amiodarone     >CAD s/p CABG- c/w ASA. xarelto, statin     DVT ppx on AC

## 2018-04-01 NOTE — PROGRESS NOTE ADULT - SUBJECTIVE AND OBJECTIVE BOX
DAVID MELISA    [id number]    83y      Male    CC: CC: productive sputum, chills, recent admission for COPD flare and UTI, NOw diagnosed with RLL PNA with strep bacteremia.     INTERVAL HPI/OVERNIGHT EVENTS: no acute events     REVIEW OF SYSTEMS:    CONSTITUTIONAL: No fever, weight loss, or fatigue  CARDIOVASCULAR: No chest pain, palpitations  GASTROINTESTINAL: No abdominal or epigastric pain. No nausea, vomiting          Vital Signs Last 24 Hrs  T(C): 36.8 (01 Apr 2018 08:36), Max: 36.9 (31 Mar 2018 15:48)  T(F): 98.2 (01 Apr 2018 08:36), Max: 98.5 (31 Mar 2018 15:48)  HR: 66 (01 Apr 2018 08:36) (66 - 84)  BP: 138/82 (01 Apr 2018 08:36) (135/76 - 138/82)  BP(mean): --  RR: 18 (01 Apr 2018 08:36) (18 - 20)  SpO2: 97% (01 Apr 2018 08:36) (95% - 100%)    PHYSICAL EXAM:    GENERAL: NAD, sitting in chair  HEENT: PERRL, +EOMI  NECK: soft, Supple  CHEST/LUNG: bilateral scattered wheezing+  HEART: S1S2+, Regular rate and rhythm; No murmurs  EXTREMITIES: No clubbing, cyanosis, or edema  NEURO: AAOX3            03-31 @ 07:01  -  04-01 @ 07:00  --------------------------------------------------------  IN: 0 mL / OUT: 1000 mL / NET: -1000 mL        LABS:                        9.4    10.5  )-----------( 114      ( 31 Mar 2018 09:03 )             30.5     03-31    133<L>  |  91<L>  |  41.0<H>  ----------------------------<  245<H>  4.5   |  26.0  |  1.79<H>    Ca    9.1      31 Mar 2018 09:03  Phos  3.9     03-31  Mg     2.2     03-31              MEDICATIONS  (STANDING):  ALBUTerol/ipratropium for Nebulization 3 milliLiter(s) Nebulizer every 6 hours  amiodarone    Tablet 200 milliGRAM(s) Oral daily  ascorbic acid 500 milliGRAM(s) Oral daily  aspirin  chewable 81 milliGRAM(s) Oral daily  atorvastatin 10 milliGRAM(s) Oral at bedtime  docusate sodium 100 milliGRAM(s) Oral two times a day  ferrous    sulfate 325 milliGRAM(s) Oral daily  furosemide    Tablet 40 milliGRAM(s) Oral daily  guaiFENesin ER 1200 milliGRAM(s) Oral every 12 hours  hemorrhoidal Ointment 1 Application(s) Rectal three times a day  lidocaine   Patch 1 Patch Transdermal every 24 hours  methylPREDNISolone sodium succinate Injectable 60 milliGRAM(s) IV Push every 12 hours  piperacillin/tazobactam IVPB. 3.375 Gram(s) IV Intermittent every 8 hours  rivaroxaban 15 milliGRAM(s) Oral every 24 hours  saccharomyces boulardii 250 milliGRAM(s) Oral two times a day  senna 2 Tablet(s) Oral at bedtime    MEDICATIONS  (PRN):  acetaminophen   Tablet. 650 milliGRAM(s) Oral every 6 hours PRN Mild Pain (1 - 3)  oxyCODONE    5 mG/acetaminophen 325 mG 1 Tablet(s) Oral every 6 hours PRN Moderate Pain (4 - 6)  polyethylene glycol 3350 17 Gram(s) Oral daily PRN Constipation      RADIOLOGY & ADDITIONAL TESTS: DAVID VINCENT    [id number]    83y      Male    CC: CC: productive sputum, chills, recent admission for COPD flare and UTI, NOw diagnosed with RLL PNA with strep bovis bacteremia.   Back pain - mildly better    INTERVAL HPI/OVERNIGHT EVENTS: no acute events     REVIEW OF SYSTEMS:    CONSTITUTIONAL: No fever, weight loss, or fatigue  CARDIOVASCULAR: No chest pain, palpitations  GASTROINTESTINAL: No abdominal or epigastric pain. No nausea, vomiting          Vital Signs Last 24 Hrs  T(C): 36.8 (01 Apr 2018 08:36), Max: 36.9 (31 Mar 2018 15:48)  T(F): 98.2 (01 Apr 2018 08:36), Max: 98.5 (31 Mar 2018 15:48)  HR: 66 (01 Apr 2018 08:36) (66 - 84)  BP: 138/82 (01 Apr 2018 08:36) (135/76 - 138/82)  BP(mean): --  RR: 18 (01 Apr 2018 08:36) (18 - 20)  SpO2: 97% (01 Apr 2018 08:36) (95% - 100%)    PHYSICAL EXAM:    GENERAL: NAD, sitting in chair  HEENT: PERRL, +EOMI  NECK: soft, Supple  CHEST/LUNG: bilateral scattered wheezing+  HEART: S1S2+, Regular rate and rhythm; No murmurs  EXTREMITIES: No clubbing, cyanosis, or edema  NEURO: AAOX3            03-31 @ 07:01  -  04-01 @ 07:00  --------------------------------------------------------  IN: 0 mL / OUT: 1000 mL / NET: -1000 mL        LABS:                        9.4    10.5  )-----------( 114      ( 31 Mar 2018 09:03 )             30.5     03-31    133<L>  |  91<L>  |  41.0<H>  ----------------------------<  245<H>  4.5   |  26.0  |  1.79<H>    Ca    9.1      31 Mar 2018 09:03  Phos  3.9     03-31  Mg     2.2     03-31              MEDICATIONS  (STANDING):  ALBUTerol/ipratropium for Nebulization 3 milliLiter(s) Nebulizer every 6 hours  amiodarone    Tablet 200 milliGRAM(s) Oral daily  ascorbic acid 500 milliGRAM(s) Oral daily  aspirin  chewable 81 milliGRAM(s) Oral daily  atorvastatin 10 milliGRAM(s) Oral at bedtime  docusate sodium 100 milliGRAM(s) Oral two times a day  ferrous    sulfate 325 milliGRAM(s) Oral daily  furosemide    Tablet 40 milliGRAM(s) Oral daily  guaiFENesin ER 1200 milliGRAM(s) Oral every 12 hours  hemorrhoidal Ointment 1 Application(s) Rectal three times a day  lidocaine   Patch 1 Patch Transdermal every 24 hours  methylPREDNISolone sodium succinate Injectable 60 milliGRAM(s) IV Push every 12 hours  piperacillin/tazobactam IVPB. 3.375 Gram(s) IV Intermittent every 8 hours  rivaroxaban 15 milliGRAM(s) Oral every 24 hours  saccharomyces boulardii 250 milliGRAM(s) Oral two times a day  senna 2 Tablet(s) Oral at bedtime    MEDICATIONS  (PRN):  acetaminophen   Tablet. 650 milliGRAM(s) Oral every 6 hours PRN Mild Pain (1 - 3)  oxyCODONE    5 mG/acetaminophen 325 mG 1 Tablet(s) Oral every 6 hours PRN Moderate Pain (4 - 6)  polyethylene glycol 3350 17 Gram(s) Oral daily PRN Constipation      RADIOLOGY & ADDITIONAL TESTS:

## 2018-04-02 ENCOUNTER — APPOINTMENT (OUTPATIENT)
Dept: INTERNAL MEDICINE | Facility: CLINIC | Age: 83
End: 2018-04-02

## 2018-04-02 PROCEDURE — 99233 SBSQ HOSP IP/OBS HIGH 50: CPT

## 2018-04-02 PROCEDURE — 99232 SBSQ HOSP IP/OBS MODERATE 35: CPT

## 2018-04-02 RX ORDER — CEFTRIAXONE 500 MG/1
2 INJECTION, POWDER, FOR SOLUTION INTRAMUSCULAR; INTRAVENOUS ONCE
Qty: 0 | Refills: 0 | Status: COMPLETED | OUTPATIENT
Start: 2018-04-02 | End: 2018-04-02

## 2018-04-02 RX ORDER — CEFTRIAXONE 500 MG/1
2 INJECTION, POWDER, FOR SOLUTION INTRAMUSCULAR; INTRAVENOUS EVERY 24 HOURS
Qty: 0 | Refills: 0 | Status: DISCONTINUED | OUTPATIENT
Start: 2018-04-03 | End: 2018-04-06

## 2018-04-02 RX ORDER — CEFTRIAXONE 500 MG/1
INJECTION, POWDER, FOR SOLUTION INTRAMUSCULAR; INTRAVENOUS
Qty: 0 | Refills: 0 | Status: DISCONTINUED | OUTPATIENT
Start: 2018-04-02 | End: 2018-04-06

## 2018-04-02 RX ADMIN — Medication 325 MILLIGRAM(S): at 11:08

## 2018-04-02 RX ADMIN — Medication 40 MILLIGRAM(S): at 05:08

## 2018-04-02 RX ADMIN — LIDOCAINE 1 PATCH: 4 CREAM TOPICAL at 11:08

## 2018-04-02 RX ADMIN — Medication 100 MILLIGRAM(S): at 05:07

## 2018-04-02 RX ADMIN — Medication 500 MILLIGRAM(S): at 11:08

## 2018-04-02 RX ADMIN — Medication 250 MILLIGRAM(S): at 05:08

## 2018-04-02 RX ADMIN — PIPERACILLIN AND TAZOBACTAM 25 GRAM(S): 4; .5 INJECTION, POWDER, LYOPHILIZED, FOR SOLUTION INTRAVENOUS at 05:07

## 2018-04-02 RX ADMIN — RIVAROXABAN 15 MILLIGRAM(S): KIT at 16:56

## 2018-04-02 RX ADMIN — Medication 1200 MILLIGRAM(S): at 05:07

## 2018-04-02 RX ADMIN — Medication 650 MILLIGRAM(S): at 23:59

## 2018-04-02 RX ADMIN — CEFTRIAXONE 100 GRAM(S): 500 INJECTION, POWDER, FOR SOLUTION INTRAMUSCULAR; INTRAVENOUS at 13:55

## 2018-04-02 RX ADMIN — Medication 40 MILLIGRAM(S): at 05:07

## 2018-04-02 RX ADMIN — AMIODARONE HYDROCHLORIDE 200 MILLIGRAM(S): 400 TABLET ORAL at 05:07

## 2018-04-02 RX ADMIN — Medication 100 MILLIGRAM(S): at 16:56

## 2018-04-02 RX ADMIN — Medication 81 MILLIGRAM(S): at 11:08

## 2018-04-02 RX ADMIN — Medication 250 MILLIGRAM(S): at 16:57

## 2018-04-02 RX ADMIN — ATORVASTATIN CALCIUM 10 MILLIGRAM(S): 80 TABLET, FILM COATED ORAL at 23:28

## 2018-04-02 RX ADMIN — Medication 650 MILLIGRAM(S): at 12:10

## 2018-04-02 RX ADMIN — PHENYLEPHRINE-SHARK LIVER OIL-MINERAL OIL-PETROLATUM RECTAL OINTMENT 1 APPLICATION(S): at 23:28

## 2018-04-02 RX ADMIN — Medication 3 MILLILITER(S): at 21:30

## 2018-04-02 RX ADMIN — SENNA PLUS 2 TABLET(S): 8.6 TABLET ORAL at 23:28

## 2018-04-02 RX ADMIN — LIDOCAINE 1 PATCH: 4 CREAM TOPICAL at 23:33

## 2018-04-02 RX ADMIN — Medication 650 MILLIGRAM(S): at 23:29

## 2018-04-02 RX ADMIN — Medication 1200 MILLIGRAM(S): at 16:56

## 2018-04-02 RX ADMIN — Medication 650 MILLIGRAM(S): at 11:10

## 2018-04-02 NOTE — PROGRESS NOTE ADULT - ASSESSMENT
82 y/o male with history of COPD on home 02 3 liters cont, chronic blood loss anemia, GIB, ckd stage 4, DM-2, HTN, HLD, Chronic LE edema, Afib on Xarelto, CAD s/p CABG presents from home with greenish productive cough, chills, body aches, pleuritic chest pain/ back pain x 2 days. Patient was found to have pneumonia on CXR and started on treatment for HCAP with zosyn. He was started on steroids, nebulizers, and oxygen. Pulmonology and infectious disease was consulted. Blood cultures grew Streptococci bovis. ABG showed hypercapnia and pulm recommended nocturnal BiPAP and an outpatient KRYSTIN sleep study.       >RLL PNA (HCAP Gram +ve/-ve) causing COPD exacerbation/ Acute on chronic Hypoxic & hypercarbic RF- Continue zosyn renally dosed. Blood cultures with Streptococci, f/u culture and sensitivity. tapersteroids. Continue supplemental O2 and nebs. Trend WBC and fever curve.    > Strep Bovis Bacteremia - on Zosyn, repeat blood cultures -NG , ? Source - GI/ oral. ID following.     >Low Back Pain, pleuritic in nature - present for 2 weeks. Lumbar spine x-ray with degenerative spondylosis. Lidocaine patch and tylenol for pain. add percocet.    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 Continue iron supplements. ct bowel regimen and vit C.     >ckd stage 4- avoid nephrotoxics, renally dose medications     >hx of DM-2- A1c 5.5, non diabetic    >HTN/ HLD- c/w home meds (lasix)    >CHF- appears euvolemic, c/w home lasix, last echo 2/2016    > Afib on Xarelto- c/w amiodarone     >CAD s/p CABG- c/w ASA. xarelto, statin     DVT ppx on AC 82 y/o male with history of COPD on home 02 3 liters cont, chronic blood loss anemia, GIB, ckd stage 4, DM-2, HTN, HLD, Chronic LE edema, Afib on Xarelto, CAD s/p CABG presents from home with greenish productive cough, chills, body aches, pleuritic chest pain/ back pain x 2 days. Patient was found to have pneumonia on CXR and started on treatment for HCAP with zosyn. He was started on steroids, nebulizers, and oxygen. Pulmonology and infectious disease was consulted. Blood cultures grew Streptococci bovis. ABG showed hypercapnia and pulm recommended nocturnal BiPAP and an outpatient KRYSTIN sleep study.       >RLL PNA (HCAP Gram +ve/-ve) causing COPD exacerbation/ Acute on chronic Hypoxic & hypercarbic RF- Continue zosyn renally dosed. Blood cultures with Streptococci, f/u culture and sensitivity. tapersteroids. Continue supplemental O2 and nebs.     > Strep Bovis Bacteremia - on Zosyn, repeat blood cultures -NG ,ID following. rec- GI eval and colonoscopy - GI consult placed     >Low Back Pain, pleuritic in nature - present for 2 weeks. Lumbar spine x-ray with degenerative spondylosis. Lidocaine patch and tylenol for pain. add percocet.    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 Continue iron supplements. ct bowel regimen and vit C.     >ckd stage 4- avoid nephrotoxics, renally dose medications     >hx of DM-2- A1c 5.5, non diabetic    >HTN/ HLD- c/w home meds (lasix)    >CHF- appears euvolemic, c/w home lasix, last echo 2/2016    > Afib on Xarelto- c/w amiodarone     >CAD s/p CABG- c/w ASA. xarelto, statin     DVT ppx on AC

## 2018-04-02 NOTE — PROGRESS NOTE ADULT - SUBJECTIVE AND OBJECTIVE BOX
DAVID VINCENT    [id number]    83y      Male    CC: productive sputum, chills, recent admission for COPD flare and UTI, NOw diagnosed with RLL PNA with strep bovis bacteremia.   Back pain - mildly better    INTERVAL HPI/OVERNIGHT EVENTS: no acute events     REVIEW OF SYSTEMS:    CONSTITUTIONAL: No fever, weight loss, or fatigue  CARDIOVASCULAR: No chest pain, palpitations  GASTROINTESTINAL: No abdominal or epigastric pain. No nausea, vomiting      Vital Signs Last 24 Hrs  T(C): 36.3 (02 Apr 2018 07:42), Max: 37.2 (01 Apr 2018 15:46)  T(F): 97.3 (02 Apr 2018 07:42), Max: 98.9 (01 Apr 2018 15:46)  HR: 60 (02 Apr 2018 07:42) (56 - 71)  BP: 119/75 (02 Apr 2018 07:42) (119/75 - 124/78)  BP(mean): --  RR: 18 (02 Apr 2018 07:42) (17 - 18)  SpO2: 99% (02 Apr 2018 07:42) (96% - 100%)        PHYSICAL EXAM:    GENERAL: NAD, sitting in chair  HEENT: PERRL, +EOMI  NECK: soft, Supple  CHEST/LUNG: bilateral scattered wheezing+  HEART: S1S2+, Regular rate and rhythm; No murmurs  EXTREMITIES: No clubbing, cyanosis, or edema  NEURO: AAOX3          04-01 @ 07:01  -  04-02 @ 07:00  --------------------------------------------------------  IN: 100 mL / OUT: 2800 mL / NET: -2700 mL    04-02 @ 07:01  -  04-02 @ 08:48  --------------------------------------------------------  IN: 0 mL / OUT: 300 mL / NET: -300 mL        LABS:                        9.4    10.5  )-----------( 114      ( 31 Mar 2018 09:03 )             30.5     03-31    133<L>  |  91<L>  |  41.0<H>  ----------------------------<  245<H>  4.5   |  26.0  |  1.79<H>    Ca    9.1      31 Mar 2018 09:03  Phos  3.9     03-31  Mg     2.2     03-31              MEDICATIONS  (STANDING):  ALBUTerol/ipratropium for Nebulization 3 milliLiter(s) Nebulizer every 6 hours  amiodarone    Tablet 200 milliGRAM(s) Oral daily  ascorbic acid 500 milliGRAM(s) Oral daily  aspirin  chewable 81 milliGRAM(s) Oral daily  atorvastatin 10 milliGRAM(s) Oral at bedtime  docusate sodium 100 milliGRAM(s) Oral two times a day  ferrous    sulfate 325 milliGRAM(s) Oral daily  furosemide    Tablet 40 milliGRAM(s) Oral daily  guaiFENesin ER 1200 milliGRAM(s) Oral every 12 hours  hemorrhoidal Ointment 1 Application(s) Rectal three times a day  lidocaine   Patch 1 Patch Transdermal every 24 hours  piperacillin/tazobactam IVPB. 3.375 Gram(s) IV Intermittent every 8 hours  predniSONE   Tablet 40 milliGRAM(s) Oral daily  rivaroxaban 15 milliGRAM(s) Oral every 24 hours  saccharomyces boulardii 250 milliGRAM(s) Oral two times a day  senna 2 Tablet(s) Oral at bedtime    MEDICATIONS  (PRN):  acetaminophen   Tablet. 650 milliGRAM(s) Oral every 6 hours PRN Mild Pain (1 - 3)  oxyCODONE    5 mG/acetaminophen 325 mG 1 Tablet(s) Oral every 6 hours PRN Moderate Pain (4 - 6)  polyethylene glycol 3350 17 Gram(s) Oral daily PRN Constipation      RADIOLOGY & ADDITIONAL TESTS: DAVID VINCENT    [id number]    83y      Male    CC: productive sputum, chills, recent admission for COPD flare and UTI, NOw diagnosed with RLL PNA with strep bovis bacteremia.   Back pain - mildly better    INTERVAL HPI/OVERNIGHT EVENTS: no acute events     REVIEW OF SYSTEMS:    CONSTITUTIONAL: No fever, weight loss, or fatigue  CARDIOVASCULAR: No chest pain, palpitations  GASTROINTESTINAL: No abdominal or epigastric pain. No nausea, vomiting      Vital Signs Last 24 Hrs  T(C): 36.3 (02 Apr 2018 07:42), Max: 37.2 (01 Apr 2018 15:46)  T(F): 97.3 (02 Apr 2018 07:42), Max: 98.9 (01 Apr 2018 15:46)  HR: 60 (02 Apr 2018 07:42) (56 - 71)  BP: 119/75 (02 Apr 2018 07:42) (119/75 - 124/78)  BP(mean): --  RR: 18 (02 Apr 2018 07:42) (17 - 18)  SpO2: 99% (02 Apr 2018 07:42) (96% - 100%)        PHYSICAL EXAM:    GENERAL: NAD, sitting in chair  HEENT: PERRL, +EOMI  NECK: soft, Supple  CHEST/LUNG: bilateral scattered wheezing+  HEART: S1S2+, Regular rate and rhythm; No murmurs  EXTREMITIES: No clubbing, cyanosis, or edema  NEURO: AAOX3          04-01 @ 07:01  -  04-02 @ 07:00  --------------------------------------------------------  IN: 100 mL / OUT: 2800 mL / NET: -2700 mL    04-02 @ 07:01  -  04-02 @ 08:48  --------------------------------------------------------  IN: 0 mL / OUT: 300 mL / NET: -300 mL        LABS:                        9.4    10.5  )-----------( 114      ( 31 Mar 2018 09:03 )             30.5     03-31    133<L>  |  91<L>  |  41.0<H>  ----------------------------<  245<H>  4.5   |  26.0  |  1.79<H>    Ca    9.1      31 Mar 2018 09:03  Phos  3.9     03-31  Mg     2.2     03-31              MEDICATIONS  (STANDING):  ALBUTerol/ipratropium for Nebulization 3 milliLiter(s) Nebulizer every 6 hours  amiodarone    Tablet 200 milliGRAM(s) Oral daily  ascorbic acid 500 milliGRAM(s) Oral daily  aspirin  chewable 81 milliGRAM(s) Oral daily  atorvastatin 10 milliGRAM(s) Oral at bedtime  docusate sodium 100 milliGRAM(s) Oral two times a day  ferrous    sulfate 325 milliGRAM(s) Oral daily  furosemide    Tablet 40 milliGRAM(s) Oral daily  guaiFENesin ER 1200 milliGRAM(s) Oral every 12 hours  hemorrhoidal Ointment 1 Application(s) Rectal three times a day  lidocaine   Patch 1 Patch Transdermal every 24 hours  piperacillin/tazobactam IVPB. 3.375 Gram(s) IV Intermittent every 8 hours  predniSONE   Tablet 40 milliGRAM(s) Oral daily  rivaroxaban 15 milliGRAM(s) Oral every 24 hours  saccharomyces boulardii 250 milliGRAM(s) Oral two times a day  senna 2 Tablet(s) Oral at bedtime    MEDICATIONS  (PRN):  acetaminophen   Tablet. 650 milliGRAM(s) Oral every 6 hours PRN Mild Pain (1 - 3)  oxyCODONE    5 mG/acetaminophen 325 mG 1 Tablet(s) Oral every 6 hours PRN Moderate Pain (4 - 6)  polyethylene glycol 3350 17 Gram(s) Oral daily PRN Constipation      RADIOLOGY & ADDITIONAL TESTS:

## 2018-04-02 NOTE — PROGRESS NOTE ADULT - ASSESSMENT
This 83 y.o. man here for right sided PNA, baseline COPD, strep  BOVIS BACTEREMIA  RECOMMEND GI EVALUATION  R/O MALIGNANCY  ALSO CONSIDER KALE  PT HAS NOT HAD COLONOSCOPY RECENTLY   WILL D/C ZOSYN CHAGNE TO [facility] DAILY

## 2018-04-02 NOTE — PROGRESS NOTE ADULT - ASSESSMENT
Assess    [facility]/KRYSTIN  COPD  RLL pneumonia with strep bovis      Plan    ABx per ID  Nocturnal bipap for now  Neb Rx  Consider GI evaluation as strep bovis can be seen with occult GI malignancy - should have OP colonoscopy  OP Pulm/sleep FU

## 2018-04-02 NOTE — PROGRESS NOTE ADULT - SUBJECTIVE AND OBJECTIVE BOX
[facility] [place]  =======================================================  Geremias Suarez MD FACCANDY Leija MD  Diplomates American Board of Internal Medicine and Infectious Diseases  =======================================================    [facility]-[id number]  DAVID VINCENT   follow up for pneumonia.  labs / cultures reviewed.  STREP BOVIS BACTEREMIA          Allergies  No Known Allergies  Intolerances    MEDICATIONS  (STANDING):  ALBUTerol/ipratropium for Nebulization 3 milliLiter(s) Nebulizer every 6 hours  amiodarone    Tablet 200 milliGRAM(s) Oral daily  aspirin  chewable 81 milliGRAM(s) Oral daily  atorvastatin 10 milliGRAM(s) Oral at bedtime  ferrous    sulfate 325 milliGRAM(s) Oral daily  furosemide    Tablet 40 milliGRAM(s) Oral daily  guaiFENesin ER 1200 milliGRAM(s) Oral every 12 hours  hemorrhoidal Ointment 1 Application(s) Rectal three times a day  lidocaine   Patch 1 Patch Transdermal every 24 hours  methylPREDNISolone sodium succinate Injectable 60 milliGRAM(s) IV Push every 12 hours     rivaroxaban 15 milliGRAM(s) Oral every 24 hours  saccharomyces boulardii 250 milliGRAM(s) Oral two times a day      =======================================================  REVIEW OF SYSTEMS:  as above    ======================================================  Physical Exam:  ============    Vital Signs Last 24 Hrs  T(C): 36.3 (2018 07:42), Max: 37.2 (2018 15:46)  T(F): 97.3 (2018 07:42), Max: 98.9 (2018 15:46)  HR: 60 (2018 07:42) (60 - 71)  BP: 119/75 (2018 07:42) (119/75 - 124/78)  BP(mean): --  RR: 18 (2018 07:42) (17 - 18)  SpO2: 100% (2018 08:55) (96% - 100%)    General: Alert and oriented, No acute distress.  obese  Eye: Pupils are equal, round and reactive to light, Extraocular movements are intact, Normal conjunctiva.  HENT: Normocephalic, Oral mucosa is moist, No pharyngeal erythema, No sinus tenderness.  Neck: Supple, No lymphadenopathy.  Respiratory: coarse breath sounds, decreased at RIGHT base  Cardiovascular: Normal rate, Regular rhythm, No murmur, Good pulses equal in all extremities, No edema.  Gastrointestinal: Soft, Non-tender, Non-distended, Normal bowel sounds.  Genitourinary: No costovertebral angle tenderness.  Musculoskeletal: Normal range of motion, Normal strength.  Integumentary: No rash.  Neurologic: Alert, Oriented, No focal deficits, Cranial Nerves II-XII are grossly intact.  Psychiatric: Appropriate mood & affect.      =======================================================      Labs:           Urinalysis Basic - ( 29 Mar 2018 15:49 )    Color: Yellow / Appearance: Clear / S.015 / pH: x  Gluc: x / Ketone: Negative  / Bili: Negative / Urobili: Negative mg/dL   Blood: x / Protein: Negative mg/dL / Nitrite: Negative   Leuk Esterase: Negative / RBC: x / WBC x   Sq Epi: x / Non Sq Epi: x / Bacteria: x      LIVER FUNCTIONS - ( 29 Mar 2018 08:43 )  Alb: 3.6 g/dL / Pro: 6.7 g/dL / ALK PHOS: 77 U/L / ALT: 15 U/L / AST: 21 U/L / GGT: x               CAPILLARY BLOOD GLUCOSE        ABG - ( 29 Mar 2018 15:19 )  pH: 7.35  /  pCO2: 57    /  pO2: 94    / HCO3: 28    / Base Excess: 4.4   /  SaO2: 98                  RECENT CULTURES:   @ 08:57 .Blood Blood-Venous Blood Culture PCR    Growth in aerobic and anaerobic bottles: Gram Positive Cocci in Pairs and  Chains  Anaerobic Bottle: 9:04 Hours to positivity  Aerobic Bottle: 15:34 Hours to positivity  ***Blood Panel PCR results on this specimen are available  approximately 3 hours after the Gram stain result.***  Gram stain, PCR, and/or culture results may not always  correspond due to difference in methodologies.  ************************************************************  This PCR assay was performed using [employer] - is now [employer].  The following targets are tested for: Enterococcus,  vancomycin resistant enterococci, Listeria monocytogenes,  coagulase negative staphylococci, S. aureus,  methicillin resistant S. aureus, Streptococcus agalactiae  (Group B), S. pneumoniae, S.pyogenes (Group A),  Acinetobacter baumannii, Enterobacter cloacae, E. coli,  Klebsiella oxytoca, K. pneumoniae, Proteus sp.,  Serratia marcescens, Haemophilus influenzae,  Neisseria meningitidis, Pseudomonas aeruginosa, Candida  albicans, C. glabrata, C krusei, C parapsilosis,  C. tropicalis and the KPC resistance gene.  "Due to technical problems, Proteus sp. will Not be reported as part of  the BCID panel until further notice"  .  TYPE: (C=Critical, N=Notification, A=Abnormal) C  TESTS:  _ Gram stain  DATE/TIME CALLED: _ 2018 19:01:45  CALLED TO: Fady Iverson  READ BACK (2 Patient Identifiers)(Y/N): _ y  READ BACK VALUES (Y/N): _ y  CALLED BY: Fady camargo  ,  TYPE: (C=Critical, N=Notification, A=Abnormal) c  TESTS:  _ gs  DATE/TIME CALLED: _ 2018 08:42:31  CALLED TO: Fady zafar rn  READ BACK (2 Patient Identifiers)(Y/N): _ y  READ BACK VALUES (Y/N): _ y  CALLED BY: Fady rowe         @ 08:54 .Blood Blood-Venous     Growth in aerobic and anaerobic bottles: Gram Positive Cocci in Pairs and  Chains  Anaerobic Bottle: 9:05 Hours to positivity  Aerobic Bottle: 14:04   Hours to positivity  .  TYPE: (C=Critical, N=Notification, A=Abnormal) C  TESTS:  _ Gram stain  DATE/TIME CALLED: _ 2018 19:05:08  CALLED TO: Fady Iverson  READ BACK (2 Patient Identifiers)(Y/N): _ y  READ BACK VALUES (Y/N): _ y  CALLED BY: Fady camargo  ,  TYPE: (C=Critical, N=Notification, A=Abnormal) c  TESTS:  _ gs  DATE/TIME CALLED: _ 2018 08:40:31  CALLED TO: Fady zfaar rn  READ BACK (2 Patient Identifiers)(Y/N): _ y  READ BACK VALUES (Y/N): _ y  CALLED BY: Fady rowe

## 2018-04-03 LAB
ANION GAP SERPL CALC-SCNC: 11 MMOL/L — SIGNIFICANT CHANGE UP (ref 5–17)
BUN SERPL-MCNC: 41 MG/DL — HIGH (ref 8–20)
CALCIUM SERPL-MCNC: 9.3 MG/DL — SIGNIFICANT CHANGE UP (ref 8.6–10.2)
CHLORIDE SERPL-SCNC: 97 MMOL/L — LOW (ref 98–107)
CO2 SERPL-SCNC: 34 MMOL/L — HIGH (ref 22–29)
CREAT SERPL-MCNC: 1.54 MG/DL — HIGH (ref 0.5–1.3)
GLUCOSE SERPL-MCNC: 110 MG/DL — SIGNIFICANT CHANGE UP (ref 70–115)
HCT VFR BLD CALC: 32.6 % — LOW (ref 42–52)
HGB BLD-MCNC: 10.1 G/DL — LOW (ref 14–18)
MCHC RBC-ENTMCNC: 23.5 PG — LOW (ref 27–31)
MCHC RBC-ENTMCNC: 31 G/DL — LOW (ref 32–36)
MCV RBC AUTO: 76 FL — LOW (ref 80–94)
PLATELET # BLD AUTO: 165 K/UL — SIGNIFICANT CHANGE UP (ref 150–400)
POTASSIUM SERPL-MCNC: 4 MMOL/L — SIGNIFICANT CHANGE UP (ref 3.5–5.3)
POTASSIUM SERPL-SCNC: 4 MMOL/L — SIGNIFICANT CHANGE UP (ref 3.5–5.3)
RBC # BLD: 4.29 M/UL — LOW (ref 4.6–6.2)
RBC # FLD: 21.3 % — HIGH (ref 11–15.6)
SODIUM SERPL-SCNC: 142 MMOL/L — SIGNIFICANT CHANGE UP (ref 135–145)
WBC # BLD: 6.4 K/UL — SIGNIFICANT CHANGE UP (ref 4.8–10.8)
WBC # FLD AUTO: 6.4 K/UL — SIGNIFICANT CHANGE UP (ref 4.8–10.8)

## 2018-04-03 PROCEDURE — 99233 SBSQ HOSP IP/OBS HIGH 50: CPT

## 2018-04-03 PROCEDURE — 99222 1ST HOSP IP/OBS MODERATE 55: CPT

## 2018-04-03 PROCEDURE — 99232 SBSQ HOSP IP/OBS MODERATE 35: CPT

## 2018-04-03 RX ORDER — GABAPENTIN 400 MG/1
100 CAPSULE ORAL DAILY
Qty: 0 | Refills: 0 | Status: DISCONTINUED | OUTPATIENT
Start: 2018-04-03 | End: 2018-04-06

## 2018-04-03 RX ADMIN — Medication 100 MILLIGRAM(S): at 06:01

## 2018-04-03 RX ADMIN — Medication 650 MILLIGRAM(S): at 06:01

## 2018-04-03 RX ADMIN — Medication 250 MILLIGRAM(S): at 18:02

## 2018-04-03 RX ADMIN — ATORVASTATIN CALCIUM 10 MILLIGRAM(S): 80 TABLET, FILM COATED ORAL at 23:26

## 2018-04-03 RX ADMIN — GABAPENTIN 100 MILLIGRAM(S): 400 CAPSULE ORAL at 18:03

## 2018-04-03 RX ADMIN — CEFTRIAXONE 100 GRAM(S): 500 INJECTION, POWDER, FOR SOLUTION INTRAMUSCULAR; INTRAVENOUS at 16:04

## 2018-04-03 RX ADMIN — PHENYLEPHRINE-SHARK LIVER OIL-MINERAL OIL-PETROLATUM RECTAL OINTMENT 1 APPLICATION(S): at 23:27

## 2018-04-03 RX ADMIN — Medication 650 MILLIGRAM(S): at 23:57

## 2018-04-03 RX ADMIN — Medication 1200 MILLIGRAM(S): at 18:03

## 2018-04-03 RX ADMIN — Medication 325 MILLIGRAM(S): at 13:33

## 2018-04-03 RX ADMIN — Medication 100 MILLIGRAM(S): at 18:02

## 2018-04-03 RX ADMIN — Medication 81 MILLIGRAM(S): at 13:33

## 2018-04-03 RX ADMIN — Medication 40 MILLIGRAM(S): at 06:01

## 2018-04-03 RX ADMIN — Medication 1200 MILLIGRAM(S): at 06:01

## 2018-04-03 RX ADMIN — RIVAROXABAN 15 MILLIGRAM(S): KIT at 18:02

## 2018-04-03 RX ADMIN — Medication 250 MILLIGRAM(S): at 06:01

## 2018-04-03 RX ADMIN — Medication 650 MILLIGRAM(S): at 23:27

## 2018-04-03 RX ADMIN — Medication 3 MILLILITER(S): at 20:51

## 2018-04-03 RX ADMIN — AMIODARONE HYDROCHLORIDE 200 MILLIGRAM(S): 400 TABLET ORAL at 06:01

## 2018-04-03 RX ADMIN — Medication 650 MILLIGRAM(S): at 06:31

## 2018-04-03 RX ADMIN — Medication 3 MILLILITER(S): at 09:15

## 2018-04-03 RX ADMIN — PHENYLEPHRINE-SHARK LIVER OIL-MINERAL OIL-PETROLATUM RECTAL OINTMENT 1 APPLICATION(S): at 06:01

## 2018-04-03 RX ADMIN — Medication 500 MILLIGRAM(S): at 13:34

## 2018-04-03 RX ADMIN — LIDOCAINE 1 PATCH: 4 CREAM TOPICAL at 13:34

## 2018-04-03 RX ADMIN — SENNA PLUS 2 TABLET(S): 8.6 TABLET ORAL at 23:26

## 2018-04-03 RX ADMIN — PHENYLEPHRINE-SHARK LIVER OIL-MINERAL OIL-PETROLATUM RECTAL OINTMENT 1 APPLICATION(S): at 13:35

## 2018-04-03 NOTE — CONSULT NOTE ADULT - SUBJECTIVE AND OBJECTIVE BOX
ADVID VINCENT  [id number]      HPI:  84 y/o male with history of bio AVR, CAD s/p CABG, PAF, KRYSTIN, COPD on home 02, CKD, DM-2, HTN, HLD presented from home with greenish productive cough, chills, body aches, pleuritic chest pain/ back pain x 2 days. Patient was found to have PNA along with S. Bovis bacteremia.  Treated with abx and cx now clear.  Patient is improved, reports no SOB at this time.  Was having abd pain which has resolved as well.  Reports no c/o at this time.  Patient denies CP, palps, orthopnea, syncope.        ALLERGIES:  No Known Allergies      PAST MEDICAL & SURGICAL HISTORY:  Hyperlipidemia, unspecified hyperlipidemia type  Prostate cancer  History of knee surgery  Otherwise, as noted above      MEDICATIONS (HOME):  · 	ferrous sulfate 325 mg (65 mg elemental iron) oral delayed release tablet: 1 tab(s) orally once a day   · 	K-Tab 20 mEq oral tablet, extended release: 1 tab(s) orally once a day   · 	cyanocobalamin 1000 mcg oral tablet: 1 tab(s) orally once a day  · 	pantoprazole 40 mg oral delayed release tablet: 1 tab(s) orally 2 times a day  · 	saccharomyces boulardii lyo 250 mg oral capsule: 1 cap(s) orally 2 times a day  · 	predniSONE 10 mg oral tablet: 4 tab(s) po daily x 2 days then 3tabs x 2 days then 2tabs for 2 days then 1 tab daily x2 days then stop   · 	azithromycin 500 mg oral tablet: 0.5 tab(s) orally once a day   · 	gabapentin 100 mg oral capsule: 2 cap(s) orally 3 times a day   · 	rivaroxaban 15 mg oral tablet: 1 tab(s) orally every 24 hours  · 	amiodarone 200 mg oral tablet: 1 tab(s) orally once a day  · 	furosemide 40 mg oral tablet: 1 tab(s) orally once a day   · 	aspirin 81 mg oral delayed release tablet: 1 tab(s) orally once a day  · 	DME: 4L NASAL OXYGEN	  · 	Breo Ellipta 200 mcg-25 mcg/inh inhalation powder: 1 puff(s) inhaled once a day  · 	lactulose 10 g/15 mL oral syrup: 30 milliliter(s) orally 2 times a day  · 	lovastatin 40 mg oral tablet: 1 tab(s) orally once a day  · 	ipratropium 500 mcg/2.5 mL inhalation solution: 2.5 milliliter(s) inhaled 3 times a day  · 	albuterol 2.5 mg/3 mL (0.083%) inhalation solution: 3 milliliter(s) inhaled every 6 hours        SOCIAL HISTORY:  Patient denies alcohol, drug use.  Former smoker.    FAMILY HISTORY:  No pertinent family history in first degree relatives      ROS:  Patient denies cough, and other than noted above full ROS is unremarkable      PHYSICAL EXAM:  Vital Signs Last 24 Hrs  T(C): 36.6 (03 Apr 2018 07:30), Max: 37 (02 Apr 2018 23:37)  T(F): 97.8 (03 Apr 2018 07:30), Max: 98.6 (02 Apr 2018 23:37)  HR: 73 (03 Apr 2018 09:17) (50 - 74)  BP: 132/82 (03 Apr 2018 07:30) (123/76 - 132/82)  BP(mean): --  RR: 19 (03 Apr 2018 07:30) (18 - 19)  SpO2: 97% (03 Apr 2018 09:17) (97% - 100%)  General: Patient comfortable in NAD  HEENT: NCAT, mmm, EOMI  Neck: no JVD, no carotid bruits  CVS: nl s1, split s2, no s3, +YELENA, irreg  Chest: CTA b/l  Abdomen: soft, nt/nd  Extremities: No c/c/e  Neuro: A&O x3  Psych: Normal affect        LABS:                        10.1   6.4   )-----------( 165      ( 03 Apr 2018 06:47 )             32.6     04-03    142  |  97<L>  |  41.0<H>  ----------------------------<  110  4.0   |  34.0<H>  |  1.54<H>    Ca    9.3      03 Apr 2018 06:47        RADIOLOGY:  CXR (3/29/18):  Right lower lobe infiltrate.    Echo:   1. Severely enlarged left atrium.   2. Leftventricular ejection fraction, by visual estimation, is >75%.   3. The right atrium is normal in size.   4. Normal right ventricular size and function.   5. Bioprosthesis in the aortic position. Acceptable gradients. No leak.   6. The ascending aorta is dilated, measuring 4.36 cm from the right parasternal view.   7. There is no evidence of pericardial effusion.        Assessment:  84 y/o male with history of bio AVR, CAD s/p CABG, PAF, KRYSTIN, COPD on home 02, CKD, DM-2, HTN, HLD presented with PNA along with S. Bovis bacteremia.  Treated with abx and cx now clear.  Given bio AVR ID recommending KALE to r/o PVE.  No evidence of CHF at this time.  TTE with normal EF and no evidence of endocarditis.    Plan:  1. KALE tomorrow  2. Abx per ID  3. Continue current CV meds at current doses  4. Restart Lasix on d/c    Will follow.  Thanks!

## 2018-04-03 NOTE — PROGRESS NOTE ADULT - ASSESSMENT
This 83 y.o. man here for right sided PNA, baseline COPD, strep  BOVIS BACTEREMIA    GI EVALUATION  R/O MALIGNANCY    ALSO CONSIDER [facility] CONSULTED  PT SEES DR SCHUMACHER IN THE OFFICE  PT HAS NOT HAD COLONOSCOPY RECENTLY   ON  ROCEPHIN DAILY  REPEAT BLOOD CX NEGATIVE WILL NEED EVENTUAL LINE FOR IV ABX OUTSIDE THE HOSPITAL This 83 y.o. man here for right sided PNA, baseline COPD, strep  BOVIS BACTEREMIA    GI EVALUATION  R/O MALIGNANCY    ALSO  ITH AORTIC VALVE REPLACEMENT  CONSIDER [facility] CONSULTED  PT SEES DR SCHUMACHER IN THE OFFICE  PT HAS NOT HAD COLONOSCOPY RECENTLY   ON  ROCEPHIN DAILY  REPEAT BLOOD CX NEGATIVE WILL NEED EVENTUAL LINE FOR IV ABX OUTSIDE THE HOSPITAL

## 2018-04-03 NOTE — PROGRESS NOTE ADULT - ASSESSMENT
Assess    OHS/KRYSTIN  COPD  RLL pneumonia with strep bovis      Plan    ABx per ID  Nocturnal bipap for now  Neb Rx  Discussed with GI- xs risk to endoscopy; OP GI FU for non-invasive imaging  OP Pulm/sleep FU

## 2018-04-03 NOTE — CONSULT NOTE ADULT - SUBJECTIVE AND OBJECTIVE BOX
HPI:  82 y/o male with history of COPD on home 02 3 liters cont, chronic blood loss anemia, TRELL; ckd stage 4, DM-2, HTN, HLD, Chronic LE edema, Afib on Xarelto, CAD s/p CABG; Bio AoVR with good EF.   Patient is currently admitted with right lower lobe pneumonia and found to have bacteremia with strep bovis, 2/2 bottles.  Currently on appropriate antibiotics of Rocephin and improving somewhat. Recently seen by pulmonary and placed on nocturnal BiPAP. Patient has chronic CO2 retention and is on chronic home O2, between 2 and 3 L per hour. Patient still has cough and productive sputum. No fevers. Has dyspnea on exertion. Current weight of about 95.5 kg with morbid obesity.  GI consulted to consider colonoscopy for investigation of strep bovis. This patient was previously seen by our see in consultation on last admission date 3/13/18., when he presented with a COPD exacerbation, and iron deficiency anemia. At that time he was not felt to be a candidate for endoscopic procedures due to multiple comorbidities.  On prior admission, hemoglobin was 6.5, and patient received 2 units packed red cells. Iron studies were consistent with iron deficiency.  Patient currently reporting that stools are brown without hematochezia. He is having one to 2 formed bowel movements per day without diarrhea or bleeding. There is a distant history of hemorrhoidectomy. And a distant history of a colonoscopy probably 10 years ago with unknown results. No current hematochezia current hemoglobin is acceptable.  Currently on Xarelto for Afib.   Currently he denies any  palpitations,  light headedness/dizziness, fevers, abdominal pain, n/v, diarrhea/constipation, dysuria or increased urinary frequency, worsening LE edema, PND, orthopnea. Was not requiring more O2 than usual. CXR with RLL infiltrate. Pt received IV steroids & IV abx.   ABG  shows chronic CO2 Retention, and baseline hypoxia.        PAST MEDICAL & SURGICAL HISTORY:  COPD (chronic obstructive pulmonary disease)  Hyperlipidemia, unspecified hyperlipidemia type  Essential hypertension  Afib  Chronic systolic CHF (congestive heart failure)  Hypertrophic cardiomyopathy  CKD (chronic kidney disease), stage 4 (severe)  Gassiness  Diabetes  Asthma  Prostate cancer  Hypertension  No significant past surgical history  History of valvuloplasty  History of knee surgery      ROS:  No Heartburn, regurgitation, dysphagia, odynophagia.  No dyspepsia  No abdominal pain.    No Nausea, vomiting.  No Bleeding.  No hematemesis.   No diarrhea.    No hematochesia.  No weight loss, anorexia.  No edema.      MEDICATIONS  (STANDING):  ALBUTerol/ipratropium for Nebulization 3 milliLiter(s) Nebulizer every 6 hours  amiodarone    Tablet 200 milliGRAM(s) Oral daily  ascorbic acid 500 milliGRAM(s) Oral daily  aspirin  chewable 81 milliGRAM(s) Oral daily  atorvastatin 10 milliGRAM(s) Oral at bedtime  cefTRIAXone   IVPB      cefTRIAXone   IVPB 2 Gram(s) IV Intermittent every 24 hours  docusate sodium 100 milliGRAM(s) Oral two times a day  ferrous    sulfate 325 milliGRAM(s) Oral daily  furosemide    Tablet 40 milliGRAM(s) Oral daily  guaiFENesin ER 1200 milliGRAM(s) Oral every 12 hours  hemorrhoidal Ointment 1 Application(s) Rectal three times a day  lidocaine   Patch 1 Patch Transdermal every 24 hours  predniSONE   Tablet 40 milliGRAM(s) Oral daily  rivaroxaban 15 milliGRAM(s) Oral every 24 hours  saccharomyces boulardii 250 milliGRAM(s) Oral two times a day  senna 2 Tablet(s) Oral at bedtime    MEDICATIONS  (PRN):  acetaminophen   Tablet. 650 milliGRAM(s) Oral every 6 hours PRN Mild Pain (1 - 3)  oxyCODONE    5 mG/acetaminophen 325 mG 1 Tablet(s) Oral every 6 hours PRN Moderate Pain (4 - 6)  polyethylene glycol 3350 17 Gram(s) Oral daily PRN Constipation      Allergies    No Known Allergies    Intolerances        SOCIAL HISTORY:    FAMILY HISTORY:  No pertinent family history in first degree relatives  No pertinent family history in first degree relatives      Vital Signs Last 24 Hrs  T(C): 36.6 (03 Apr 2018 07:30), Max: 37 (02 Apr 2018 23:37)  T(F): 97.8 (03 Apr 2018 07:30), Max: 98.6 (02 Apr 2018 23:37)  HR: 56 (03 Apr 2018 07:30) (50 - 72)  BP: 132/82 (03 Apr 2018 07:30) (123/76 - 132/82)  BP(mean): --  RR: 19 (03 Apr 2018 07:30) (18 - 19)  SpO2: 100% (03 Apr 2018 07:30) (97% - 100%)    PHYSICAL EXAM:    GENERAL: NAD, well-groomed, well-developed  HEAD:  Atraumatic, Normocephalic  EYES: EOMI, PERRLA, conjunctiva and sclera clear  ENMT: No tonsillar erythema, exudates, or enlargement; Moist mucous membranes, Good dentition, No lesions  NECK: Supple, No JVD, Normal thyroid  CHEST/LUNG: Clear to percussion bilaterally; No rales, rhonchi, wheezing, or rubs  HEART: Regular rate and rhythm; No murmurs, rubs, or gallops  CABG scar.   ABDOMEN: Soft, Nontender, Nondistended; Bowel sounds present;  RAMONA:  Nolesions.  Normal tone.  No BPH.  Stool brown;  OB +   EXTREMITIES:  2+ Peripheral Pulses, No clubbing, cyanosis;  +1 edema  LYMPH: No lymphadenopathy noted  SKIN: No rashes or lesions      LABS:                        10.1   6.4   )-----------( x        ( 03 Apr 2018 06:47 )             32.6     04-03    142  |  97<L>  |  41.0<H>  ----------------------------<  110  4.0   |  34.0<H>  |  1.54<H>    Ca    9.3      03 Apr 2018 06:47                   RADIOLOGY & ADDITIONAL STUDIES:  CXR:  RLL infiltrate.

## 2018-04-03 NOTE — CONSULT NOTE ADULT - ASSESSMENT
Multiple comorbidities including COPD, obstructive sleep apnea, hypercarbia, hypoxemia, with need for supplemental O2, acute pneumonia, right lower lobe area. History of A. fib, and aortic valve replacement, and chronic renal insufficiency, and morbid obesity.  Iron deficiency anemia documented. Scope out and for occult blood on aspirin and Xarelto without overt GI bleeding. Without falling hemoglobin. Ideally evaluation of the colon as indicated. However, patient is not a candidate for endoscopic procedures, now or forseeably  in the future. Therefore , need to defer on colonoscopy or endoscopic procedures. If patient clinically significantly improves then he may be a candidate for virtual colonoscopy in the future. Reevaluate in the office post discharge.   No active GI issues currently. Reconsult as needed.

## 2018-04-03 NOTE — PROGRESS NOTE ADULT - SUBJECTIVE AND OBJECTIVE BOX
Patient is a 83y old  Male who presents with a chief complaint of cough, chills (29 Mar 2018 13:21)      HPI:  84 y/o male with history of COPD on home 02 3 liters cont, chronic blood loss anemia, GIB, ckd stage 4, DM-2, HTN, HLD, Chronic LE edema, Afib on Xarelto, CAD s/p CABG presents from home with greenish productive cough, chills, body aches, pleuritic chest pain/ back pain x 2 days. Pt has not been feeling well since being discharged from [facility] recently with URI symptoms since discharge. The back pain/ chest pain is worse with coughing. Pt's daughter is at bedside who is giving much of the history. Pt is [place] speaking. Denies any  palpitations,  light headedness/dizziness, fevers, abdominal pain, n/v, diarrhea/constipation, dysuria or increased urinary frequency, worsening LE edema, PND, orthopnea. Was not requiring more O2 than usual. CXR with RLL infiltrate. Pt received IV steroids & IV abx. ABG pending. (29 Mar 2018 13:21)    FAMILY HISTORY:  No pertinent family history in first degree relatives  No pertinent family history in first degree relatives      INTERVAL HPI/OVERNIGHT EVENTS:  Pt. c/o back pain, he states its in the low back,   doesn't radiate to the legs, no tingling or numbness  Pt. denies any SOB, CP, palpitaions, diaphoresis, chills/fever  no wht.loss, no blood in the stool, no changes in bowels, no foul odor, no abd.pain, no N/V/D/C    PAST MEDICAL & SURGICAL HISTORY:  COPD (chronic obstructive pulmonary disease)  Hyperlipidemia, unspecified hyperlipidemia type  Essential hypertension  Afib  Chronic systolic CHF (congestive heart failure)  Hypertrophic cardiomyopathy  CKD (chronic kidney disease), stage 4 (severe)  Gassiness  Diabetes  Asthma  Prostate cancer  Hypertension  No significant past surgical history  History of valvuloplasty  History of knee surgery    Allergies  No Known Allergies    Vital Signs Last 24 Hrs  T(C): 36.6 (03 Apr 2018 07:30), Max: 37 (02 Apr 2018 23:37)  T(F): 97.8 (03 Apr 2018 07:30), Max: 98.6 (02 Apr 2018 23:37)  HR: 73 (03 Apr 2018 09:17) (50 - 74)  BP: 132/82 (03 Apr 2018 07:30) (123/76 - 132/82)  BP(mean): --  RR: 19 (03 Apr 2018 07:30) (18 - 19)  SpO2: 97% (03 Apr 2018 09:17) (97% - 100%)                          10.1   6.4   )-----------( 165      ( 03 Apr 2018 06:47 )             32.6       RADIOLOGY & ADDITIONAL STUDIES:    MEDICATIONS:  acetaminophen   Tablet. 650 milliGRAM(s) Oral every 6 hours PRN  ALBUTerol/ipratropium for Nebulization 3 milliLiter(s) Nebulizer every 6 hours  amiodarone    Tablet 200 milliGRAM(s) Oral daily  ascorbic acid 500 milliGRAM(s) Oral daily  aspirin  chewable 81 milliGRAM(s) Oral daily  atorvastatin 10 milliGRAM(s) Oral at bedtime  cefTRIAXone   IVPB      cefTRIAXone   IVPB 2 Gram(s) IV Intermittent every 24 hours  docusate sodium 100 milliGRAM(s) Oral two times a day  ferrous    sulfate 325 milliGRAM(s) Oral daily  furosemide    Tablet 40 milliGRAM(s) Oral daily  guaiFENesin ER 1200 milliGRAM(s) Oral every 12 hours  hemorrhoidal Ointment 1 Application(s) Rectal three times a day  lidocaine   Patch 1 Patch Transdermal every 24 hours  oxyCODONE    5 mG/acetaminophen 325 mG 1 Tablet(s) Oral every 6 hours PRN  polyethylene glycol 3350 17 Gram(s) Oral daily PRN  predniSONE   Tablet 40 milliGRAM(s) Oral daily  rivaroxaban 15 milliGRAM(s) Oral every 24 hours  saccharomyces boulardii 250 milliGRAM(s) Oral two times a day  senna 2 Tablet(s) Oral at bedtime Patient is a 83y old  Male who presents with a chief complaint of cough, chills (29 Mar 2018 13:21)      HPI:  82 y/o male with history of COPD on home 02 3 liters cont, chronic blood loss anemia, GIB, ckd stage 4, DM-2, HTN, HLD, Chronic LE edema, Afib on Xarelto, CAD s/p CABG presents from home with greenish productive cough, chills, body aches, pleuritic chest pain/ back pain x 2 days. Pt has not been feeling well since being discharged from [facility] recently with URI symptoms since discharge. The back pain/ chest pain is worse with coughing. Pt's daughter is at bedside who is giving much of the history. Pt is Libyan speaking. Denies any  palpitations,  light headedness/dizziness, fevers, abdominal pain, n/v, diarrhea/constipation, dysuria or increased urinary frequency, worsening LE edema, PND, orthopnea. Was not requiring more O2 than usual. CXR with RLL infiltrate. Pt received IV steroids & IV abx.       INTERVAL HPI/OVERNIGHT EVENTS:  Pt. c/o back pain, he states its in the low back,   doesn't radiate to the legs, no tingling or numbness  Pt. denies any SOB, CP, palpitaions, diaphoresis, chills/fever  no wht.loss, no blood in the stool, no changes in bowels, no foul odor, no abd.pain, no N/V/D/C      Allergies  No Known Allergies    Vital Signs Last 24 Hrs  T(C): 36.6 (03 Apr 2018 07:30), Max: 37 (02 Apr 2018 23:37)  T(F): 97.8 (03 Apr 2018 07:30), Max: 98.6 (02 Apr 2018 23:37)  HR: 73 (03 Apr 2018 09:17) (50 - 74)  BP: 132/82 (03 Apr 2018 07:30) (123/76 - 132/82)  BP(mean): --  RR: 19 (03 Apr 2018 07:30) (18 - 19)  SpO2: 97% (03 Apr 2018 09:17) (97% - 100%)                          10.1   6.4   )-----------( 165      ( 03 Apr 2018 06:47 )             32.6       RADIOLOGY & ADDITIONAL STUDIES:    MEDICATIONS:  acetaminophen   Tablet. 650 milliGRAM(s) Oral every 6 hours PRN  ALBUTerol/ipratropium for Nebulization 3 milliLiter(s) Nebulizer every 6 hours  amiodarone    Tablet 200 milliGRAM(s) Oral daily  ascorbic acid 500 milliGRAM(s) Oral daily  aspirin  chewable 81 milliGRAM(s) Oral daily  atorvastatin 10 milliGRAM(s) Oral at bedtime  cefTRIAXone   IVPB      cefTRIAXone   IVPB 2 Gram(s) IV Intermittent every 24 hours  docusate sodium 100 milliGRAM(s) Oral two times a day  ferrous    sulfate 325 milliGRAM(s) Oral daily  furosemide    Tablet 40 milliGRAM(s) Oral daily  guaiFENesin ER 1200 milliGRAM(s) Oral every 12 hours  hemorrhoidal Ointment 1 Application(s) Rectal three times a day  lidocaine   Patch 1 Patch Transdermal every 24 hours  oxyCODONE    5 mG/acetaminophen 325 mG 1 Tablet(s) Oral every 6 hours PRN  polyethylene glycol 3350 17 Gram(s) Oral daily PRN  predniSONE   Tablet 40 milliGRAM(s) Oral daily  rivaroxaban 15 milliGRAM(s) Oral every 24 hours  saccharomyces boulardii 250 milliGRAM(s) Oral two times a day  senna 2 Tablet(s) Oral at bedtime

## 2018-04-03 NOTE — PROGRESS NOTE ADULT - SUBJECTIVE AND OBJECTIVE BOX
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  Restrictive pulmonary impairment  CKD IV  Afib  CAD  Pneumonia - strep bovis sepsis    INTERVAL HPI/OVERNIGHT EVENTS:  Doing well     MEDICATIONS  (STANDING):  ALBUTerol/ipratropium for Nebulization 3 milliLiter(s) Nebulizer every 6 hours  amiodarone    Tablet 200 milliGRAM(s) Oral daily  ascorbic acid 500 milliGRAM(s) Oral daily  aspirin  chewable 81 milliGRAM(s) Oral daily  atorvastatin 10 milliGRAM(s) Oral at bedtime  cefTRIAXone   IVPB      docusate sodium 100 milliGRAM(s) Oral two times a day  ferrous    sulfate 325 milliGRAM(s) Oral daily  furosemide    Tablet 40 milliGRAM(s) Oral daily  guaiFENesin ER 1200 milliGRAM(s) Oral every 12 hours  hemorrhoidal Ointment 1 Application(s) Rectal three times a day  lidocaine   Patch 1 Patch Transdermal every 24 hours  predniSONE   Tablet 40 milliGRAM(s) Oral daily  rivaroxaban 15 milliGRAM(s) Oral every 24 hours  saccharomyces boulardii 250 milliGRAM(s) Oral two times a day  senna 2 Tablet(s) Oral at bedtime      MEDICATIONS  (PRN):  acetaminophen   Tablet. 650 milliGRAM(s) Oral every 6 hours PRN Mild Pain (1 - 3)  oxyCODONE    5 mG/acetaminophen 325 mG 1 Tablet(s) Oral every 6 hours PRN Moderate Pain (4 - 6)  polyethylene glycol 3350 17 Gram(s) Oral daily PRN Constipation      Allergies    No Known Allergies    Intolerances        PAST MEDICAL & SURGICAL HISTORY:  COPD (chronic obstructive pulmonary disease)  Hyperlipidemia, unspecified hyperlipidemia type  Essential hypertension  Afib  Chronic systolic CHF (congestive heart failure)  Hypertrophic cardiomyopathy  CKD (chronic kidney disease), stage 4 (severe)  Gassiness  Diabetes  Asthma  Prostate cancer  Hypertension  No significant past surgical history  History of valvuloplasty  History of knee surgery      SOCIAL HISTORY  Smoking History:       REVIEW OF SYSTEMS:    CONSTITUTIONAL:  No distress    HEENT:  Eyes:  No diplopia or blurred vision. ENT:  No earache, sore throat or runny nose.    CARDIOVASCULAR:  No pressure, squeezing, tightness, heaviness or aching about the chest; no palpitations.    RESPIRATORY:  No PND or orthopnea. Mild SOBOE    GASTROINTESTINAL:  No nausea, vomiting or diarrhea.    GENITOURINARY:  No dysuria, frequency or urgency.    NEUROLOGIC:  No paresthesias, fasciculations, seizures or weakness.    PSYCHIATRIC:  No disorder of thought or mood.    Vital Signs Last 24 Hrs  T(C): 36.3 (02 Apr 2018 07:42), Max: 37.2 (01 Apr 2018 15:46)  T(F): 97.3 (02 Apr 2018 07:42), Max: 98.9 (01 Apr 2018 15:46)  HR: 60 (02 Apr 2018 07:42) (60 - 71)  BP: 119/75 (02 Apr 2018 07:42) (119/75 - 124/78)  BP(mean): --  RR: 18 (02 Apr 2018 07:42) (17 - 18)  SpO2: 100% (02 Apr 2018 08:55) (96% - 100%)    PHYSICAL EXAMINATION:    GENERAL: The patient is awake and alert in no apparent distress.     HEENT: Head is normocephalic and atraumatic. Extraocular muscles are intact. Mucous membranes are moist.    NECK: Supple.    LUNGS: Clear to auscultation without wheezing, rales or rhonchi; respirations unlabored    HEART: Regular rate and rhythm without murmur.    ABDOMEN: Soft, nontender, and nondistended.      EXTREMITIES: Without any cyanosis, clubbing, rash, lesions or edema.    NEUROLOGIC: Grossly intact.    LABS:        MICROBIOLOGY: Strep bovis    RADIOLOGY & ADDITIONAL STUDIES:    CXR on 3/29/18  RLL infiltrate

## 2018-04-03 NOTE — PROGRESS NOTE ADULT - SUBJECTIVE AND OBJECTIVE BOX
NPP INFECTIOUS DISEASES AND INTERNAL MEDICINE at [place]  =======================================================  Geremias Suarez MD FACCANDY Leija MD  Diplomates American Board of Internal Medicine and Infectious Diseases  =======================================================    [facility]-[id number]  DAVID VINCENT   follow up for pneumonia.  labs / cultures reviewed.  STREP BOVIS BACTEREMIA  and PNEUMONIA          Allergies  No Known Allergies  Intolerances    MEDICATIONS  (STANDING):  ALBUTerol/ipratropium for Nebulization 3 milliLiter(s) Nebulizer every 6 hours  amiodarone    Tablet 200 milliGRAM(s) Oral daily  aspirin  chewable 81 milliGRAM(s) Oral daily  atorvastatin 10 milliGRAM(s) Oral at bedtime  ferrous    sulfate 325 milliGRAM(s) Oral daily  furosemide    Tablet 40 milliGRAM(s) Oral daily  guaiFENesin ER 1200 milliGRAM(s) Oral every 12 hours  hemorrhoidal Ointment 1 Application(s) Rectal three times a day  lidocaine   Patch 1 Patch Transdermal every 24 hours  methylPREDNISolone sodium succinate Injectable 60 milliGRAM(s) IV Push every 12 hours     rivaroxaban 15 milliGRAM(s) Oral every 24 hours  saccharomyces boulardii 250 milliGRAM(s) Oral two times a day      =======================================================  REVIEW OF SYSTEMS:  as above    ======================================================  Physical Exam:  ============     Vital Signs Last 24 Hrs  T(C): 36.6 (2018 07:30), Max: 37 (2018 23:37)  T(F): 97.8 (2018 07:30), Max: 98.6 (2018 23:37)  HR: 73 (2018 09:17) (50 - 74)  BP: 132/82 (2018 07:30) (123/76 - 132/82)  BP(mean): --  RR: 19 (2018 07:30) (18 - 19)  SpO2: 97% (2018 09:17) (97% - 100%)    General: Alert and oriented, No acute distress.  obese  Eye: Pupils are equal, round and reactive to light, Extraocular movements are intact, Normal conjunctiva.  HENT: Normocephalic, Oral mucosa is moist, No pharyngeal erythema, No sinus tenderness.  Neck: Supple, No lymphadenopathy.  Respiratory: coarse breath sounds, decreased at RIGHT base  Cardiovascular: Normal rate, Regular rhythm, No murmur, Good pulses equal in all extremities, No edema.  Gastrointestinal: Soft, Non-tender, Non-distended, Normal bowel sounds.  Genitourinary: No costovertebral angle tenderness.  Musculoskeletal: Normal range of motion, Normal strength.  Integumentary: No rash.  Neurologic: Alert, Oriented, No focal deficits, Cranial Nerves II-XII are grossly intact.  Psychiatric: Appropriate mood & affect.      =======================================================      Labs:           Urinalysis Basic - ( 29 Mar 2018 15:49 )    Color: Yellow / Appearance: Clear / S.015 / pH: x  Gluc: x / Ketone: Negative  / Bili: Negative / Urobili: Negative mg/dL   Blood: x / Protein: Negative mg/dL / Nitrite: Negative   Leuk Esterase: Negative / RBC: x / WBC x   Sq Epi: x / Non Sq Epi: x / Bacteria: x      LIVER FUNCTIONS - ( 29 Mar 2018 08:43 )  Alb: 3.6 g/dL / Pro: 6.7 g/dL / ALK PHOS: 77 U/L / ALT: 15 U/L / AST: 21 U/L / GGT: x               CAPILLARY BLOOD GLUCOSE        ABG - ( 29 Mar 2018 15:19 )  pH: 7.35  /  pCO2: 57    /  pO2: 94    / HCO3: 28    / Base Excess: 4.4   /  SaO2: 98                  RECENT CULTURES:   @ 08:57 .Blood Blood-Venous Blood Culture PCR    Growth in aerobic and anaerobic bottles: Gram Positive Cocci in Pairs and  Chains  Anaerobic Bottle: 9:04 Hours to positivity  Aerobic Bottle: 15:34 Hours to positivity  ***Blood Panel PCR results on this specimen are available  approximately 3 hours after the Gram stain result.***  Gram stain, PCR, and/or culture results may not always  correspond due to difference in methodologies.  ************************************************************  This PCR assay was performed using [employer].  The following targets are tested for: Enterococcus,  vancomycin resistant enterococci, Listeria monocytogenes,  coagulase negative staphylococci, S. aureus,  methicillin resistant S. aureus, Streptococcus agalactiae  (Group B), S. pneumoniae, S.pyogenes (Group A),  Acinetobacter baumannii, Enterobacter cloacae, E. coli,  Klebsiella oxytoca, K. pneumoniae, Proteus sp.,  Serratia marcescens, Haemophilus influenzae,  Neisseria meningitidis, Pseudomonas aeruginosa, Candida  albicans, C. glabrata, C krusei, C parapsilosis,  C. tropicalis and the KPC resistance gene.  "Due to technical problems, Proteus sp. will Not be reported as part of  the BCID panel until further notice"  .  TYPE: (C=Critical, N=Notification, A=Abnormal) C  TESTS:  _ Gram stain  DATE/TIME CALLED: _ 2018 19:01:45  CALLED TO: Fady Iverson  READ BACK (2 Patient Identifiers)(Y/N): _ y  READ BACK VALUES (Y/N): _ y  CALLED BY: Fady camargo  ,  TYPE: (C=Critical, N=Notification, A=Abnormal) c  TESTS:  _ gs  DATE/TIME CALLED: _ 2018 08:42:31  CALLED TO: Fady zafar rn  READ BACK (2 Patient Identifiers)(Y/N): _ y  READ BACK VALUES (Y/N): _ y  CALLED BY: Fady rowe         @ 08:54 .Blood Blood-Venous     Growth in aerobic and anaerobic bottles: Gram Positive Cocci in Pairs and  Chains  Anaerobic Bottle: 9:05 Hours to positivity  Aerobic Bottle: 14:04   Hours to positivity  .  TYPE: (C=Critical, N=Notification, A=Abnormal) C  TESTS:  _ Gram stain  DATE/TIME CALLED: _ 2018 19:05:08  CALLED TO: Fady Iverson  READ BACK (2 Patient Identifiers)(Y/N): _ y  READ BACK VALUES (Y/N): _ y  CALLED BY: Fady camargo  ,  TYPE: (C=Critical, N=Notification, A=Abnormal) c  TESTS:  _ gs  DATE/TIME CALLED: _ 2018 08:40:31  CALLED TO: Fady zafar rn  READ BACK (2 Patient Identifiers)(Y/N): _ y  READ BACK VALUES (Y/N): _ y  CALLED BY: Fady rowe

## 2018-04-03 NOTE — PROGRESS NOTE ADULT - ASSESSMENT
84 y/o male with history of COPD on home 02 3 liters cont, chronic blood loss anemia, GIB, ckd stage 4, DM-2, HTN, HLD, Chronic LE edema, Afib on Xarelto, CAD s/p CABG presents from home with greenish productive cough, chills, body aches, pleuritic chest pain/ back pain x 2 days.   Patient was found to have pneumonia on CXR and started on treatment for HCAP with zosyn.   He was started on steroids, nebulizers, and oxygen. Pulmonology and infectious disease was consulted.   Blood cultures grew Streptococci bovis. ABG showed hypercapnia and pulm recommended nocturnal BiPAP and an outpatient KRYSTIN sleep study.       >RLL PNA (HCAP Gram +ve/-ve) causing COPD exacerbation/ Acute on chronic Hypoxic & hypercarbic RF-   Continue Rocephin, off Zosyn  Blood cultures with Streptococci, f/u culture and sensitivity  taper steroids  Continue supplemental O2   nebs    > Strep Bovis Bacteremia -   on Rocephin now off Zosyn  repeat blood cultures -Negative  ID following- input appreciated  Pt. will need Midline or PICC for long term abx  GI Consult appreciated- as per GI patient is not a candidate for endoscopic procedures, now or forseeably  in the future.    due to multiple comorbidities   Will consider Virtual Colonoscopy  Cardiology consult (Hx Aortic valve replacement)- might consider KALE      >Low Back Pain, pleuritic in nature - present for 2 weeks  Lumbar spine x-ray with degenerative spondylosis  Lidocaine patch and tylenol for pain  will try Gabapentin low dose  add percocet (but pt. usually doesn't want to take it)    >chronic blood loss anemia from GIB- not candidate for sedation & colonoscopy as per GI notes on last admission,   monitor H/H 10.1/32.6  Continue iron supplements and vit C.  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 will need to f/u w/Pulmonology as outpt.      >ckd stage 4- avoid nephrotoxics,   renally dose medications & avoid nephrotoxic meds    >hx of DM-2- A1c 5.5, non diabetic    >HTN/ HLD-   c/w home meds (lasix)    >CHF- appears euvolemic,   c/w home lasix,   last echo 2/2016    > Afib on Xarelto- c/w amiodarone     >CAD s/p CABG- c/w ASA. xarelto, statin     DVT ppx on AC

## 2018-04-03 NOTE — PROGRESS NOTE ADULT - ATTENDING COMMENTS
Patient seen and examined at bedside. Agree with above. Note addended where needed. Plan discussed with pt, rn , med PA
pt seen and examined at bedside. pt admitted with acute on chronic respiratory failure, HCAP, COPD exacerbation, strep bovis bacteremia on iv rocephin, followed by ID, Pulmonary. GI and cardio consulted for possible C-scope Vs virtual Colonoscopy and KALE to look for source of Strep bovis bacteremia.

## 2018-04-04 LAB
CULTURE RESULTS: SIGNIFICANT CHANGE UP
GLUCOSE BLDC GLUCOMTR-MCNC: 160 MG/DL — HIGH (ref 70–99)
SPECIMEN SOURCE: SIGNIFICANT CHANGE UP

## 2018-04-04 PROCEDURE — 99233 SBSQ HOSP IP/OBS HIGH 50: CPT

## 2018-04-04 RX ORDER — IPRATROPIUM/ALBUTEROL SULFATE 18-103MCG
3 AEROSOL WITH ADAPTER (GRAM) INHALATION EVERY 6 HOURS
Qty: 0 | Refills: 0 | Status: DISCONTINUED | OUTPATIENT
Start: 2018-04-04 | End: 2018-04-06

## 2018-04-04 RX ADMIN — LIDOCAINE 1 PATCH: 4 CREAM TOPICAL at 01:11

## 2018-04-04 RX ADMIN — RIVAROXABAN 15 MILLIGRAM(S): KIT at 17:22

## 2018-04-04 RX ADMIN — Medication 325 MILLIGRAM(S): at 12:12

## 2018-04-04 RX ADMIN — LIDOCAINE 1 PATCH: 4 CREAM TOPICAL at 08:09

## 2018-04-04 RX ADMIN — Medication 650 MILLIGRAM(S): at 22:30

## 2018-04-04 RX ADMIN — ATORVASTATIN CALCIUM 10 MILLIGRAM(S): 80 TABLET, FILM COATED ORAL at 21:33

## 2018-04-04 RX ADMIN — Medication 650 MILLIGRAM(S): at 21:33

## 2018-04-04 RX ADMIN — Medication 81 MILLIGRAM(S): at 12:12

## 2018-04-04 RX ADMIN — PHENYLEPHRINE-SHARK LIVER OIL-MINERAL OIL-PETROLATUM RECTAL OINTMENT 1 APPLICATION(S): at 14:27

## 2018-04-04 RX ADMIN — Medication 250 MILLIGRAM(S): at 17:22

## 2018-04-04 RX ADMIN — Medication 100 MILLIGRAM(S): at 06:39

## 2018-04-04 RX ADMIN — LIDOCAINE 1 PATCH: 4 CREAM TOPICAL at 21:33

## 2018-04-04 RX ADMIN — Medication 650 MILLIGRAM(S): at 06:39

## 2018-04-04 RX ADMIN — AMIODARONE HYDROCHLORIDE 200 MILLIGRAM(S): 400 TABLET ORAL at 06:39

## 2018-04-04 RX ADMIN — Medication 40 MILLIGRAM(S): at 06:39

## 2018-04-04 RX ADMIN — Medication 100 MILLIGRAM(S): at 17:23

## 2018-04-04 RX ADMIN — Medication 1200 MILLIGRAM(S): at 17:22

## 2018-04-04 RX ADMIN — CEFTRIAXONE 100 GRAM(S): 500 INJECTION, POWDER, FOR SOLUTION INTRAMUSCULAR; INTRAVENOUS at 12:11

## 2018-04-04 RX ADMIN — Medication 650 MILLIGRAM(S): at 07:09

## 2018-04-04 RX ADMIN — GABAPENTIN 100 MILLIGRAM(S): 400 CAPSULE ORAL at 12:11

## 2018-04-04 RX ADMIN — Medication 3 MILLILITER(S): at 09:42

## 2018-04-04 RX ADMIN — PHENYLEPHRINE-SHARK LIVER OIL-MINERAL OIL-PETROLATUM RECTAL OINTMENT 1 APPLICATION(S): at 21:33

## 2018-04-04 RX ADMIN — Medication 500 MILLIGRAM(S): at 12:12

## 2018-04-04 RX ADMIN — Medication 250 MILLIGRAM(S): at 06:39

## 2018-04-04 RX ADMIN — PHENYLEPHRINE-SHARK LIVER OIL-MINERAL OIL-PETROLATUM RECTAL OINTMENT 1 APPLICATION(S): at 06:39

## 2018-04-04 RX ADMIN — Medication 40 MILLIGRAM(S): at 06:41

## 2018-04-04 NOTE — PROGRESS NOTE ADULT - SUBJECTIVE AND OBJECTIVE BOX
NPP INFECTIOUS DISEASES AND INTERNAL MEDICINE at [place]  =======================================================  Geremias Suarez MD FAC   Holger Leija MD  Diplomates American Board of Internal Medicine and Infectious Diseases  =======================================================    [facility]-[id number]  DAVID VINCENT   follow up for pneumonia.  labs / cultures reviewed.  STREP BOVIS BACTEREMIA  and PNEUMONIA  FOR KALE TODAY           Allergies  No Known Allergies  Intolerances    MEDICATIONS  (STANDING):  ALBUTerol/ipratropium for Nebulization 3 milliLiter(s) Nebulizer every 6 hours  amiodarone    Tablet 200 milliGRAM(s) Oral daily  aspirin  chewable 81 milliGRAM(s) Oral daily  atorvastatin 10 milliGRAM(s) Oral at bedtime  ferrous    sulfate 325 milliGRAM(s) Oral daily  furosemide    Tablet 40 milliGRAM(s) Oral daily  guaiFENesin ER 1200 milliGRAM(s) Oral every 12 hours  hemorrhoidal Ointment 1 Application(s) Rectal three times a day  lidocaine   Patch 1 Patch Transdermal every 24 hours  methylPREDNISolone sodium succinate Injectable 60 milliGRAM(s) IV Push every 12 hours     rivaroxaban 15 milliGRAM(s) Oral every 24 hours  saccharomyces boulardii 250 milliGRAM(s) Oral two times a day      =======================================================  REVIEW OF SYSTEMS:  as above    ======================================================  Physical Exam:  ============   Vital Signs Last 24 Hrs  T(C): 36.7 (04 Apr 2018 07:51), Max: 37 (04 Apr 2018 01:36)  T(F): 98.1 (04 Apr 2018 07:51), Max: 98.6 (04 Apr 2018 01:36)  HR: 65 (04 Apr 2018 07:51) (55 - 75)  BP: 140/78 (04 Apr 2018 07:51) (133/86 - 140/78)  BP(mean): --  RR: 19 (04 Apr 2018 07:51) (16 - 19)  SpO2: 100% (04 Apr 2018 07:51) (92% - 100%)    General: Alert and oriented, No acute distress.  obese  Eye: Pupils are equal, round and reactive to light, Extraocular movements are intact, Normal conjunctiva.  HENT: Normocephalic, Oral mucosa is moist, No pharyngeal erythema, No sinus tenderness.  Neck: Supple, No lymphadenopathy.  Respiratory: coarse breath sounds, decreased at RIGHT base  Cardiovascular: Normal rate, Regular rhythm, No murmur, Good pulses equal in all extremities, No edema.  Gastrointestinal: Soft, Non-tender, Non-distended, Normal bowel sounds.  Genitourinary: No costovertebral angle tenderness.  Musculoskeletal: Normal range of motion, Normal strength.  Integumentary: No rash.  Neurologic: Alert, Oriented, No focal deficits, Cranial Nerves II-XII are grossly intact.  Psychiatric: Appropriate mood & affect.      =======================================================      Labs:                       10.1   6.4   )-----------( 165      ( 03 Apr 2018 06:47 )             32.6   04-03    142  |  97<L>  |  41.0<H>  ----------------------------<  110  4.0   |  34.0<H>  |  1.54<H>    Ca    9.3      03 Apr 2018 06:47                          RECENT CULTURES:  03-29 @ 08:57 .Blood Blood-Venous Blood Culture PCR    Growth in aerobic and anaerobic bottles: Gram Positive Cocci in Pairs and  Chains  Anaerobic Bottle: 9:04 Hours to positivity  Aerobic Bottle: 15:34 Hours to positivity  ***Blood Panel PCR results on this specimen are available  approximately 3 hours after the Gram stain result.***  Gram stain, PCR, and/or culture results may not always  correspond due to difference in methodologies.  ************************************************************  This PCR assay was performed using [employer].  The following targets are tested for: Enterococcus,  vancomycin resistant enterococci, Listeria monocytogenes,  coagulase negative staphylococci, S. aureus,  methicillin resistant S. aureus, Streptococcus agalactiae  (Group B), S. pneumoniae, S.pyogenes (Group A),  Acinetobacter baumannii, Enterobacter cloacae, E. coli,  Klebsiella oxytoca, K. pneumoniae, Proteus sp.,  Serratia marcescens, Haemophilus influenzae,  Neisseria meningitidis, Pseudomonas aeruginosa, Candida  albicans, C. glabrata, C krusei, C parapsilosis,  C. tropicalis and the KPC resistance gene.  "Due to technical problems, Proteus sp. will Not be reported as part of  the BCID panel until further notice"  .  TYPE: (C=Critical, N=Notification, A=Abnormal) C  TESTS:  _ Gram stain  DATE/TIME CALLED: _ 03/29/2018 19:01:45  CALLED TO: Fady Iverson  READ BACK (2 Patient Identifiers)(Y/N): _ y  READ BACK VALUES (Y/N): _ y  CALLED BY: Fady camargo  ,  TYPE: (C=Critical, N=Notification, A=Abnormal) c  TESTS:  _ gs  DATE/TIME CALLED: _ 03/30/2018 08:42:31  CALLED TO: Fady zafar rn  READ BACK (2 Patient Identifiers)(Y/N): _ y  READ BACK VALUES (Y/N): _ y  CALLED BY: Fady rowe        03-29 @ 08:54 .Blood Blood-Venous     Growth in aerobic and anaerobic bottles: Gram Positive Cocci in Pairs and  Chains  Anaerobic Bottle: 9:05 Hours to positivity  Aerobic Bottle: 14:04   Hours to positivity  .  TYPE: (C=Critical, N=Notification, A=Abnormal) C  TESTS:  _ Gram stain  DATE/TIME CALLED: _ 03/29/2018 19:05:08  CALLED TO: _ Tiffanie Iverson  READ BACK (2 Patient Identifiers)(Y/N): _ y  READ BACK VALUES (Y/N): _ y  CALLED BY: Fady camargo  ,  TYPE: (C=Critical, N=Notification, A=Abnormal) c  TESTS:  _ gs  DATE/TIME CALLED: _ 03/30/2018 08:40:31  CALLED TO: _ herrera zafar rn  READ BACK (2 Patient Identifiers)(Y/N): _ y  READ BACK VALUES (Y/N): _ y  CALLED BY: Fady rowe

## 2018-04-04 NOTE — PHYSICAL THERAPY INITIAL EVALUATION ADULT - ADDITIONAL COMMENTS
pt states he lives with his daughter in law (son is in [place]). states he is in his own house with 3 steps to enter (+bilateral reachable rails). used home O2 and RW for amb. states his daughter in law helps him with mobility and ADLs, does not work, and is home with him.

## 2018-04-04 NOTE — PHYSICAL THERAPY INITIAL EVALUATION ADULT - CRITERIA FOR SKILLED THERAPEUTIC INTERVENTIONS
anticipated equipment needs at discharge/impairments found/rehab potential/therapy frequency/functional limitations in following categories/risk reduction/prevention/anticipated discharge recommendation

## 2018-04-04 NOTE — PROGRESS NOTE ADULT - SUBJECTIVE AND OBJECTIVE BOX
seen for bacteremia, RLL PNA    no acute complaints  ROS negative. no cp/sob/cough    MEDICATIONS  (STANDING):  amiodarone    Tablet 200 milliGRAM(s) Oral daily  ascorbic acid 500 milliGRAM(s) Oral daily  aspirin  chewable 81 milliGRAM(s) Oral daily  atorvastatin 10 milliGRAM(s) Oral at bedtime  cefTRIAXone   IVPB      cefTRIAXone   IVPB 2 Gram(s) IV Intermittent every 24 hours  docusate sodium 100 milliGRAM(s) Oral two times a day  ferrous    sulfate 325 milliGRAM(s) Oral daily  furosemide    Tablet 40 milliGRAM(s) Oral daily  gabapentin 100 milliGRAM(s) Oral daily  guaiFENesin ER 1200 milliGRAM(s) Oral every 12 hours  hemorrhoidal Ointment 1 Application(s) Rectal three times a day  lidocaine   Patch 1 Patch Transdermal every 24 hours  rivaroxaban 15 milliGRAM(s) Oral every 24 hours  saccharomyces boulardii 250 milliGRAM(s) Oral two times a day  senna 2 Tablet(s) Oral at bedtime    MEDICATIONS  (PRN):  acetaminophen   Tablet. 650 milliGRAM(s) Oral every 6 hours PRN Mild Pain (1 - 3)  ALBUTerol/ipratropium for Nebulization 3 milliLiter(s) Nebulizer every 6 hours PRN Shortness of Breath and/or Wheezing  oxyCODONE    5 mG/acetaminophen 325 mG 1 Tablet(s) Oral every 6 hours PRN Moderate Pain (4 - 6)  polyethylene glycol 3350 17 Gram(s) Oral daily PRN Constipation      Allergies    No Known Allergies    Vital Signs Last 24 Hrs  T(C): 36.7 (04 Apr 2018 07:51), Max: 37 (04 Apr 2018 01:36)  T(F): 98.1 (04 Apr 2018 07:51), Max: 98.6 (04 Apr 2018 01:36)  HR: 65 (04 Apr 2018 07:51) (55 - 75)  BP: 140/78 (04 Apr 2018 07:51) (133/86 - 140/78)  BP(mean): --  RR: 19 (04 Apr 2018 07:51) (16 - 19)  SpO2: 100% (04 Apr 2018 07:51) (92% - 100%)    PHYSICAL EXAM:    GENERAL: NAD, obese  CHEST/LUNG: ctab  HEART: Regular rate and rhythm; S1 S2  ABDOMEN: Soft, Nontender, Nondistended; Bowel sounds present  EXTREMITIES: trace edema.  NERVOUS SYSTEM:  Alert & Oriented X3,nonfocal  PSYCH: normal mood, appropriate response.    LABS:                        10.1   6.4   )-----------( 165      ( 03 Apr 2018 06:47 )             32.6     04-03    142  |  97<L>  |  41.0<H>  ----------------------------<  110  4.0   |  34.0<H>  |  1.54<H>    Ca    9.3      03 Apr 2018 06:47            CAPILLARY BLOOD GLUCOSE            RADIOLOGY & ADDITIONAL TESTS:

## 2018-04-04 NOTE — PROGRESS NOTE ADULT - SUBJECTIVE AND OBJECTIVE BOX
KALE Procedure Note:    After r/b/a were explained and questions answered, signed consent was placed in the chart.  Anesthesia sedated the patient and KALE probe was passed without difficulty.  KALE was performed in multiple planes and with doppler.  Probe was then removed.  Patient tolerated procedure well without complications.    Findings:  1. LV normal size and fxn, EF 60-65%  2. Grossly normal RV  3. Severe LAE  4. Bio AVR functioning normally  5. Mild-mod MR, no MS  6. TV not well seen  7. Mild PI  8. No effusion  9. No evidence of endocarditis    Alex Hernandez MD

## 2018-04-04 NOTE — PROGRESS NOTE ADULT - ASSESSMENT
This 83 y.o. man here for right sided PNA, baseline COPD, strep  BOVIS BACTEREMIA    GI EVALUATION  R/O MALIGNANCY    ALSO  WITH AORTIC VALVE REPLACEMENT  FOR  KALE CARDIOLOGY CONSULTED     PT HAS NOT HAD COLONOSCOPY RECENTLY   ON  ROCEPHIN DAILY  REPEAT BLOOD CX NEGATIVE WILL NEED EVENTUAL LINE FOR IV ABX OUTSIDE THE HOSPITAL  ALSO CONSIDER VIRTUAL COLONOSCOPY  AS PT HIGH RISK FOR Congential COLONOSCOPY This 83 y.o. man here for right sided PNA, baseline COPD, strep  BOVIS BACTEREMIA    GI EVALUATION  R/O MALIGNANCY    ALSO  WITH AORTIC VALVE REPLACEMENT  FOR  KALE CARDIOLOGY CONSULTED     PT HAS NOT HAD COLONOSCOPY RECENTLY   ON  ROCEPHIN DAILY  REPEAT BLOOD CX NEGATIVE WILL NEED EVENTUAL LINE FOR IV ABX OUTSIDE THE HOSPITAL  ALSO CONSIDER VIRTUAL COLONOSCOPY  AS PT HIGH RISK FOR CONVENTIONAL COLONOSCOPY

## 2018-04-04 NOTE — PROGRESS NOTE ADULT - ASSESSMENT
82 y/o male with history of COPD on home 02 3 liters cont, chronic blood loss anemia, ckd stage 4, DM-2, HTN, HLD,Afib on Xarelto, CAD s/p CABG presents from home with greenish productive cough, chills, body aches, pleuritic chest pain/ back pain x 2 days.   Patient was found to have pneumonia on CXR and started on treatment for HCAP with zosyn.   He was started on steroids, nebulizers, and oxygen. Pulmonology and infectious disease was consulted.   Blood cultures grew Streptococci bovis. ABG showed hypercapnia and pulm recommended nocturnal BiPAP and an outpatient KRYSTIN sleep study.       >RLL PNA (HCAP Gram +ve/-ve) causing COPD exacerbation/ Acute on chronic Hypoxic & hypercarbic RF-   Continue Rocephin, off Zosyn  Blood cultures with Streptococci, f/u culture and sensitivity  taper steroids  Continue supplemental O2   nebs    > Strep Bovis Bacteremia -   on Rocephin now off Zosyn  repeat blood cultures -Negative  ID following- input appreciated  Pt. will need Midline or PICC for long term abx  GI Consult appreciated- as per GI patient is not a candidate for endoscopic procedures, now or forseeably  in the future.    due to multiple comorbidities   Will consider Virtual Colonoscopy  Cardiology consult (Hx Aortic valve replacement)- KALE today      >Low Back Pain, pleuritic in nature - present for 2 weeks  Lumbar spine x-ray with degenerative spondylosis  Lidocaine patch and tylenol for pain  will try Gabapentin low dose  add percocet (but pt. usually doesn't want to take it)    >chronic blood loss anemia from GIB- not candidate for sedation & colonoscopy as per GI notes on last admission,   monitor H/H 10.1/32.6  Continue iron supplements and vit C.  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 will need to f/u w/Pulmonology as outpt.      >ckd stage 4- avoid nephrotoxics,   renally dose medications & avoid nephrotoxic meds    >hx of DM-2- A1c 5.5    >HTN/ HLD-   c/w home meds (lasix)    >HFpEF- appears euvolemic,   c/w home lasix    > Afib on Xarelto- c/w amiodarone     >CAD s/p CABG- c/w ASA. xarelto, statin

## 2018-04-04 NOTE — PHYSICAL THERAPY INITIAL EVALUATION ADULT - PERTINENT HX OF CURRENT PROBLEM, REHAB EVAL
84 y/o male with history of COPD on home 02 3 liters cont, chronic blood loss anemia, GIB, ckd stage 4, DM-2, HTN, HLD, Chronic LE edema, Afib on Xarelto, CAD s/p CABG presents from home with greenish productive cough, chills, body aches, pleuritic chest pain/ back pain x 2 days. Pt has not been feeling well since being discharged from [facility] recently with URI symptoms since discharge. RLL PNA (HCAP Gram +ve/-ve) causing COPD exacerbation

## 2018-04-05 LAB
ANION GAP SERPL CALC-SCNC: 10 MMOL/L — SIGNIFICANT CHANGE UP (ref 5–17)
ANISOCYTOSIS BLD QL: SLIGHT — SIGNIFICANT CHANGE UP
BUN SERPL-MCNC: 40 MG/DL — HIGH (ref 8–20)
CALCIUM SERPL-MCNC: 9.2 MG/DL — SIGNIFICANT CHANGE UP (ref 8.6–10.2)
CHLORIDE SERPL-SCNC: 100 MMOL/L — SIGNIFICANT CHANGE UP (ref 98–107)
CO2 SERPL-SCNC: 34 MMOL/L — HIGH (ref 22–29)
CREAT SERPL-MCNC: 1.54 MG/DL — HIGH (ref 0.5–1.3)
CULTURE RESULTS: SIGNIFICANT CHANGE UP
EOSINOPHIL # BLD AUTO: 0.1 K/UL — SIGNIFICANT CHANGE UP (ref 0–0.5)
EOSINOPHIL NFR BLD AUTO: 1.2 % — SIGNIFICANT CHANGE UP (ref 0–6)
GLUCOSE SERPL-MCNC: 114 MG/DL — SIGNIFICANT CHANGE UP (ref 70–115)
HCT VFR BLD CALC: 31.2 % — LOW (ref 42–52)
HGB BLD-MCNC: 9.7 G/DL — LOW (ref 14–18)
HYPERCHROMIA BLD QL AUTO: SLIGHT — SIGNIFICANT CHANGE UP
HYPOCHROMIA BLD QL: SLIGHT — SIGNIFICANT CHANGE UP
LYMPHOCYTES # BLD AUTO: 0.9 K/UL — LOW (ref 1–4.8)
LYMPHOCYTES # BLD AUTO: 13.9 % — LOW (ref 20–55)
MACROCYTES BLD QL: SLIGHT — SIGNIFICANT CHANGE UP
MCHC RBC-ENTMCNC: 23.6 PG — LOW (ref 27–31)
MCHC RBC-ENTMCNC: 31.1 G/DL — LOW (ref 32–36)
MCV RBC AUTO: 75.9 FL — LOW (ref 80–94)
MONOCYTES # BLD AUTO: 0.5 K/UL — SIGNIFICANT CHANGE UP (ref 0–0.8)
MONOCYTES NFR BLD AUTO: 8.1 % — SIGNIFICANT CHANGE UP (ref 3–10)
NEUTROPHILS # BLD AUTO: 4.9 K/UL — SIGNIFICANT CHANGE UP (ref 1.8–8)
NEUTROPHILS NFR BLD AUTO: 76 % — HIGH (ref 37–73)
OVALOCYTES BLD QL SMEAR: SLIGHT — SIGNIFICANT CHANGE UP
PLAT MORPH BLD: NORMAL — SIGNIFICANT CHANGE UP
PLATELET # BLD AUTO: 152 K/UL — SIGNIFICANT CHANGE UP (ref 150–400)
POIKILOCYTOSIS BLD QL AUTO: SLIGHT — SIGNIFICANT CHANGE UP
POLYCHROMASIA BLD QL SMEAR: SLIGHT — SIGNIFICANT CHANGE UP
POTASSIUM SERPL-MCNC: 4 MMOL/L — SIGNIFICANT CHANGE UP (ref 3.5–5.3)
POTASSIUM SERPL-SCNC: 4 MMOL/L — SIGNIFICANT CHANGE UP (ref 3.5–5.3)
RBC # BLD: 4.11 M/UL — LOW (ref 4.6–6.2)
RBC # FLD: 21.7 % — HIGH (ref 11–15.6)
RBC BLD AUTO: ABNORMAL
SODIUM SERPL-SCNC: 144 MMOL/L — SIGNIFICANT CHANGE UP (ref 135–145)
SPECIMEN SOURCE: SIGNIFICANT CHANGE UP
WBC # BLD: 6.4 K/UL — SIGNIFICANT CHANGE UP (ref 4.8–10.8)
WBC # FLD AUTO: 6.4 K/UL — SIGNIFICANT CHANGE UP (ref 4.8–10.8)

## 2018-04-05 PROCEDURE — 99233 SBSQ HOSP IP/OBS HIGH 50: CPT

## 2018-04-05 PROCEDURE — 99232 SBSQ HOSP IP/OBS MODERATE 35: CPT

## 2018-04-05 RX ORDER — DILTIAZEM HCL 120 MG
120 CAPSULE, EXT RELEASE 24 HR ORAL DAILY
Qty: 0 | Refills: 0 | Status: DISCONTINUED | OUTPATIENT
Start: 2018-04-05 | End: 2018-04-06

## 2018-04-05 RX ORDER — CEFTRIAXONE 500 MG/1
2 INJECTION, POWDER, FOR SOLUTION INTRAMUSCULAR; INTRAVENOUS
Qty: 2 | Refills: 0 | OUTPATIENT
Start: 2018-04-05 | End: 2018-04-29

## 2018-04-05 RX ORDER — FAMOTIDINE 10 MG/ML
20 INJECTION INTRAVENOUS AT BEDTIME
Qty: 0 | Refills: 0 | Status: DISCONTINUED | OUTPATIENT
Start: 2018-04-05 | End: 2018-04-06

## 2018-04-05 RX ORDER — METOPROLOL TARTRATE 50 MG
25 TABLET ORAL DAILY
Qty: 0 | Refills: 0 | Status: DISCONTINUED | OUTPATIENT
Start: 2018-04-05 | End: 2018-04-05

## 2018-04-05 RX ADMIN — ATORVASTATIN CALCIUM 10 MILLIGRAM(S): 80 TABLET, FILM COATED ORAL at 21:38

## 2018-04-05 RX ADMIN — Medication 500 MILLIGRAM(S): at 12:27

## 2018-04-05 RX ADMIN — Medication 1200 MILLIGRAM(S): at 18:35

## 2018-04-05 RX ADMIN — GABAPENTIN 100 MILLIGRAM(S): 400 CAPSULE ORAL at 12:27

## 2018-04-05 RX ADMIN — Medication 200 MILLIGRAM(S): at 04:45

## 2018-04-05 RX ADMIN — Medication 100 MILLIGRAM(S): at 06:21

## 2018-04-05 RX ADMIN — Medication 100 MILLIGRAM(S): at 18:35

## 2018-04-05 RX ADMIN — Medication 40 MILLIGRAM(S): at 06:20

## 2018-04-05 RX ADMIN — Medication 81 MILLIGRAM(S): at 12:27

## 2018-04-05 RX ADMIN — SENNA PLUS 2 TABLET(S): 8.6 TABLET ORAL at 21:38

## 2018-04-05 RX ADMIN — Medication 650 MILLIGRAM(S): at 21:38

## 2018-04-05 RX ADMIN — Medication 3 MILLILITER(S): at 21:18

## 2018-04-05 RX ADMIN — Medication 650 MILLIGRAM(S): at 07:18

## 2018-04-05 RX ADMIN — CEFTRIAXONE 100 GRAM(S): 500 INJECTION, POWDER, FOR SOLUTION INTRAMUSCULAR; INTRAVENOUS at 12:27

## 2018-04-05 RX ADMIN — FAMOTIDINE 20 MILLIGRAM(S): 10 INJECTION INTRAVENOUS at 21:38

## 2018-04-05 RX ADMIN — Medication 1200 MILLIGRAM(S): at 06:21

## 2018-04-05 RX ADMIN — PHENYLEPHRINE-SHARK LIVER OIL-MINERAL OIL-PETROLATUM RECTAL OINTMENT 1 APPLICATION(S): at 14:11

## 2018-04-05 RX ADMIN — RIVAROXABAN 15 MILLIGRAM(S): KIT at 18:54

## 2018-04-05 RX ADMIN — Medication 325 MILLIGRAM(S): at 12:27

## 2018-04-05 RX ADMIN — Medication 650 MILLIGRAM(S): at 22:30

## 2018-04-05 RX ADMIN — Medication 650 MILLIGRAM(S): at 06:21

## 2018-04-05 RX ADMIN — PHENYLEPHRINE-SHARK LIVER OIL-MINERAL OIL-PETROLATUM RECTAL OINTMENT 1 APPLICATION(S): at 21:38

## 2018-04-05 RX ADMIN — AMIODARONE HYDROCHLORIDE 200 MILLIGRAM(S): 400 TABLET ORAL at 06:20

## 2018-04-05 RX ADMIN — LIDOCAINE 1 PATCH: 4 CREAM TOPICAL at 12:27

## 2018-04-05 RX ADMIN — Medication 250 MILLIGRAM(S): at 18:39

## 2018-04-05 RX ADMIN — Medication 250 MILLIGRAM(S): at 06:21

## 2018-04-05 NOTE — PROGRESS NOTE ADULT - ASSESSMENT
Patient improved  Bacteremia>secondary to pneumonia    Plan:  1.Antibiotics per ID  2.O2>titrate to sat >92  3.Will need f/u CXR as outpatient in 4-6 weeks to ensure resolution  4.Followup with us post D/C  5.Patient refuses to use CPAP even if needed  6.Would continue LABA/ICS/LAMA as outpatient until can be evaluated in our office  7.Will f/u PRN

## 2018-04-05 NOTE — PROGRESS NOTE ADULT - PROBLEM SELECTOR PROBLEM 1
Pneumonia due to infectious organism, unspecified laterality, unspecified part of lung

## 2018-04-05 NOTE — PROGRESS NOTE ADULT - ASSESSMENT
This 83 y.o. man here for right sided PNA, baseline COPD, strep  BOVIS BACTEREMIA    GI EVALUATION  R/O MALIGNANCY    ALSO  WITH AORTIC VALVE REPLACEMENT  S/P KALE NO OBVIOUS  ENDOCARDITIS     PT HAS NOT HAD COLONOSCOPY RECENTLY   ON  ROCEPHIN DAILY  REPEAT BLOOD CX NEGATIVE  NEG  WILL ORDER MIDLINE TO COMPLETE 4 WEEKS IV ABX THROUGH 4/30  WILL FOLLOW UP OK TO D/C FROM ID STANDPOINT  ONCE ARRANGE  WILL NEED COLONOSCOPY AS OUT PT

## 2018-04-05 NOTE — PROGRESS NOTE ADULT - SUBJECTIVE AND OBJECTIVE BOX
PULMONARY PROGRESS NOTE      KARLENE VINCENT-[id number]    Patient is a 83y old  Male who presents with a chief complaint of cough, chills (29 Mar 2018 13:21)      INTERVAL HPI/OVERNIGHT EVENTS:  Some continued right back pain  Some cough  KALE negative for endocarditis>good function    MEDICATIONS  (STANDING):  amiodarone    Tablet 200 milliGRAM(s) Oral daily  ascorbic acid 500 milliGRAM(s) Oral daily  aspirin  chewable 81 milliGRAM(s) Oral daily  atorvastatin 10 milliGRAM(s) Oral at bedtime  cefTRIAXone   IVPB      cefTRIAXone   IVPB 2 Gram(s) IV Intermittent every 24 hours  diltiazem    milliGRAM(s) Oral daily  docusate sodium 100 milliGRAM(s) Oral two times a day  famotidine    Tablet 20 milliGRAM(s) Oral at bedtime  ferrous    sulfate 325 milliGRAM(s) Oral daily  furosemide    Tablet 40 milliGRAM(s) Oral daily  gabapentin 100 milliGRAM(s) Oral daily  guaiFENesin ER 1200 milliGRAM(s) Oral every 12 hours  hemorrhoidal Ointment 1 Application(s) Rectal three times a day  lidocaine   Patch 1 Patch Transdermal every 24 hours  rivaroxaban 15 milliGRAM(s) Oral every 24 hours  saccharomyces boulardii 250 milliGRAM(s) Oral two times a day  senna 2 Tablet(s) Oral at bedtime      MEDICATIONS  (PRN):  acetaminophen   Tablet. 650 milliGRAM(s) Oral every 6 hours PRN Mild Pain (1 - 3)  ALBUTerol/ipratropium for Nebulization 3 milliLiter(s) Nebulizer every 6 hours PRN Shortness of Breath and/or Wheezing  polyethylene glycol 3350 17 Gram(s) Oral daily PRN Constipation      Allergies    No Known Allergies    Intolerances        PAST MEDICAL & SURGICAL HISTORY:  COPD (chronic obstructive pulmonary disease)  Hyperlipidemia, unspecified hyperlipidemia type  Essential hypertension  Afib  Chronic systolic CHF (congestive heart failure)  Hypertrophic cardiomyopathy  CKD (chronic kidney disease), stage 4 (severe)  Gassiness  Diabetes  Asthma  Prostate cancer  Hypertension  No significant past surgical history  History of valvuloplasty  History of knee surgery      SOCIAL HISTORY  Smoking History: Former      REVIEW OF SYSTEMS:    CONSTITUTIONAL:  No distress    HEENT:  Eyes:  No diplopia or blurred vision. ENT:  No earache, sore throat or runny nose.    CARDIOVASCULAR:  No pressure, squeezing, tightness, heaviness or aching about the chest; no palpitations.    RESPIRATORY:  Per HPI    GASTROINTESTINAL:  No nausea, vomiting or diarrhea.    GENITOURINARY:  No dysuria, frequency or urgency.    MUSCULOSKELETAL:  No joint pain    SKIN:  No new lesions.    NEUROLOGIC:  No paresthesias, fasciculations, seizures or weakness.    PSYCHIATRIC:  No disorder of thought or mood.    ENDOCRINE:  No heat or cold intolerance, polyuria or polydipsia.    HEMATOLOGICAL:  No easy bruising or bleeding.     Vital Signs Last 24 Hrs  T(C): 36.6 (05 Apr 2018 07:30), Max: 36.8 (05 Apr 2018 00:07)  T(F): 97.9 (05 Apr 2018 07:30), Max: 98.3 (05 Apr 2018 00:07)  HR: 73 (05 Apr 2018 07:30) (71 - 90)  BP: 101/70 (05 Apr 2018 07:30) (100/57 - 119/66)  BP(mean): --  RR: 18 (05 Apr 2018 07:30) (15 - 18)  SpO2: 96% (05 Apr 2018 07:30) (96% - 99%)    PHYSICAL EXAMINATION:    GENERAL: The patient is awake and alert in no apparent distress.     HEENT: Head is normocephalic and atraumatic. Extraocular muscles are intact. Mucous membranes are moist.    NECK: Supple.    LUNGS: Initial rhonchi>clears with cough otherwise good breath sounds    HEART: Regular rate and rhythm without murmur.    ABDOMEN: Soft, nontender, and nondistended.      EXTREMITIES: Without any cyanosis, clubbing, rash, lesions or edema.    NEUROLOGIC: Grossly intact.    SKIN: No ulceration or induration present.      LABS:                        9.7    6.4   )-----------( 152      ( 05 Apr 2018 06:39 )             31.2     04-05    144  |  100  |  40.0<H>  ----------------------------<  114  4.0   |  34.0<H>  |  1.54<H>    Ca    9.2      05 Apr 2018 06:39                          MICROBIOLOGY:    RADIOLOGY & ADDITIONAL STUDIES:

## 2018-04-05 NOTE — PROGRESS NOTE ADULT - PROBLEM SELECTOR PLAN 3
continue medical care

## 2018-04-05 NOTE — PROGRESS NOTE ADULT - PROBLEM SELECTOR PLAN 4
will monitor  abx dosed renally.

## 2018-04-05 NOTE — PROGRESS NOTE ADULT - PROVIDER SPECIALTY LIST ADULT
Cardiology
Cardiology
Hospitalist
Infectious Disease
Pulmonology
Hospitalist
Pulmonology
Hospitalist
Pulmonology
Infectious Disease
Infectious Disease

## 2018-04-05 NOTE — PROGRESS NOTE ADULT - SUBJECTIVE AND OBJECTIVE BOX
seen for pna, bacteremia    seen via .  nocturnal cough  ros otherwise negative  breathing at baseline. no acute complaints.    MEDICATIONS  (STANDING):  amiodarone    Tablet 200 milliGRAM(s) Oral daily  ascorbic acid 500 milliGRAM(s) Oral daily  aspirin  chewable 81 milliGRAM(s) Oral daily  atorvastatin 10 milliGRAM(s) Oral at bedtime  cefTRIAXone   IVPB      cefTRIAXone   IVPB 2 Gram(s) IV Intermittent every 24 hours  docusate sodium 100 milliGRAM(s) Oral two times a day  ferrous    sulfate 325 milliGRAM(s) Oral daily  furosemide    Tablet 40 milliGRAM(s) Oral daily  gabapentin 100 milliGRAM(s) Oral daily  guaiFENesin ER 1200 milliGRAM(s) Oral every 12 hours  hemorrhoidal Ointment 1 Application(s) Rectal three times a day  lidocaine   Patch 1 Patch Transdermal every 24 hours  metoprolol succinate ER 25 milliGRAM(s) Oral daily  rivaroxaban 15 milliGRAM(s) Oral every 24 hours  saccharomyces boulardii 250 milliGRAM(s) Oral two times a day  senna 2 Tablet(s) Oral at bedtime    MEDICATIONS  (PRN):  acetaminophen   Tablet. 650 milliGRAM(s) Oral every 6 hours PRN Mild Pain (1 - 3)  ALBUTerol/ipratropium for Nebulization 3 milliLiter(s) Nebulizer every 6 hours PRN Shortness of Breath and/or Wheezing  oxyCODONE    5 mG/acetaminophen 325 mG 1 Tablet(s) Oral every 6 hours PRN Moderate Pain (4 - 6)  polyethylene glycol 3350 17 Gram(s) Oral daily PRN Constipation      Allergies    No Known Allergies    Vital Signs Last 24 Hrs  T(C): 36.6 (05 Apr 2018 07:30), Max: 36.8 (05 Apr 2018 00:07)  T(F): 97.9 (05 Apr 2018 07:30), Max: 98.3 (05 Apr 2018 00:07)  HR: 73 (05 Apr 2018 07:30) (71 - 90)  BP: 101/70 (05 Apr 2018 07:30) (100/57 - 119/66)  BP(mean): --  RR: 18 (05 Apr 2018 07:30) (15 - 18)  SpO2: 96% (05 Apr 2018 07:30) (96% - 99%)    PHYSICAL EXAM:    GENERAL: NAD obese  CHEST/LUNG: ctab  HEART: irreg irreg rate and rhythm; S1 S2  ABDOMEN: Soft, Nontender, Nondistended; Bowel sounds present  EXTREMITIES:trace edema  NERVOUS SYSTEM:  Alert & Oriented X3,nonfocal    LABS:                        9.7    6.4   )-----------( 152      ( 05 Apr 2018 06:39 )             31.2     04-05    144  |  100  |  40.0<H>  ----------------------------<  114  4.0   |  34.0<H>  |  1.54<H>    Ca    9.2      05 Apr 2018 06:39            CAPILLARY BLOOD GLUCOSE            RADIOLOGY & ADDITIONAL TESTS:

## 2018-04-05 NOTE — PROGRESS NOTE ADULT - SUBJECTIVE AND OBJECTIVE BOX
[facility] INFECTIOUS DISEASES AND INTERNAL MEDICINE at [place]  =======================================================  Geremias Suarez MD FAC   Holger Leija MD  Diplomates American Board of Internal Medicine and Infectious Diseases  =======================================================    [facility]-[id number]  DAVID VINCENT   follow up for pneumonia.  labs / cultures reviewed.  STREP BOVIS BACTEREMIA  and PNEUMONIA  S/P KALE           Allergies  No Known Allergies  Intolerances    MEDICATIONS  (STANDING):  ALBUTerol/ipratropium for Nebulization 3 milliLiter(s) Nebulizer every 6 hours  amiodarone    Tablet 200 milliGRAM(s) Oral daily  aspirin  chewable 81 milliGRAM(s) Oral daily  atorvastatin 10 milliGRAM(s) Oral at bedtime  ferrous    sulfate 325 milliGRAM(s) Oral daily  furosemide    Tablet 40 milliGRAM(s) Oral daily  guaiFENesin ER 1200 milliGRAM(s) Oral every 12 hours  hemorrhoidal Ointment 1 Application(s) Rectal three times a day  lidocaine   Patch 1 Patch Transdermal every 24 hours  methylPREDNISolone sodium succinate Injectable 60 milliGRAM(s) IV Push every 12 hours     rivaroxaban 15 milliGRAM(s) Oral every 24 hours  saccharomyces boulardii 250 milliGRAM(s) Oral two times a day      =======================================================  REVIEW OF SYSTEMS:  as above    ======================================================  Physical Exam:  ============    Vital Signs Last 24 Hrs  T(C): 36.6 (05 Apr 2018 07:30), Max: 36.8 (05 Apr 2018 00:07)  T(F): 97.9 (05 Apr 2018 07:30), Max: 98.3 (05 Apr 2018 00:07)  HR: 73 (05 Apr 2018 07:30) (69 - 90)  BP: 101/70 (05 Apr 2018 07:30) (100/57 - 119/66)  BP(mean): --  RR: 18 (05 Apr 2018 07:30) (15 - 20)  SpO2: 96% (05 Apr 2018 07:30) (95% - 99%)    General: Alert and oriented, No acute distress.  obese  Eye: Pupils are equal, round and reactive to light, Extraocular movements are intact, Normal conjunctiva.  HENT: Normocephalic, Oral mucosa is moist, No pharyngeal erythema, No sinus tenderness.  Neck: Supple, No lymphadenopathy.  Respiratory: coarse breath sounds, decreased at RIGHT base  Cardiovascular: Normal rate, Regular rhythm, No murmur, Good pulses equal in all extremities, No edema.  Gastrointestinal: Soft, Non-tender, Non-distended, Normal bowel sounds.  Genitourinary: No costovertebral angle tenderness.  Musculoskeletal: Normal range of motion, Normal strength.  Integumentary: No rash.  Neurologic: Alert, Oriented, No focal deficits, Cranial Nerves II-XII are grossly intact.  Psychiatric: Appropriate mood & affect.      =======================================================      Labs:                                   9.7    6.4   )-----------( 152      ( 05 Apr 2018 06:39 )             31.2   04-05    144  |  100  |  40.0<H>  ----------------------------<  114  4.0   |  34.0<H>  |  1.54<H>    Ca    9.2      05 Apr 2018 06:39                            RECENT CULTURES:  03-29 @ 08:57 .Blood Blood-Venous Blood Culture PCR    Growth in aerobic and anaerobic bottles: Gram Positive Cocci in Pairs and  Chains  Anaerobic Bottle: 9:04 Hours to positivity  Aerobic Bottle: 15:34 Hours to positivity  ***Blood Panel PCR results on this specimen are available  approximately 3 hours after the Gram stain result.***  Gram stain, PCR, and/or culture results may not always  correspond due to difference in methodologies.  ************************************************************  This PCR assay was performed using [employer].  The following targets are tested for: Enterococcus,  vancomycin resistant enterococci, Listeria monocytogenes,  coagulase negative staphylococci, S. aureus,  methicillin resistant S. aureus, Streptococcus agalactiae  (Group B), S. pneumoniae, S.pyogenes (Group A),  Acinetobacter baumannii, Enterobacter cloacae, E. coli,  Klebsiella oxytoca, K. pneumoniae, Proteus sp.,  Serratia marcescens, Haemophilus influenzae,  Neisseria meningitidis, Pseudomonas aeruginosa, Candida  albicans, C. glabrata, C krusei, C parapsilosis,  C. tropicalis and the KPC resistance gene.  "Due to technical problems, Proteus sp. will Not be reported as part of  the BCID panel until further notice"  .  TYPE: (C=Critical, N=Notification, A=Abnormal) C  TESTS:  _ Gram stain  DATE/TIME CALLED: _ 03/29/2018 19:01:45  CALLED TO: Fady Iverson  READ BACK (2 Patient Identifiers)(Y/N): _ y  READ BACK VALUES (Y/N): _ y  CALLED BY: Fady camargo  ,  TYPE: (C=Critical, N=Notification, A=Abnormal) c  TESTS:  _ gs  DATE/TIME CALLED: _ 03/30/2018 08:42:31  CALLED TO: Fady zafar rn  READ BACK (2 Patient Identifiers)(Y/N): _ y  READ BACK VALUES (Y/N): _ y  CALLED BY: Fady rowe        03-29 @ 08:54 .Blood Blood-Venous     Growth in aerobic and anaerobic bottles: Gram Positive Cocci in Pairs and  Chains  Anaerobic Bottle: 9:05 Hours to positivity  Aerobic Bottle: 14:04   Hours to positivity  .  TYPE: (C=Critical, N=Notification, A=Abnormal) C  TESTS:  _ Gram stain  DATE/TIME CALLED: _ 03/29/2018 19:05:08  CALLED TO: _ Tiffanie Iverson  READ BACK (2 Patient Identifiers)(Y/N): _ y  READ BACK VALUES (Y/N): _ y  CALLED BY: Fady camargo  ,  TYPE: (C=Critical, N=Notification, A=Abnormal) c  TESTS:  _ gs  DATE/TIME CALLED: _ 03/30/2018 08:40:31  CALLED TO: _ herrera zafar rn  READ BACK (2 Patient Identifiers)(Y/N): _ y  READ BACK VALUES (Y/N): _ y  CALLED BY: Fady rowe

## 2018-04-05 NOTE — PROGRESS NOTE ADULT - ASSESSMENT
82 y/o male with history of COPD on home 02 3 liters cont, chronic blood loss anemia, ckd stage 4, DM-2, HTN, HLD,Afib on Xarelto, CAD s/p CABG presents from home with greenish productive cough, chills, body aches, pleuritic chest pain/ back pain x 2 days.   Patient was found to have pneumonia on CXR and started on treatment for HCAP with zosyn.   He was started on steroids, nebulizers, and oxygen. Pulmonology and infectious disease was consulted.   Blood cultures grew Streptococci bovis. ABG showed hypercapnia and pulm recommended nocturnal BiPAP and an outpatient KRYSTIN sleep study.       >RLL PNA (HCAP Gram +ve/-ve) causing COPD exacerbation/ Acute on chronic Hypoxic & hypercarbic RF-   Continue Rocephin, off Zosyn  Blood cultures with Streptococci, f/u culture and sensitivity  taper steroids  Continue supplemental O2   nebs    > Strep Bovis Bacteremia -   on Rocephin  ID following- input appreciated  MIDLINE PENDING--rocephin until 4/30  GI Consult appreciated- as per GI patient is not a candidate for endoscopic procedures, now or forseeably  in the future.    due to multiple comorbidities   Virtual Colonoscopy as outpatient (d/w radiologist)  negative KALE    >Low Back Pain, pleuritic in nature - present for 2 weeks  Lumbar spine x-ray with degenerative spondylosis  Lidocaine patch and tylenol for pain  will try Gabapentin low dose      >chronic blood loss anemia from GIB- not candidate for sedation & colonoscopy as per GI notes on last admission,   monitor H/H 10.1/32.6  Continue iron supplements and vit C.  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 will need to f/u w/Pulmonology as outpt.      >ckd stage 4- avoid nephrotoxics,   renally dose medications & avoid nephrotoxic meds    >hx of DM-2- A1c 5.5    >HTN/ HLD-   c/w home meds (lasix)    >HFpEF- appears euvolemic,   c/w home lasix    > Afib on Xarelto- c/w amiodarone     >CAD s/p CABG- c/w ASA. xarelto, statin   add diltiazem as HR is high on continuous pulse ox    dc planning

## 2018-04-05 NOTE — PROGRESS NOTE ADULT - PROBLEM SELECTOR PROBLEM 2
Bacterial infection due to Streptococcus

## 2018-04-06 ENCOUNTER — TRANSCRIPTION ENCOUNTER (OUTPATIENT)
Age: 83
End: 2018-04-06

## 2018-04-06 VITALS
DIASTOLIC BLOOD PRESSURE: 68 MMHG | SYSTOLIC BLOOD PRESSURE: 104 MMHG | RESPIRATION RATE: 18 BRPM | TEMPERATURE: 99 F | HEART RATE: 94 BPM

## 2018-04-06 PROCEDURE — 87150 DNA/RNA AMPLIFIED PROBE: CPT

## 2018-04-06 PROCEDURE — 72100 X-RAY EXAM L-S SPINE 2/3 VWS: CPT

## 2018-04-06 PROCEDURE — 99285 EMERGENCY DEPT VISIT HI MDM: CPT | Mod: 25

## 2018-04-06 PROCEDURE — 83605 ASSAY OF LACTIC ACID: CPT

## 2018-04-06 PROCEDURE — 87086 URINE CULTURE/COLONY COUNT: CPT

## 2018-04-06 PROCEDURE — 93320 DOPPLER ECHO COMPLETE: CPT

## 2018-04-06 PROCEDURE — 97530 THERAPEUTIC ACTIVITIES: CPT

## 2018-04-06 PROCEDURE — 80053 COMPREHEN METABOLIC PANEL: CPT

## 2018-04-06 PROCEDURE — 93312 ECHO TRANSESOPHAGEAL: CPT

## 2018-04-06 PROCEDURE — 99232 SBSQ HOSP IP/OBS MODERATE 35: CPT

## 2018-04-06 PROCEDURE — T1013: CPT

## 2018-04-06 PROCEDURE — 97163 PT EVAL HIGH COMPLEX 45 MIN: CPT

## 2018-04-06 PROCEDURE — 94660 CPAP INITIATION&MGMT: CPT

## 2018-04-06 PROCEDURE — 94640 AIRWAY INHALATION TREATMENT: CPT

## 2018-04-06 PROCEDURE — 83735 ASSAY OF MAGNESIUM: CPT

## 2018-04-06 PROCEDURE — 96375 TX/PRO/DX INJ NEW DRUG ADDON: CPT

## 2018-04-06 PROCEDURE — 82803 BLOOD GASES ANY COMBINATION: CPT

## 2018-04-06 PROCEDURE — 71046 X-RAY EXAM CHEST 2 VIEWS: CPT

## 2018-04-06 PROCEDURE — 87186 SC STD MICRODIL/AGAR DIL: CPT

## 2018-04-06 PROCEDURE — 97116 GAIT TRAINING THERAPY: CPT

## 2018-04-06 PROCEDURE — 36600 WITHDRAWAL OF ARTERIAL BLOOD: CPT

## 2018-04-06 PROCEDURE — 87040 BLOOD CULTURE FOR BACTERIA: CPT

## 2018-04-06 PROCEDURE — 93325 DOPPLER ECHO COLOR FLOW MAPG: CPT

## 2018-04-06 PROCEDURE — 82962 GLUCOSE BLOOD TEST: CPT

## 2018-04-06 PROCEDURE — 81003 URINALYSIS AUTO W/O SCOPE: CPT

## 2018-04-06 PROCEDURE — 99239 HOSP IP/OBS DSCHRG MGMT >30: CPT

## 2018-04-06 PROCEDURE — 87449 NOS EACH ORGANISM AG IA: CPT

## 2018-04-06 PROCEDURE — 84100 ASSAY OF PHOSPHORUS: CPT

## 2018-04-06 PROCEDURE — 36415 COLL VENOUS BLD VENIPUNCTURE: CPT

## 2018-04-06 PROCEDURE — 80048 BASIC METABOLIC PNL TOTAL CA: CPT

## 2018-04-06 PROCEDURE — 93306 TTE W/DOPPLER COMPLETE: CPT

## 2018-04-06 PROCEDURE — 85027 COMPLETE CBC AUTOMATED: CPT

## 2018-04-06 PROCEDURE — 96374 THER/PROPH/DIAG INJ IV PUSH: CPT

## 2018-04-06 RX ORDER — FUROSEMIDE 40 MG
1 TABLET ORAL
Qty: 30 | Refills: 0 | OUTPATIENT
Start: 2018-04-06 | End: 2018-05-05

## 2018-04-06 RX ORDER — FAMOTIDINE 10 MG/ML
1 INJECTION INTRAVENOUS
Qty: 30 | Refills: 0 | OUTPATIENT
Start: 2018-04-06 | End: 2018-05-05

## 2018-04-06 RX ORDER — ACETAMINOPHEN 500 MG
2 TABLET ORAL
Qty: 0 | Refills: 0 | COMMUNITY
Start: 2018-04-06

## 2018-04-06 RX ORDER — SACCHAROMYCES BOULARDII 250 MG
1 POWDER IN PACKET (EA) ORAL
Qty: 60 | Refills: 0 | OUTPATIENT
Start: 2018-04-06 | End: 2018-05-05

## 2018-04-06 RX ORDER — DILTIAZEM HCL 120 MG
1 CAPSULE, EXT RELEASE 24 HR ORAL
Qty: 30 | Refills: 0 | OUTPATIENT
Start: 2018-04-06 | End: 2018-05-05

## 2018-04-06 RX ADMIN — Medication 650 MILLIGRAM(S): at 06:34

## 2018-04-06 RX ADMIN — Medication 100 MILLIGRAM(S): at 06:34

## 2018-04-06 RX ADMIN — Medication 650 MILLIGRAM(S): at 07:23

## 2018-04-06 RX ADMIN — Medication 81 MILLIGRAM(S): at 11:42

## 2018-04-06 RX ADMIN — LIDOCAINE 1 PATCH: 4 CREAM TOPICAL at 00:09

## 2018-04-06 RX ADMIN — PHENYLEPHRINE-SHARK LIVER OIL-MINERAL OIL-PETROLATUM RECTAL OINTMENT 1 APPLICATION(S): at 14:20

## 2018-04-06 RX ADMIN — Medication 1200 MILLIGRAM(S): at 06:34

## 2018-04-06 RX ADMIN — GABAPENTIN 100 MILLIGRAM(S): 400 CAPSULE ORAL at 11:42

## 2018-04-06 RX ADMIN — Medication 200 MILLIGRAM(S): at 00:21

## 2018-04-06 RX ADMIN — Medication 500 MILLIGRAM(S): at 11:41

## 2018-04-06 RX ADMIN — LIDOCAINE 1 PATCH: 4 CREAM TOPICAL at 11:43

## 2018-04-06 RX ADMIN — Medication 250 MILLIGRAM(S): at 06:33

## 2018-04-06 RX ADMIN — AMIODARONE HYDROCHLORIDE 200 MILLIGRAM(S): 400 TABLET ORAL at 06:33

## 2018-04-06 RX ADMIN — Medication 40 MILLIGRAM(S): at 06:34

## 2018-04-06 RX ADMIN — Medication 325 MILLIGRAM(S): at 11:41

## 2018-04-06 RX ADMIN — Medication 120 MILLIGRAM(S): at 06:33

## 2018-04-06 RX ADMIN — CEFTRIAXONE 100 GRAM(S): 500 INJECTION, POWDER, FOR SOLUTION INTRAMUSCULAR; INTRAVENOUS at 11:44

## 2018-04-06 NOTE — DISCHARGE NOTE ADULT - CARE PLAN
Principal Discharge DX:	Pneumonia due to infectious organism, unspecified laterality, unspecified part of lung  Goal:	resolution  Assessment and plan of treatment:	continue antibiotics via midline per infectious disease  follow up with infectious disease in 1 week.  Secondary Diagnosis:	Bacterial infection due to Streptococcus  Assessment and plan of treatment:	outpatient colonoscopy   follow up with gastroenterology  Secondary Diagnosis:	CKD (chronic kidney disease), stage IV  Assessment and plan of treatment:	stable  Secondary Diagnosis:	Paroxysmal atrial fibrillation  Assessment and plan of treatment:	continue current medications  follow up with cardiology  Secondary Diagnosis:	KRYSTIN (obstructive sleep apnea)  Assessment and plan of treatment:	suspected  outpatient sleep study required, follow up with pulmonology in 1-2 weeks

## 2018-04-06 NOTE — DISCHARGE NOTE ADULT - MEDICATION SUMMARY - MEDICATIONS TO STOP TAKING
I will STOP taking the medications listed below when I get home from the hospital:    predniSONE 10 mg oral tablet  -- 4 tab(s) po daily x 2 days then 3tabs x 2 days then 2tabs for 2 days then 1 tab daily x2 days then stop   -- It is very important that you take or use this exactly as directed.  Do not skip doses or discontinue unless directed by your doctor.  Obtain medical advice before taking any non-prescription drugs as some may affect the action of this medication.  Take with food or milk.    azithromycin 500 mg oral tablet  -- 0.5 tab(s) by mouth once a day   -- Do not take dairy products, antacids, or iron preparations within one hour of this medication.  Finish all this medication unless otherwise directed by prescriber.

## 2018-04-06 NOTE — DISCHARGE NOTE ADULT - CARE PROVIDER_API CALL
Alejandro Valladares), Internal Medicine; Pulmonary Disease  [facility] at [place]  [address]  [place] [address]  Phone: [phone]  Fax: [phone]    Holger Pickering), Infectious Disease; Internal Medicine  [address] [place] [address]  Phone: [phone]  Fax: [phone]    Sourav Mancia), Gastroenterology; Internal Medicine  [address] [place] [address] [address]  [place] [address]  Phone: [phone]  Fax: [phone]

## 2018-04-06 NOTE — DISCHARGE NOTE ADULT - CARE PROVIDERS DIRECT ADDRESSES
[email or web address]

## 2018-04-06 NOTE — DISCHARGE NOTE ADULT - PLAN OF CARE
resolution continue antibiotics via midline per infectious disease  follow up with infectious disease in 1 week. outpatient colonoscopy   follow up with gastroenterology stable continue current medications  follow up with cardiology suspected  outpatient sleep study required, follow up with pulmonology in 1-2 weeks

## 2018-04-06 NOTE — DISCHARGE NOTE ADULT - HOSPITAL COURSE
84 y/o male with history of COPD on home 02 3 liters cont, chronic blood loss anemia, ckd stage 4, DM-2, HTN, HLD, Afib on Xarelto, CAD s/p CABG presents from home with greenish productive cough, chills, body aches, pleuritic chest pain/ back pain x 2 days.  Found to have pneumonia on chest x ray and treated with zosyn for HCAP. Pulmonology consulted as well and completed course of steroids.  Sleep study advised for suspected KRYSTIN but patient refused cpap use while inpatient.  Found to have strep bovis bacteremia, switched to IV ceftriaxone and underwent KALE by cardiology with negative results.  GI deferred colonoscopy given co morbidities and planned for outpatient virtual colonoscopy.  Intermittent back pain, cxr with degenerative changes and pain improves with tylenol.  Stable for discharge home.  dc planning 40 minutes.  Midline placed and planned for ceftriaxone infusion until 4/30/18.

## 2018-04-06 NOTE — DISCHARGE NOTE ADULT - PATIENT PORTAL LINK FT
You can access the [facility] Portal, offered by [facility], by registering with the following website: [email or web address]

## 2018-04-06 NOTE — PROCEDURE NOTE - PROCEDURE
<<-----Click on this checkbox to enter Procedure Midline catheter insertion  04/06/2018    Active  [id number]

## 2018-04-06 NOTE — PROCEDURE NOTE - NSPROCDETAILS_GEN_ALL_CORE
supine position/ultrasound assessment/ultrasound guidance/sterile technique, catheter placed/location identified, draped/prepped, sterile technique used/sterile dressing applied

## 2018-04-06 NOTE — DISCHARGE NOTE ADULT - SECONDARY DIAGNOSIS.
Bacterial infection due to Streptococcus CKD (chronic kidney disease), stage IV Paroxysmal atrial fibrillation KRYSTIN (obstructive sleep apnea)

## 2018-04-09 ENCOUNTER — APPOINTMENT (OUTPATIENT)
Dept: ORTHOPEDIC SURGERY | Facility: CLINIC | Age: 83
End: 2018-04-09

## 2018-04-09 NOTE — ED ADULT NURSE NOTE - CAS TRG GENERAL NORM CIRC DET
Pt given more pedialyte by provider Capillary refill less/equal to 2 seconds/Strong peripheral pulses

## 2018-04-12 ENCOUNTER — APPOINTMENT (OUTPATIENT)
Dept: INTERNAL MEDICINE | Facility: CLINIC | Age: 83
End: 2018-04-12
Payer: MEDICARE

## 2018-04-12 PROCEDURE — 99215 OFFICE O/P EST HI 40 MIN: CPT

## 2018-04-16 ENCOUNTER — APPOINTMENT (OUTPATIENT)
Dept: CARDIOLOGY | Facility: CLINIC | Age: 83
End: 2018-04-16

## 2018-04-23 ENCOUNTER — MESSAGE (OUTPATIENT)
Age: 83
End: 2018-04-23

## 2018-04-23 ENCOUNTER — APPOINTMENT (OUTPATIENT)
Dept: PULMONOLOGY | Facility: CLINIC | Age: 83
End: 2018-04-23
Payer: MEDICARE

## 2018-04-23 VITALS
HEART RATE: 76 BPM | OXYGEN SATURATION: 97 % | SYSTOLIC BLOOD PRESSURE: 110 MMHG | WEIGHT: 224 LBS | DIASTOLIC BLOOD PRESSURE: 60 MMHG | BODY MASS INDEX: 42.32 KG/M2

## 2018-04-23 DIAGNOSIS — J15.9 UNSPECIFIED BACTERIAL PNEUMONIA: ICD-10-CM

## 2018-04-23 DIAGNOSIS — R63.5 ABNORMAL WEIGHT GAIN: ICD-10-CM

## 2018-04-23 DIAGNOSIS — R09.02 HYPOXEMIA: ICD-10-CM

## 2018-04-23 PROCEDURE — 99215 OFFICE O/P EST HI 40 MIN: CPT

## 2018-04-26 ENCOUNTER — RECORD ABSTRACTING (OUTPATIENT)
Age: 83
End: 2018-04-26

## 2018-04-26 DIAGNOSIS — K21.9 GASTRO-ESOPHAGEAL REFLUX DISEASE W/OUT ESOPHAGITIS: ICD-10-CM

## 2018-04-26 RX ORDER — RIVAROXABAN 15 MG/1
15 TABLET, FILM COATED ORAL
Qty: 90 | Refills: 3 | Status: ACTIVE | COMMUNITY

## 2018-04-27 ENCOUNTER — APPOINTMENT (OUTPATIENT)
Dept: INTERNAL MEDICINE | Facility: CLINIC | Age: 83
End: 2018-04-27
Payer: MEDICARE

## 2018-04-27 PROCEDURE — 99214 OFFICE O/P EST MOD 30 MIN: CPT

## 2018-04-30 ENCOUNTER — APPOINTMENT (OUTPATIENT)
Dept: CARDIOLOGY | Facility: CLINIC | Age: 83
End: 2018-04-30
Payer: MEDICARE

## 2018-04-30 VITALS
DIASTOLIC BLOOD PRESSURE: 70 MMHG | RESPIRATION RATE: 18 BRPM | HEART RATE: 77 BPM | HEIGHT: 61 IN | SYSTOLIC BLOOD PRESSURE: 115 MMHG | WEIGHT: 232 LBS | BODY MASS INDEX: 43.8 KG/M2

## 2018-04-30 PROCEDURE — 93000 ELECTROCARDIOGRAM COMPLETE: CPT

## 2018-04-30 PROCEDURE — 99214 OFFICE O/P EST MOD 30 MIN: CPT

## 2018-05-14 ENCOUNTER — RX RENEWAL (OUTPATIENT)
Age: 83
End: 2018-05-14

## 2018-05-14 ENCOUNTER — APPOINTMENT (OUTPATIENT)
Dept: INTERNAL MEDICINE | Facility: CLINIC | Age: 83
End: 2018-05-14
Payer: MEDICARE

## 2018-05-14 PROCEDURE — 99213 OFFICE O/P EST LOW 20 MIN: CPT

## 2018-05-22 ENCOUNTER — INPATIENT (INPATIENT)
Facility: HOSPITAL | Age: 83
LOS: 9 days | Discharge: HOME | DRG: 377 | End: 2018-06-01
Attending: INTERNAL MEDICINE | Admitting: INTERNAL MEDICINE
Payer: MEDICARE

## 2018-05-22 VITALS
OXYGEN SATURATION: 98 % | DIASTOLIC BLOOD PRESSURE: 71 MMHG | WEIGHT: 220.02 LBS | TEMPERATURE: 98 F | HEIGHT: 67 IN | HEART RATE: 85 BPM | SYSTOLIC BLOOD PRESSURE: 109 MMHG | RESPIRATION RATE: 18 BRPM

## 2018-05-22 DIAGNOSIS — J18.9 PNEUMONIA, UNSPECIFIED ORGANISM: ICD-10-CM

## 2018-05-22 DIAGNOSIS — K92.2 GASTROINTESTINAL HEMORRHAGE, UNSPECIFIED: ICD-10-CM

## 2018-05-22 DIAGNOSIS — Z98.89 OTHER SPECIFIED POSTPROCEDURAL STATES: Chronic | ICD-10-CM

## 2018-05-22 DIAGNOSIS — D68.9 COAGULATION DEFECT, UNSPECIFIED: ICD-10-CM

## 2018-05-22 DIAGNOSIS — I50.22 CHRONIC SYSTOLIC (CONGESTIVE) HEART FAILURE: ICD-10-CM

## 2018-05-22 DIAGNOSIS — J44.1 CHRONIC OBSTRUCTIVE PULMONARY DISEASE WITH (ACUTE) EXACERBATION: ICD-10-CM

## 2018-05-22 DIAGNOSIS — K92.1 MELENA: ICD-10-CM

## 2018-05-22 DIAGNOSIS — I10 ESSENTIAL (PRIMARY) HYPERTENSION: ICD-10-CM

## 2018-05-22 DIAGNOSIS — I48.2 CHRONIC ATRIAL FIBRILLATION: ICD-10-CM

## 2018-05-22 DIAGNOSIS — N18.3 CHRONIC KIDNEY DISEASE, STAGE 3 (MODERATE): ICD-10-CM

## 2018-05-22 LAB
ALBUMIN SERPL ELPH-MCNC: 3.4 G/DL — SIGNIFICANT CHANGE UP (ref 3.3–5.2)
ALP SERPL-CCNC: 64 U/L — SIGNIFICANT CHANGE UP (ref 40–120)
ALT FLD-CCNC: 7 U/L — SIGNIFICANT CHANGE UP
ANION GAP SERPL CALC-SCNC: 15 MMOL/L — SIGNIFICANT CHANGE UP (ref 5–17)
APTT BLD: 39.7 SEC — HIGH (ref 27.5–37.4)
AST SERPL-CCNC: 19 U/L — SIGNIFICANT CHANGE UP
BASOPHILS # BLD AUTO: 0.1 K/UL — SIGNIFICANT CHANGE UP (ref 0–0.2)
BASOPHILS NFR BLD AUTO: 1 % — SIGNIFICANT CHANGE UP (ref 0–2)
BILIRUB SERPL-MCNC: 0.5 MG/DL — SIGNIFICANT CHANGE UP (ref 0.4–2)
BUN SERPL-MCNC: 42 MG/DL — HIGH (ref 8–20)
CALCIUM SERPL-MCNC: 8.8 MG/DL — SIGNIFICANT CHANGE UP (ref 8.6–10.2)
CHLORIDE SERPL-SCNC: 99 MMOL/L — SIGNIFICANT CHANGE UP (ref 98–107)
CK SERPL-CCNC: 103 U/L — SIGNIFICANT CHANGE UP (ref 30–200)
CO2 SERPL-SCNC: 28 MMOL/L — SIGNIFICANT CHANGE UP (ref 22–29)
CREAT SERPL-MCNC: 2.03 MG/DL — HIGH (ref 0.5–1.3)
EOSINOPHIL # BLD AUTO: 0.2 K/UL — SIGNIFICANT CHANGE UP (ref 0–0.5)
EOSINOPHIL NFR BLD AUTO: 5 % — SIGNIFICANT CHANGE UP (ref 0–6)
GLUCOSE SERPL-MCNC: 137 MG/DL — HIGH (ref 70–115)
HCT VFR BLD CALC: 17.9 % — CRITICAL LOW (ref 42–52)
HGB BLD-MCNC: 5.4 G/DL — CRITICAL LOW (ref 14–18)
INR BLD: 2.59 RATIO — HIGH (ref 0.88–1.16)
LACTATE BLDV-MCNC: 1.7 MMOL/L — SIGNIFICANT CHANGE UP (ref 0.5–2)
LYMPHOCYTES # BLD AUTO: 0.8 K/UL — LOW (ref 1–4.8)
LYMPHOCYTES # BLD AUTO: 23 % — SIGNIFICANT CHANGE UP (ref 20–55)
MCHC RBC-ENTMCNC: 22.7 PG — LOW (ref 27–31)
MCHC RBC-ENTMCNC: 30.2 G/DL — LOW (ref 32–36)
MCV RBC AUTO: 75.2 FL — LOW (ref 80–94)
MONOCYTES # BLD AUTO: 0.3 K/UL — SIGNIFICANT CHANGE UP (ref 0–0.8)
MONOCYTES NFR BLD AUTO: 9 % — SIGNIFICANT CHANGE UP (ref 3–10)
NEUTROPHILS # BLD AUTO: 2.1 K/UL — SIGNIFICANT CHANGE UP (ref 1.8–8)
NEUTROPHILS NFR BLD AUTO: 60 % — SIGNIFICANT CHANGE UP (ref 37–73)
NT-PROBNP SERPL-SCNC: 3182 PG/ML — HIGH (ref 0–300)
OB PNL STL: POSITIVE
PLATELET # BLD AUTO: 195 K/UL — SIGNIFICANT CHANGE UP (ref 150–400)
POTASSIUM SERPL-MCNC: 4.3 MMOL/L — SIGNIFICANT CHANGE UP (ref 3.5–5.3)
POTASSIUM SERPL-SCNC: 4.3 MMOL/L — SIGNIFICANT CHANGE UP (ref 3.5–5.3)
PROT SERPL-MCNC: 5.9 G/DL — LOW (ref 6.6–8.7)
PROTHROM AB SERPL-ACNC: 29.1 SEC — HIGH (ref 9.8–12.7)
RBC # BLD: 2.38 M/UL — LOW (ref 4.6–6.2)
RBC # FLD: 21 % — HIGH (ref 11–15.6)
SODIUM SERPL-SCNC: 142 MMOL/L — SIGNIFICANT CHANGE UP (ref 135–145)
TROPONIN T SERPL-MCNC: 0.05 NG/ML — SIGNIFICANT CHANGE UP (ref 0–0.06)
TYPE + AB SCN PNL BLD: SIGNIFICANT CHANGE UP
WBC # BLD: 3.5 K/UL — LOW (ref 4.8–10.8)
WBC # FLD AUTO: 3.5 K/UL — LOW (ref 4.8–10.8)

## 2018-05-22 PROCEDURE — 93010 ELECTROCARDIOGRAM REPORT: CPT

## 2018-05-22 PROCEDURE — 71045 X-RAY EXAM CHEST 1 VIEW: CPT | Mod: 26

## 2018-05-22 PROCEDURE — 99223 1ST HOSP IP/OBS HIGH 75: CPT

## 2018-05-22 PROCEDURE — 99222 1ST HOSP IP/OBS MODERATE 55: CPT

## 2018-05-22 PROCEDURE — 99285 EMERGENCY DEPT VISIT HI MDM: CPT

## 2018-05-22 PROCEDURE — 71250 CT THORAX DX C-: CPT | Mod: 26

## 2018-05-22 RX ORDER — PIPERACILLIN AND TAZOBACTAM 4; .5 G/20ML; G/20ML
3.38 INJECTION, POWDER, LYOPHILIZED, FOR SOLUTION INTRAVENOUS EVERY 8 HOURS
Qty: 0 | Refills: 0 | Status: DISCONTINUED | OUTPATIENT
Start: 2018-05-22 | End: 2018-05-23

## 2018-05-22 RX ORDER — IPRATROPIUM/ALBUTEROL SULFATE 18-103MCG
3 AEROSOL WITH ADAPTER (GRAM) INHALATION
Qty: 0 | Refills: 0 | Status: COMPLETED | OUTPATIENT
Start: 2018-05-22 | End: 2018-05-22

## 2018-05-22 RX ORDER — BUDESONIDE AND FORMOTEROL FUMARATE DIHYDRATE 160; 4.5 UG/1; UG/1
2 AEROSOL RESPIRATORY (INHALATION)
Qty: 0 | Refills: 0 | Status: DISCONTINUED | OUTPATIENT
Start: 2018-05-22 | End: 2018-06-01

## 2018-05-22 RX ORDER — LEVALBUTEROL 1.25 MG/.5ML
1.25 SOLUTION, CONCENTRATE RESPIRATORY (INHALATION) EVERY 6 HOURS
Qty: 0 | Refills: 0 | Status: DISCONTINUED | OUTPATIENT
Start: 2018-05-22 | End: 2018-06-01

## 2018-05-22 RX ORDER — PANTOPRAZOLE SODIUM 20 MG/1
8 TABLET, DELAYED RELEASE ORAL
Qty: 80 | Refills: 0 | Status: DISCONTINUED | OUTPATIENT
Start: 2018-05-22 | End: 2018-05-26

## 2018-05-22 RX ORDER — PIPERACILLIN AND TAZOBACTAM 4; .5 G/20ML; G/20ML
3.38 INJECTION, POWDER, LYOPHILIZED, FOR SOLUTION INTRAVENOUS ONCE
Qty: 0 | Refills: 0 | Status: DISCONTINUED | OUTPATIENT
Start: 2018-05-22 | End: 2018-05-22

## 2018-05-22 RX ORDER — AMIODARONE HYDROCHLORIDE 400 MG/1
200 TABLET ORAL DAILY
Qty: 0 | Refills: 0 | Status: DISCONTINUED | OUTPATIENT
Start: 2018-05-22 | End: 2018-05-24

## 2018-05-22 RX ORDER — VANCOMYCIN HCL 1 G
1000 VIAL (EA) INTRAVENOUS ONCE
Qty: 0 | Refills: 0 | Status: COMPLETED | OUTPATIENT
Start: 2018-05-22 | End: 2018-05-22

## 2018-05-22 RX ORDER — VANCOMYCIN HCL 1 G
1250 VIAL (EA) INTRAVENOUS EVERY 12 HOURS
Qty: 0 | Refills: 0 | Status: DISCONTINUED | OUTPATIENT
Start: 2018-05-22 | End: 2018-05-22

## 2018-05-22 RX ORDER — FUROSEMIDE 40 MG
40 TABLET ORAL DAILY
Qty: 0 | Refills: 0 | Status: DISCONTINUED | OUTPATIENT
Start: 2018-05-22 | End: 2018-05-22

## 2018-05-22 RX ORDER — SACCHAROMYCES BOULARDII 250 MG
250 POWDER IN PACKET (EA) ORAL
Qty: 0 | Refills: 0 | Status: DISCONTINUED | OUTPATIENT
Start: 2018-05-22 | End: 2018-05-26

## 2018-05-22 RX ORDER — ATORVASTATIN CALCIUM 80 MG/1
40 TABLET, FILM COATED ORAL AT BEDTIME
Qty: 0 | Refills: 0 | Status: DISCONTINUED | OUTPATIENT
Start: 2018-05-22 | End: 2018-06-01

## 2018-05-22 RX ORDER — PIPERACILLIN AND TAZOBACTAM 4; .5 G/20ML; G/20ML
3.38 INJECTION, POWDER, LYOPHILIZED, FOR SOLUTION INTRAVENOUS ONCE
Qty: 0 | Refills: 0 | Status: COMPLETED | OUTPATIENT
Start: 2018-05-22 | End: 2018-05-22

## 2018-05-22 RX ORDER — PHYTONADIONE (VIT K1) 5 MG
10 TABLET ORAL ONCE
Qty: 0 | Refills: 0 | Status: COMPLETED | OUTPATIENT
Start: 2018-05-22 | End: 2018-05-22

## 2018-05-22 RX ORDER — VANCOMYCIN HCL 1 G
1000 VIAL (EA) INTRAVENOUS EVERY 24 HOURS
Qty: 0 | Refills: 0 | Status: DISCONTINUED | OUTPATIENT
Start: 2018-05-22 | End: 2018-05-23

## 2018-05-22 RX ORDER — SODIUM CHLORIDE 9 MG/ML
1000 INJECTION, SOLUTION INTRAVENOUS
Qty: 0 | Refills: 0 | Status: DISCONTINUED | OUTPATIENT
Start: 2018-05-22 | End: 2018-05-23

## 2018-05-22 RX ORDER — FUROSEMIDE 40 MG
40 TABLET ORAL DAILY
Qty: 0 | Refills: 0 | Status: DISCONTINUED | OUTPATIENT
Start: 2018-05-22 | End: 2018-05-23

## 2018-05-22 RX ORDER — POTASSIUM CHLORIDE 20 MEQ
20 PACKET (EA) ORAL DAILY
Qty: 0 | Refills: 0 | Status: DISCONTINUED | OUTPATIENT
Start: 2018-05-22 | End: 2018-05-23

## 2018-05-22 RX ADMIN — Medication 102 MILLIGRAM(S): at 17:00

## 2018-05-22 RX ADMIN — Medication 250 MILLIGRAM(S): at 22:45

## 2018-05-22 RX ADMIN — Medication 3 MILLILITER(S): at 14:20

## 2018-05-22 RX ADMIN — Medication 3 MILLILITER(S): at 14:24

## 2018-05-22 RX ADMIN — Medication 40 MILLIGRAM(S): at 22:22

## 2018-05-22 RX ADMIN — Medication 3 MILLILITER(S): at 14:22

## 2018-05-22 RX ADMIN — BUDESONIDE AND FORMOTEROL FUMARATE DIHYDRATE 2 PUFF(S): 160; 4.5 AEROSOL RESPIRATORY (INHALATION) at 21:46

## 2018-05-22 RX ADMIN — LEVALBUTEROL 1.25 MILLIGRAM(S): 1.25 SOLUTION, CONCENTRATE RESPIRATORY (INHALATION) at 21:44

## 2018-05-22 RX ADMIN — Medication 250 MILLIGRAM(S): at 16:07

## 2018-05-22 RX ADMIN — PIPERACILLIN AND TAZOBACTAM 200 GRAM(S): 4; .5 INJECTION, POWDER, LYOPHILIZED, FOR SOLUTION INTRAVENOUS at 14:24

## 2018-05-22 RX ADMIN — ATORVASTATIN CALCIUM 40 MILLIGRAM(S): 80 TABLET, FILM COATED ORAL at 22:22

## 2018-05-22 NOTE — H&P ADULT - PROBLEM SELECTOR PLAN 5
judicious IVF. renal sono if not done recently. consider Nephro eval as family is very concerned about kidney dysfunction h/o AVR, CAD, Rate control. on xarelto. hold xarelto. hold ASA, cardiology eval called

## 2018-05-22 NOTE — H&P ADULT - NSHPPHYSICALEXAM_GEN_ALL_CORE
Vital Signs Last 24 Hrs  T(C): 37.2 (22 May 2018 14:30), Max: 37.2 (22 May 2018 14:30)  T(F): 99 (22 May 2018 14:30), Max: 99 (22 May 2018 14:30)  HR: 90 (22 May 2018 15:35) (85 - 90)  BP: 100/79 (22 May 2018 15:35) (100/79 - 109/71)  BP(mean): --  RR: 20 (22 May 2018 15:35) (18 - 20)  SpO2: 97% (22 May 2018 15:35) (97% - 98%)    CAPILLARY BLOOD GLUCOSE      I&O's Summary Vital Signs Last 24 Hrs  T(C): 37.2 (22 May 2018 14:30), Max: 37.2 (22 May 2018 14:30)  T(F): 99 (22 May 2018 14:30), Max: 99 (22 May 2018 14:30)  HR: 90 (22 May 2018 15:35) (85 - 90)  BP: 100/79 (22 May 2018 15:35) (100/79 - 109/71)  BP(mean): --  RR: 20 (22 May 2018 15:35) (18 - 20)  SpO2: 97% (22 May 2018 15:35) (97% - 98%)    CAPILLARY BLOOD GLUCOSE      I&O's Summary    GENERAL: Not in distress. Alert    HEENT:  Normocephalic and atraumatic. PEARLA,EOMI  NECK: Supple.  No JVD.    CARDIOVASCULAR: irregular,  S1, S2. SM+.no rubs/gallop  LUNGS: BLAE+, + rales, + wheezing, no rhonchi.    ABDOMEN: ND. Soft,  NT, no guarding / rebound / rigidity. BS normoactive. No CVA tenderness.    BACK: No spine tenderness.  EXTREMITIES: no cyanosis, no clubbing, + edema.   SKIN: no rash. No skin break or ulcer. No cellulitis.  NEUROLOGIC: AAO*3.strength is symmetric, sensation intact, speech fluent.  palnter downgoing  PSYCHIATRIC: Calm.  No agitation.

## 2018-05-22 NOTE — ED ADULT NURSE REASSESSMENT NOTE - NS ED NURSE REASSESS COMMENT FT1
pt with PRBC infusing as ordered thru patent IV site. pt with no noted SOB, voids in urinal without assistance. even and unlabored resps present, skin warm dry and intact. pt awaiting bed assignment at this time.

## 2018-05-22 NOTE — H&P ADULT - HISTORY OF PRESENT ILLNESS
84 yo male with hx htn, dm, copd, akd c/o shortness of breath, wheezing with productive cough. Patient was admitted to [facility] x 1 month  for pneumonia, sepsis/bacteremia, and was discharged home on IV abx via PICC line. PICC line was discontinued x 3 weeks ago. Patient went to pmd last week and was told worsening kidney function and needs to see nephrologist. Over pats few days, productive cough, increased shortness of breath and increased leg swelling  	c/o neck pain and right knee pain.   no falls. denies abdominal pain, vomiting or diarrhea 84 yo male with hx HTN, DM, COPD on home oxygen, A fib on Xarelto, AVReplacement 6 years ago, TRELL, CKD , Dyslipidemia, chronic blood loss anemia, came to ER c/o shortness of breath, wheezing with productive cough, neck and back pain, "pneumonia", "COPD".  Patient was having neck pain and as per family, he had similar presentation before the pneumonia last time.  Patient was admitted to [facility] x 1 month  for pneumonia, sepsis/bacteremia, and was discharged home on IV abx via PICC line. PICC line was discontinued 4/30.. Patient had strep bovis last time. IE was ruled out by TTE and [facility]. For the past  few days, productive cough, increased shortness of breath and increased leg swelling. He also c/o neck pain and lower back pain. no falls. reported dark colored stools. no fresh blood,. denies abdominal pain, vomiting or diarrhea. takes iron tablet once weekly. last taken 2 weeks ago. patient has multiple hospitalization in past 6 months. he was admitted to [facility] on march and april with GIB, COPD exacerbation and HCAP. lowet hb on 4/2018 was 6.5. he was deemed not a good candidate for Endoscopic w/u. He has h/o afib and on xarelto which was held during hospitalizations for GIB and was restarted in discharge.

## 2018-05-22 NOTE — ED PROVIDER NOTE - PROGRESS NOTE DETAILS
resp distress; recent hospital admission; rhonchi rectal: melanotic stool  patient with copd, possible pna, renal insufficiency and GI bleed  d/w Dr. Felix  who will admit

## 2018-05-22 NOTE — ED PROVIDER NOTE - OBJECTIVE STATEMENT
82 yo male with hx htn, dm, copd, akd c/o shortness of breath, wheezing with productive cough. 84 yo male with hx htn, dm, copd, akd c/o shortness of breath, wheezing with productive cough. Patient was admitted to [facility] x 1 month  for pneumonia, sepsis/bactermia, and 82 yo male with hx htn, dm, copd, akd c/o shortness of breath, wheezing with productive cough. Patient was admitted to [facility] x 1 month  for pneumonia, sepsis/bacteremia, and was discharged home on IV abx via PICC line. PICC line was discontinued x 3 weeks ago. Patient went to pmd last week and was told worsening kidney function and needs to see nephrologist. Over pats few days, productive cough, increased shortness of breath and increased leg swelling  c/o neck pain and right knee pain.   no falls. denies abdominal pain, vomiting or diarrhea

## 2018-05-22 NOTE — ED ADULT NURSE REASSESSMENT NOTE - NS ED NURSE REASSESS COMMENT FT1
ER MD aware of results, family and patient made aware as well. pt rec'd blood txfusion while admitted last month for GI bleed. pt states dark stools remain. showing afib on monitor with controlled rate at this time. no SOB present. occult blood completed by MD and specimen sent to lab with type and screen as ordered.

## 2018-05-22 NOTE — H&P ADULT - PROBLEM SELECTOR PLAN 6
HOLD anti_hTn now. monitor BP judicious IVF. renal sono if not done recently. consider Nephro eval as family is very concerned about kidney dysfunction

## 2018-05-22 NOTE — ED ADULT NURSE NOTE - OBJECTIVE STATEMENT
pt comes to ED with reports of SOB x few days as per daughter with productive cough. pt is awake and alert, no resp distress noted, afebrile, no fevers at home. pt recently d/c last month with PICC line for PNA. pt daughter reports pt has had bilateral lower leg swelling. currently on Lasix at home. + 4 pitting edema noted on exam, lungs with bilat rhochi noted on exam as well. pt skin warm and dry, no PICC line present on arrival to ED. no reports of chest pain, rec'd 1 neb treatment en route to ED with EMS and reports he feels a bit better.

## 2018-05-22 NOTE — H&P ADULT - NSHPREVIEWOFSYSTEMS_GEN_ALL_CORE
CONSTITUTIONAL:  No distress. no fever. + chills. + fatigue  HEENT:  Eyes:  No diplopia or blurred vision.   CARDIOVASCULAR:  No pressure, squeezing, tightness, heaviness or aching about the chest; no palpitations.+ leg swelling, + orthopnea or PND  RESPIRATORY:  as per HPI  GI: no nausea, no vomiting, no diarrhea, no constipation. No hematochezia or melena  EXT:No joint pain or joint swelling or redness  SKIN: no skin break or ulcer. No rash  CNS: No headaches. No weakness. no numbness. No depression or anxiety. No SI

## 2018-05-22 NOTE — ED ADULT NURSE REASSESSMENT NOTE - NS ED NURSE REASSESS COMMENT FT1
second unit of PRBC started as ordered, CT completed as ordered as well. pt VSS, no resp distress noted, pt voiding in urinal without assistance, remains AOX3, resting otherwise comfortably in cart. awaiting bed assignment at this time, remains on 3LPM 02 via nasal cannula. pt continues to show controlled a-fib on monitor, paroxysmal. skin warm dry and intact, IV site patent.

## 2018-05-22 NOTE — H&P ADULT - PROBLEM SELECTOR PLAN 1
admit to SDU  Protonix gtt  NPO except meds  PRBC transfusion 2 units  monitor VS and call MICU eval clinically deteriorating  GI cx appreciated: not a candidate for colonoscopy at this time. for CT colonography/enterography once cleared by pulmonary  NPO except meds,  IVF, judicious use  CBC Q12 hrs for now admit to SDU  Protonix gtt  NPO except meds  PRBC transfusion 2 units  monitor VS and call MICU eval clinically deteriorating  GI cx appreciated: not a candidate for colonoscopy at this time. for CT colonography/enterography once cleared by pulmonary  NPO except meds,  IVF, judicious use  CBC Q12 hrs for now  Iron panel  IV iron tomorrow

## 2018-05-22 NOTE — H&P ADULT - NSHPOUTPATIENTPROVIDERS_GEN_ALL_CORE
PMD: , Cardio: , GI: Dr. Rico sabillon, Urology: Dr. Padron PMD: , Cardio: , GI: Dr. Gómez, Urology: Dr. Padron

## 2018-05-22 NOTE — H&P ADULT - PROBLEM SELECTOR PLAN 3
c/w lasix with holding parameter. daily weight. 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 CT chest w/o contrast  s/p vanco and zosyn in ER, will c/w same  f/u BC, send sputum cx, urine legionella.  will call Dr. Suarez group tomorrow after CT chest

## 2018-05-22 NOTE — H&P ADULT - PROBLEM SELECTOR PLAN 4
h/o AVR, CAD, Rate control. on xarelto. hold xarelto. hold ASA, cardiology eval called c/w lasix with holding parameter. daily weight. strict I and O acute on chronic systolic HF  c/w lasix with holding parameter. daily weight. strict I and O

## 2018-05-22 NOTE — H&P ADULT - ASSESSMENT
83 yrs old man with multiple medical problems currently being admitted for GIB, severe blood loss anemia, COPD exacerbation, CHF, HCAP

## 2018-05-22 NOTE — H&P ADULT - NSHPLABSRESULTS_GEN_ALL_CORE
5.4    3.5   )-----------( 195      ( 22 May 2018 14:14 )             17.9       05-22    142  |  99  |  42.0<H>  ----------------------------<  137<H>  4.3   |  28.0  |  2.03<H>    Ca    8.8      22 May 2018 14:14    TPro  5.9<L>  /  Alb  3.4  /  TBili  0.5  /  DBili  x   /  AST  19  /  ALT  7   /  AlkPhos  64  05-22    PT/INR - ( 22 May 2018 14:14 )   PT: 29.1 sec;   INR: 2.59 ratio         PTT - ( 22 May 2018 14:14 )  PTT:39.7 sec    CARDIAC MARKERS ( 22 May 2018 14:14 )  x     / 0.05 ng/mL / 103 U/L / x     / x 5.4    3.5   )-----------( 195      ( 22 May 2018 14:14 )             17.9       05-22    142  |  99  |  42.0<H>  ----------------------------<  137<H>  4.3   |  28.0  |  2.03<H>    Ca    8.8      22 May 2018 14:14    TPro  5.9<L>  /  Alb  3.4  /  TBili  0.5  /  DBili  x   /  AST  19  /  ALT  7   /  AlkPhos  64  05-22    PT/INR - ( 22 May 2018 14:14 )   PT: 29.1 sec;   INR: 2.59 ratio         PTT - ( 22 May 2018 14:14 )  PTT:39.7 sec    CARDIAC MARKERS ( 22 May 2018 14:14 )  x     / 0.05 ng/mL / 103 U/L / x     / x    < from: [facility] Echo Doppler (04.04.18 @ 11:14) >    Summary:   1. Left ventricular ejection fraction, by visual estimation, is 60 to   65%.   2. Normal global left ventricular systolic function.   3. The mitral in-flow pattern reveals no discernable A-wave, therefore   no comment on diastolic function can be made.   4. Normal left ventricular internal cavity size.   5. Normal right ventricular size and function.   6. Severely enlarged left atrium.   7. The right atrium is normal in size.   8. Mild mitral annular calcification.   9. Thickening of the anterior and posterior mitral valve leaflets.  10. Mild to moderate mitral valve regurgitation.  11. Bioprosthesis in the aortic position demonstrating normal function.  12. Dilatation of the ascending aorta.  13. There is no evidence of pericardial effusion.  14. No evidence of endocarditis. Tricuspid valve not well seen.    < end of copied text >

## 2018-05-23 DIAGNOSIS — J96.11 CHRONIC RESPIRATORY FAILURE WITH HYPOXIA: ICD-10-CM

## 2018-05-23 LAB
ALBUMIN SERPL ELPH-MCNC: 3.4 G/DL — SIGNIFICANT CHANGE UP (ref 3.3–5.2)
ALP SERPL-CCNC: 64 U/L — SIGNIFICANT CHANGE UP (ref 40–120)
ALT FLD-CCNC: 6 U/L — SIGNIFICANT CHANGE UP
ANION GAP SERPL CALC-SCNC: 14 MMOL/L — SIGNIFICANT CHANGE UP (ref 5–17)
APPEARANCE UR: CLEAR — SIGNIFICANT CHANGE UP
APTT BLD: 32.9 SEC — SIGNIFICANT CHANGE UP (ref 27.5–37.4)
AST SERPL-CCNC: 12 U/L — SIGNIFICANT CHANGE UP
BASE EXCESS BLDA CALC-SCNC: 5.2 MMOL/L — HIGH (ref -2–2)
BILIRUB SERPL-MCNC: 0.6 MG/DL — SIGNIFICANT CHANGE UP (ref 0.4–2)
BILIRUB UR-MCNC: NEGATIVE — SIGNIFICANT CHANGE UP
BLOOD GAS COMMENTS ARTERIAL: SIGNIFICANT CHANGE UP
BUN SERPL-MCNC: 35 MG/DL — HIGH (ref 8–20)
CALCIUM SERPL-MCNC: 8.9 MG/DL — SIGNIFICANT CHANGE UP (ref 8.6–10.2)
CHLORIDE SERPL-SCNC: 101 MMOL/L — SIGNIFICANT CHANGE UP (ref 98–107)
CO2 SERPL-SCNC: 28 MMOL/L — SIGNIFICANT CHANGE UP (ref 22–29)
COLOR SPEC: YELLOW — SIGNIFICANT CHANGE UP
CREAT SERPL-MCNC: 1.68 MG/DL — HIGH (ref 0.5–1.3)
DIFF PNL FLD: NEGATIVE — SIGNIFICANT CHANGE UP
FERRITIN SERPL-MCNC: 17 NG/ML — LOW (ref 30–400)
GAS PNL BLDA: SIGNIFICANT CHANGE UP
GLUCOSE BLDC GLUCOMTR-MCNC: 141 MG/DL — HIGH (ref 70–99)
GLUCOSE BLDC GLUCOMTR-MCNC: 170 MG/DL — HIGH (ref 70–99)
GLUCOSE SERPL-MCNC: 166 MG/DL — HIGH (ref 70–115)
GLUCOSE UR QL: NEGATIVE MG/DL — SIGNIFICANT CHANGE UP
HCO3 BLDA-SCNC: 29 MMOL/L — HIGH (ref 20–26)
HCT VFR BLD CALC: 22.2 % — LOW (ref 42–52)
HCT VFR BLD CALC: 23.9 % — LOW (ref 42–52)
HGB BLD-MCNC: 7.1 G/DL — LOW (ref 14–18)
HGB BLD-MCNC: 7.5 G/DL — LOW (ref 14–18)
HOROWITZ INDEX BLDA+IHG-RTO: 0.21 — SIGNIFICANT CHANGE UP
INR BLD: 1.25 RATIO — HIGH (ref 0.88–1.16)
INR BLD: 1.36 RATIO — HIGH (ref 0.88–1.16)
IRON SATN MFR SERPL: 14 UG/DL — LOW (ref 59–158)
IRON SATN MFR SERPL: 3 % — LOW (ref 16–55)
KETONES UR-MCNC: NEGATIVE — SIGNIFICANT CHANGE UP
LEUKOCYTE ESTERASE UR-ACNC: NEGATIVE — SIGNIFICANT CHANGE UP
MAGNESIUM SERPL-MCNC: 2.3 MG/DL — SIGNIFICANT CHANGE UP (ref 1.6–2.6)
MCHC RBC-ENTMCNC: 24.4 PG — LOW (ref 27–31)
MCHC RBC-ENTMCNC: 25 PG — LOW (ref 27–31)
MCHC RBC-ENTMCNC: 31.4 G/DL — LOW (ref 32–36)
MCHC RBC-ENTMCNC: 32 G/DL — SIGNIFICANT CHANGE UP (ref 32–36)
MCV RBC AUTO: 77.9 FL — LOW (ref 80–94)
MCV RBC AUTO: 78.2 FL — LOW (ref 80–94)
NITRITE UR-MCNC: NEGATIVE — SIGNIFICANT CHANGE UP
PCO2 BLDA: 53 MMHG — HIGH (ref 35–45)
PH BLDA: 7.38 — SIGNIFICANT CHANGE UP (ref 7.35–7.45)
PH UR: 6.5 — SIGNIFICANT CHANGE UP (ref 5–8)
PHOSPHATE SERPL-MCNC: 4.8 MG/DL — HIGH (ref 2.4–4.7)
PLATELET # BLD AUTO: 205 K/UL — SIGNIFICANT CHANGE UP (ref 150–400)
PLATELET # BLD AUTO: 214 K/UL — SIGNIFICANT CHANGE UP (ref 150–400)
PO2 BLDA: 56 MMHG — LOW (ref 83–108)
POTASSIUM SERPL-MCNC: 4.5 MMOL/L — SIGNIFICANT CHANGE UP (ref 3.5–5.3)
POTASSIUM SERPL-SCNC: 4.5 MMOL/L — SIGNIFICANT CHANGE UP (ref 3.5–5.3)
PROCALCITONIN SERPL-MCNC: 0.18 NG/ML — HIGH (ref 0–0.04)
PROT SERPL-MCNC: 5.9 G/DL — LOW (ref 6.6–8.7)
PROT UR-MCNC: NEGATIVE MG/DL — SIGNIFICANT CHANGE UP
PROTHROM AB SERPL-ACNC: 13.8 SEC — HIGH (ref 9.8–12.7)
PROTHROM AB SERPL-ACNC: 15 SEC — HIGH (ref 9.8–12.7)
RBC # BLD: 2.84 M/UL — LOW (ref 4.6–6.2)
RBC # BLD: 2.84 M/UL — LOW (ref 4.6–6.2)
RBC # BLD: 3.07 M/UL — LOW (ref 4.6–6.2)
RBC # FLD: 19.2 % — HIGH (ref 11–15.6)
RBC # FLD: 20.3 % — HIGH (ref 11–15.6)
RETICS #: 4.9 K/UL — LOW (ref 25–125)
RETICS/RBC NFR: 1.7 % — SIGNIFICANT CHANGE UP (ref 0.5–2.5)
SAO2 % BLDA: 90 % — LOW (ref 95–99)
SODIUM SERPL-SCNC: 143 MMOL/L — SIGNIFICANT CHANGE UP (ref 135–145)
SP GR SPEC: 1.01 — SIGNIFICANT CHANGE UP (ref 1.01–1.02)
TIBC SERPL-MCNC: 416 UG/DL — SIGNIFICANT CHANGE UP (ref 220–430)
TRANSFERRIN SERPL-MCNC: 291 MG/DL — SIGNIFICANT CHANGE UP (ref 180–329)
UROBILINOGEN FLD QL: NEGATIVE MG/DL — SIGNIFICANT CHANGE UP
WBC # BLD: 3.6 K/UL — LOW (ref 4.8–10.8)
WBC # BLD: 5.7 K/UL — SIGNIFICANT CHANGE UP (ref 4.8–10.8)
WBC # FLD AUTO: 3.6 K/UL — LOW (ref 4.8–10.8)
WBC # FLD AUTO: 5.7 K/UL — SIGNIFICANT CHANGE UP (ref 4.8–10.8)
WBC UR QL: SIGNIFICANT CHANGE UP

## 2018-05-23 PROCEDURE — 99222 1ST HOSP IP/OBS MODERATE 55: CPT

## 2018-05-23 PROCEDURE — 99232 SBSQ HOSP IP/OBS MODERATE 35: CPT

## 2018-05-23 PROCEDURE — 99233 SBSQ HOSP IP/OBS HIGH 50: CPT

## 2018-05-23 PROCEDURE — 99223 1ST HOSP IP/OBS HIGH 75: CPT

## 2018-05-23 RX ORDER — SODIUM CHLORIDE 9 MG/ML
1000 INJECTION INTRAMUSCULAR; INTRAVENOUS; SUBCUTANEOUS
Qty: 0 | Refills: 0 | Status: DISCONTINUED | OUTPATIENT
Start: 2018-05-23 | End: 2018-05-25

## 2018-05-23 RX ORDER — AZITHROMYCIN 500 MG/1
TABLET, FILM COATED ORAL
Qty: 0 | Refills: 0 | Status: DISCONTINUED | OUTPATIENT
Start: 2018-05-23 | End: 2018-05-23

## 2018-05-23 RX ORDER — SODIUM CHLORIDE 9 MG/ML
1000 INJECTION, SOLUTION INTRAVENOUS
Qty: 0 | Refills: 0 | Status: DISCONTINUED | OUTPATIENT
Start: 2018-05-23 | End: 2018-05-23

## 2018-05-23 RX ORDER — FUROSEMIDE 40 MG
80 TABLET ORAL ONCE
Qty: 0 | Refills: 0 | Status: COMPLETED | OUTPATIENT
Start: 2018-05-23 | End: 2018-05-23

## 2018-05-23 RX ORDER — AZITHROMYCIN 500 MG/1
500 TABLET, FILM COATED ORAL ONCE
Qty: 0 | Refills: 0 | Status: COMPLETED | OUTPATIENT
Start: 2018-05-23 | End: 2018-05-23

## 2018-05-23 RX ORDER — ACETAMINOPHEN 500 MG
650 TABLET ORAL EVERY 6 HOURS
Qty: 0 | Refills: 0 | Status: DISCONTINUED | OUTPATIENT
Start: 2018-05-23 | End: 2018-06-01

## 2018-05-23 RX ADMIN — Medication 80 MILLIGRAM(S): at 09:03

## 2018-05-23 RX ADMIN — Medication 650 MILLIGRAM(S): at 16:55

## 2018-05-23 RX ADMIN — ATORVASTATIN CALCIUM 40 MILLIGRAM(S): 80 TABLET, FILM COATED ORAL at 21:40

## 2018-05-23 RX ADMIN — AZITHROMYCIN 255 MILLIGRAM(S): 500 TABLET, FILM COATED ORAL at 09:03

## 2018-05-23 RX ADMIN — SODIUM CHLORIDE 100 MILLILITER(S): 9 INJECTION INTRAMUSCULAR; INTRAVENOUS; SUBCUTANEOUS at 13:39

## 2018-05-23 RX ADMIN — Medication 250 MILLIGRAM(S): at 18:11

## 2018-05-23 RX ADMIN — LEVALBUTEROL 1.25 MILLIGRAM(S): 1.25 SOLUTION, CONCENTRATE RESPIRATORY (INHALATION) at 09:38

## 2018-05-23 RX ADMIN — Medication 40 MILLIGRAM(S): at 05:20

## 2018-05-23 RX ADMIN — Medication 250 MILLIGRAM(S): at 05:19

## 2018-05-23 RX ADMIN — PANTOPRAZOLE SODIUM 10 MG/HR: 20 TABLET, DELAYED RELEASE ORAL at 13:39

## 2018-05-23 RX ADMIN — SODIUM CHLORIDE 60 MILLILITER(S): 9 INJECTION, SOLUTION INTRAVENOUS at 05:19

## 2018-05-23 RX ADMIN — PANTOPRAZOLE SODIUM 10 MG/HR: 20 TABLET, DELAYED RELEASE ORAL at 05:19

## 2018-05-23 RX ADMIN — LEVALBUTEROL 1.25 MILLIGRAM(S): 1.25 SOLUTION, CONCENTRATE RESPIRATORY (INHALATION) at 03:59

## 2018-05-23 RX ADMIN — PIPERACILLIN AND TAZOBACTAM 25 GRAM(S): 4; .5 INJECTION, POWDER, LYOPHILIZED, FOR SOLUTION INTRAVENOUS at 01:36

## 2018-05-23 RX ADMIN — Medication 40 MILLIGRAM(S): at 13:56

## 2018-05-23 RX ADMIN — LEVALBUTEROL 1.25 MILLIGRAM(S): 1.25 SOLUTION, CONCENTRATE RESPIRATORY (INHALATION) at 21:43

## 2018-05-23 RX ADMIN — Medication 650 MILLIGRAM(S): at 15:48

## 2018-05-23 RX ADMIN — Medication 40 MILLIGRAM(S): at 21:40

## 2018-05-23 RX ADMIN — Medication 40 MILLIGRAM(S): at 05:19

## 2018-05-23 RX ADMIN — PANTOPRAZOLE SODIUM 10 MG/HR: 20 TABLET, DELAYED RELEASE ORAL at 23:28

## 2018-05-23 RX ADMIN — AMIODARONE HYDROCHLORIDE 200 MILLIGRAM(S): 400 TABLET ORAL at 05:20

## 2018-05-23 RX ADMIN — BUDESONIDE AND FORMOTEROL FUMARATE DIHYDRATE 2 PUFF(S): 160; 4.5 AEROSOL RESPIRATORY (INHALATION) at 09:39

## 2018-05-23 RX ADMIN — Medication 650 MILLIGRAM(S): at 23:24

## 2018-05-23 NOTE — ED ADULT NURSE REASSESSMENT NOTE - NS ED NURSE REASSESS COMMENT FT1
Dr willard internal med at the bedside along with cardiology to see and clear pt, pt was given medications and is pending 4th blood transfusion will continue to monitor.

## 2018-05-23 NOTE — PROGRESS NOTE ADULT - PROBLEM SELECTOR PLAN 5
h/o AVR, CAD, Rate control. on xarelto. hold xarelto. hold ASA, cardiology eval called h/o AVR, CAD, Rate control. on xarelto. hold xarelto. hold ASA, cardiology eval appreciated

## 2018-05-23 NOTE — ED ADULT NURSE REASSESSMENT NOTE - NS ED NURSE REASSESS COMMENT FT1
Patient recd this morning, A/Ox3, VSS, states his breathing has improved but at night he is coughing a lot, patient and NC 3L, denies any sort of pain, will continue to montor.

## 2018-05-23 NOTE — PROGRESS NOTE ADULT - SUBJECTIVE AND OBJECTIVE BOX
INTERVAL HPI/OVERNIGHT EVENTS:    MEDICATIONS  (STANDING):  amiodarone    Tablet 200 milliGRAM(s) Oral daily  atorvastatin 40 milliGRAM(s) Oral at bedtime  buDESOnide 160 MICROgram(s)/formoterol 4.5 MICROgram(s) Inhaler 2 Puff(s) Inhalation two times a day  furosemide    Tablet 40 milliGRAM(s) Oral daily  lactated ringers. 1000 milliLiter(s) (75 mL/Hr) IV Continuous <Continuous>  levalbuterol Inhalation 1.25 milliGRAM(s) Inhalation every 6 hours  methylPREDNISolone sodium succinate Injectable 40 milliGRAM(s) IV Push every 8 hours  pantoprazole Infusion 8 mG/Hr (10 mL/Hr) IV Continuous <Continuous>  piperacillin/tazobactam IVPB. 3.375 Gram(s) IV Intermittent every 8 hours  saccharomyces boulardii 250 milliGRAM(s) Oral two times a day  vancomycin  IVPB 1000 milliGRAM(s) IV Intermittent every 24 hours    MEDICATIONS  (PRN):      Allergies    No Known Allergies    Intolerances        Vital Signs Last 24 Hrs  T(C): 37 (23 May 2018 03:13), Max: 37.2 (22 May 2018 14:30)  T(F): 98.6 (23 May 2018 03:13), Max: 99 (22 May 2018 14:30)  HR: 64 (23 May 2018 04:00) (62 - 92)  BP: 111/71 (23 May 2018 03:13) (100/74 - 112/72)  BP(mean): --  RR: 20 (23 May 2018 03:13) (18 - 20)  SpO2: 100% (23 May 2018 03:13) (96% - 100%)    LABS:                        7.1    5.7   )-----------( 205      ( 23 May 2018 04:52 )             22.2     05-22    142  |  99  |  42.0<H>  ----------------------------<  137<H>  4.3   |  28.0  |  2.03<H>    Ca    8.8      22 May 2018 14:14    TPro  5.9<L>  /  Alb  3.4  /  TBili  0.5  /  DBili  x   /  AST  19  /  ALT  7   /  AlkPhos  64  05-22    PT/INR - ( 22 May 2018 14:14 )   PT: 29.1 sec;   INR: 2.59 ratio         PTT - ( 22 May 2018 14:14 )  PTT:39.7 sec      RADIOLOGY & ADDITIONAL TESTS: INTERVAL HPI/OVERNIGHT EVENTS:No particular complaints. No bleeding reported. Seen by pulmonary, cardiology and nephrology    MEDICATIONS  (STANDING):  amiodarone    Tablet 200 milliGRAM(s) Oral daily  atorvastatin 40 milliGRAM(s) Oral at bedtime  buDESOnide 160 MICROgram(s)/formoterol 4.5 MICROgram(s) Inhaler 2 Puff(s) Inhalation two times a day  furosemide    Tablet 40 milliGRAM(s) Oral daily  lactated ringers. 1000 milliLiter(s) (75 mL/Hr) IV Continuous <Continuous>  levalbuterol Inhalation 1.25 milliGRAM(s) Inhalation every 6 hours  methylPREDNISolone sodium succinate Injectable 40 milliGRAM(s) IV Push every 8 hours  pantoprazole Infusion 8 mG/Hr (10 mL/Hr) IV Continuous <Continuous>  piperacillin/tazobactam IVPB. 3.375 Gram(s) IV Intermittent every 8 hours  saccharomyces boulardii 250 milliGRAM(s) Oral two times a day  vancomycin  IVPB 1000 milliGRAM(s) IV Intermittent every 24 hours    MEDICATIONS  (PRN):      Allergies    No Known Allergies    Intolerances        Vital Signs Last 24 Hrs  T(C): 37 (23 May 2018 03:13), Max: 37.2 (22 May 2018 14:30)  T(F): 98.6 (23 May 2018 03:13), Max: 99 (22 May 2018 14:30)  HR: 64 (23 May 2018 04:00) (62 - 92)  BP: 111/71 (23 May 2018 03:13) (100/74 - 112/72)  BP(mean): --  RR: 20 (23 May 2018 03:13) (18 - 20)  SpO2: 100% (23 May 2018 03:13) (96% - 100%)    LABS:                        7.1    5.7   )-----------( 205      ( 23 May 2018 04:52 )             22.2     05-22    142  |  99  |  42.0<H>  ----------------------------<  137<H>  4.3   |  28.0  |  2.03<H>    Ca    8.8      22 May 2018 14:14    TPro  5.9<L>  /  Alb  3.4  /  TBili  0.5  /  DBili  x   /  AST  19  /  ALT  7   /  AlkPhos  64  05-22    PT/INR - ( 22 May 2018 14:14 )   PT: 29.1 sec;   INR: 2.59 ratio         PTT - ( 22 May 2018 14:14 )  PTT:39.7 sec      RADIOLOGY & ADDITIONAL TESTS:

## 2018-05-23 NOTE — PROGRESS NOTE ADULT - PROBLEM SELECTOR PLAN 1
c/w SDU  Protonix gtt  NPO except meds  PRBC transfusion 3 units  monitor VS and call MICU eval clinically deteriorating  GI cx appreciated: not a candidate for colonoscopy at this time. for CT colonography/enterography. cleared bby pulmonary,cardio. seen by nephro.   NPO except meds,  IVF, judicious use  CBC Q12 hrs for now. repeat post-transfusion CBC at 4 pm  f/u Iron panel  consider IV iron

## 2018-05-23 NOTE — ED ADULT NURSE REASSESSMENT NOTE - NS ED NURSE REASSESS COMMENT FT1
pt care assumed at 2350, no apparent distress noted at this time, charting as noted. pt received Alert and Oriented to person, place, situation and time resting in bed comfortably. pt denies any complaints at this time. pt is afib on cardiac monitor. blood transfusion is running well at this time, no signs of reaction. pt has +2 pitting edema in b/l lower extremities edema. pt educated on plan of care, pt able to successfully plan of care to rn. rn will continue to reeducate pt throughout Emergency Department stay.

## 2018-05-23 NOTE — PROGRESS NOTE ADULT - PROBLEM SELECTOR PLAN 6
judicious IVF. renal sono if not done recently. consider Nephro eval as family is very concerned about kidney dysfunction creatinine better. nephrology eval

## 2018-05-23 NOTE — PROGRESS NOTE ADULT - PROBLEM SELECTOR PLAN 4
acute on chronic systolic HF  c/w lasix with holding parameter. daily weight. strict I and O acute on chronic diastolic HF  c/w lasix with holding parameter. daily weight. strict I and O. monitor Renal function. lasix after every transfusion. cardiology cx appreciated. cleared for procedure

## 2018-05-23 NOTE — PROGRESS NOTE ADULT - PROBLEM SELECTOR PLAN 3
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 CT chest w/o contrast  s/p vanco and zosyn in ER, will c/w same  f/u BC, send sputum cx, urine legionella.  will call Dr. Suarez group tomorrow after CT chest CT chest negative. ABX dced. no ssx of PNA at this time.

## 2018-05-23 NOTE — PROGRESS NOTE ADULT - SUBJECTIVE AND OBJECTIVE BOX
DX : GIB, CKD    CT Guided Colon image Planned,    Will optimize Pre IV Contrast exposure,    MAXX Nuno,

## 2018-05-23 NOTE — PROGRESS NOTE ADULT - SUBJECTIVE AND OBJECTIVE BOX
Dr. Felix Hospitalist Progress Note  DAVID VINCENT [id number]    Patient is a 83y old  Male who presents with a chief complaint of 82 yo male with hx htn, dm, copd, akd c/o shortness of breath, wheezing with productive cough. (22 May 2018 17:02)    seen at bedside, denied complaints. hungry. currently NPO for GI procedure. SOB better. no CP/palpitation/abd pain/n/v/d/dysuria/headaches/dizziness/weakness or numbness. all other ROS neg. wants full code      VS:    T(C): 36.9 (05-23-18 @ 12:11), Max: 37.2 (05-22-18 @ 14:30)  HR: 64 (05-23-18 @ 11:11) (62 - 92)  BP: 105/60 (05-23-18 @ 11:11) (100/74 - 122/74)  RR: 20 (05-23-18 @ 11:11) (18 - 20)  SpO2: 95% (05-23-18 @ 11:11) (92% - 100%)  CAPILLARY BLOOD GLUCOSE      POCT Blood Glucose.: 141 mg/dL (23 May 2018 00:48)      Physical Exam:  GENERAL: Not in distress. Alert    HEENT:  Normocephalic and atraumatic. PEARLA,EOMI  NECK: Supple.  No JVD.    CARDIOVASCULAR: RRR S1, S2. SM+, no rubs/gallop  LUNGS: BLAE+, + rales, very few scattered wheezing, no rhonchi.    ABDOMEN: ND. Soft,  NT, no guarding / rebound / rigidity. BS normoactive. No CVA tenderness.    BACK: No spine tenderness.  EXTREMITIES: no cyanosis, no clubbing, + edema, improving. no ssx of DVT  SKIN: no rash.   NEUROLOGIC: AAO*3.strength is symmetric, sensation intact, speech fluent.    PSYCHIATRIC: Calm.  No agitation.    Labs                        7.1    5.7   )-----------( 205      ( 23 May 2018 04:52 )             22.2     05-23    143  |  101  |  35.0<H>  ----------------------------<  166<H>  4.5   |  28.0  |  1.68<H>    Ca    8.9      23 May 2018 09:23  Phos  4.8     05-23  Mg     2.3     05-23    TPro  5.9<L>  /  Alb  3.4  /  TBili  0.6  /  DBili  x   /  AST  12  /  ALT  6   /  AlkPhos  64  05-23   PT/INR - ( 23 May 2018 09:23 )   PT: 15.0 sec;   INR: 1.36 ratio         PTT - ( 23 May 2018 09:23 )  PTT:32.9 sec  MEDICATIONS  (STANDING):  amiodarone    Tablet 200 milliGRAM(s) Oral daily  atorvastatin 40 milliGRAM(s) Oral at bedtime  buDESOnide 160 MICROgram(s)/formoterol 4.5 MICROgram(s) Inhaler 2 Puff(s) Inhalation two times a day  levalbuterol Inhalation 1.25 milliGRAM(s) Inhalation every 6 hours  methylPREDNISolone sodium succinate Injectable 40 milliGRAM(s) IV Push every 8 hours  pantoprazole Infusion 8 mG/Hr (10 mL/Hr) IV Continuous <Continuous>  saccharomyces boulardii 250 milliGRAM(s) Oral two times a day  sodium chloride 0.9%. 1000 milliLiter(s) (100 mL/Hr) IV Continuous <Continuous>    MEDICATIONS  (PRN):

## 2018-05-23 NOTE — PATIENT PROFILE ADULT. - NS PRO TALK SOMEONE YN
Reviewed with PRAMOD Sewell. Okay to send FYWB on COPD as this will include information on obstructive lung disease.    Letter sent to Catrina with summary of PFT results and upcoming appt for diffusion capacity with respiratory services on 4/27 at 2:30 PM. Also send FYWB on COPD and copy of PFT results.     Watch for DLCO reading on 4/27. Patient is hard of hearing on the phone (has not been wearing hearing aides).    no

## 2018-05-24 DIAGNOSIS — K62.5 HEMORRHAGE OF ANUS AND RECTUM: ICD-10-CM

## 2018-05-24 LAB
ANION GAP SERPL CALC-SCNC: 15 MMOL/L — SIGNIFICANT CHANGE UP (ref 5–17)
APTT BLD: 27.4 SEC — LOW (ref 27.5–37.4)
BUN SERPL-MCNC: 37 MG/DL — HIGH (ref 8–20)
CALCIUM SERPL-MCNC: 9.5 MG/DL — SIGNIFICANT CHANGE UP (ref 8.6–10.2)
CHLORIDE SERPL-SCNC: 100 MMOL/L — SIGNIFICANT CHANGE UP (ref 98–107)
CO2 SERPL-SCNC: 28 MMOL/L — SIGNIFICANT CHANGE UP (ref 22–29)
CREAT SERPL-MCNC: 1.73 MG/DL — HIGH (ref 0.5–1.3)
GLUCOSE BLDC GLUCOMTR-MCNC: 143 MG/DL — HIGH (ref 70–99)
GLUCOSE BLDC GLUCOMTR-MCNC: 162 MG/DL — HIGH (ref 70–99)
GLUCOSE BLDC GLUCOMTR-MCNC: 163 MG/DL — HIGH (ref 70–99)
GLUCOSE BLDC GLUCOMTR-MCNC: 182 MG/DL — HIGH (ref 70–99)
GLUCOSE SERPL-MCNC: 171 MG/DL — HIGH (ref 70–115)
HCT VFR BLD CALC: 24 % — LOW (ref 42–52)
HCT VFR BLD CALC: 25 % — LOW (ref 42–52)
HGB BLD-MCNC: 7.5 G/DL — LOW (ref 14–18)
HGB BLD-MCNC: 7.8 G/DL — LOW (ref 14–18)
INR BLD: 1.15 RATIO — SIGNIFICANT CHANGE UP (ref 0.88–1.16)
MCHC RBC-ENTMCNC: 24.2 PG — LOW (ref 27–31)
MCHC RBC-ENTMCNC: 24.5 PG — LOW (ref 27–31)
MCHC RBC-ENTMCNC: 31.2 G/DL — LOW (ref 32–36)
MCHC RBC-ENTMCNC: 31.3 G/DL — LOW (ref 32–36)
MCV RBC AUTO: 77.4 FL — LOW (ref 80–94)
MCV RBC AUTO: 78.6 FL — LOW (ref 80–94)
PLATELET # BLD AUTO: 196 K/UL — SIGNIFICANT CHANGE UP (ref 150–400)
PLATELET # BLD AUTO: 231 K/UL — SIGNIFICANT CHANGE UP (ref 150–400)
POTASSIUM SERPL-MCNC: 4.5 MMOL/L — SIGNIFICANT CHANGE UP (ref 3.5–5.3)
POTASSIUM SERPL-SCNC: 4.5 MMOL/L — SIGNIFICANT CHANGE UP (ref 3.5–5.3)
PROTHROM AB SERPL-ACNC: 12.7 SEC — SIGNIFICANT CHANGE UP (ref 9.8–12.7)
RBC # BLD: 3.1 M/UL — LOW (ref 4.6–6.2)
RBC # BLD: 3.18 M/UL — LOW (ref 4.6–6.2)
RBC # FLD: 19.1 % — HIGH (ref 11–15.6)
RBC # FLD: 19.7 % — HIGH (ref 11–15.6)
SODIUM SERPL-SCNC: 143 MMOL/L — SIGNIFICANT CHANGE UP (ref 135–145)
WBC # BLD: 4 K/UL — LOW (ref 4.8–10.8)
WBC # BLD: 5.5 K/UL — SIGNIFICANT CHANGE UP (ref 4.8–10.8)
WBC # FLD AUTO: 4 K/UL — LOW (ref 4.8–10.8)
WBC # FLD AUTO: 5.5 K/UL — SIGNIFICANT CHANGE UP (ref 4.8–10.8)

## 2018-05-24 PROCEDURE — 76770 US EXAM ABDO BACK WALL COMP: CPT | Mod: 26

## 2018-05-24 PROCEDURE — 99232 SBSQ HOSP IP/OBS MODERATE 35: CPT

## 2018-05-24 PROCEDURE — 99223 1ST HOSP IP/OBS HIGH 75: CPT

## 2018-05-24 PROCEDURE — 99233 SBSQ HOSP IP/OBS HIGH 50: CPT

## 2018-05-24 RX ORDER — SODIUM CHLORIDE 9 MG/ML
1000 INJECTION, SOLUTION INTRAVENOUS
Qty: 0 | Refills: 0 | Status: DISCONTINUED | OUTPATIENT
Start: 2018-05-24 | End: 2018-06-01

## 2018-05-24 RX ORDER — DEXTROSE 50 % IN WATER 50 %
25 SYRINGE (ML) INTRAVENOUS ONCE
Qty: 0 | Refills: 0 | Status: DISCONTINUED | OUTPATIENT
Start: 2018-05-24 | End: 2018-06-01

## 2018-05-24 RX ORDER — DEXTROSE 50 % IN WATER 50 %
12.5 SYRINGE (ML) INTRAVENOUS ONCE
Qty: 0 | Refills: 0 | Status: DISCONTINUED | OUTPATIENT
Start: 2018-05-24 | End: 2018-06-01

## 2018-05-24 RX ORDER — INSULIN LISPRO 100/ML
VIAL (ML) SUBCUTANEOUS
Qty: 0 | Refills: 0 | Status: DISCONTINUED | OUTPATIENT
Start: 2018-05-24 | End: 2018-06-01

## 2018-05-24 RX ORDER — METOPROLOL TARTRATE 50 MG
25 TABLET ORAL DAILY
Qty: 0 | Refills: 0 | Status: DISCONTINUED | OUTPATIENT
Start: 2018-05-24 | End: 2018-05-30

## 2018-05-24 RX ORDER — GLUCAGON INJECTION, SOLUTION 0.5 MG/.1ML
1 INJECTION, SOLUTION SUBCUTANEOUS ONCE
Qty: 0 | Refills: 0 | Status: DISCONTINUED | OUTPATIENT
Start: 2018-05-24 | End: 2018-06-01

## 2018-05-24 RX ORDER — DEXTROSE 50 % IN WATER 50 %
15 SYRINGE (ML) INTRAVENOUS ONCE
Qty: 0 | Refills: 0 | Status: DISCONTINUED | OUTPATIENT
Start: 2018-05-24 | End: 2018-06-01

## 2018-05-24 RX ADMIN — Medication 650 MILLIGRAM(S): at 00:20

## 2018-05-24 RX ADMIN — LEVALBUTEROL 1.25 MILLIGRAM(S): 1.25 SOLUTION, CONCENTRATE RESPIRATORY (INHALATION) at 15:47

## 2018-05-24 RX ADMIN — AMIODARONE HYDROCHLORIDE 200 MILLIGRAM(S): 400 TABLET ORAL at 06:04

## 2018-05-24 RX ADMIN — LEVALBUTEROL 1.25 MILLIGRAM(S): 1.25 SOLUTION, CONCENTRATE RESPIRATORY (INHALATION) at 21:58

## 2018-05-24 RX ADMIN — PANTOPRAZOLE SODIUM 10 MG/HR: 20 TABLET, DELAYED RELEASE ORAL at 21:40

## 2018-05-24 RX ADMIN — Medication 1: at 12:12

## 2018-05-24 RX ADMIN — LEVALBUTEROL 1.25 MILLIGRAM(S): 1.25 SOLUTION, CONCENTRATE RESPIRATORY (INHALATION) at 02:41

## 2018-05-24 RX ADMIN — Medication 40 MILLIGRAM(S): at 17:19

## 2018-05-24 RX ADMIN — Medication 250 MILLIGRAM(S): at 17:22

## 2018-05-24 RX ADMIN — ATORVASTATIN CALCIUM 40 MILLIGRAM(S): 80 TABLET, FILM COATED ORAL at 22:52

## 2018-05-24 RX ADMIN — PANTOPRAZOLE SODIUM 10 MG/HR: 20 TABLET, DELAYED RELEASE ORAL at 11:28

## 2018-05-24 RX ADMIN — Medication 40 MILLIGRAM(S): at 06:04

## 2018-05-24 RX ADMIN — Medication 250 MILLIGRAM(S): at 06:04

## 2018-05-24 RX ADMIN — Medication 25 MILLIGRAM(S): at 12:12

## 2018-05-24 NOTE — CONSULT NOTE ADULT - CONSULT REASON
Multiple Readmissions  COPD O2 dependent  GI Bleed  AFIB
[place]
CAD/HFpEF
Pulmonary evaluation for procedure  H/O hypercapnic respiratory failure>chronic, OHS, KRYSTIN  Morbid obesity
Anemia  COPD exacerbation

## 2018-05-24 NOTE — PHYSICAL THERAPY INITIAL EVALUATION ADULT - CRITERIA FOR SKILLED THERAPEUTIC INTERVENTIONS
predicted duration of therapy intervention/functional limitations in following categories/risk reduction/prevention/therapy frequency/anticipated discharge recommendation/impairments found/rehab potential/anticipated equipment needs at discharge

## 2018-05-24 NOTE — CONSULT NOTE ADULT - ASSESSMENT
Patient with A fib on xarelto, TRELL, COPD, CKD, DM, HTN, Aortic valve replacement admitted with COPD exacerbation. Noted to have significant anemia.     1. Patient has multiple factors to lose blood. Elevated INR, CKD, anticoagulation use and hemorrhoids. At this time, patient is not candidate for endoscopic work up. However, colonic pathology/small bowel pathology needs to be ruled out. Once patient is better from pulmonary status and feels better, will need to arrange CT colonography and CT enterography  2.  Transfuse PRBC as needed to keep hemoglobin/hct > 8/24  3. Cardiology evaluation  4. Pulmonary evaluation  5. Continue PPI therapy  6. Consider IV iron supplementation  7. GI will follow up
82yo obese  Male chronically ill recent hospitalizations.    Admitted with increasing weakness, SOB    COPD exacerbation  O2 dependent  Neb treatments    R/O GI Bleed  Virtual colonoscopy tomorrow    Chronic Blood Loss Anemia  Transfused on admission  CBC daily    Need a meeting with daughter-in-law Kira to discuss Advance Directives   MOLST and possibly more  support at home.  Monitor work-up ongoing testing
1) [place] on CKD  2) HTN  3) DM  4) HLD    Pt has superimposed [place] in the setting of decreased perfusion; ?blood loss anemia  GI following; s/p PRBC transfusion; getting transfused  Renal function improving  Check UA, Lev, UCl, UOsm  Check renal/bladder sonogram
Patient currently at baseline status.  No evidence of increased bicarb but marked anemia may be masking increased bicarb.  Need ABG to assess status.  Nonetheless due to patient's history he is at increased risk of complications related to sedation in view of obesity and KRYSTIN.  Should consider use of airway during all procedures to maintain patency of oropharynx.  Does not necessarily need general anesthesia.   There are however no absolute pulmonary contraindications to procedures.     Plan:  1.ABG  2.Routine BD's as ordered  3.See no indication for antibiotics at this time (patient recently treated for Step Bovis in blood)  4.Patient refuses nocturnal BIPAP/CPAP

## 2018-05-24 NOTE — PROGRESS NOTE ADULT - PROBLEM SELECTOR PLAN 4
acute on chronic diastolic HF  c/w lasix with holding parameter. daily weight. strict I and O. monitor Renal function. lasix after every transfusion. cardiology cx appreciated. cleared for procedure

## 2018-05-24 NOTE — PROGRESS NOTE ADULT - SUBJECTIVE AND OBJECTIVE BOX
DAVIDGALE VINCENT  [id number]        Chief Complaint: follow up GIB/AF/CAD      Subjective: feels a little better, no cp/sob/palps      24 hour Tele: AF, no events        acetaminophen   Tablet. 650 milliGRAM(s) Oral every 6 hours PRN  amiodarone    Tablet 200 milliGRAM(s) Oral daily  atorvastatin 40 milliGRAM(s) Oral at bedtime  buDESOnide 160 MICROgram(s)/formoterol 4.5 MICROgram(s) Inhaler 2 Puff(s) Inhalation two times a day  levalbuterol Inhalation 1.25 milliGRAM(s) Inhalation every 6 hours  methylPREDNISolone sodium succinate Injectable 40 milliGRAM(s) IV Push every 8 hours  pantoprazole Infusion 8 mG/Hr IV Continuous <Continuous>  saccharomyces boulardii 250 milliGRAM(s) Oral two times a day  sodium chloride 0.9%. 1000 milliLiter(s) IV Continuous <Continuous>          Physical Exam:  T(C): 36.6 (05-24-18 @ 08:26), Max: 37.1 (05-23-18 @ 20:44)  HR: 65 (05-24-18 @ 07:27) (62 - 72)  BP: 111/73 (05-24-18 @ 07:27) (105/60 - 132/80)  RR: 18 (05-24-18 @ 07:27) (16 - 22)  SpO2: 99% (05-24-18 @ 07:27) (92% - 100%)  Wt(kg): --  GEN: NAD  HEENT: dry, sclera anicteric, poor dentition  RESP: reduced air movement, no rales/rhonchi  CVS: S1S2, rrr, borderline JVD, no M/R/G  GI: Soft, NT, ND, BS+, obese  EXT: trace to 1+ edema bilaterally with stasis dermatitis changes  NEURO: AAOX3  PSYCH: Normal affect      I&O's Summary    23 May 2018 07:01  -  24 May 2018 07:00  --------------------------------------------------------  IN: 325 mL / OUT: 1595 mL / NET: -1270 mL    24 May 2018 07:01  -  24 May 2018 09:26  --------------------------------------------------------  IN: 10 mL / OUT: 0 mL / NET: 10 mL          Labs:   24 May 2018 06:56    143    |  100    |  37.0   ----------------------------<  171    4.5     |  28.0   |  1.73     Ca    9.5        24 May 2018 06:56  Phos  4.8       23 May 2018 09:23  Mg     2.3       23 May 2018 09:23    TPro  5.9    /  Alb  3.4    /  TBili  0.6    /  DBili  x      /  AST  12     /  ALT  6      /  AlkPhos  64     23 May 2018 09:23                          7.8    5.5   )-----------( 231      ( 24 May 2018 06:56 )             25.0     PT/INR - ( 24 May 2018 06:56 )   PT: 12.7 sec;   INR: 1.15 ratio         PTT - ( 24 May 2018 06:56 )  PTT:27.4 sec  CARDIAC MARKERS ( 22 May 2018 14:14 )  x     / 0.05 ng/mL / 103 U/L / x     / x        Echo 4/2018P:   1. Left ventricular ejection fraction, by visual estimation, is 60 to   65%.   2. Normal global left ventricular systolic function.   3. The mitral in-flow pattern reveals no discernable A-wave, therefore   no comment on diastolic function can be made.   4. Normal left ventricular internal cavity size.   5. Normal right ventricular size and function.   6. Severely enlarged left atrium.   7. The right atrium is normal in size.   8. Mild mitral annular calcification.   9. Thickening of the anterior and posterior mitral valve leaflets.  10. Mild to moderate mitral valve regurgitation.  11. Bioprosthesis in the aortic position demonstrating normal function.  12. Dilatation of the ascending aorta.  13. There is no evidence of pericardial effusion.  14. No evidence of endocarditis. Tricuspid valve not well seen.      Assessment:   Patient is a  83y Male with h/o CAD s/p CABG, Bio AVR, persistent AF, COPD on home O2 (2-3L), KRYSTIN here with recurrent GI bleed leading to severe symptomatic anemia.   -Has been transfused, no further GI bleeding and H/H stable in 7's  -No signs decompensated CHF at this time      Plan:  1. Continue med management of CAD  2. Will ultimately need to be back on A/C after etiology of bleeding ascertained. May need to consider ULISES closure device  3. DC amiodarone, now with persistent AF  4. Add low dose beta blocker metop succinate 25mg daily  5. Once taking regular PO, add daily po lasix

## 2018-05-24 NOTE — PROGRESS NOTE ADULT - PROBLEM SELECTOR PLAN 1
c/w SDU  still on Protonix gtt  NPO after midnight for Virtual colonography tomorrow  PRBC transfusion 3 units  monitor VS and call MICU eval clinically deteriorating   monitor CBC  consider IV iron  GI/cardio/nephro f/u appreciated

## 2018-05-24 NOTE — CONSULT NOTE ADULT - ATTENDING COMMENTS
COUNSELING:    Face to face meeting to discuss Advanced Care Planning - Time Spent ______ Minutes.  See goals of care note.    More than 50% time spent in counseling and coordinating care. ____40__ Minutes.     Thank you for the opportunity to assist with the care of this patient.   [facility] [phone].

## 2018-05-24 NOTE — PHYSICAL THERAPY INITIAL EVALUATION ADULT - ADDITIONAL COMMENTS
as per pt. lives in the private house with 3 steps to enter, (+) rail, first floor set up, owns RW, transfer shower chair, manual w/c, reports having supervision on the stairs at all times

## 2018-05-24 NOTE — CONSULT NOTE ADULT - SUBJECTIVE AND OBJECTIVE BOX
HPI:  84 yo male with hx HTN, DM, COPD on home oxygen, A fib on Xarelto, AVReplacement 6 years ago, TRELL, CKD , Dyslipidemia was admitted with c/o shortness of breath, wheezing with productive cough. Patient was having neck pain and as per family, he had similar presentation before the pneumonia last time.  Patient was admitted to [facility] x 1 month  for pneumonia, sepsis/bacteremia, and was discharged home on IV abx via PICC line. PICC line was discontinued x 3 weeks ago. Patient had strep bovis last time.  For the past  few days, productive cough, increased shortness of breath and increased leg swelling. He also c/o neck pain and right knee pain. no falls. denies abdominal pain, vomiting or diarrhea. C/o dark colored stools.     He was evaluated in the past for endoscopic work up and has been deemed a high risk candidate. Last colonoscopy was more than 10 years ago.    He was noted to be significantly anemic.       PAST MEDICAL & SURGICAL HISTORY:  COPD (chronic obstructive pulmonary disease)  Hyperlipidemia, unspecified hyperlipidemia type  Essential hypertension  Afib  Chronic systolic CHF (congestive heart failure)  Hypertrophic cardiomyopathy  CKD (chronic kidney disease), stage 4 (severe)  Gassiness  Diabetes  Asthma  Prostate cancer  Hypertension  Open heart surgery for aortic valve replacement  History of valvuloplasty  History of knee surgery      ROS:  No Heartburn, regurgitation, dysphagia, odynophagia.  No dyspepsia  No abdominal pain.    No Nausea, vomiting.  No Bleeding.  No hematemesis.   No diarrhea.    No hematochezia  No weight loss, anorexia.  No edema.      MEDICATIONS  (STANDING):  amiodarone    Tablet 200 milliGRAM(s) Oral daily  atorvastatin 40 milliGRAM(s) Oral at bedtime  buDESOnide 160 MICROgram(s)/formoterol 4.5 MICROgram(s) Inhaler 2 Puff(s) Inhalation two times a day  furosemide    Tablet 40 milliGRAM(s) Oral daily  levalbuterol Inhalation 1.25 milliGRAM(s) Inhalation every 6 hours  methylPREDNISolone sodium succinate Injectable 40 milliGRAM(s) IV Push every 8 hours  multiple electrolytes Injection Type 1 1000 milliLiter(s) (60 mL/Hr) IV Continuous <Continuous>  pantoprazole Infusion 8 mG/Hr (10 mL/Hr) IV Continuous <Continuous>  phytonadione  IVPB 10 milliGRAM(s) IV Intermittent once  piperacillin/tazobactam IVPB. 3.375 Gram(s) IV Intermittent every 8 hours  potassium chloride    Tablet ER 20 milliEquivalent(s) Oral daily  saccharomyces boulardii 250 milliGRAM(s) Oral two times a day  vancomycin  IVPB 1250 milliGRAM(s) IV Intermittent every 12 hours    MEDICATIONS  (PRN):      Allergies    No Known Allergies    Intolerances        SOCIAL HISTORY:Negative x 3    ENDOSCOPIC/GI HISTORY:Remote colonoscopy    FAMILY HISTORY:  No pertinent family history in first degree relatives  No pertinent family history in first degree relatives      Vital Signs Last 24 Hrs  T(C): 37.2 (22 May 2018 14:30), Max: 37.2 (22 May 2018 14:30)  T(F): 99 (22 May 2018 14:30), Max: 99 (22 May 2018 14:30)  HR: 90 (22 May 2018 15:35) (85 - 90)  BP: 100/79 (22 May 2018 15:35) (100/79 - 109/71)  BP(mean): --  RR: 20 (22 May 2018 15:35) (18 - 20)  SpO2: 97% (22 May 2018 15:35) (97% - 98%)    PHYSICAL EXAM:    GENERAL: NAD, well-groomed, well-developed. Nasal oxygen on  HEAD:  Atraumatic, Normocephalic  EYES: EOMI, PERRLA, conjunctiva and sclera clear  ENMT: No tonsillar erythema, exudates, or enlargement; Moist mucous membranes, Good dentition, No lesions  NECK: Supple, No JVD, Normal thyroid  CHEST/LUNG: Clear to percussion bilaterally; No rales, rhonchi, wheezing, or rubs  HEART: Regular rate and rhythm; No murmurs, rubs, or gallops  ABDOMEN: Soft, Nontender, Nondistended; Bowel sounds present  RECTAL: External hemorrhoids. Loose stools. No blood  EXTREMITIES:  2+ Peripheral Pulses, No clubbing, cyanosis,3+ edema  LYMPH: No lymphadenopathy noted  SKIN: No rashes or lesions      LABS:                        5.4    3.5   )-----------( 195      ( 22 May 2018 14:14 )             17.9     05-22    142  |  99  |  42.0<H>  ----------------------------<  137<H>  4.3   |  28.0  |  2.03<H>    Ca    8.8      22 May 2018 14:14    TPro  5.9<L>  /  Alb  3.4  /  TBili  0.5  /  DBili  x   /  AST  19  /  ALT  7   /  AlkPhos  64  05-22    PT/INR - ( 22 May 2018 14:14 )   PT: 29.1 sec;   INR: 2.59 ratio         PTT - ( 22 May 2018 14:14 )  PTT:39.7 sec       LIVER FUNCTIONS - ( 22 May 2018 14:14 )  Alb: 3.4 g/dL / Pro: 5.9 g/dL / ALK PHOS: 64 U/L / ALT: 7 U/L / AST: 19 U/L / GGT: x           Iron with Total Binding Capacity (03.13.18 @ 21:11)    % Saturation, Iron: 6 %    Iron - Total Binding Capacity.: 395 ug/dL    Iron Total, Serum: 24 ug/dL        RADIOLOGY & ADDITIONAL STUDIES:  < from: Xray Chest 1 View- PORTABLE-Urgent (05.22.18 @ 14:54) >  INTERPRETATION:    INDICATION: Shortness of breath.    Single frontal view of chest dated 5/22/2018 2:54 PM, compared to prior   study of 3/29/2018.    FINDINGS /   IMPRESSION:     There is shallow inspiration, patchy atelectasis cannot exclude small   left pleural effusion. Status post sternotomy and surgery. Cardiac and   mediastinal structures cannot be assessed due to portable technique.      There are degenerative changes.     < end of copied text >
H[facility]: [facility] 82[facility]  COPD O2 dependent AFIB-on Xarelto;  HTN, DM, CKA chronic anemia. Multiple hospitalizations for PNA COPD Exacerbation and GI Bleed.   Admitted with severe SOB wheezing and productive cough Dyspnea with short walk to BR. He was very weak, found to have dark colored stools, and anemia- chronic blood loss-transfused since admission..  Prepping for a virtual colonoscopy tomorrow. He is OOB/CH NPO alert and oriented x 3 Nasal O2 continuous, Neb treatments administered during my visit.  CC: Very weak and SOB  denies N/V/D CP, palpitations dizziness headache     82 yo male with hx HTN, DM, COPD on home oxygen, A fib on Xarelto, AVReplacement 6 years ago, TRELL, CKD , Dyslipidemia, chronic blood loss anemia, came to ER c/o shortness of breath, wheezing with productive cough, neck and back pain, "pneumonia", "COPD".  Patient was having neck pain and as per family, he had similar presentation before the pneumonia last time.  Patient was admitted to [facility] x 1 month  for pneumonia, sepsis/bacteremia, and was discharged home on IV abx via PICC line. PICC line was discontinued .. Patient had strep bovis last time. IE was ruled out by TTE and KALE. For the past  few days, productive cough, increased shortness of breath and increased leg swelling. He also c/o neck pain and lower back pain. no falls. reported dark colored stools. no fresh blood,. denies abdominal pain, vomiting or diarrhea. takes iron tablet once weekly. last taken 2 weeks ago. patient has multiple hospitalization in past 6 months. he was admitted to [facility] on march and april with GIB, COPD exacerbation and HCAP. lowet hb on 2018 was 6.5. he was deemed not a good candidate for Endoscopic w/u. He has h/o afib and on xarelto which was held during hospitalizations for GIB and was restarted in discharge. (22 May 2018 17:02)      PERTINENT PMH REVIEWED: Yes     PAST MEDICAL & SURGICAL HISTORY:  COPD (chronic obstructive pulmonary disease)  Hyperlipidemia, unspecified hyperlipidemia type  Essential hypertension  Afib  Chronic systolic CHF (congestive heart failure)  Hypertrophic cardiomyopathy  CKD (chronic kidney disease), stage 4 (severe)  Gassiness  Diabetes  Asthma  Prostate cancer  Hypertension  No significant past surgical history  History of valvuloplasty  History of knee surgery      SOCIAL HISTORY:  EtOH    No                                    nonsmoker                                    Admitted from: home  Lives with son and daughter-in-law Mercy Loera [phone]  FAMILY HISTORY:  No pertinent family history in first degree relatives  No pertinent family history in first degree relatives    No Known Allergies    Baseline ADLs (prior to admission):  Independent with ADL's  uses walker and wheelchair at times    Present Symptoms:     Dyspnea: 3   Nausea/Vomiting: No  Anxiety:  Yes   Depression: No  Fatigue: Yes   Loss of appetite: No    Pain:  Denies            Character-            Duration-            Effect-            Factors-            Frequency-            Location-            Severity-    Review of Systems: Reviewed                     All others negative    MEDICATIONS  (STANDING):  atorvastatin 40 milliGRAM(s) Oral at bedtime  buDESOnide 160 MICROgram(s)/formoterol 4.5 MICROgram(s) Inhaler 2 Puff(s) Inhalation two times a day  dextrose 5%. 1000 milliLiter(s) (50 mL/Hr) IV Continuous <Continuous>  dextrose 50% Injectable 12.5 Gram(s) IV Push once  dextrose 50% Injectable 25 Gram(s) IV Push once  dextrose 50% Injectable 25 Gram(s) IV Push once  insulin lispro (HumaLOG) corrective regimen sliding scale   SubCutaneous three times a day before meals  levalbuterol Inhalation 1.25 milliGRAM(s) Inhalation every 6 hours  methylPREDNISolone sodium succinate Injectable 40 milliGRAM(s) IV Push every 12 hours  metoprolol succinate ER 25 milliGRAM(s) Oral daily  pantoprazole Infusion 8 mG/Hr (10 mL/Hr) IV Continuous <Continuous>  saccharomyces boulardii 250 milliGRAM(s) Oral two times a day  sodium chloride 0.9%. 1000 milliLiter(s) (100 mL/Hr) IV Continuous <Continuous>    MEDICATIONS  (PRN):  acetaminophen   Tablet. 650 milliGRAM(s) Oral every 6 hours PRN Moderate Pain (4 - 6)  dextrose 40% Gel 15 Gram(s) Oral once PRN Blood Glucose LESS THAN 70 milliGRAM(s)/deciliter  glucagon  Injectable 1 milliGRAM(s) IntraMuscular once PRN Glucose LESS THAN 70 milligrams/deciliter      PHYSICAL EXAM:    Vital Signs Last 24 Hrs  T(C): 36.9 (24 May 2018 13:48), Max: 37.1 (23 May 2018 20:44)  T(F): 98.4 (24 May 2018 13:48), Max: 98.8 (23 May 2018 20:44)  HR: 57 (24 May 2018 15:47) (56 - 79)  BP: 139/84 (24 May 2018 15:39) (110/67 - 139/84)  BP(mean): 99 (24 May 2018 15:39) (80 - 99)  RR: 19 (24 May 2018 15:39) (16 - 19)  SpO2: 100% (24 May 2018 15:47) (99% - 100%)    General: alert  oriented x __3__                 HEENT:  dry mouth      Lungs:tachypnea/labored breathing      CV: normal      GI:  distended obese  Dark stools                 constipation  last BM:     : normal      MSK:  weakness BLLE edema             ambulatory  bedbound/wheelchair bound    Skin: normal _  no rash    LABS:                        7.8    5.5   )-----------( 231      ( 24 May 2018 06:56 )             25.0     05-24    143  |  100  |  37.0<H>  ----------------------------<  171<H>  4.5   |  28.0  |  1.73<H>    Ca    9.5      24 May 2018 06:56  Phos  4.8     05-23  Mg     2.3     05-23    TPro  5.9<L>  /  Alb  3.4  /  TBili  0.6  /  DBili  x   /  AST  12  /  ALT  6   /  AlkPhos  64  05-23    PT/INR - ( 24 May 2018 06:56 )   PT: 12.7 sec;   INR: 1.15 ratio         PTT - ( 24 May 2018 06:56 )  PTT:27.4 sec  Urinalysis Basic - ( 23 May 2018 15:44 )    Color: Yellow / Appearance: Clear / S.010 / pH: x  Gluc: x / Ketone: Negative  / Bili: Negative / Urobili: Negative mg/dL   Blood: x / Protein: Negative mg/dL / Nitrite: Negative   Leuk Esterase: Negative / RBC: x / WBC 0-2   Sq Epi: x / Non Sq Epi: x / Bacteria: x      I&O's Summary    23 May 2018 07:01  -  24 May 2018 07:00  --------------------------------------------------------  IN: 325 mL / OUT: 1595 mL / NET: -1270 mL    24 May 2018 07:  -  24 May 2018 15:55  --------------------------------------------------------  IN: 80 mL / OUT: 100 mL / NET: -20 mL    RADIOLOGY & ADDITIONAL STUDIES:    ADVANCE DIRECTIVES:   DNR NO  Completed on:                     NEGRO NO   Completed on:  Living Will  NO   Completed on:
CHIEF COMPLAINT: blood in stool, leg swelling    HPI: Patient is a  83y Male with h/o CAD s/p CABG, Bio AVR, persistent AF, COPD on home O2 (2-3L), KRYSTIN here with dark black stools and increasing leg swelling lately. Has had a mild dry cough as well, no PND/orthopnea/palps/syncope. Breathing a bit more labored he state lately as well. No CP however. No fevers/chills/sweats.     PAST MEDICAL & SURGICAL HISTORY:  COPD (chronic obstructive pulmonary disease)  Hyperlipidemia, unspecified hyperlipidemia type  Essential hypertension  Afib  Chronic systolic CHF (congestive heart failure)  Hypertrophic cardiomyopathy  CKD (chronic kidney disease), stage 4 (severe)  Gassiness  Diabetes  Asthma  Prostate cancer  Hypertension  No significant past surgical history  History of valvuloplasty  History of knee surgery      MEDICATIONS:  amiodarone    Tablet 200 milliGRAM(s) Oral daily  atorvastatin 40 milliGRAM(s) Oral at bedtime  azithromycin  IVPB      buDESOnide 160 MICROgram(s)/formoterol 4.5 MICROgram(s) Inhaler 2 Puff(s) Inhalation two times a day  levalbuterol Inhalation 1.25 milliGRAM(s) Inhalation every 6 hours  methylPREDNISolone sodium succinate Injectable 40 milliGRAM(s) IV Push every 8 hours  pantoprazole Infusion 8 mG/Hr (10 mL/Hr) IV Continuous <Continuous>  saccharomyces boulardii 250 milliGRAM(s) Oral two times a day  sodium chloride 0.9%. 1000 milliLiter(s) (100 mL/Hr) IV Continuous <Continuous>        FAMILY HISTORY:  FAMILY HISTORY:  No pertinent family history in first degree relatives  No pertinent family history in first degree relatives      SOCIAL HISTORY: no EtOH, drugs or tobacco    ROS:  negative All others negative    PHYSCIAL EXAM:  Vital Signs Last 24 Hrs  T(C): 36.1 (23 May 2018 09:20), Max: 37.2 (22 May 2018 14:30)  T(F): 97 (23 May 2018 09:20), Max: 99 (22 May 2018 14:30)  HR: 62 (23 May 2018 09:20) (62 - 92)  BP: 107/67 (23 May 2018 09:20) (100/74 - 122/74)  BP(mean): --  RR: 20 (23 May 2018 09:20) (18 - 20)  SpO2: 96% (23 May 2018 09:20) (96% - 100%)  I&O's Summary    GEN: NAD  HEENT: dry, sclera anicteric, poor dentition  RESP: reduced air movement, no rales/rhonchi  CVS: S1S2, rrr, borderline JVD, no M/R/G  GI: Soft, NT, ND, BS+, obese  EXT: 1-2+ edema bilaterally with stasis dermatitis changes  NEURO: AAOX3  PSYCH: Normal affect    ECG: AF, diffuse NSST, mildly prolonged QTc  Echo 4/2018P:   1. Left ventricular ejection fraction, by visual estimation, is 60 to   65%.   2. Normal global left ventricular systolic function.   3. The mitral in-flow pattern reveals no discernable A-wave, therefore   no comment on diastolic function can be made.   4. Normal left ventricular internal cavity size.   5. Normal right ventricular size and function.   6. Severely enlarged left atrium.   7. The right atrium is normal in size.   8. Mild mitral annular calcification.   9. Thickening of the anterior and posterior mitral valve leaflets.  10. Mild to moderate mitral valve regurgitation.  11. Bioprosthesis in the aortic position demonstrating normal function.  12. Dilatation of the ascending aorta.  13. There is no evidence of pericardial effusion.  14. No evidence of endocarditis. Tricuspid valve not well seen.    LABS:                        7.1    5.7   )-----------( 205      ( 23 May 2018 04:52 )             22.2     05-22    142  |  99  |  42.0<H>  ----------------------------<  137<H>  4.3   |  28.0  |  2.03<H>    Ca    8.8      22 May 2018 14:14    TPro  5.9<L>  /  Alb  3.4  /  TBili  0.5  /  DBili  x   /  AST  19  /  ALT  7   /  AlkPhos  64  05-22    CARDIAC MARKERS ( 22 May 2018 14:14 )  x     / 0.05 ng/mL / 103 U/L / x     / x          PT/INR - ( 22 May 2018 14:14 )   PT: 29.1 sec;   INR: 2.59 ratio         PTT - ( 22 May 2018 14:14 )  PTT:39.7 sec      Assessment:     Patient is a  83y Male with h/o CAD s/p CABG, Bio AVR, persistent AF, COPD on home O2 (2-3L), KRYSTIN here with recurrent GI bleed leading to severe symptomatic anemia. transfused overnight. Mild acute on chronic diastolic CHF due to fluid/blood resuscitation. Agree with IV lasix and would continue to give after further transfusions.     Plan:  1. Continue to hold A/C and antiplatelets  2. CV stable at moderate risk for EGD/colonoscopy  3. IV lasix after transfusions  4. Continue statin and amiodarone (QTc mildly prolonged but not enough to warrant discontinuation at this time)  5. Monitor renal function  6. Will follow, thank you!
[facility] DIVISION OF KIDNEY DISEASES AND HYPERTENSION -- INITIAL CONSULT NOTE  --------------------------------------------------------------------------------  HPI:  83 year old male with HTN, DM, COPD on home O2, AFib on xarelto, AVR, TRELL, CKD, HLD, previously admitted for 1+ month with PNA, sepsis, bacteremia, found to have strep bovis. Pt now with increased SOB, increased LE edema, also had GIB in March. Pt now with elevation in Cr, nephrology consult called. Pt seen and examined on 4BRK; lying in bed in no distress; has no complaints at this time.     PAST HISTORY  --------------------------------------------------------------------------------  PAST MEDICAL & SURGICAL HISTORY:  COPD (chronic obstructive pulmonary disease)  Hyperlipidemia, unspecified hyperlipidemia type  Essential hypertension  Afib  Chronic systolic CHF (congestive heart failure)  Hypertrophic cardiomyopathy  CKD (chronic kidney disease), stage 4 (severe)  Gassiness  Diabetes  Asthma  Prostate cancer  Hypertension  No significant past surgical history  History of valvuloplasty  History of knee surgery    FAMILY HISTORY:  No pertinent family history in first degree relatives  No pertinent family history in first degree relatives    PAST SOCIAL HISTORY:    ALLERGIES & MEDICATIONS  --------------------------------------------------------------------------------  Allergies    No Known Allergies    Intolerances      Standing Inpatient Medications  amiodarone    Tablet 200 milliGRAM(s) Oral daily  atorvastatin 40 milliGRAM(s) Oral at bedtime  buDESOnide 160 MICROgram(s)/formoterol 4.5 MICROgram(s) Inhaler 2 Puff(s) Inhalation two times a day  levalbuterol Inhalation 1.25 milliGRAM(s) Inhalation every 6 hours  methylPREDNISolone sodium succinate Injectable 40 milliGRAM(s) IV Push every 8 hours  pantoprazole Infusion 8 mG/Hr IV Continuous <Continuous>  saccharomyces boulardii 250 milliGRAM(s) Oral two times a day  sodium chloride 0.9%. 1000 milliLiter(s) IV Continuous <Continuous>    PRN Inpatient Medications      REVIEW OF SYSTEMS  --------------------------------------------------------------------------------  Gen: No weight changes, fatigue, fevers/chills, weakness  Skin: No rashes  Head/Eyes/Ears/Mouth: No headache; Normal hearing; Normal vision w/o blurriness; No sinus pain/discomfort, sore throat  Respiratory: No dyspnea, cough, wheezing, hemoptysis  CV: No chest pain, PND, orthopnea  GI: No abdominal pain, diarrhea, constipation, nausea, vomiting, melena, hematochezia  : No increased frequency, dysuria, hematuria, nocturia  MSK: No joint pain/swelling; no back pain; no edema  Neuro: No dizziness/lightheadedness, weakness, seizures, numbness, tingling  Heme: No easy bruising or bleeding  Endo: No heat/cold intolerance  Psych: No significant nervousness, anxiety, stress, depression    All other systems were reviewed and are negative, except as noted.    VITALS/PHYSICAL EXAM  --------------------------------------------------------------------------------  T(C): 36.9 (05-23-18 @ 12:11), Max: 37.2 (05-22-18 @ 14:30)  HR: 68 (05-23-18 @ 12:58) (62 - 92)  BP: 114/74 (05-23-18 @ 12:58) (100/74 - 132/80)  RR: 22 (05-23-18 @ 12:58) (17 - 22)  SpO2: 99% (05-23-18 @ 12:58) (92% - 100%)  Wt(kg): --  Height (cm): 170.18 (05-22-18 @ 13:16)  Weight (kg): 104.2 (05-23-18 @ 11:49)  BMI (kg/m2): 36 (05-23-18 @ 11:49)  BSA (m2): 2.14 (05-23-18 @ 11:49)      05-23-18 @ 07:01  -  05-23-18 @ 13:56  --------------------------------------------------------  IN: 0 mL / OUT: 550 mL / NET: -550 mL      Physical Exam:  	Gen: NAD  	HEENT: PERRL, supple neck, clear oropharynx  	Pulm: CTA B/L  	CV: RRR, S1S2; no rub  	Back: No spinal or CVA tenderness; no sacral edema  	Abd: +BS, soft, nontender, mild distension  	: No suprapubic tenderness  	UE: Warm, FROM, no clubbing, intact strength; no edema; no asterixis  	LE: Warm, FROM, no clubbing, intact strength; + edema  	Neuro: No focal deficits, intact gait  	Psych: Normal affect and mood  	Skin: Warm, without rashes  	Vascular access:    LABS/STUDIES  --------------------------------------------------------------------------------              7.1    5.7   >-----------<  205      [05-23-18 @ 04:52]              22.2     143  |  101  |  35.0  ----------------------------<  166      [05-23-18 @ 09:23]  4.5   |  28.0  |  1.68        Ca     8.9     [05-23-18 @ 09:23]      Mg     2.3     [05-23-18 @ 09:23]      Phos  4.8     [05-23-18 @ 09:23]    TPro  5.9  /  Alb  3.4  /  TBili  0.6  /  DBili  x   /  AST  12  /  ALT  6   /  AlkPhos  64  [05-23-18 @ 09:23]    PT/INR: PT 15.0 , INR 1.36       [05-23-18 @ 09:23]  PTT: 32.9       [05-23-18 @ 09:23]    Troponin 0.05      [05-22-18 @ 14:14]        [05-22-18 @ 14:14]    Creatinine Trend:  SCr 1.68 [05-23 @ 09:23]  SCr 2.03 [05-22 @ 14:14]    Urinalysis - [03-29-18 @ 15:49]      Color Yellow / Appearance Clear / SG 1.015 / pH 6.0      Gluc Negative / Ketone Negative  / Bili Negative / Urobili Negative       Blood Negative / Protein Negative / Leuk Est Negative / Nitrite Negative      RBC  / WBC  / Hyaline  / Gran  / Sq Epi  / Non Sq Epi  / Bacteria       Iron 14, TIBC 416, %sat 3      [05-23-18 @ 09:23]  Ferritin 17      [05-23-18 @ 09:23]  HbA1c 5.5      [03-16-18 @ 06:02]
PULMONARY CONSULT NOTE      KARLENE VINCENT-[id number]    Patient is a 83y old  Male who presents with a chief complaint of 84 yo male with hx htn, dm, copd, akd c/o shortness of breath, wheezing with productive cough. (22 May 2018 17:02)  Patient came to hospital with primary complaint of weakness and dyspnea.  Denies cough, sputum.  Noted black stools.  In ER with marked anemia.  Patient known to us with h/o OHS (refuses BIPAP), probable KRYSTIN noncompliant with sleep evaluation recommendations, "COPD" but no evidence of significant obstruction with primary findings related to obesity.  On chronic O2.  H/O afib (was on AC), LV dysfunction.  Currently in no acute distress.      INTERVAL HPI/OVERNIGHT EVENTS:    MEDICATIONS  (STANDING):  amiodarone    Tablet 200 milliGRAM(s) Oral daily  atorvastatin 40 milliGRAM(s) Oral at bedtime  azithromycin  IVPB      buDESOnide 160 MICROgram(s)/formoterol 4.5 MICROgram(s) Inhaler 2 Puff(s) Inhalation two times a day  levalbuterol Inhalation 1.25 milliGRAM(s) Inhalation every 6 hours  methylPREDNISolone sodium succinate Injectable 40 milliGRAM(s) IV Push every 8 hours  pantoprazole Infusion 8 mG/Hr (10 mL/Hr) IV Continuous <Continuous>  saccharomyces boulardii 250 milliGRAM(s) Oral two times a day  sodium chloride 0.9%. 1000 milliLiter(s) (100 mL/Hr) IV Continuous <Continuous>      MEDICATIONS  (PRN):      Allergies    No Known Allergies    Intolerances        PAST MEDICAL & SURGICAL HISTORY:  COPD (chronic obstructive pulmonary disease)  Hyperlipidemia, unspecified hyperlipidemia type  Essential hypertension  Afib  Chronic systolic CHF (congestive heart failure)  Hypertrophic cardiomyopathy  CKD (chronic kidney disease), stage 4 (severe)  Gassiness  Diabetes  Asthma  Prostate cancer  Hypertension  No significant past surgical history  History of valvuloplasty  History of knee surgery      FAMILY HISTORY:  No pertinent family history in first degree relatives  No pertinent family history in first degree relatives      SOCIAL HISTORY  Smoking History: Former    REVIEW OF SYSTEMS:    CONSTITUTIONAL:  As per HPI.    HEENT:  Eyes:  No diplopia or blurred vision. ENT:  No earache, sore throat or runny nose.    CARDIOVASCULAR:  No pressure, squeezing, tightness, heaviness or aching about the chest; no palpitations.    RESPIRATORY:  Per HPI    GASTROINTESTINAL: Per HPI    GENITOURINARY:  No dysuria, frequency or urgency.    MUSCULOSKELETAL:  No joint pains    SKIN:  No new lesions.    NEUROLOGIC:  No paresthesias, fasciculations, seizures or weakness.    PSYCHIATRIC:  No disorder of thought or mood.    ENDOCRINE:  No heat or cold intolerance, polyuria or polydipsia.    HEMATOLOGICAL:  No easy bruising or bleeding.     Vital Signs Last 24 Hrs  T(C): 36.1 (23 May 2018 09:20), Max: 37.2 (22 May 2018 14:30)  T(F): 97 (23 May 2018 09:20), Max: 99 (22 May 2018 14:30)  HR: 62 (23 May 2018 09:20) (62 - 92)  BP: 107/67 (23 May 2018 09:20) (100/74 - 122/74)  BP(mean): --  RR: 20 (23 May 2018 09:20) (18 - 20)  SpO2: 96% (23 May 2018 09:20) (96% - 100%)    PHYSICAL EXAMINATION:    GENERAL: The patient is a well-developed, well-nourished _____in no apparent distress.     HEENT: Head is normocephalic and atraumatic. Extraocular muscles are intact. Mucous membranes are moist.     NECK: Supple.     LUNGS: Clear to auscultation without wheezing, rales, or rhonchi. Respirations unlabored    HEART: Regular rate and rhythm without murmur.    ABDOMEN: Soft, nontender, and nondistended.  No hepatosplenomegaly is noted.    EXTREMITIES: Without any cyanosis, clubbing, rash, lesions or edema.    NEUROLOGIC: Grossly intact.    SKIN: No ulceration or induration present.      LABS:                        7.1    5.7   )-----------( 205      ( 23 May 2018 04:52 )             22.2     05-22    142  |  99  |  42.0<H>  ----------------------------<  137<H>  4.3   |  28.0  |  2.03<H>    Ca    8.8      22 May 2018 14:14    TPro  5.9<L>  /  Alb  3.4  /  TBili  0.5  /  DBili  x   /  AST  19  /  ALT  7   /  AlkPhos  64  05-22    PT/INR - ( 22 May 2018 14:14 )   PT: 29.1 sec;   INR: 2.59 ratio         PTT - ( 22 May 2018 14:14 )  PTT:39.7 sec      CARDIAC MARKERS ( 22 May 2018 14:14 )  x     / 0.05 ng/mL / 103 U/L / x     / x            Serum Pro-Brain Natriuretic Peptide: 3182 pg/mL (05-22-18 @ 14:14)          MICROBIOLOGY:    RADIOLOGY & ADDITIONAL STUDIES:< from: Xray Chest 1 View- PORTABLE-Urgent (05.22.18 @ 14:54) >   EXAM:  XR CHEST PORTABLE URGENT 1V                          PROCEDURE DATE:  05/22/2018          INTERPRETATION:    INDICATION: Shortness of breath.    Single frontal view of chest dated 5/22/2018 2:54 PM, compared to prior   study of 3/29/2018.    FINDINGS /   IMPRESSION:     There is shallow inspiration, patchy atelectasis cannot exclude small   left pleural effusion. Status post sternotomy and surgery. Cardiac and   mediastinal structures cannot be assessed due to portable technique.      There are degenerative changes.     < end of copied text >  PErsonal review of CXR>NAPD

## 2018-05-24 NOTE — PROGRESS NOTE ADULT - SUBJECTIVE AND OBJECTIVE BOX
Dr. Felix Hospitalist Progress Note  DAVID VINCENT [id number]    Patient is a 83y old  Male who presents with a chief complaint of 82 yo male with hx htn, dm, copd, akd c/o shortness of breath, wheezing with productive cough. (22 May 2018 17:02)    seen at bedside, denied complaints.  SOB better. no CP/palpitation/abd pain/n/v/d/dysuria/headaches/dizziness/weakness or numbness. all other ROS neg. for colonography tomorrow      VS:    Vital Signs Last 24 Hrs  T(C): 36.9 (24 May 2018 20:00), Max: 37.1 (24 May 2018 16:00)  T(F): 98.4 (24 May 2018 20:00), Max: 98.7 (24 May 2018 16:00)  HR: 59 (24 May 2018 22:00) (56 - 79)  BP: 140/78 (24 May 2018 20:15) (110/67 - 140/78)  BP(mean): 96 (24 May 2018 20:15) (80 - 99)  RR: 16 (24 May 2018 20:15) (15 - 19)  SpO2: 100% (24 May 2018 22:00) (99% - 100%)      CAPILLARY BLOOD GLUCOSE      POCT Blood Glucose.: 162 mg/dL (24 May 2018 21:38)  POCT Blood Glucose.: 143 mg/dL (24 May 2018 16:34)  POCT Blood Glucose.: 163 mg/dL (24 May 2018 11:27)  POCT Blood Glucose.: 182 mg/dL (24 May 2018 06:06)        Physical Exam:  GENERAL: Not in distress. Alert    HEENT:  Normocephalic and atraumatic. PEARLA,EOMI  NECK: Supple.  No JVD.    CARDIOVASCULAR: RRR S1, S2. SM+, no rubs/gallop  LUNGS: BLAE+, + rales, very few scattered wheezing, no rhonchi.    ABDOMEN: ND. Soft,  NT, no guarding / rebound / rigidity. BS normoactive. No CVA tenderness.    BACK: No spine tenderness.  EXTREMITIES: no cyanosis, no clubbing, + edema, improving. no ssx of DVT  SKIN: no rash.   NEUROLOGIC: AAO*3.grossly intact  PSYCHIATRIC: Calm.  No agitation.    Labs                                   7.8    5.5   )-----------( 231      ( 24 May 2018 06:56 )             25.0       05-24    143  |  100  |  37.0<H>  ----------------------------<  171<H>  4.5   |  28.0  |  1.73<H>    Ca    9.5      24 May 2018 06:56  Phos  4.8     05-23  Mg     2.3     05-23    TPro  5.9<L>  /  Alb  3.4  /  TBili  0.6  /  DBili  x   /  AST  12  /  ALT  6   /  AlkPhos  64  05-23  	       PTT - ( 23 May 2018 09:23 )  PTT:32.9 sec  MEDICATIONS  (STANDING):  amiodarone    Tablet 200 milliGRAM(s) Oral daily  atorvastatin 40 milliGRAM(s) Oral at bedtime  buDESOnide 160 MICROgram(s)/formoterol 4.5 MICROgram(s) Inhaler 2 Puff(s) Inhalation two times a day  levalbuterol Inhalation 1.25 milliGRAM(s) Inhalation every 6 hours  methylPREDNISolone sodium succinate Injectable 40 milliGRAM(s) IV Push every 8 hours  pantoprazole Infusion 8 mG/Hr (10 mL/Hr) IV Continuous <Continuous>  saccharomyces boulardii 250 milliGRAM(s) Oral two times a day  sodium chloride 0.9%. 1000 milliLiter(s) (100 mL/Hr) IV Continuous <Continuous>    MEDICATIONS  (PRN):

## 2018-05-24 NOTE — PROGRESS NOTE ADULT - SUBJECTIVE AND OBJECTIVE BOX
[facility] DIVISION OF KIDNEY DISEASES AND HYPERTENSION - F.U :  --------------------------------------------------------------------------------  HPI:  83 year old male with HTN, DM, COPD on home O2, AFib on xarelto, AVR, TRELL, CKD, HLD, previously admitted for 1+ month with PNA, sepsis, bacteremia, found to have strep bovis. Pt now with increased SOB, increased LE edema, also had GIB in March. Pt now with elevation in Cr, nephrology consult called. Pt seen and examined on 4BRK; lying in bed in no distress; has no complaints at this time.     PAST HISTORY  --------------------------------------------------------------------------------  PAST MEDICAL & SURGICAL HISTORY:  COPD (chronic obstructive pulmonary disease)  Hyperlipidemia, unspecified hyperlipidemia type  Essential hypertension  Afib  Chronic systolic CHF (congestive heart failure)  Hypertrophic cardiomyopathy  CKD (chronic kidney disease), stage 4 (severe)  Gassiness  Diabetes  Asthma  Prostate cancer  Hypertension  No significant past surgical history  History of valvuloplasty  History of knee surgery    FAMILY HISTORY:  No pertinent family history in first degree relatives  No pertinent family history in first degree relatives    PAST SOCIAL HISTORY:    ALLERGIES & MEDICATIONS  --------------------------------------------------------------------------------  Allergies    No Known Allergies    Intolerances      Standing Inpatient Medications  amiodarone    Tablet 200 milliGRAM(s) Oral daily  atorvastatin 40 milliGRAM(s) Oral at bedtime  buDESOnide 160 MICROgram(s)/formoterol 4.5 MICROgram(s) Inhaler 2 Puff(s) Inhalation two times a day  levalbuterol Inhalation 1.25 milliGRAM(s) Inhalation every 6 hours  methylPREDNISolone sodium succinate Injectable 40 milliGRAM(s) IV Push every 8 hours  pantoprazole Infusion 8 mG/Hr IV Continuous <Continuous>  saccharomyces boulardii 250 milliGRAM(s) Oral two times a day  sodium chloride 0.9%. 1000 milliLiter(s) IV Continuous <Continuous>    PRN Inpatient Medications      REVIEW OF SYSTEMS  --------------------------------------------------------------------------------  Gen: No weight changes, fatigue, fevers/chills, weakness  Skin: No rashes  Head/Eyes/Ears/Mouth: No headache; Normal hearing; Normal vision w/o blurriness; No sinus pain/discomfort, sore throat  Respiratory: No dyspnea, cough, wheezing, hemoptysis  CV: No chest pain, PND, orthopnea  GI: No abdominal pain, diarrhea, constipation, nausea, vomiting, melena, hematochezia  : No increased frequency, dysuria, hematuria, nocturia  MSK: No joint pain/swelling; no back pain; no edema  Neuro: No dizziness/lightheadedness, weakness, seizures, numbness, tingling  Heme: No easy bruising or bleeding  Endo: No heat/cold intolerance  Psych: No significant nervousness, anxiety, stress, depression    All other systems were reviewed and are negative, except as noted.    VITALS/PHYSICAL EXAM  --------------------------------------------------------------------------------    Vital Signs Last  48  Hrs,  T(C): 36.8 (25 May 2018 04:51), Max: 37.1 (24 May 2018 16:00)  T(F): 98.2 (25 May 2018 04:51), Max: 98.7 (24 May 2018 16:00)  HR: 52 (25 May 2018 04:18) (52 - 79)  BP: 126/85 (25 May 2018 00:16) (111/73 - 140/78)  BP(mean): 98 (25 May 2018 00:16) (80 - 99)  RR: 16 (25 May 2018 04:18) (15 - 19)  SpO2: 100% (25 May 2018 04:18) (99% - 100%)    T(C): 36.9 (18 @ 12:11), Max: 37.2 (18 @ 14:30)  HR: 68 (18 @ 12:58) (62 - 92)  BP: 114/74 (18 @ 12:58) (100/74 - 132/80)  RR: 22 (18 @ 12:58) (17 - 22)  SpO2: 99% (18 @ 12:58) (92% - 100%)  Wt(kg): --  Height (cm): 170.18 (18 @ 13:16)  Weight (kg): 104.2 (18 @ 11:49)  BMI (kg/m2): 36 (18 @ 11:49)  BSA (m2): 2.14 (18 @ 11:49)      18 @ 07:01  -  18 @ 13:56  --------------------------------------------------------  IN: 0 mL / OUT: 550 mL / NET: -550 mL      Physical Exam:  	Gen: NAD  	HEENT: PERRL, supple neck, clear oropharynx  	Pulm: CTA B/L  	CV: RRR, S1S2; no rub  	Back: No spinal or CVA tenderness; no sacral edema  	Abd: +BS, soft, nontender, mild distension  	: No suprapubic tenderness  	UE: Warm, FROM, no clubbing, intact strength; no edema; no asterixis  	LE: Warm, FROM, no clubbing, intact strength; + edema  	Neuro: No focal deficits, intact gait  	Psych: Normal affect and mood  	Skin: Warm, without rashes  	Vascular access:    LABS/STUDIES  --------------------------------------------------------------------------------    143    |  100    |  37.0<H>  ----------------------------<  171<H>  Ca:9.5   (24 May 2018 06:56)  4.5     |  28.0   |  1.73<H>      eGFR if Non : 36 <L>  eGFR if : 41 <L>    TPro  5.9<L>  /  Alb  3.4    /  TBili  0.6    /  DBili  x      /  AST  12     /  ALT  6      /  AlkPhos  64     23 May 2018 09:23                        7.8<L>  5.5   )-----------( 231      ( 24 May 2018 06:56 )             25.0<L>    Phos:4.8 mg/dL<H> M.3 mg/dL PTH:-- Uric acid:-- Serum Osm:--  Ferritin:17 ng/mL<L> Iron:14 ug/dL<L> TIBC:416 ug/dL Tsat:3 %<L>  B12:-- TSH:-- ( @ 09:23)    Urinalysis Basic - ( 23 May 2018 15:44 )  Color: Yellow / Appearance: Clear / S.010 / pH: x  Gluc: x / Ketone: Negative  / Bili: Negative / Urobili: Negative mg/dL   Blood: x / Protein: Negative mg/dL / Nitrite: Negative   Leuk Esterase: Negative / RBC: x / WBC 0-2   Sq Epi: x / Non Sq Epi: x / Bacteria: x                7.1    5.7   >-----------<  205      [18 @ 04:52]              22.2     143  |  101  |  35.0  ----------------------------<  166      [18 09:23]  4.5   |  28.0  |  1.68        Ca     8.9     [18:23]      Mg     2.3     [18:23]      Phos  4.8     [18:23]    TPro  5.9  /  Alb  3.4  /  TBili  0.6  /  DBili  x   /  AST  12  /  ALT  6   /  AlkPhos  64  [18:23]    PT/INR: PT 15.0 , INR 1.36       [18:23]  PTT: 32.9       [18:23]    Troponin 0.05      [18 14:14]        [18 14:14]    Creatinine Trend:  SCr 1.68 [:23]  SCr 2.03 [ 14:14]    Urinalysis - [18 @ 15:49]      Color Yellow / Appearance Clear / SG 1.015 / pH 6.0      Gluc Negative / Ketone Negative  / Bili Negative / Urobili Negative       Blood Negative / Protein Negative / Leuk Est Negative / Nitrite Negative      RBC  / WBC  / Hyaline  / Gran  / Sq Epi  / Non Sq Epi  / Bacteria       Iron 14, TIBC 416, %sat 3      [18 09:23]  Ferritin 17      [18 09:23]  HbA1c 5.5      [18 @ 06:02]    1) [place] Resolved, Baseline CKD- 3  2) HTN  3) DM      Pt has superimposed [place] in the setting of Hypoperfusion & Ischemia ; ? blood loss anem    s/p PRBC transfusion;     Renal function improving

## 2018-05-24 NOTE — PROGRESS NOTE ADULT - SUBJECTIVE AND OBJECTIVE BOX
Patient is a 83y old  Male who presents with a chief complaint of 82 yo male with hx htn, dm, copd, akd c/o shortness of breath, wheezing with productive cough. (22 May 2018 17:02)      HPI:  82 yo male with hx HTN, DM, COPD on home oxygen, A fib on Xarelto, AVReplacement 6 years ago, TRELL, CKD , Dyslipidemia, chronic blood loss anemia, came to ER c/o shortness of breath, wheezing with productive cough, neck and back pain, "pneumonia", "COPD".   Patient has no BM in over 24 hours. No abdominal pain, no fevers.     REVIEW OF SYSTEMS:  Constitutional: No fever, weight loss or fatigue  ENMT:  No difficulty hearing, tinnitus, vertigo; No sinus or throat pain  Respiratory: No cough, wheezing, chills or hemoptysis  Cardiovascular: No chest pain, palpitations, dizziness or leg swelling  Gastrointestinal: No abdominal or epigastric pain. No nausea, vomiting or hematemesis; No diarrhea or constipation. No melena or hematochezia.  Skin: No itching, burning, rashes or lesions   Musculoskeletal: No joint pain or swelling; No muscle, back or extremity pain    PAST MEDICAL & SURGICAL HISTORY:  COPD (chronic obstructive pulmonary disease)  Hyperlipidemia, unspecified hyperlipidemia type  Essential hypertension  Afib  Chronic systolic CHF (congestive heart failure)  Hypertrophic cardiomyopathy  CKD (chronic kidney disease), stage 4 (severe)  Gassiness  Diabetes  Asthma  Prostate cancer  Hypertension  No significant past surgical history  History of valvuloplasty  History of knee surgery      FAMILY HISTORY:  No pertinent family history in first degree relatives  No pertinent family history in first degree relatives      SOCIAL HISTORY:  Smoking Status: [ ] Current, [ ] Former, [ ] Never  Pack Years:  [  ] EtOH-no  [  ] IVDA    MEDICATIONS:  MEDICATIONS  (STANDING):  atorvastatin 40 milliGRAM(s) Oral at bedtime  buDESOnide 160 MICROgram(s)/formoterol 4.5 MICROgram(s) Inhaler 2 Puff(s) Inhalation two times a day  levalbuterol Inhalation 1.25 milliGRAM(s) Inhalation every 6 hours  methylPREDNISolone sodium succinate Injectable 40 milliGRAM(s) IV Push every 8 hours  metoprolol succinate ER 25 milliGRAM(s) Oral daily  pantoprazole Infusion 8 mG/Hr (10 mL/Hr) IV Continuous <Continuous>  saccharomyces boulardii 250 milliGRAM(s) Oral two times a day  sodium chloride 0.9%. 1000 milliLiter(s) (100 mL/Hr) IV Continuous <Continuous>    MEDICATIONS  (PRN):  acetaminophen   Tablet. 650 milliGRAM(s) Oral every 6 hours PRN Moderate Pain (4 - 6)      Allergies    No Known Allergies    Intolerances        Vital Signs Last 24 Hrs  T(C): 36.6 (24 May 2018 08:26), Max: 37.1 (23 May 2018 20:44)  T(F): 97.8 (24 May 2018 08:26), Max: 98.8 (23 May 2018 20:44)  HR: 65 (24 May 2018 07:27) (62 - 72)  BP: 111/73 (24 May 2018 07:27) (105/60 - 132/80)  BP(mean): 80 (24 May 2018 07:27) (80 - 97)  RR: 18 (24 May 2018 07:27) (16 - 22)  SpO2: 99% (24 May 2018 07:27) (95% - 100%)    05-23 @ 07:01 - 05-24 @ 07:00  --------------------------------------------------------  IN: 325 mL / OUT: 1595 mL / NET: -1270 mL    05-24 @ 07:01 - 05-24 @ 09:44  --------------------------------------------------------  IN: 10 mL / OUT: 0 mL / NET: 10 mL          PHYSICAL EXAM:    General: Well developed; well nourished; in no acute distress  HEENT: MMM, conjunctiva and sclera clear  H- RRR  L- decreased breath sounds B/L  Gastrointestinal: Soft, non-tender non-distended; Normal bowel sounds; No rebound or guarding  Extremities: Normal range of motion, No clubbing, cyanosis or edema  Neurological: Alert and oriented x3  Skin: Warm and dry. No obvious rash      LABS:                        7.8    5.5   )-----------( 231      ( 24 May 2018 06:56 )             25.0     24 May 2018 06:56    143    |  100    |  37.0   ----------------------------<  171    4.5     |  28.0   |  1.73     Ca    9.5        24 May 2018 06:56  Phos  4.8       23 May 2018 09:23  Mg     2.3       23 May 2018 09:23    TPro  5.9    /  Alb  3.4    /  TBili  0.6    /  DBili  x      /  AST  12     /  ALT  6      /  AlkPhos  64     / Amylase x      /Lipase x      23 May 2018 09:23              RADIOLOGY & ADDITIONAL STUDIES:     < from: CT Chest No Cont (05.22.18 @ 21:40) >  IMPRESSION: No acute findings. Incidental comments as above.    < end of copied text >

## 2018-05-25 ENCOUNTER — TRANSCRIPTION ENCOUNTER (OUTPATIENT)
Age: 83
End: 2018-05-25

## 2018-05-25 DIAGNOSIS — Z29.9 ENCOUNTER FOR PROPHYLACTIC MEASURES, UNSPECIFIED: ICD-10-CM

## 2018-05-25 DIAGNOSIS — D50.0 IRON DEFICIENCY ANEMIA SECONDARY TO BLOOD LOSS (CHRONIC): ICD-10-CM

## 2018-05-25 DIAGNOSIS — R19.5 OTHER FECAL ABNORMALITIES: ICD-10-CM

## 2018-05-25 LAB
ANION GAP SERPL CALC-SCNC: 14 MMOL/L — SIGNIFICANT CHANGE UP (ref 5–17)
BUN SERPL-MCNC: 39 MG/DL — HIGH (ref 8–20)
CALCIUM SERPL-MCNC: 9.4 MG/DL — SIGNIFICANT CHANGE UP (ref 8.6–10.2)
CHLORIDE SERPL-SCNC: 102 MMOL/L — SIGNIFICANT CHANGE UP (ref 98–107)
CO2 SERPL-SCNC: 32 MMOL/L — HIGH (ref 22–29)
CREAT SERPL-MCNC: 1.58 MG/DL — HIGH (ref 0.5–1.3)
GLUCOSE BLDC GLUCOMTR-MCNC: 158 MG/DL — HIGH (ref 70–99)
GLUCOSE BLDC GLUCOMTR-MCNC: 159 MG/DL — HIGH (ref 70–99)
GLUCOSE BLDC GLUCOMTR-MCNC: 172 MG/DL — HIGH (ref 70–99)
GLUCOSE SERPL-MCNC: 156 MG/DL — HIGH (ref 70–115)
HBA1C BLD-MCNC: 4.9 % — SIGNIFICANT CHANGE UP (ref 4–5.6)
HCT VFR BLD CALC: 26.8 % — LOW (ref 42–52)
HGB BLD-MCNC: 8 G/DL — LOW (ref 14–18)
INR BLD: 1.02 RATIO — SIGNIFICANT CHANGE UP (ref 0.88–1.16)
MCHC RBC-ENTMCNC: 23.7 PG — LOW (ref 27–31)
MCHC RBC-ENTMCNC: 29.9 G/DL — LOW (ref 32–36)
MCV RBC AUTO: 79.3 FL — LOW (ref 80–94)
PLATELET # BLD AUTO: 216 K/UL — SIGNIFICANT CHANGE UP (ref 150–400)
POTASSIUM SERPL-MCNC: 4.1 MMOL/L — SIGNIFICANT CHANGE UP (ref 3.5–5.3)
POTASSIUM SERPL-SCNC: 4.1 MMOL/L — SIGNIFICANT CHANGE UP (ref 3.5–5.3)
PROTHROM AB SERPL-ACNC: 11.2 SEC — SIGNIFICANT CHANGE UP (ref 9.8–12.7)
RBC # BLD: 3.38 M/UL — LOW (ref 4.6–6.2)
RBC # FLD: 19.7 % — HIGH (ref 11–15.6)
SODIUM SERPL-SCNC: 148 MMOL/L — HIGH (ref 135–145)
WBC # BLD: 6.7 K/UL — SIGNIFICANT CHANGE UP (ref 4.8–10.8)
WBC # FLD AUTO: 6.7 K/UL — SIGNIFICANT CHANGE UP (ref 4.8–10.8)

## 2018-05-25 PROCEDURE — 99232 SBSQ HOSP IP/OBS MODERATE 35: CPT

## 2018-05-25 PROCEDURE — 74018 RADEX ABDOMEN 1 VIEW: CPT | Mod: 26

## 2018-05-25 PROCEDURE — 99233 SBSQ HOSP IP/OBS HIGH 50: CPT

## 2018-05-25 PROCEDURE — 93010 ELECTROCARDIOGRAM REPORT: CPT

## 2018-05-25 RX ORDER — SENNA PLUS 8.6 MG/1
2 TABLET ORAL ONCE
Qty: 0 | Refills: 0 | Status: COMPLETED | OUTPATIENT
Start: 2018-05-25 | End: 2018-05-25

## 2018-05-25 RX ORDER — SODIUM CHLORIDE 9 MG/ML
1000 INJECTION, SOLUTION INTRAVENOUS
Qty: 0 | Refills: 0 | Status: DISCONTINUED | OUTPATIENT
Start: 2018-05-25 | End: 2018-05-25

## 2018-05-25 RX ORDER — DOCUSATE SODIUM 100 MG
100 CAPSULE ORAL ONCE
Qty: 0 | Refills: 0 | Status: COMPLETED | OUTPATIENT
Start: 2018-05-25 | End: 2018-05-25

## 2018-05-25 RX ORDER — POLYETHYLENE GLYCOL 3350 17 G/17G
17 POWDER, FOR SOLUTION ORAL
Qty: 0 | Refills: 0 | Status: DISCONTINUED | OUTPATIENT
Start: 2018-05-25 | End: 2018-06-01

## 2018-05-25 RX ADMIN — PANTOPRAZOLE SODIUM 10 MG/HR: 20 TABLET, DELAYED RELEASE ORAL at 10:39

## 2018-05-25 RX ADMIN — SENNA PLUS 2 TABLET(S): 8.6 TABLET ORAL at 11:26

## 2018-05-25 RX ADMIN — Medication 1: at 06:32

## 2018-05-25 RX ADMIN — LEVALBUTEROL 1.25 MILLIGRAM(S): 1.25 SOLUTION, CONCENTRATE RESPIRATORY (INHALATION) at 15:51

## 2018-05-25 RX ADMIN — ATORVASTATIN CALCIUM 40 MILLIGRAM(S): 80 TABLET, FILM COATED ORAL at 21:09

## 2018-05-25 RX ADMIN — LEVALBUTEROL 1.25 MILLIGRAM(S): 1.25 SOLUTION, CONCENTRATE RESPIRATORY (INHALATION) at 08:20

## 2018-05-25 RX ADMIN — LEVALBUTEROL 1.25 MILLIGRAM(S): 1.25 SOLUTION, CONCENTRATE RESPIRATORY (INHALATION) at 21:15

## 2018-05-25 RX ADMIN — Medication 100 MILLIGRAM(S): at 11:26

## 2018-05-25 RX ADMIN — SODIUM CHLORIDE 50 MILLILITER(S): 9 INJECTION, SOLUTION INTRAVENOUS at 10:13

## 2018-05-25 RX ADMIN — POLYETHYLENE GLYCOL 3350 17 GRAM(S): 17 POWDER, FOR SOLUTION ORAL at 10:38

## 2018-05-25 RX ADMIN — BUDESONIDE AND FORMOTEROL FUMARATE DIHYDRATE 2 PUFF(S): 160; 4.5 AEROSOL RESPIRATORY (INHALATION) at 21:15

## 2018-05-25 RX ADMIN — BUDESONIDE AND FORMOTEROL FUMARATE DIHYDRATE 2 PUFF(S): 160; 4.5 AEROSOL RESPIRATORY (INHALATION) at 08:20

## 2018-05-25 RX ADMIN — Medication 40 MILLIGRAM(S): at 05:54

## 2018-05-25 RX ADMIN — Medication 250 MILLIGRAM(S): at 16:55

## 2018-05-25 RX ADMIN — SODIUM CHLORIDE 50 MILLILITER(S): 9 INJECTION, SOLUTION INTRAVENOUS at 10:39

## 2018-05-25 RX ADMIN — Medication 1: at 16:54

## 2018-05-25 RX ADMIN — Medication 250 MILLIGRAM(S): at 05:54

## 2018-05-25 NOTE — PROGRESS NOTE ADULT - SUBJECTIVE AND OBJECTIVE BOX
DAVID VINCENT  [id number]          Chief Complaint: follow up GIB/AF/CAD      Subjective: no cp/sob/palps, no further evens      24 hour Tele: AF, no events        acetaminophen   Tablet. 650 milliGRAM(s) Oral every 6 hours PRN  atorvastatin 40 milliGRAM(s) Oral at bedtime  buDESOnide 160 MICROgram(s)/formoterol 4.5 MICROgram(s) Inhaler 2 Puff(s) Inhalation two times a day  dextrose 40% Gel 15 Gram(s) Oral once PRN  dextrose 5% + sodium chloride 0.225%. 1000 milliLiter(s) IV Continuous <Continuous>  dextrose 5%. 1000 milliLiter(s) IV Continuous <Continuous>  dextrose 50% Injectable 12.5 Gram(s) IV Push once  dextrose 50% Injectable 25 Gram(s) IV Push once  dextrose 50% Injectable 25 Gram(s) IV Push once  glucagon  Injectable 1 milliGRAM(s) IntraMuscular once PRN  insulin lispro (HumaLOG) corrective regimen sliding scale   SubCutaneous three times a day before meals  levalbuterol Inhalation 1.25 milliGRAM(s) Inhalation every 6 hours  metoprolol succinate ER 25 milliGRAM(s) Oral daily  pantoprazole Infusion 8 mG/Hr IV Continuous <Continuous>  predniSONE   Tablet 40 milliGRAM(s) Oral daily  saccharomyces boulardii 250 milliGRAM(s) Oral two times a day  sodium chloride 0.9%. 1000 milliLiter(s) IV Continuous <Continuous>          Physical Exam:  T(C): 36.8 (05-25-18 @ 04:51), Max: 37.1 (05-24-18 @ 16:00)  HR: 60 (05-25-18 @ 08:33) (45 - 79)  BP: 119/79 (05-25-18 @ 08:00) (112/79 - 140/78)  RR: 15 (05-25-18 @ 08:00) (15 - 19)  SpO2: 100% (05-25-18 @ 08:33) (98% - 100%)  Wt(kg): --  GEN: NAD  HEENT: dry, sclera anicteric, poor dentition  RESP: reduced air movement, no rales/rhonchi  CVS: S1S2, rrr, borderline JVD, no M/R/G  GI: Soft, NT, ND, BS+, obese  EXT: trace to 1+ edema bilaterally with stasis dermatitis changes  NEURO: AAOX3  PSYCH: Normal affect      I&O's Summary    24 May 2018 07:01  -  25 May 2018 07:00  --------------------------------------------------------  IN: 110 mL / OUT: 100 mL / NET: 10 mL    25 May 2018 07:01  -  25 May 2018 09:29  --------------------------------------------------------  IN: 70 mL / OUT: 200 mL / NET: -130 mL          Labs:   25 May 2018 06:44    148    |  102    |  39.0   ----------------------------<  156    4.1     |  32.0   |  1.58     Ca    9.4        25 May 2018 06:44                            8.0    6.7   )-----------( 216      ( 25 May 2018 06:44 )             26.8     PT/INR - ( 25 May 2018 06:44 )   PT: 11.2 sec;   INR: 1.02 ratio         PTT - ( 24 May 2018 06:56 )  PTT:27.4 sec      Echo 4/2018P:   1. Left ventricular ejection fraction, by visual estimation, is 60 to   65%.   2. Normal global left ventricular systolic function.   3. The mitral in-flow pattern reveals no discernable A-wave, therefore   no comment on diastolic function can be made.   4. Normal left ventricular internal cavity size.   5. Normal right ventricular size and function.   6. Severely enlarged left atrium.   7. The right atrium is normal in size.   8. Mild mitral annular calcification.   9. Thickening of the anterior and posterior mitral valve leaflets.  10. Mild to moderate mitral valve regurgitation.  11. Bioprosthesis in the aortic position demonstrating normal function.  12. Dilatation of the ascending aorta.  13. There is no evidence of pericardial effusion.  14. No evidence of endocarditis. Tricuspid valve not well seen.      Assessment:   Patient is a  83y Male with h/o CAD s/p CABG, Bio AVR, persistent AF, COPD on home O2 (2-3L), KRYSTIN here with recurrent GI bleed leading to severe symptomatic anemia.   -Has been transfused, no further GI bleeding and H/H stable in 7's  -No signs decompensated CHF at this time and AF rate controlled      Plan:  1. Plan for virtual colonoscopy today  2. A/C will be determined once GI work up complete, will consider ULISES closure if can't be on A/C  3. Continue metorpolol, AF rate controlled. Keep off amio  4. Daily po lasix once taking regular diet

## 2018-05-25 NOTE — DISCHARGE NOTE ADULT - CARE PROVIDER_API CALL
Jameson Looney (MD), Cardiovascular Disease  [address] [place] [address]  Phone: [phone]  Fax: [phone]

## 2018-05-25 NOTE — DISCHARGE NOTE ADULT - PATIENT PORTAL LINK FT
You can access the [facility] Patient Portal, offered by [facility], by registering with the following website: [email or web address]

## 2018-05-25 NOTE — DISCHARGE NOTE ADULT - CARE PLAN
Principal Discharge DX:	Diverticular hemorrhage  Goal:	prevention  Assessment and plan of treatment:	take medications as prescribed.  follow up with pmd in 1 week  Secondary Diagnosis:	Chronic atrial fibrillation  Assessment and plan of treatment:	NO aspirin.  continue xarelto.   follow up with cardiology in 1 week  Secondary Diagnosis:	Iron deficiency anemia due to chronic blood loss  Assessment and plan of treatment:	continue anemia.  Secondary Diagnosis:	Chronic obstructive pulmonary disease, unspecified COPD type  Assessment and plan of treatment:	finish prednisone taper  Secondary Diagnosis:	Chronic kidney disease (CKD), stage III (moderate)  Assessment and plan of treatment:	stable

## 2018-05-25 NOTE — PROGRESS NOTE ADULT - SUBJECTIVE AND OBJECTIVE BOX
[facility] DIVISION OF KIDNEY DISEASES AND HYPERTENSION -- FOLLOW UP NOTE  --------------------------------------------------------------------------------  Chief Complaint: [place]    24 hour events/subjective:  Pt seen and examined  no complaints  renal fx improving      PAST HISTORY  --------------------------------------------------------------------------------  No significant changes to PMH, PSH, FHx, SHx, unless otherwise noted    ALLERGIES & MEDICATIONS  --------------------------------------------------------------------------------  Allergies    No Known Allergies    Intolerances      Standing Inpatient Medications  atorvastatin 40 milliGRAM(s) Oral at bedtime  buDESOnide 160 MICROgram(s)/formoterol 4.5 MICROgram(s) Inhaler 2 Puff(s) Inhalation two times a day  dextrose 5% + sodium chloride 0.225%. 1000 milliLiter(s) IV Continuous <Continuous>  dextrose 5%. 1000 milliLiter(s) IV Continuous <Continuous>  dextrose 50% Injectable 12.5 Gram(s) IV Push once  dextrose 50% Injectable 25 Gram(s) IV Push once  dextrose 50% Injectable 25 Gram(s) IV Push once  insulin lispro (HumaLOG) corrective regimen sliding scale   SubCutaneous three times a day before meals  levalbuterol Inhalation 1.25 milliGRAM(s) Inhalation every 6 hours  metoprolol succinate ER 25 milliGRAM(s) Oral daily  pantoprazole Infusion 8 mG/Hr IV Continuous <Continuous>  predniSONE   Tablet 40 milliGRAM(s) Oral daily  saccharomyces boulardii 250 milliGRAM(s) Oral two times a day  sodium chloride 0.9%. 1000 milliLiter(s) IV Continuous <Continuous>    PRN Inpatient Medications  acetaminophen   Tablet. 650 milliGRAM(s) Oral every 6 hours PRN  dextrose 40% Gel 15 Gram(s) Oral once PRN  glucagon  Injectable 1 milliGRAM(s) IntraMuscular once PRN  polyethylene glycol 3350 17 Gram(s) Oral two times a day PRN      REVIEW OF SYSTEMS  --------------------------------------------------------------------------------  Gen: No weight changes, fatigue, fevers/chills, weakness  Skin: No rashes  Head/Eyes/Ears/Mouth: No headache; Normal hearing; Normal vision w/o blurriness; No sinus pain/discomfort, sore throat  Respiratory: No dyspnea, cough, wheezing, hemoptysis  CV: No chest pain, PND, orthopnea  GI: No abdominal pain, diarrhea, constipation, nausea, vomiting, melena, hematochezia  : No increased frequency, dysuria, hematuria, nocturia  MSK: No joint pain/swelling; no back pain; no edema  Neuro: No dizziness/lightheadedness, weakness, seizures, numbness, tingling  Heme: No easy bruising or bleeding  Endo: No heat/cold intolerance  Psych: No significant nervousness, anxiety, stress, depression    All other systems were reviewed and are negative, except as noted.    VITALS/PHYSICAL EXAM  --------------------------------------------------------------------------------  T(C): 37.1 (05-25-18 @ 11:53), Max: 37.1 (05-24-18 @ 16:00)  HR: 62 (05-25-18 @ 11:27) (45 - 79)  BP: 122/77 (05-25-18 @ 11:27) (119/79 - 140/78)  RR: 16 (05-25-18 @ 11:27) (15 - 19)  SpO2: 100% (05-25-18 @ 11:27) (98% - 100%)  Wt(kg): --        05-24-18 @ 07:01  -  05-25-18 @ 07:00  --------------------------------------------------------  IN: 110 mL / OUT: 100 mL / NET: 10 mL    05-25-18 @ 07:01  -  05-25-18 @ 15:17  --------------------------------------------------------  IN: 670 mL / OUT: 550 mL / NET: 120 mL      Physical Exam:  	Gen: NAD  	HEENT: PERRL, supple neck, clear oropharynx  	Pulm: CTA B/L  	CV: RRR, S1S2; no rub  	Back: No spinal or CVA tenderness; no sacral edema  	Abd: +BS, soft, nontender, mild distension  	: No suprapubic tenderness  	UE: Warm, FROM, no clubbing, intact strength; no edema; no asterixis  	LE: Warm, FROM, no clubbing, intact strength; + edema  	Neuro: No focal deficits, intact gait  	Psych: Normal affect and mood  	Skin: Warm, without rashes  	Vascular access  LABS/STUDIES  --------------------------------------------------------------------------------              8.0    6.7   >-----------<  216      [05-25-18 @ 06:44]              26.8     148  |  102  |  39.0  ----------------------------<  156      [05-25-18 @ 06:44]  4.1   |  32.0  |  1.58        Ca     9.4     [05-25-18 @ 06:44]      PT/INR: PT 11.2 , INR 1.02       [05-25-18 @ 06:44]  PTT: 27.4       [05-24-18 @ 06:56]      Creatinine Trend:  SCr 1.58 [05-25 @ 06:44]  SCr 1.73 [05-24 @ 06:56]  SCr 1.68 [05-23 @ 09:23]  SCr 2.03 [05-22 @ 14:14]    Urinalysis - [05-23-18 @ 15:44]      Color Yellow / Appearance Clear / SG 1.010 / pH 6.5      Gluc Negative / Ketone Negative  / Bili Negative / Urobili Negative       Blood Negative / Protein Negative / Leuk Est Negative / Nitrite Negative      RBC  / WBC 0-2 / Hyaline  / Gran  / Sq Epi  / Non Sq Epi  / Bacteria       Iron 14, TIBC 416, %sat 3      [05-23-18 @ 09:23]  Ferritin 17      [05-23-18 @ 09:23]  HbA1c 4.9      [05-25-18 @ 06:44]

## 2018-05-25 NOTE — DISCHARGE NOTE ADULT - SECONDARY DIAGNOSIS.
Chronic atrial fibrillation Iron deficiency anemia due to chronic blood loss Chronic obstructive pulmonary disease, unspecified COPD type Chronic kidney disease (CKD), stage III (moderate)

## 2018-05-25 NOTE — PROGRESS NOTE ADULT - PROBLEM SELECTOR PLAN 1
For CT colonography (Virtual Colonoscopy) today. Await results. Further w/u dependent upon these results. Repeat labs ordered for the AM.

## 2018-05-25 NOTE — PROGRESS NOTE ADULT - SUBJECTIVE AND OBJECTIVE BOX
Pt seen and examined. He was prepped last night for CT Colonography today.     REVIEW OF SYSTEMS:  Constitutional: No fever, weight loss or fatigue  Cardiovascular: No chest pain, palpitations, dizziness or leg swelling  Gastrointestinal: No abdominal or epigastric pain. No nausea, vomiting or hematemesis; No diarrhea or constipation. No melena or hematochezia.  Skin: No itching, burning, rashes or lesions       MEDICATIONS:  MEDICATIONS  (STANDING):  atorvastatin 40 milliGRAM(s) Oral at bedtime  buDESOnide 160 MICROgram(s)/formoterol 4.5 MICROgram(s) Inhaler 2 Puff(s) Inhalation two times a day  dextrose 5%. 1000 milliLiter(s) (50 mL/Hr) IV Continuous <Continuous>  dextrose 50% Injectable 12.5 Gram(s) IV Push once  dextrose 50% Injectable 25 Gram(s) IV Push once  dextrose 50% Injectable 25 Gram(s) IV Push once  insulin lispro (HumaLOG) corrective regimen sliding scale   SubCutaneous three times a day before meals  levalbuterol Inhalation 1.25 milliGRAM(s) Inhalation every 6 hours  metoprolol succinate ER 25 milliGRAM(s) Oral daily  pantoprazole Infusion 8 mG/Hr (10 mL/Hr) IV Continuous <Continuous>  predniSONE   Tablet 40 milliGRAM(s) Oral daily  saccharomyces boulardii 250 milliGRAM(s) Oral two times a day  sodium chloride 0.9%. 1000 milliLiter(s) (100 mL/Hr) IV Continuous <Continuous>    MEDICATIONS  (PRN):  acetaminophen   Tablet. 650 milliGRAM(s) Oral every 6 hours PRN Moderate Pain (4 - 6)  dextrose 40% Gel 15 Gram(s) Oral once PRN Blood Glucose LESS THAN 70 milliGRAM(s)/deciliter  glucagon  Injectable 1 milliGRAM(s) IntraMuscular once PRN Glucose LESS THAN 70 milligrams/deciliter      Allergies    No Known Allergies    Intolerances        Vital Signs Last 24 Hrs  T(C): 36.8 (25 May 2018 04:51), Max: 37.1 (24 May 2018 16:00)  T(F): 98.2 (25 May 2018 04:51), Max: 98.7 (24 May 2018 16:00)  HR: 45 (25 May 2018 05:58) (45 - 79)  BP: 126/85 (25 May 2018 00:16) (111/73 - 140/78)  BP(mean): 98 (25 May 2018 00:16) (80 - 99)  RR: 16 (25 May 2018 04:18) (15 - 19)  SpO2: 100% (25 May 2018 04:18) (99% - 100%)     @ 07:01  -   @ 07:00  --------------------------------------------------------  IN: 100 mL / OUT: 100 mL / NET: 0 mL        PHYSICAL EXAM:    General: Well developed; well nourished; in no acute distress  HEENT: MMM, conjunctiva pink and sclera anicteric.  Lungs: Decreased breath sounds bilaterally.  Cor: RRR S1, S2 only.  Gastrointestinal: Abdomen: Soft non-tender non-distended; Normal bowel sounds; No hepatosplenomegaly  Extremities: no cyanosis, clubbing or edema.  Skin: Warm and dry. No obvious rash  Neuro: Pt. a + o x 3.    LABS:  ABG - ( 23 May 2018 09:30 )  pH, Arterial: 7.38  pH, Blood: x     /  pCO2: 53    /  pO2: 56    / HCO3: 29    / Base Excess: 5.2   /  SaO2: 90                  CBC Full  -  ( 24 May 2018 06:56 )  WBC Count : 5.5 K/uL  Hemoglobin : 7.8 g/dL  Hematocrit : 25.0 %  Platelet Count - Automated : 231 K/uL  Mean Cell Volume : 78.6 fl  Mean Cell Hemoglobin : 24.5 pg  Mean Cell Hemoglobin Concentration : 31.2 g/dL  Auto Neutrophil # : x  Auto Lymphocyte # : x  Auto Monocyte # : x  Auto Eosinophil # : x  Auto Basophil # : x  Auto Neutrophil % : x  Auto Lymphocyte % : x  Auto Monocyte % : x  Auto Eosinophil % : x  Auto Basophil % : x        143  |  100  |  37.0<H>  ----------------------------<  171<H>  4.5   |  28.0  |  1.73<H>    Ca    9.5      24 May 2018 06:56  Phos  4.8       Mg     2.3         TPro  5.9<L>  /  Alb  3.4  /  TBili  0.6  /  DBili  x   /  AST  12  /  ALT  6   /  AlkPhos  64      PT/INR - ( 25 May 2018 06:44 )   PT: 11.2 sec;   INR: 1.02 ratio         PTT - ( 24 May 2018 06:56 )  PTT:27.4 sec      Urinalysis Basic - ( 23 May 2018 15:44 )    Color: Yellow / Appearance: Clear / S.010 / pH: x  Gluc: x / Ketone: Negative  / Bili: Negative / Urobili: Negative mg/dL   Blood: x / Protein: Negative mg/dL / Nitrite: Negative   Leuk Esterase: Negative / RBC: x / WBC 0-2   Sq Epi: x / Non Sq Epi: x / Bacteria: x                RADIOLOGY & ADDITIONAL STUDIES (The following images were personally reviewed):

## 2018-05-25 NOTE — PROGRESS NOTE ADULT - PROBLEM SELECTOR PLAN 4
RESOLVED acute on chronic diastolic HF  c/w lasix with holding parameter. daily weight. strict I and O. monitor Renal function. lasix after every transfusion. cardiology cx appreciated.

## 2018-05-25 NOTE — PROGRESS NOTE ADULT - PROBLEM SELECTOR PLAN 1
Hemodynamically stable, no e/o active bleeding - clinically improved, at baseline  H/H stable  For Virtual colonography outpt per GI - cleared for DC  Adv diet and tentative DC in AM  GI/cardio/nephro f/u appreciated Hemodynamically stable, no active bleeding - clinically improved, at baseline  H/H stable  For Virtual colonography outpt per GI - cleared for DC  Adv diet and tentative DC in AM  GI/cardio/nephro f/u appreciated

## 2018-05-25 NOTE — DISCHARGE NOTE ADULT - PLAN OF CARE
prevention take medications as prescribed.  follow up with pmd in 1 week NO aspirin.  continue xarelto.   follow up with cardiology in 1 week continue anemia. finish prednisone taper stable

## 2018-05-25 NOTE — DISCHARGE NOTE ADULT - HOSPITAL COURSE
83y Male with h/o CAD s/p CABG, Bio AVR, persistent AF, COPD on home O2 (2-3L), KRYSTIN, CKD3 here with recurrent GI bleed leading to severe symptomatic anemia.    received 4 units prbcs and seen by GI. did not get egd/colonoscopy given comorbidities.  underwent virtual colonoscopy and CT enterography with diverticuli and possible AVM.  seen by cardiology and after discussion with GI, restarted on xarelto but no aspirin.    patient stable for discharge.  dc planning 45 min.  VSS afebrile no acute complaints ros otherwise negative.  RRR s1s2 CTAB soft abdomen. +1 pitting edema b/l LE  nonfocal neuro.     high risk for re admission.

## 2018-05-25 NOTE — PROGRESS NOTE ADULT - PROBLEM SELECTOR PLAN 5
h/o AVR, CAD, Rate control. on xarelto. hold xarelto. hold ASA, cardiology eval appreciated - will not continue for now until o/p f/u

## 2018-05-25 NOTE — PROGRESS NOTE ADULT - PROBLEM SELECTOR PLAN 9
on home oxygen and needs BiPAP. at baseline on home oxygen and needs BiPAP. at baseline. refused BiPAP

## 2018-05-25 NOTE — DISCHARGE NOTE ADULT - MEDICATION SUMMARY - MEDICATIONS TO TAKE
I will START or STAY ON the medications listed below when I get home from the hospital:    predniSONE 10 mg oral tablet  -- 2 tab(s) oral - by mouth once a day x 3 days  1 tab(s) oral - by mouth once a day x 3 days  -- It is very important that you take or use this exactly as directed.  Do not skip doses or discontinue unless directed by your doctor.  Obtain medical advice before taking any non-prescription drugs as some may affect the action of this medication.  Take with food or milk.    -- Indication: For COPD (chronic obstructive pulmonary disease)    acetaminophen 325 mg oral tablet  -- 2 tab(s) by mouth every 6 hours, As needed, Mild Pain (1 - 3)  -- Indication: For pain    amiodarone 200 mg oral tablet  -- 1 tab(s) by mouth once a day  -- Indication: For atrial fibrillation    rivaroxaban 15 mg oral tablet  -- 1 tab(s) by mouth every 24 hours  -- Indication: For atrial fibrillation    gabapentin 100 mg oral capsule  -- 2 cap(s) by mouth 3 times a day   -- It is very important that you take or use this exactly as directed.  Do not skip doses or discontinue unless directed by your doctor.  May cause drowsiness.  Alcohol may intensify this effect.  Use care when operating dangerous machinery.    -- Indication: For pain    lovastatin 40 mg oral tablet  -- 1 tab(s) by mouth once a day  -- Indication: For Hyperlipidemia    ipratropium 500 mcg/2.5 mL inhalation solution  -- 2.5 milliliter(s) inhaled 3 times a day  -- Indication: For COPD (chronic obstructive pulmonary disease)    albuterol 2.5 mg/3 mL (0.083%) inhalation solution  -- 3 milliliter(s) inhaled every 6 hours  -- Indication: For COPD (chronic obstructive pulmonary disease)    Breo Ellipta 200 mcg-25 mcg/inh inhalation powder  -- 1 puff(s) inhaled once a day  -- Indication: For COPD (chronic obstructive pulmonary disease)    furosemide 40 mg oral tablet  -- 1 tab(s) by mouth once a day  -- Indication: For Heart failure    guaiFENesin 1200 mg oral tablet, extended release  -- 1 tab(s) by mouth every 12 hours, As Needed cough   over the counter   -- Indication: For Cough    ferrous sulfate 325 mg (65 mg elemental iron) oral delayed release tablet  -- 1 tab(s) by mouth once a week  -- May discolor urine or feces.  Swallow whole.  Do not crush.    -- Indication: For anemia    lactulose 10 g/15 mL oral syrup  -- 30 milliliter(s) by mouth 2 times a day  -- Indication: For Constipation    K-Tab 20 mEq oral tablet, extended release  -- 1 tab(s) by mouth once a day   -- It is very important that you take or use this exactly as directed.  Do not skip doses or discontinue unless directed by your doctor.  Medication should be taken with plenty of water.  Take with food or milk.    -- Indication: For supplement    pantoprazole 40 mg oral delayed release tablet  -- 1 tab(s) by mouth 2 times a day  -- Indication: For Gerd    cyanocobalamin 1000 mcg oral tablet  -- 1 tab(s) by mouth once a day  -- Indication: For b12 deficiency

## 2018-05-25 NOTE — PROGRESS NOTE ADULT - SUBJECTIVE AND OBJECTIVE BOX
Dr. Felix Hospitalist Progress Note  DAVID VINCENT [id number]    Patient is a 83y old  Male who presents with a chief complaint of 82 yo male with hx htn, dm, copd, akd c/o shortness of breath, wheezing with productive cough. (22 May 2018 17:02)    seen at bedside, denied complaints.  SOB better. no CP/palpitation/abd pain/n/v/d/dysuria/headaches/dizziness/weakness or numbness. all other ROS neg. for colonography tomorrow      VS:  Vital Signs Last 24 Hrs  T(C): 36.9 (25 May 2018 16:55), Max: 37.1 (25 May 2018 11:53)  T(F): 98.5 (25 May 2018 16:55), Max: 98.7 (25 May 2018 11:53)  HR: 65 (25 May 2018 16:55) (45 - 66)  BP: 130/81 (25 May 2018 16:55) (119/79 - 140/78)  BP(mean): 98 (25 May 2018 16:55) (91 - 98)  RR: 17 (25 May 2018 16:55) (15 - 19)  SpO2: 100% (25 May 2018 16:55) (98% - 100%)      Physical Exam:  GENERAL: Not in distress. Alert    HEENT:  Normocephalic and atraumatic. PEARLA,EOMI  NECK: Supple.  No JVD.    CARDIOVASCULAR: RRR S1, S2. SM+, no rubs/gallop  LUNGS: BLAE+, + rales, very few scattered wheezing, no rhonchi.    ABDOMEN: ND. Soft,  NT, no guarding / rebound / rigidity. BS normoactive. No CVA tenderness.    BACK: No spine tenderness.  EXTREMITIES: no cyanosis, no clubbing, +1 edema, improving. no s/sx of DVT  SKIN: no rash or lesions on exposed skin  NEUROLOGIC: AAO*3.grossly intact  PSYCHIATRIC: Calm.  No agitation. appropriate mood & affect        Daily     Daily Weight in k.6 (25 May 2018 11:44)  I&O's Detail    24 May 2018 07:01  -  25 May 2018 07:00  --------------------------------------------------------  IN:    pantoprazole Infusion: 110 mL  Total IN: 110 mL    OUT:    Voided: 100 mL  Total OUT: 100 mL    Total NET: 10 mL      25 May 2018 07:01  -  25 May 2018 18:35  --------------------------------------------------------  IN:    dextrose 5% + sodium chloride 0.225%: 550 mL    Oral Fluid: 720 mL    pantoprazole Infusion: 120 mL  Total IN: 1390 mL    OUT:    Voided: 750 mL  Total OUT: 750 mL    Total NET: 640 mL      LABS:                        8.0    6.7   )-----------( 216      ( 25 May 2018 06:44 )             26.8     CBC Full  -  ( 25 May 2018 06:44 )  WBC Count : 6.7 K/uL  Hemoglobin : 8.0 g/dL  Hematocrit : 26.8 %  Platelet Count - Automated : 216 K/uL  Mean Cell Volume : 79.3 fl  Mean Cell Hemoglobin : 23.7 pg  Mean Cell Hemoglobin Concentration : 29.9 g/dL  Auto Neutrophil # : x  Auto Lymphocyte # : x  Auto Monocyte # : x  Auto Eosinophil # : x  Auto Basophil # : x  Auto Neutrophil % : x  Auto Lymphocyte % : x  Auto Monocyte % : x  Auto Eosinophil % : x  Auto Basophil % : x        148<H>  |  102  |  39.0<H>  ----------------------------<  156<H>  4.1   |  32.0<H>  |  1.58<H>    Ca    9.4      25 May 2018 06:44      PT/INR - ( 25 May 2018 06:44 )   PT: 11.2 sec;   INR: 1.02 ratio         PTT - ( 24 May 2018 06:56 )  PTT:27.4 sec    Hemoglobin A1C, Whole Blood: 4.9 % ( @ 06:44)  Hemoglobin A1C, Whole Blood: 5.5 % ( @ 06:02)  Hemoglobin A1C, Whole Blood: 5.0 % ( @ 06:20)    Culture Results:   No growth at 48 hours ( @ 14:15)  Culture Results:   No growth at 48 hours ( @ 14:13)    Procalcitonin, Serum: 0.18 ng/mL ( @ 09:23)      RADIOLOGY & ADDITIONAL TESTS:  Unable to perform colonscopy today Dr. Felix Hospitalist Progress Note  DAVID VINCENT [id number]    Patient is a 83y old  Male who presents with a chief complaint of 84 yo male with hx htn, dm, copd, akd c/o shortness of breath, wheezing with productive cough. (22 May 2018 17:02)    seen at bedside, denied complaints.  SOB better. no CP/palpitation/abd pain/n/v/d/dysuria/headaches/dizziness/weakness or numbness. all other ROS neg. Ct colonography cannot be done due to incomplete Bowel prep. GI recommended OP w/u if no further bleed. seen by PT. awaiting home care, Home PT. downgraded to any monitored bed for tonight.       VS:  Vital Signs Last 24 Hrs  T(C): 36.9 (25 May 2018 16:55), Max: 37.1 (25 May 2018 11:53)  T(F): 98.5 (25 May 2018 16:55), Max: 98.7 (25 May 2018 11:53)  HR: 65 (25 May 2018 16:55) (45 - 66)  BP: 130/81 (25 May 2018 16:55) (119/79 - 140/78)  BP(mean): 98 (25 May 2018 16:55) (91 - 98)  RR: 17 (25 May 2018 16:55) (15 - 19)  SpO2: 100% (25 May 2018 16:55) (98% - 100%)      Physical Exam:  GENERAL: Not in distress. Alert    HEENT:  Normocephalic and atraumatic.   NECK: Supple.  No JVD.    CARDIOVASCULAR: RRR S1, S2. SM+, no rubs/gallop  LUNGS: BLAE+, no rales, no wheezing, no rhonchi.    ABDOMEN: ND. Soft,  NT, no guarding / rebound / rigidity. BS normoactive. No CVA tenderness.    BACK: No spine tenderness.  EXTREMITIES: no cyanosis, no clubbing, +1 edema, improving. no s/sx of DVT  SKIN: no rash or lesions on exposed skin  NEUROLOGIC: AAO*3.grossly intact  PSYCHIATRIC: Calm.  No agitation. appropriate mood & affect        Daily     Daily Weight in k.6 (25 May 2018 11:44)  I&O's Detail    24 May 2018 07:01  -  25 May 2018 07:00  --------------------------------------------------------  IN:    pantoprazole Infusion: 110 mL  Total IN: 110 mL    OUT:    Voided: 100 mL  Total OUT: 100 mL    Total NET: 10 mL      25 May 2018 07:01  -  25 May 2018 18:35  --------------------------------------------------------  IN:    dextrose 5% + sodium chloride 0.225%: 550 mL    Oral Fluid: 720 mL    pantoprazole Infusion: 120 mL  Total IN: 1390 mL    OUT:    Voided: 750 mL  Total OUT: 750 mL    Total NET: 640 mL      LABS:                        8.0    6.7   )-----------( 216      ( 25 May 2018 06:44 )             26.8     CBC Full  -  ( 25 May 2018 06:44 )  WBC Count : 6.7 K/uL  Hemoglobin : 8.0 g/dL  Hematocrit : 26.8 %  Platelet Count - Automated : 216 K/uL  Mean Cell Volume : 79.3 fl  Mean Cell Hemoglobin : 23.7 pg  Mean Cell Hemoglobin Concentration : 29.9 g/dL  Auto Neutrophil # : x  Auto Lymphocyte # : x  Auto Monocyte # : x  Auto Eosinophil # : x  Auto Basophil # : x  Auto Neutrophil % : x  Auto Lymphocyte % : x  Auto Monocyte % : x  Auto Eosinophil % : x  Auto Basophil % : x        148<H>  |  102  |  39.0<H>  ----------------------------<  156<H>  4.1   |  32.0<H>  |  1.58<H>    Ca    9.4      25 May 2018 06:44      PT/INR - ( 25 May 2018 06:44 )   PT: 11.2 sec;   INR: 1.02 ratio         PTT - ( 24 May 2018 06:56 )  PTT:27.4 sec    Hemoglobin A1C, Whole Blood: 4.9 % ( @ 06:44)  Hemoglobin A1C, Whole Blood: 5.5 % (03-16 @ 06:02)  Hemoglobin A1C, Whole Blood: 5.0 % ( @ 06:20)    Culture Results:   No growth at 48 hours ( @ 14:15)  Culture Results:   No growth at 48 hours ( @ 14:13)    Procalcitonin, Serum: 0.18 ng/mL ( @ 09:23)      RADIOLOGY & ADDITIONAL TESTS:  Unable to perform colonscopy today

## 2018-05-26 LAB
ANION GAP SERPL CALC-SCNC: 13 MMOL/L — SIGNIFICANT CHANGE UP (ref 5–17)
BUN SERPL-MCNC: 32 MG/DL — HIGH (ref 8–20)
CALCIUM SERPL-MCNC: 9.5 MG/DL — SIGNIFICANT CHANGE UP (ref 8.6–10.2)
CHLORIDE SERPL-SCNC: 102 MMOL/L — SIGNIFICANT CHANGE UP (ref 98–107)
CO2 SERPL-SCNC: 31 MMOL/L — HIGH (ref 22–29)
CREAT ?TM UR-MCNC: 114 MG/DL — SIGNIFICANT CHANGE UP
CREAT SERPL-MCNC: 1.53 MG/DL — HIGH (ref 0.5–1.3)
EOSINOPHIL # BLD AUTO: 0 K/UL — SIGNIFICANT CHANGE UP (ref 0–0.5)
EOSINOPHIL NFR BLD AUTO: 0 % — SIGNIFICANT CHANGE UP (ref 0–5)
GLUCOSE BLDC GLUCOMTR-MCNC: 128 MG/DL — HIGH (ref 70–99)
GLUCOSE BLDC GLUCOMTR-MCNC: 136 MG/DL — HIGH (ref 70–99)
GLUCOSE BLDC GLUCOMTR-MCNC: 159 MG/DL — HIGH (ref 70–99)
GLUCOSE BLDC GLUCOMTR-MCNC: 187 MG/DL — HIGH (ref 70–99)
GLUCOSE BLDC GLUCOMTR-MCNC: 190 MG/DL — HIGH (ref 70–99)
GLUCOSE SERPL-MCNC: 130 MG/DL — HIGH (ref 70–115)
HCT VFR BLD CALC: 26.6 % — LOW (ref 42–52)
HGB BLD-MCNC: 8 G/DL — LOW (ref 14–18)
LYMPHOCYTES # BLD AUTO: 0.7 K/UL — LOW (ref 1–4.8)
LYMPHOCYTES # BLD AUTO: 11.8 % — LOW (ref 20–55)
MCHC RBC-ENTMCNC: 23.9 PG — LOW (ref 27–31)
MCHC RBC-ENTMCNC: 30.1 G/DL — LOW (ref 32–36)
MCV RBC AUTO: 79.4 FL — LOW (ref 80–94)
MONOCYTES # BLD AUTO: 0.4 K/UL — SIGNIFICANT CHANGE UP (ref 0–0.8)
MONOCYTES NFR BLD AUTO: 6.9 % — SIGNIFICANT CHANGE UP (ref 3–10)
NEUTROPHILS # BLD AUTO: 4.6 K/UL — SIGNIFICANT CHANGE UP (ref 1.8–8)
NEUTROPHILS NFR BLD AUTO: 81.1 % — HIGH (ref 37–73)
OSMOLALITY UR: 475 MOSM/KG — SIGNIFICANT CHANGE UP (ref 300–1000)
PLATELET # BLD AUTO: 208 K/UL — SIGNIFICANT CHANGE UP (ref 150–400)
POTASSIUM SERPL-MCNC: 4 MMOL/L — SIGNIFICANT CHANGE UP (ref 3.5–5.3)
POTASSIUM SERPL-SCNC: 4 MMOL/L — SIGNIFICANT CHANGE UP (ref 3.5–5.3)
PROT ?TM UR-MCNC: 10 MG/DL — SIGNIFICANT CHANGE UP (ref 0–12)
PROT/CREAT UR-RTO: 0.1 RATIO — SIGNIFICANT CHANGE UP
RBC # BLD: 3.35 M/UL — LOW (ref 4.6–6.2)
RBC # FLD: 20 % — HIGH (ref 11–15.6)
SODIUM SERPL-SCNC: 146 MMOL/L — HIGH (ref 135–145)
SODIUM UR-SCNC: <30 MMOL/L — SIGNIFICANT CHANGE UP
WBC # BLD: 5.7 K/UL — SIGNIFICANT CHANGE UP (ref 4.8–10.8)
WBC # FLD AUTO: 5.7 K/UL — SIGNIFICANT CHANGE UP (ref 4.8–10.8)

## 2018-05-26 PROCEDURE — 99233 SBSQ HOSP IP/OBS HIGH 50: CPT

## 2018-05-26 RX ORDER — PANTOPRAZOLE SODIUM 20 MG/1
40 TABLET, DELAYED RELEASE ORAL
Qty: 0 | Refills: 0 | Status: DISCONTINUED | OUTPATIENT
Start: 2018-05-26 | End: 2018-06-01

## 2018-05-26 RX ADMIN — LEVALBUTEROL 1.25 MILLIGRAM(S): 1.25 SOLUTION, CONCENTRATE RESPIRATORY (INHALATION) at 03:06

## 2018-05-26 RX ADMIN — Medication 650 MILLIGRAM(S): at 21:49

## 2018-05-26 RX ADMIN — Medication 30 MILLIGRAM(S): at 18:14

## 2018-05-26 RX ADMIN — Medication 1: at 12:07

## 2018-05-26 RX ADMIN — Medication 1: at 17:17

## 2018-05-26 RX ADMIN — Medication 650 MILLIGRAM(S): at 22:19

## 2018-05-26 RX ADMIN — BUDESONIDE AND FORMOTEROL FUMARATE DIHYDRATE 2 PUFF(S): 160; 4.5 AEROSOL RESPIRATORY (INHALATION) at 20:10

## 2018-05-26 RX ADMIN — Medication 250 MILLIGRAM(S): at 06:04

## 2018-05-26 RX ADMIN — Medication 40 MILLIGRAM(S): at 06:04

## 2018-05-26 RX ADMIN — LEVALBUTEROL 1.25 MILLIGRAM(S): 1.25 SOLUTION, CONCENTRATE RESPIRATORY (INHALATION) at 09:09

## 2018-05-26 RX ADMIN — PANTOPRAZOLE SODIUM 10 MG/HR: 20 TABLET, DELAYED RELEASE ORAL at 06:26

## 2018-05-26 RX ADMIN — LEVALBUTEROL 1.25 MILLIGRAM(S): 1.25 SOLUTION, CONCENTRATE RESPIRATORY (INHALATION) at 20:10

## 2018-05-26 RX ADMIN — PANTOPRAZOLE SODIUM 40 MILLIGRAM(S): 20 TABLET, DELAYED RELEASE ORAL at 18:14

## 2018-05-26 RX ADMIN — LEVALBUTEROL 1.25 MILLIGRAM(S): 1.25 SOLUTION, CONCENTRATE RESPIRATORY (INHALATION) at 16:02

## 2018-05-26 RX ADMIN — BUDESONIDE AND FORMOTEROL FUMARATE DIHYDRATE 2 PUFF(S): 160; 4.5 AEROSOL RESPIRATORY (INHALATION) at 09:10

## 2018-05-26 RX ADMIN — ATORVASTATIN CALCIUM 40 MILLIGRAM(S): 80 TABLET, FILM COATED ORAL at 21:49

## 2018-05-26 NOTE — PROGRESS NOTE ADULT - SUBJECTIVE AND OBJECTIVE BOX
Pt seen and examined. Had failed attempt at CT colonography yesterday because of poor prep.     REVIEW OF SYSTEMS:  Constitutional: No fever, weight loss or fatigue  Cardiovascular: No chest pain, palpitations, dizziness or leg swelling  Gastrointestinal: No abdominal or epigastric pain. No nausea, vomiting or hematemesis; No diarrhea or constipation. No melena or hematochezia.  Skin: No itching, burning, rashes or lesions       MEDICATIONS:  MEDICATIONS  (STANDING):  atorvastatin 40 milliGRAM(s) Oral at bedtime  buDESOnide 160 MICROgram(s)/formoterol 4.5 MICROgram(s) Inhaler 2 Puff(s) Inhalation two times a day  dextrose 5%. 1000 milliLiter(s) (50 mL/Hr) IV Continuous <Continuous>  dextrose 50% Injectable 12.5 Gram(s) IV Push once  dextrose 50% Injectable 25 Gram(s) IV Push once  dextrose 50% Injectable 25 Gram(s) IV Push once  insulin lispro (HumaLOG) corrective regimen sliding scale   SubCutaneous three times a day before meals  levalbuterol Inhalation 1.25 milliGRAM(s) Inhalation every 6 hours  metoprolol succinate ER 25 milliGRAM(s) Oral daily  pantoprazole Infusion 8 mG/Hr (10 mL/Hr) IV Continuous <Continuous>  predniSONE   Tablet 40 milliGRAM(s) Oral daily  saccharomyces boulardii 250 milliGRAM(s) Oral two times a day    MEDICATIONS  (PRN):  acetaminophen   Tablet. 650 milliGRAM(s) Oral every 6 hours PRN Moderate Pain (4 - 6)  dextrose 40% Gel 15 Gram(s) Oral once PRN Blood Glucose LESS THAN 70 milliGRAM(s)/deciliter  glucagon  Injectable 1 milliGRAM(s) IntraMuscular once PRN Glucose LESS THAN 70 milligrams/deciliter  polyethylene glycol 3350 17 Gram(s) Oral two times a day PRN unclear stool, bowel prep      Allergies    No Known Allergies    Intolerances        Vital Signs Last 24 Hrs  T(C): 36.6 (26 May 2018 04:56), Max: 37.1 (25 May 2018 11:53)  T(F): 97.9 (26 May 2018 04:56), Max: 98.7 (25 May 2018 11:53)  HR: 51 (26 May 2018 04:56) (49 - 67)  BP: 109/61 (26 May 2018 04:56) (107/63 - 130/81)  BP(mean): 76 (25 May 2018 21:58) (76 - 98)  RR: 16 (26 May 2018 04:56) (16 - 17)  SpO2: 96% (26 May 2018 04:56) (96% - 100%)    05-25 @ 07:01  -  05-26 @ 07:00  --------------------------------------------------------  IN: 1390 mL / OUT: 1400 mL / NET: -10 mL        PHYSICAL EXAM:    General: Well developed; well nourished; in no acute distress  HEENT: MMM, conjunctiva pink and sclera anicteric.  Lungs: clear to auscultation and percussion.  Cor: RRR S1, S2 only.  Gastrointestinal: Abdomen: Soft non-tender non-distended; Normal bowel sounds; No hepatosplenomegaly  Extremities: no cyanosis, clubbing or edema.  Skin: Warm and dry. No obvious rash  Neuro: Pt. a + o x 3.    LABS:      CBC Full  -  ( 26 May 2018 07:24 )  WBC Count : 5.7 K/uL  Hemoglobin : 8.0 g/dL  Hematocrit : 26.6 %  Platelet Count - Automated : 208 K/uL  Mean Cell Volume : 79.4 fl  Mean Cell Hemoglobin : 23.9 pg  Mean Cell Hemoglobin Concentration : 30.1 g/dL  Auto Neutrophil # : 4.6 K/uL  Auto Lymphocyte # : 0.7 K/uL  Auto Monocyte # : 0.4 K/uL  Auto Eosinophil # : 0.0 K/uL  Auto Basophil # : x  Auto Neutrophil % : 81.1 %  Auto Lymphocyte % : 11.8 %  Auto Monocyte % : 6.9 %  Auto Eosinophil % : 0.0 %  Auto Basophil % : x    05-26    146<H>  |  102  |  32.0<H>  ----------------------------<  130<H>  4.0   |  31.0<H>  |  1.53<H>    Ca    9.5      26 May 2018 07:24      PT/INR - ( 25 May 2018 06:44 )   PT: 11.2 sec;   INR: 1.02 ratio                           RADIOLOGY & ADDITIONAL STUDIES (The following images were personally reviewed):

## 2018-05-26 NOTE — PROGRESS NOTE ADULT - PROBLEM SELECTOR PLAN 1
No need to keep pt in house until Tuesday for repeat attempt at CT colonography. Can be done as OPT. Not a candidate for endoscopic w/u because of his multiple medical co-morbidities.

## 2018-05-27 LAB
ANION GAP SERPL CALC-SCNC: 11 MMOL/L — SIGNIFICANT CHANGE UP (ref 5–17)
BUN SERPL-MCNC: 26 MG/DL — HIGH (ref 8–20)
CALCIUM SERPL-MCNC: 9.5 MG/DL — SIGNIFICANT CHANGE UP (ref 8.6–10.2)
CHLORIDE SERPL-SCNC: 103 MMOL/L — SIGNIFICANT CHANGE UP (ref 98–107)
CO2 SERPL-SCNC: 31 MMOL/L — HIGH (ref 22–29)
CREAT SERPL-MCNC: 1.42 MG/DL — HIGH (ref 0.5–1.3)
CULTURE RESULTS: SIGNIFICANT CHANGE UP
CULTURE RESULTS: SIGNIFICANT CHANGE UP
GLUCOSE BLDC GLUCOMTR-MCNC: 150 MG/DL — HIGH (ref 70–99)
GLUCOSE BLDC GLUCOMTR-MCNC: 156 MG/DL — HIGH (ref 70–99)
GLUCOSE BLDC GLUCOMTR-MCNC: 156 MG/DL — HIGH (ref 70–99)
GLUCOSE BLDC GLUCOMTR-MCNC: 171 MG/DL — HIGH (ref 70–99)
GLUCOSE SERPL-MCNC: 158 MG/DL — HIGH (ref 70–115)
HCT VFR BLD CALC: 25.7 % — LOW (ref 42–52)
HGB BLD-MCNC: 7.8 G/DL — LOW (ref 14–18)
MCHC RBC-ENTMCNC: 24.1 PG — LOW (ref 27–31)
MCHC RBC-ENTMCNC: 30.4 G/DL — LOW (ref 32–36)
MCV RBC AUTO: 79.3 FL — LOW (ref 80–94)
PLATELET # BLD AUTO: 190 K/UL — SIGNIFICANT CHANGE UP (ref 150–400)
POTASSIUM SERPL-MCNC: 4.5 MMOL/L — SIGNIFICANT CHANGE UP (ref 3.5–5.3)
POTASSIUM SERPL-SCNC: 4.5 MMOL/L — SIGNIFICANT CHANGE UP (ref 3.5–5.3)
RBC # BLD: 3.24 M/UL — LOW (ref 4.6–6.2)
RBC # FLD: 20 % — HIGH (ref 11–15.6)
SODIUM SERPL-SCNC: 145 MMOL/L — SIGNIFICANT CHANGE UP (ref 135–145)
SPECIMEN SOURCE: SIGNIFICANT CHANGE UP
SPECIMEN SOURCE: SIGNIFICANT CHANGE UP
WBC # BLD: 5.3 K/UL — SIGNIFICANT CHANGE UP (ref 4.8–10.8)
WBC # FLD AUTO: 5.3 K/UL — SIGNIFICANT CHANGE UP (ref 4.8–10.8)

## 2018-05-27 PROCEDURE — 99233 SBSQ HOSP IP/OBS HIGH 50: CPT

## 2018-05-27 RX ADMIN — LEVALBUTEROL 1.25 MILLIGRAM(S): 1.25 SOLUTION, CONCENTRATE RESPIRATORY (INHALATION) at 21:10

## 2018-05-27 RX ADMIN — BUDESONIDE AND FORMOTEROL FUMARATE DIHYDRATE 2 PUFF(S): 160; 4.5 AEROSOL RESPIRATORY (INHALATION) at 09:35

## 2018-05-27 RX ADMIN — PANTOPRAZOLE SODIUM 40 MILLIGRAM(S): 20 TABLET, DELAYED RELEASE ORAL at 16:52

## 2018-05-27 RX ADMIN — LEVALBUTEROL 1.25 MILLIGRAM(S): 1.25 SOLUTION, CONCENTRATE RESPIRATORY (INHALATION) at 09:35

## 2018-05-27 RX ADMIN — Medication 650 MILLIGRAM(S): at 22:27

## 2018-05-27 RX ADMIN — Medication 1: at 08:12

## 2018-05-27 RX ADMIN — Medication 1: at 12:10

## 2018-05-27 RX ADMIN — PANTOPRAZOLE SODIUM 40 MILLIGRAM(S): 20 TABLET, DELAYED RELEASE ORAL at 05:28

## 2018-05-27 RX ADMIN — Medication 650 MILLIGRAM(S): at 21:57

## 2018-05-27 RX ADMIN — BUDESONIDE AND FORMOTEROL FUMARATE DIHYDRATE 2 PUFF(S): 160; 4.5 AEROSOL RESPIRATORY (INHALATION) at 21:11

## 2018-05-27 RX ADMIN — LEVALBUTEROL 1.25 MILLIGRAM(S): 1.25 SOLUTION, CONCENTRATE RESPIRATORY (INHALATION) at 16:26

## 2018-05-27 RX ADMIN — ATORVASTATIN CALCIUM 40 MILLIGRAM(S): 80 TABLET, FILM COATED ORAL at 21:57

## 2018-05-27 RX ADMIN — Medication 30 MILLIGRAM(S): at 05:28

## 2018-05-27 NOTE — PROGRESS NOTE ADULT - SUBJECTIVE AND OBJECTIVE BOX
DAVID VINCENT    [id number]    83y      Male    INTERVAL HPI/OVERNIGHT EVENTS:    patient being seen for symptomatic anemia, afib and med management.  patient seen at bedside and denies     REVIEW OF SYSTEMS:    CONSTITUTIONAL: No fever, weight loss, or fatigue  RESPIRATORY: No cough, wheezing, hemoptysis; No shortness of breath  CARDIOVASCULAR: No chest pain, palpitations  GASTROINTESTINAL: No abdominal or epigastric pain. No nausea, vomiting  NEUROLOGICAL: No headaches, memory loss, loss of strength.  MISCELLANEOUS:      Vital Signs Last 24 Hrs  T(C): 36.7 (27 May 2018 09:04), Max: 37.3 (26 May 2018 16:47)  T(F): 98.1 (27 May 2018 09:04), Max: 99.2 (26 May 2018 16:47)  HR: 54 (27 May 2018 09:04) (53 - 60)  BP: 140/64 (27 May 2018 09:04) (127/76 - 140/64)  BP(mean): --  RR: 18 (27 May 2018 09:04) (18 - 18)  SpO2: 99% (27 May 2018 09:04) (97% - 99%)    PHYSICAL EXAM:    GENERAL: NAD   HEENT:  Normocephalic and atraumatic.   NECK: Supple.  No JVD.    CARDIOVASCULAR: s12+  LUNGS: clear  ABDOMEN: ND. Soft,    EXTREMITIES:  +1 edema  NEUROLOGIC: AAO*3.        LABS:                        7.8    5.3   )-----------( 190      ( 27 May 2018 07:20 )             25.7     05-27    145  |  103  |  26.0<H>  ----------------------------<  158<H>  4.5   |  31.0<H>  |  1.42<H>    Ca    9.5      27 May 2018 07:20              MEDICATIONS  (STANDING):  atorvastatin 40 milliGRAM(s) Oral at bedtime  buDESOnide 160 MICROgram(s)/formoterol 4.5 MICROgram(s) Inhaler 2 Puff(s) Inhalation two times a day  dextrose 5%. 1000 milliLiter(s) (50 mL/Hr) IV Continuous <Continuous>  dextrose 50% Injectable 12.5 Gram(s) IV Push once  dextrose 50% Injectable 25 Gram(s) IV Push once  dextrose 50% Injectable 25 Gram(s) IV Push once  insulin lispro (HumaLOG) corrective regimen sliding scale   SubCutaneous three times a day before meals  levalbuterol Inhalation 1.25 milliGRAM(s) Inhalation every 6 hours  metoprolol succinate ER 25 milliGRAM(s) Oral daily  pantoprazole    Tablet 40 milliGRAM(s) Oral two times a day  predniSONE   Tablet 30 milliGRAM(s) Oral daily    MEDICATIONS  (PRN):  acetaminophen   Tablet. 650 milliGRAM(s) Oral every 6 hours PRN Moderate Pain (4 - 6)  dextrose 40% Gel 15 Gram(s) Oral once PRN Blood Glucose LESS THAN 70 milliGRAM(s)/deciliter  glucagon  Injectable 1 milliGRAM(s) IntraMuscular once PRN Glucose LESS THAN 70 milligrams/deciliter  polyethylene glycol 3350 17 Gram(s) Oral two times a day PRN unclear stool, bowel prep      RADIOLOGY & ADDITIONAL TESTS:

## 2018-05-28 LAB
ALBUMIN SERPL ELPH-MCNC: 2.9 G/DL — LOW (ref 3.3–5.2)
ALP SERPL-CCNC: 50 U/L — SIGNIFICANT CHANGE UP (ref 40–120)
ALT FLD-CCNC: 10 U/L — SIGNIFICANT CHANGE UP
ANION GAP SERPL CALC-SCNC: 9 MMOL/L — SIGNIFICANT CHANGE UP (ref 5–17)
AST SERPL-CCNC: 17 U/L — SIGNIFICANT CHANGE UP
BILIRUB SERPL-MCNC: 0.9 MG/DL — SIGNIFICANT CHANGE UP (ref 0.4–2)
BLD GP AB SCN SERPL QL: SIGNIFICANT CHANGE UP
BUN SERPL-MCNC: 28 MG/DL — HIGH (ref 8–20)
CALCIUM SERPL-MCNC: 9.1 MG/DL — SIGNIFICANT CHANGE UP (ref 8.6–10.2)
CHLORIDE SERPL-SCNC: 104 MMOL/L — SIGNIFICANT CHANGE UP (ref 98–107)
CO2 SERPL-SCNC: 31 MMOL/L — HIGH (ref 22–29)
CREAT SERPL-MCNC: 1.42 MG/DL — HIGH (ref 0.5–1.3)
GLUCOSE BLDC GLUCOMTR-MCNC: 126 MG/DL — HIGH (ref 70–99)
GLUCOSE BLDC GLUCOMTR-MCNC: 132 MG/DL — HIGH (ref 70–99)
GLUCOSE BLDC GLUCOMTR-MCNC: 148 MG/DL — HIGH (ref 70–99)
GLUCOSE BLDC GLUCOMTR-MCNC: 197 MG/DL — HIGH (ref 70–99)
GLUCOSE SERPL-MCNC: 116 MG/DL — HIGH (ref 70–115)
HCT VFR BLD CALC: 25 % — LOW (ref 42–52)
HGB BLD-MCNC: 7.7 G/DL — LOW (ref 14–18)
MAGNESIUM SERPL-MCNC: 2.3 MG/DL — SIGNIFICANT CHANGE UP (ref 1.8–2.6)
MCHC RBC-ENTMCNC: 24.1 PG — LOW (ref 27–31)
MCHC RBC-ENTMCNC: 30.8 G/DL — LOW (ref 32–36)
MCV RBC AUTO: 78.1 FL — LOW (ref 80–94)
PLATELET # BLD AUTO: 164 K/UL — SIGNIFICANT CHANGE UP (ref 150–400)
POTASSIUM SERPL-MCNC: 3.8 MMOL/L — SIGNIFICANT CHANGE UP (ref 3.5–5.3)
POTASSIUM SERPL-SCNC: 3.8 MMOL/L — SIGNIFICANT CHANGE UP (ref 3.5–5.3)
PROT SERPL-MCNC: 5.1 G/DL — LOW (ref 6.6–8.7)
RBC # BLD: 3.2 M/UL — LOW (ref 4.6–6.2)
RBC # FLD: 19.9 % — HIGH (ref 11–15.6)
SODIUM SERPL-SCNC: 144 MMOL/L — SIGNIFICANT CHANGE UP (ref 135–145)
TYPE + AB SCN PNL BLD: SIGNIFICANT CHANGE UP
WBC # BLD: 5.6 K/UL — SIGNIFICANT CHANGE UP (ref 4.8–10.8)
WBC # FLD AUTO: 5.6 K/UL — SIGNIFICANT CHANGE UP (ref 4.8–10.8)

## 2018-05-28 PROCEDURE — 99233 SBSQ HOSP IP/OBS HIGH 50: CPT

## 2018-05-28 RX ORDER — FUROSEMIDE 40 MG
40 TABLET ORAL DAILY
Qty: 0 | Refills: 0 | Status: DISCONTINUED | OUTPATIENT
Start: 2018-05-28 | End: 2018-06-01

## 2018-05-28 RX ORDER — SOD SULF/SODIUM/NAHCO3/KCL/PEG
1000 SOLUTION, RECONSTITUTED, ORAL ORAL EVERY 4 HOURS
Qty: 0 | Refills: 0 | Status: COMPLETED | OUTPATIENT
Start: 2018-05-28 | End: 2018-05-28

## 2018-05-28 RX ADMIN — Medication 25 MILLIGRAM(S): at 05:46

## 2018-05-28 RX ADMIN — Medication 1000 MILLILITER(S): at 18:08

## 2018-05-28 RX ADMIN — BUDESONIDE AND FORMOTEROL FUMARATE DIHYDRATE 2 PUFF(S): 160; 4.5 AEROSOL RESPIRATORY (INHALATION) at 09:24

## 2018-05-28 RX ADMIN — Medication 1000 MILLILITER(S): at 21:38

## 2018-05-28 RX ADMIN — PANTOPRAZOLE SODIUM 40 MILLIGRAM(S): 20 TABLET, DELAYED RELEASE ORAL at 05:46

## 2018-05-28 RX ADMIN — ATORVASTATIN CALCIUM 40 MILLIGRAM(S): 80 TABLET, FILM COATED ORAL at 21:37

## 2018-05-28 RX ADMIN — LEVALBUTEROL 1.25 MILLIGRAM(S): 1.25 SOLUTION, CONCENTRATE RESPIRATORY (INHALATION) at 20:58

## 2018-05-28 RX ADMIN — LEVALBUTEROL 1.25 MILLIGRAM(S): 1.25 SOLUTION, CONCENTRATE RESPIRATORY (INHALATION) at 09:24

## 2018-05-28 RX ADMIN — Medication 1: at 12:07

## 2018-05-28 RX ADMIN — Medication 650 MILLIGRAM(S): at 22:07

## 2018-05-28 RX ADMIN — BUDESONIDE AND FORMOTEROL FUMARATE DIHYDRATE 2 PUFF(S): 160; 4.5 AEROSOL RESPIRATORY (INHALATION) at 20:58

## 2018-05-28 RX ADMIN — Medication 650 MILLIGRAM(S): at 21:37

## 2018-05-28 RX ADMIN — Medication 30 MILLIGRAM(S): at 05:45

## 2018-05-28 RX ADMIN — LEVALBUTEROL 1.25 MILLIGRAM(S): 1.25 SOLUTION, CONCENTRATE RESPIRATORY (INHALATION) at 14:52

## 2018-05-28 RX ADMIN — PANTOPRAZOLE SODIUM 40 MILLIGRAM(S): 20 TABLET, DELAYED RELEASE ORAL at 18:08

## 2018-05-28 NOTE — PROGRESS NOTE ADULT - PROBLEM SELECTOR PROBLEM 2
Occult blood positive stool
Chronic obstructive pulmonary disease with acute exacerbation

## 2018-05-28 NOTE — PROGRESS NOTE ADULT - PROBLEM SELECTOR PLAN 1
Will re-attempt CT Colonography tomorrow. Clear liquid diet and Moviprep ordered for this test. Further GI w/u if any will be dependent upon these results. Repeat labs ordered for the AM.

## 2018-05-28 NOTE — PROGRESS NOTE ADULT - PROBLEM SELECTOR PLAN 2
See above management plan recommendations.
See above management plan.
See above management plan.
neb, steroid taper, Symbicort, pulmonary eval appreciated.
neb, steroid taper, Symbicort, pulmonary eval appreciated.
neb, steroid taper, Symbicort, pulmonary eval appreciated.  stable, exacerbation resolved at baseline now

## 2018-05-28 NOTE — PROGRESS NOTE ADULT - SUBJECTIVE AND OBJECTIVE BOX
Pt seen and examined. Cardiology input appreciated. They wish to place him on AC and his attempt at CT colonography Friday was unsuccessfule because of poor prep. To be re-attempted tomorrow.     REVIEW OF SYSTEMS:  Constitutional: No fever, weight loss or fatigue  Cardiovascular: No chest pain, palpitations, dizziness or leg swelling  Gastrointestinal: No abdominal or epigastric pain. No nausea, vomiting or hematemesis; No diarrhea or constipation. No melena or hematochezia.  Skin: No itching, burning, rashes or lesions       MEDICATIONS:  MEDICATIONS  (STANDING):  atorvastatin 40 milliGRAM(s) Oral at bedtime  buDESOnide 160 MICROgram(s)/formoterol 4.5 MICROgram(s) Inhaler 2 Puff(s) Inhalation two times a day  dextrose 5%. 1000 milliLiter(s) (50 mL/Hr) IV Continuous <Continuous>  dextrose 50% Injectable 12.5 Gram(s) IV Push once  dextrose 50% Injectable 25 Gram(s) IV Push once  dextrose 50% Injectable 25 Gram(s) IV Push once  insulin lispro (HumaLOG) corrective regimen sliding scale   SubCutaneous three times a day before meals  levalbuterol Inhalation 1.25 milliGRAM(s) Inhalation every 6 hours  metoprolol succinate ER 25 milliGRAM(s) Oral daily  pantoprazole    Tablet 40 milliGRAM(s) Oral two times a day  polyethylene glycol/electrolyte Solution 1000 milliLiter(s) Oral every 4 hours  predniSONE   Tablet 30 milliGRAM(s) Oral daily    MEDICATIONS  (PRN):  acetaminophen   Tablet. 650 milliGRAM(s) Oral every 6 hours PRN Moderate Pain (4 - 6)  dextrose 40% Gel 15 Gram(s) Oral once PRN Blood Glucose LESS THAN 70 milliGRAM(s)/deciliter  glucagon  Injectable 1 milliGRAM(s) IntraMuscular once PRN Glucose LESS THAN 70 milligrams/deciliter  polyethylene glycol 3350 17 Gram(s) Oral two times a day PRN unclear stool, bowel prep      Allergies    No Known Allergies    Intolerances        Vital Signs Last 24 Hrs  T(C): 37.1 (28 May 2018 11:31), Max: 37.2 (27 May 2018 20:44)  T(F): 98.7 (28 May 2018 11:31), Max: 98.9 (27 May 2018 20:44)  HR: 62 (28 May 2018 11:31) (56 - 107)  BP: 102/52 (28 May 2018 11:31) (102/52 - 124/72)  BP(mean): --  RR: 14 (28 May 2018 11:31) (14 - 20)  SpO2: 98% (28 May 2018 11:31) (97% - 98%)    05-27 @ 07:01  -  05-28 @ 07:00  --------------------------------------------------------  IN: 360 mL / OUT: 250 mL / NET: 110 mL    05-28 @ 07:01  -  05-28 @ 12:08  --------------------------------------------------------  IN: 0 mL / OUT: 200 mL / NET: -200 mL        PHYSICAL EXAM:    General: Well developed; well nourished; in no acute distress  HEENT: MMM, conjunctiva pink and sclera anicteric.  Lungs: clear to auscultation bilaterally.  Cor: RRR S1, S2 only.  Gastrointestinal: Abdomen: Soft non-tender non-distended; Normal bowel sounds; No hepatosplenomegaly  Extremities: no cyanosis, clubbing or edema.  Skin: Warm and dry. No obvious rash  Neuro: Pt. a + o x 3.    LABS:      CBC Full  -  ( 28 May 2018 06:03 )  WBC Count : 5.6 K/uL  Hemoglobin : 7.7 g/dL  Hematocrit : 25.0 %  Platelet Count - Automated : 164 K/uL  Mean Cell Volume : 78.1 fl  Mean Cell Hemoglobin : 24.1 pg  Mean Cell Hemoglobin Concentration : 30.8 g/dL  Auto Neutrophil # : x  Auto Lymphocyte # : x  Auto Monocyte # : x  Auto Eosinophil # : x  Auto Basophil # : x  Auto Neutrophil % : x  Auto Lymphocyte % : x  Auto Monocyte % : x  Auto Eosinophil % : x  Auto Basophil % : x    05-28    144  |  104  |  28.0<H>  ----------------------------<  116<H>  3.8   |  31.0<H>  |  1.42<H>    Ca    9.1      28 May 2018 06:03  Mg     2.3     05-28    TPro  5.1<L>  /  Alb  2.9<L>  /  TBili  0.9  /  DBili  x   /  AST  17  /  ALT  10  /  AlkPhos  50  05-28                      RADIOLOGY & ADDITIONAL STUDIES (The following images were personally reviewed):

## 2018-05-28 NOTE — PROGRESS NOTE ADULT - SUBJECTIVE AND OBJECTIVE BOX
DAVID VINCENT    [id number]    83y      Male    INTERVAL HPI/OVERNIGHT EVENTS:    patient being seen for gi bleed and afib. Patient seen at bedside and states feeling well and wants to go home.     patient is for virtual colonoscopy tomorrow     REVIEW OF SYSTEMS:    CONSTITUTIONAL: No fever, weight loss, or fatigue  RESPIRATORY: No cough, wheezing, hemoptysis; No shortness of breath  CARDIOVASCULAR: No chest pain, palpitations  GASTROINTESTINAL: No abdominal or epigastric pain. No nausea, vomiting  NEUROLOGICAL: No headaches, memory loss, loss of strength.  MISCELLANEOUS:      Vital Signs Last 24 Hrs  T(C): 37.1 (28 May 2018 11:31), Max: 37.2 (27 May 2018 20:44)  T(F): 98.7 (28 May 2018 11:31), Max: 98.9 (27 May 2018 20:44)  HR: 62 (28 May 2018 11:31) (56 - 107)  BP: 102/52 (28 May 2018 11:31) (102/52 - 124/72)  BP(mean): --  RR: 14 (28 May 2018 11:31) (14 - 20)  SpO2: 98% (28 May 2018 11:31) (97% - 98%)    PHYSICAL EXAM:    GENERAL: NAD   HEENT:  Normocephalic and atraumatic.   NECK: Supple.  No JVD.    CARDIOVASCULAR: s12+  LUNGS: clear  ABDOMEN: ND. Soft,    EXTREMITIES:  +1 edema  NEUROLOGIC: AAO*3.          LABS:                        7.7    5.6   )-----------( 164      ( 28 May 2018 06:03 )             25.0     05-28    144  |  104  |  28.0<H>  ----------------------------<  116<H>  3.8   |  31.0<H>  |  1.42<H>    Ca    9.1      28 May 2018 06:03  Mg     2.3     05-28    TPro  5.1<L>  /  Alb  2.9<L>  /  TBili  0.9  /  DBili  x   /  AST  17  /  ALT  10  /  AlkPhos  50  05-28            MEDICATIONS  (STANDING):  atorvastatin 40 milliGRAM(s) Oral at bedtime  buDESOnide 160 MICROgram(s)/formoterol 4.5 MICROgram(s) Inhaler 2 Puff(s) Inhalation two times a day  dextrose 5%. 1000 milliLiter(s) (50 mL/Hr) IV Continuous <Continuous>  dextrose 50% Injectable 12.5 Gram(s) IV Push once  dextrose 50% Injectable 25 Gram(s) IV Push once  dextrose 50% Injectable 25 Gram(s) IV Push once  insulin lispro (HumaLOG) corrective regimen sliding scale   SubCutaneous three times a day before meals  levalbuterol Inhalation 1.25 milliGRAM(s) Inhalation every 6 hours  metoprolol succinate ER 25 milliGRAM(s) Oral daily  pantoprazole    Tablet 40 milliGRAM(s) Oral two times a day  polyethylene glycol/electrolyte Solution 1000 milliLiter(s) Oral every 4 hours  predniSONE   Tablet 30 milliGRAM(s) Oral daily    MEDICATIONS  (PRN):  acetaminophen   Tablet. 650 milliGRAM(s) Oral every 6 hours PRN Moderate Pain (4 - 6)  dextrose 40% Gel 15 Gram(s) Oral once PRN Blood Glucose LESS THAN 70 milliGRAM(s)/deciliter  glucagon  Injectable 1 milliGRAM(s) IntraMuscular once PRN Glucose LESS THAN 70 milligrams/deciliter  polyethylene glycol 3350 17 Gram(s) Oral two times a day PRN unclear stool, bowel prep      RADIOLOGY & ADDITIONAL TESTS:

## 2018-05-28 NOTE — PROGRESS NOTE ADULT - SUBJECTIVE AND OBJECTIVE BOX
DAVID VINCENT  [id number]      Gastrointestinal hemorrhage  COPD (chronic obstructive pulmonary disease)  Hyperlipidemia, unspecified hyperlipidemia type  Essential hypertension  Chronic systolic CHF (congestive heart failure)  CKD (chronic kidney disease), stage 4 (severe)  Diabetes  Asthma  Prostate cancer  Need for prophylactic measurel  Iron deficiency anemia due to chronic blood loss  Chronic respiratory failure with hypoxia and hypercapnia  Coagulopathye)  Chronic atrial fibrillation  Chronic systolic CHF (congestive heart failure  COPD (chronic obstructive pulmonary disease)        Chief Complaint:  GIB/ AF/ AC / CAD/ BioAVR    Interval History:  Being transfused  awaiting Virtual Colonoscopy    Tele:    AF with slow VR    acetaminophen   Tablet. 650 milliGRAM(s) Oral every 6 hours PRN  atorvastatin 40 milliGRAM(s) Oral at bedtime  buDESOnide 160 MICROgram(s)/formoterol 4.5 MICROgram(s) Inhaler 2 Puff(s) Inhalation two times a day  dextrose 40% Gel 15 Gram(s) Oral once PRN  dextrose 5%. 1000 milliLiter(s) IV Continuous <Continuous>  dextrose 50% Injectable 12.5 Gram(s) IV Push once  dextrose 50% Injectable 25 Gram(s) IV Push once  dextrose 50% Injectable 25 Gram(s) IV Push once  glucagon  Injectable 1 milliGRAM(s) IntraMuscular once PRN  insulin lispro (HumaLOG) corrective regimen sliding scale   SubCutaneous three times a day before meals  levalbuterol Inhalation 1.25 milliGRAM(s) Inhalation every 6 hours  metoprolol succinate ER 25 milliGRAM(s) Oral daily  pantoprazole    Tablet 40 milliGRAM(s) Oral two times a day  polyethylene glycol 3350 17 Gram(s) Oral two times a day PRN  polyethylene glycol/electrolyte Solution 1000 milliLiter(s) Oral every 4 hours  predniSONE   Tablet 30 milliGRAM(s) Oral daily          Physical Exam:  T(C): 36.9 (05-28-18 @ 14:11), Max: 37.2 (05-27-18 @ 20:44)  HR: 70 (05-28-18 @ 14:11) (56 - 107)  BP: 112/64 (05-28-18 @ 14:11) (102/52 - 124/72)  RR: 18 (05-28-18 @ 14:11) (14 - 20)  SpO2: 99% (05-28-18 @ 14:11) (97% - 100%)  Wt(kg): --  General: Comfortable in NAD  Neck: No JVD  CVS: nl s1s2, no s3 2/6 EM  Pulm: CTA b/l  Abd: morbidly obese soft, non-tender  Ext: 2+ pretibial edema  Neuro A&O x3  Psych: Normal affect    I&O's Summary    27 May 2018 07:01  -  28 May 2018 07:00  --------------------------------------------------------  IN: 360 mL / OUT: 250 mL / NET: 110 mL    28 May 2018 07:01  -  28 May 2018 18:31  --------------------------------------------------------  IN: 0 mL / OUT: 200 mL / NET: -200 mL          Labs:   28 May 2018 06:03    144    |  104    |  28.0   ----------------------------<  116    3.8     |  31.0   |  1.42     Ca    9.1        28 May 2018 06:03  Mg     2.3       28 May 2018 06:03    TPro  5.1    /  Alb  2.9    /  TBili  0.9    /  DBili  x      /  AST  17     /  ALT  10     /  AlkPhos  50     28 May 2018 06:03                          7.7    5.6   )-----------( 164      ( 28 May 2018 06:03 )             25.0     Echo 4/2018P:   1. Left ventricular ejection fraction, by visual estimation, is 60 to   65%.   2. Normal global left ventricular systolic function.   3. The mitral in-flow pattern reveals no discernable A-wave, therefore   no comment on diastolic function can be made.   4. Normal left ventricular internal cavity size.   5. Normal right ventricular size and function.   6. Severely enlarged left atrium.   7. The right atrium is normal in size.   8. Mild mitral annular calcification.   9. Thickening of the anterior and posterior mitral valve leaflets.  10. Mild to moderate mitral valve regurgitation.  11. Bioprosthesis in the aortic position demonstrating normal function.  12. Dilatation of the ascending aorta.  13. There is no evidence of pericardial effusion.  14. No evidence of endocarditis. Tricuspid valve not well seen.      Assessment:   Patient is a  83y Male with h/o CAD s/p CABG, Bio AVR, persistent AF, COPD on home O2 (2-3L), KRYSTIN here with recurrent GI bleed leading to severe symptomatic anemia.   -Has been transfused, no further GI bleeding and H/H stable in 7's  -No signs decompensated CHF at this time and AF rate controlled  - Volume overloaded sec to Pulm HTN and CKD      Plan:  1. Plan for virtual colonoscopy AM  2. A/C will be determined once GI work up complete, will consider ULISES closure if can't be on A/C  3. Continue metorpolol, AF rate controlled. Keep off amio  4. Resume daily po lasix once taking regular diet

## 2018-05-29 ENCOUNTER — APPOINTMENT (OUTPATIENT)
Dept: NEPHROLOGY | Facility: CLINIC | Age: 83
End: 2018-05-29

## 2018-05-29 LAB
ANION GAP SERPL CALC-SCNC: 11 MMOL/L — SIGNIFICANT CHANGE UP (ref 5–17)
BUN SERPL-MCNC: 22 MG/DL — HIGH (ref 8–20)
CALCIUM SERPL-MCNC: 8.9 MG/DL — SIGNIFICANT CHANGE UP (ref 8.6–10.2)
CHLORIDE SERPL-SCNC: 105 MMOL/L — SIGNIFICANT CHANGE UP (ref 98–107)
CO2 SERPL-SCNC: 30 MMOL/L — HIGH (ref 22–29)
CREAT SERPL-MCNC: 1.29 MG/DL — SIGNIFICANT CHANGE UP (ref 0.5–1.3)
EOSINOPHIL # BLD AUTO: 0 K/UL — SIGNIFICANT CHANGE UP (ref 0–0.5)
EOSINOPHIL NFR BLD AUTO: 1 % — SIGNIFICANT CHANGE UP (ref 0–5)
GLUCOSE BLDC GLUCOMTR-MCNC: 123 MG/DL — HIGH (ref 70–99)
GLUCOSE BLDC GLUCOMTR-MCNC: 129 MG/DL — HIGH (ref 70–99)
GLUCOSE BLDC GLUCOMTR-MCNC: 129 MG/DL — HIGH (ref 70–99)
GLUCOSE BLDC GLUCOMTR-MCNC: 144 MG/DL — HIGH (ref 70–99)
GLUCOSE SERPL-MCNC: 109 MG/DL — SIGNIFICANT CHANGE UP (ref 70–115)
HCT VFR BLD CALC: 27.2 % — LOW (ref 42–52)
HGB BLD-MCNC: 8.4 G/DL — LOW (ref 14–18)
LYMPHOCYTES # BLD AUTO: 0.7 K/UL — LOW (ref 1–4.8)
LYMPHOCYTES # BLD AUTO: 14.7 % — LOW (ref 20–55)
MCHC RBC-ENTMCNC: 24.3 PG — LOW (ref 27–31)
MCHC RBC-ENTMCNC: 30.9 G/DL — LOW (ref 32–36)
MCV RBC AUTO: 78.8 FL — LOW (ref 80–94)
MONOCYTES # BLD AUTO: 0.5 K/UL — SIGNIFICANT CHANGE UP (ref 0–0.8)
MONOCYTES NFR BLD AUTO: 10.3 % — HIGH (ref 3–10)
NEUTROPHILS # BLD AUTO: 3.7 K/UL — SIGNIFICANT CHANGE UP (ref 1.8–8)
NEUTROPHILS NFR BLD AUTO: 73.6 % — HIGH (ref 37–73)
PLATELET # BLD AUTO: 182 K/UL — SIGNIFICANT CHANGE UP (ref 150–400)
POTASSIUM SERPL-MCNC: 3.7 MMOL/L — SIGNIFICANT CHANGE UP (ref 3.5–5.3)
POTASSIUM SERPL-SCNC: 3.7 MMOL/L — SIGNIFICANT CHANGE UP (ref 3.5–5.3)
RBC # BLD: 3.45 M/UL — LOW (ref 4.6–6.2)
RBC # FLD: 19.5 % — HIGH (ref 11–15.6)
SODIUM SERPL-SCNC: 146 MMOL/L — HIGH (ref 135–145)
WBC # BLD: 5 K/UL — SIGNIFICANT CHANGE UP (ref 4.8–10.8)
WBC # FLD AUTO: 5 K/UL — SIGNIFICANT CHANGE UP (ref 4.8–10.8)

## 2018-05-29 PROCEDURE — 74261 CT COLONOGRAPHY DX: CPT | Mod: 26

## 2018-05-29 PROCEDURE — 99233 SBSQ HOSP IP/OBS HIGH 50: CPT

## 2018-05-29 PROCEDURE — 99232 SBSQ HOSP IP/OBS MODERATE 35: CPT

## 2018-05-29 RX ORDER — HYDRALAZINE HCL 50 MG
10 TABLET ORAL EVERY 6 HOURS
Qty: 0 | Refills: 0 | Status: DISCONTINUED | OUTPATIENT
Start: 2018-05-29 | End: 2018-05-30

## 2018-05-29 RX ADMIN — LEVALBUTEROL 1.25 MILLIGRAM(S): 1.25 SOLUTION, CONCENTRATE RESPIRATORY (INHALATION) at 20:48

## 2018-05-29 RX ADMIN — Medication 10 MILLIGRAM(S): at 14:42

## 2018-05-29 RX ADMIN — Medication 650 MILLIGRAM(S): at 21:50

## 2018-05-29 RX ADMIN — LEVALBUTEROL 1.25 MILLIGRAM(S): 1.25 SOLUTION, CONCENTRATE RESPIRATORY (INHALATION) at 16:14

## 2018-05-29 RX ADMIN — Medication 650 MILLIGRAM(S): at 21:20

## 2018-05-29 RX ADMIN — BUDESONIDE AND FORMOTEROL FUMARATE DIHYDRATE 2 PUFF(S): 160; 4.5 AEROSOL RESPIRATORY (INHALATION) at 20:51

## 2018-05-29 RX ADMIN — Medication 20 MILLIGRAM(S): at 21:19

## 2018-05-29 RX ADMIN — Medication 40 MILLIGRAM(S): at 05:25

## 2018-05-29 RX ADMIN — PANTOPRAZOLE SODIUM 40 MILLIGRAM(S): 20 TABLET, DELAYED RELEASE ORAL at 05:25

## 2018-05-29 RX ADMIN — PANTOPRAZOLE SODIUM 40 MILLIGRAM(S): 20 TABLET, DELAYED RELEASE ORAL at 18:03

## 2018-05-29 RX ADMIN — Medication 25 MILLIGRAM(S): at 05:25

## 2018-05-29 RX ADMIN — LEVALBUTEROL 1.25 MILLIGRAM(S): 1.25 SOLUTION, CONCENTRATE RESPIRATORY (INHALATION) at 09:56

## 2018-05-29 RX ADMIN — Medication 30 MILLIGRAM(S): at 05:24

## 2018-05-29 RX ADMIN — ATORVASTATIN CALCIUM 40 MILLIGRAM(S): 80 TABLET, FILM COATED ORAL at 21:19

## 2018-05-29 RX ADMIN — BUDESONIDE AND FORMOTEROL FUMARATE DIHYDRATE 2 PUFF(S): 160; 4.5 AEROSOL RESPIRATORY (INHALATION) at 09:57

## 2018-05-29 NOTE — DIETITIAN INITIAL EVALUATION ADULT. - PROBLEM SELECTOR PLAN 3
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 CT chest w/o contrast  s/p vanco and zosyn in ER, will c/w same  f/u BC, send sputum cx, urine legionella.  will call Dr. Suarez group tomorrow after CT chest

## 2018-05-29 NOTE — DIETITIAN INITIAL EVALUATION ADULT. - PROBLEM SELECTOR PLAN 6
judicious IVF. renal sono if not done recently. consider Nephro eval as family is very concerned about kidney dysfunction

## 2018-05-29 NOTE — DIETITIAN INITIAL EVALUATION ADULT. - PERTINENT LABORATORY DATA
05-29 Na146 mmol/L<H> Glu 109 mg/dL K+ 3.7 mmol/L Cr  1.29 mg/dL BUN 22.0 mg/dL<H> Phos n/a   Alb n/a   PAB n/a

## 2018-05-29 NOTE — PROGRESS NOTE ADULT - PROBLEM SELECTOR PROBLEM 1
Gastrointestinal hemorrhage associated with anorectal source
Iron deficiency anemia due to chronic blood loss
Gastrointestinal hemorrhage with melena
Occult blood positive stool

## 2018-05-29 NOTE — PROGRESS NOTE ADULT - ATTENDING COMMENTS
COUNSELING:    Face to face meeting to discuss Advanced Care Planning - Time Spent ______ Minutes.  See goals of care note.    More than 50% time spent in counseling and coordinating care. ___25___ Minutes.     Thank you for the opportunity to assist with the care of this patient.   [facility] [phone].
goal of care: patient wants to go through all procedure and wants full code now. palliative cx
goal of care: patient wants to go through all procedure and wants full code now. palliative cx appreciated    Dispo: possible DC ome with home PT in 24-48 hrs. PT cx appreciated
dispo: need home care and home PT. SW informed

## 2018-05-29 NOTE — PROGRESS NOTE ADULT - SUBJECTIVE AND OBJECTIVE BOX
Patient is a 83y old  Male who presents with a chief complaint of 82 yo male with hx htn, dm, copd, akd c/o shortness of breath, wheezing with productive cough. (25 May 2018 11:44)      HPI:  82 yo male with hx HTN, DM, COPD on home oxygen, A fib on Xarelto, AVReplacement 6 years ago, TRELL, CKD , Dyslipidemia, chronic blood loss anemia, came to ER c/o shortness of breath, wheezing with productive cough, neck and back pain, "pneumonia", "COPD".  Patient was having neck pain and as per family, he had similar presentation before the pneumonia last time.  Patient was admitted to [facility] x 1 month  for pneumonia, sepsis/bacteremia, and was discharged home on IV abx via PICC line. PICC line was discontinued 4/30.. Patient had strep bovis last time. IE was ruled out by TTE and KALE. For the past  few days, productive cough, increased shortness of breath and increased leg swelling. He also c/o neck pain and lower back pain. no falls. reported dark colored stools. no fresh blood,. denies abdominal pain, vomiting or diarrhea. takes iron tablet once weekly. last taken 2 weeks ago. patient has multiple hospitalization in past 6 months. he was admitted to [facility] on march and april with GIB, COPD exacerbation and HCAP. lowet hb on 4/2018 was 6.5. he was deemed not a good candidate for Endoscopic w/u. He has h/o afib and on xarelto which was held during hospitalizations for GIB and was restarted in discharge. (22 May 2018 17:02)      REVIEW OF SYSTEMS:  Constitutional: No fever, weight loss or fatigue  ENMT:  No difficulty hearing, tinnitus, vertigo; No sinus or throat pain  Respiratory: No cough, wheezing, chills or hemoptysis  Cardiovascular: No chest pain, palpitations, dizziness or leg swelling  Gastrointestinal: No abdominal or epigastric pain. No nausea, vomiting or hematemesis; No diarrhea or constipation. No melena or hematochezia.  Skin: No itching, burning, rashes or lesions   Musculoskeletal: No joint pain or swelling; No muscle, back or extremity pain    PAST MEDICAL & SURGICAL HISTORY:  COPD (chronic obstructive pulmonary disease)  Hyperlipidemia, unspecified hyperlipidemia type  Essential hypertension  Afib  Chronic systolic CHF (congestive heart failure)  Hypertrophic cardiomyopathy  CKD (chronic kidney disease), stage 4 (severe)  Gassiness  Diabetes  Asthma  Prostate cancer  Hypertension  No significant past surgical history  History of valvuloplasty  History of knee surgery      FAMILY HISTORY:  No pertinent family history in first degree relatives  No pertinent family history in first degree relatives      SOCIAL HISTORY:  Smoking Status: [ ] Current, [ ] Former, [ ] Never  Pack Years:  [  ] EtOH  [  ] IVDA    MEDICATIONS:  MEDICATIONS  (STANDING):  atorvastatin 40 milliGRAM(s) Oral at bedtime  buDESOnide 160 MICROgram(s)/formoterol 4.5 MICROgram(s) Inhaler 2 Puff(s) Inhalation two times a day  dextrose 5%. 1000 milliLiter(s) (50 mL/Hr) IV Continuous <Continuous>  dextrose 50% Injectable 12.5 Gram(s) IV Push once  dextrose 50% Injectable 25 Gram(s) IV Push once  dextrose 50% Injectable 25 Gram(s) IV Push once  furosemide    Tablet 40 milliGRAM(s) Oral daily  insulin lispro (HumaLOG) corrective regimen sliding scale   SubCutaneous three times a day before meals  levalbuterol Inhalation 1.25 milliGRAM(s) Inhalation every 6 hours  metoprolol succinate ER 25 milliGRAM(s) Oral daily  pantoprazole    Tablet 40 milliGRAM(s) Oral two times a day  predniSONE   Tablet 30 milliGRAM(s) Oral daily    MEDICATIONS  (PRN):  acetaminophen   Tablet. 650 milliGRAM(s) Oral every 6 hours PRN Moderate Pain (4 - 6)  dextrose 40% Gel 15 Gram(s) Oral once PRN Blood Glucose LESS THAN 70 milliGRAM(s)/deciliter  glucagon  Injectable 1 milliGRAM(s) IntraMuscular once PRN Glucose LESS THAN 70 milligrams/deciliter  polyethylene glycol 3350 17 Gram(s) Oral two times a day PRN unclear stool, bowel prep      Allergies    No Known Allergies    Intolerances        Vital Signs Last 24 Hrs  T(C): 36.8 (29 May 2018 05:06), Max: 37.1 (28 May 2018 11:31)  T(F): 98.2 (29 May 2018 05:06), Max: 98.7 (28 May 2018 11:31)  HR: 56 (29 May 2018 05:06) (56 - 75)  BP: 141/77 (29 May 2018 05:06) (102/52 - 146/81)  BP(mean): --  RR: 18 (29 May 2018 05:06) (14 - 18)  SpO2: 100% (29 May 2018 03:57) (93% - 100%)    05-28 @ 07:01  -  05-29 @ 07:00  --------------------------------------------------------  IN: 360 mL / OUT: 200 mL / NET: 160 mL          PHYSICAL EXAM:    General: Well developed; well nourished; in no acute distress  HEENT: MMM, conjunctiva and sclera clear  Gastrointestinal: Soft, non-tender non-distended; Normal bowel sounds; No rebound or guarding  Extremities: Normal range of motion, No clubbing, cyanosis or edema  Neurological: Alert and oriented x3  Skin: Warm and dry. No obvious rash      LABS:                        7.7    5.6   )-----------( 164      ( 28 May 2018 06:03 )             25.0     29 May 2018 06:18    146    |  105    |  22.0   ----------------------------<  109    3.7     |  30.0   |  1.29     Ca    8.9        29 May 2018 06:18  Mg     2.3       28 May 2018 06:03    TPro  5.1    /  Alb  2.9    /  TBili  0.9    /  DBili  x      /  AST  17     /  ALT  10     /  AlkPhos  50     / Amylase x      /Lipase x      28 May 2018 06:03              RADIOLOGY & ADDITIONAL STUDIES: Patient is a 83y old  Male who presents with a chief complaint of 84 yo male with hx htn, dm, copd, akd c/o shortness of breath, wheezing with productive cough. (25 May 2018 11:44)      HPI:  84 yo male with hx HTN, DM, COPD on home oxygen, A fib on Xarelto, AVReplacement 6 years ago, TRELL, CKD , Dyslipidemia, chronic blood loss anemia, came to ER c/o shortness of breath, wheezing with productive cough, neck and back pain, "pneumonia", "COPD".   NO rectal bleeding.  AS per nurse, pt took MOVI prep for virtual colonoscopy . No abdominal pain ( attempted virtual on friday, but poor prep)     REVIEW OF SYSTEMS:  Constitutional: No fever, weight loss or fatigue  ENMT:  No difficulty hearing, tinnitus, vertigo; No sinus or throat pain  Respiratory: No cough, wheezing, chills or hemoptysis  Cardiovascular: No chest pain, palpitations, dizziness or leg swelling  Gastrointestinal: No abdominal or epigastric pain. No nausea, vomiting or hematemesis; No diarrhea or constipation. No melena or hematochezia.  Skin: No itching, burning, rashes or lesions   Musculoskeletal: No joint pain or swelling; No muscle, back or extremity pain    PAST MEDICAL & SURGICAL HISTORY:  COPD (chronic obstructive pulmonary disease)  Hyperlipidemia, unspecified hyperlipidemia type  Essential hypertension  Afib  Chronic systolic CHF (congestive heart failure)  Hypertrophic cardiomyopathy  CKD (chronic kidney disease), stage 4 (severe)  Gassiness  Diabetes  Asthma  Prostate cancer  Hypertension  No significant past surgical history  History of valvuloplasty  History of knee surgery      FAMILY HISTORY:  No pertinent family history in first degree relatives  No pertinent family history in first degree relatives      SOCIAL HISTORY:  Smoking Status: [ ] Current, [ ] Former, [ ] Never  Pack Years:  [  ] EtOH  [  ] IVDA    MEDICATIONS:  MEDICATIONS  (STANDING):  atorvastatin 40 milliGRAM(s) Oral at bedtime  buDESOnide 160 MICROgram(s)/formoterol 4.5 MICROgram(s) Inhaler 2 Puff(s) Inhalation two times a day  dextrose 5%. 1000 milliLiter(s) (50 mL/Hr) IV Continuous <Continuous>  dextrose 50% Injectable 12.5 Gram(s) IV Push once  dextrose 50% Injectable 25 Gram(s) IV Push once  dextrose 50% Injectable 25 Gram(s) IV Push once  furosemide    Tablet 40 milliGRAM(s) Oral daily  insulin lispro (HumaLOG) corrective regimen sliding scale   SubCutaneous three times a day before meals  levalbuterol Inhalation 1.25 milliGRAM(s) Inhalation every 6 hours  metoprolol succinate ER 25 milliGRAM(s) Oral daily  pantoprazole    Tablet 40 milliGRAM(s) Oral two times a day  predniSONE   Tablet 30 milliGRAM(s) Oral daily    MEDICATIONS  (PRN):  acetaminophen   Tablet. 650 milliGRAM(s) Oral every 6 hours PRN Moderate Pain (4 - 6)  dextrose 40% Gel 15 Gram(s) Oral once PRN Blood Glucose LESS THAN 70 milliGRAM(s)/deciliter  glucagon  Injectable 1 milliGRAM(s) IntraMuscular once PRN Glucose LESS THAN 70 milligrams/deciliter  polyethylene glycol 3350 17 Gram(s) Oral two times a day PRN unclear stool, bowel prep      Allergies    No Known Allergies    Intolerances        Vital Signs Last 24 Hrs  T(C): 36.8 (29 May 2018 05:06), Max: 37.1 (28 May 2018 11:31)  T(F): 98.2 (29 May 2018 05:06), Max: 98.7 (28 May 2018 11:31)  HR: 56 (29 May 2018 05:06) (56 - 75)  BP: 141/77 (29 May 2018 05:06) (102/52 - 146/81)  BP(mean): --  RR: 18 (29 May 2018 05:06) (14 - 18)  SpO2: 100% (29 May 2018 03:57) (93% - 100%)    05-28 @ 07:01  -  05-29 @ 07:00  --------------------------------------------------------  IN: 360 mL / OUT: 200 mL / NET: 160 mL          PHYSICAL EXAM:    General: Well developed; well nourished; in no acute distress  HEENT: MMM, conjunctiva and sclera clear  H- RRR  L- CTA  Gastrointestinal: Soft, non-tender non-distended; Normal bowel sounds; No rebound or guarding  Extremities: Normal range of motion, No clubbing, cyanosis or edema  Neurological: Alert and oriented x3  Skin: Warm and dry. No obvious rash      LABS:                        7.7    5.6   )-----------( 164      ( 28 May 2018 06:03 )             25.0     29 May 2018 06:18    146    |  105    |  22.0   ----------------------------<  109    3.7     |  30.0   |  1.29     Ca    8.9        29 May 2018 06:18  Mg     2.3       28 May 2018 06:03    TPro  5.1    /  Alb  2.9    /  TBili  0.9    /  DBili  x      /  AST  17     /  ALT  10     /  AlkPhos  50     / Amylase x      /Lipase x      28 May 2018 06:03              RADIOLOGY & ADDITIONAL STUDIES:     < from: CT Abdomen and Pelvis w/o Cont (05.22.16 @ 13:46) >  IMPRESSION: Colonic diverticulosis without evidence of diverticulitis.    No bowel obstruction or bowel wall thickening.                 MAJOR DENISE M.D., ATTENDING RADIOLOGIST  This document has been electronically signed. May 22 2016  2:25P    < end of copied text >

## 2018-05-29 NOTE — DIETITIAN INITIAL EVALUATION ADULT. - PROBLEM SELECTOR PLAN 1
admit to SDU  Protonix gtt  NPO except meds  PRBC transfusion 2 units  monitor VS and call MICU eval clinically deteriorating  GI cx appreciated: not a candidate for colonoscopy at this time. for CT colonography/enterography once cleared by pulmonary  NPO except meds,  IVF, judicious use  CBC Q12 hrs for now  Iron panel  IV iron tomorrow

## 2018-05-29 NOTE — PROGRESS NOTE ADULT - SUBJECTIVE AND OBJECTIVE BOX
DAVID VINCENT    [id number]    83y      Male    INTERVAL HPI/OVERNIGHT EVENTS:    off service note:  Patient is an 84 yo male with pmh of HTN, DM, COPD on home oxygen, A fib on Xarelto, AVReplacement 6 years ago, TRELL, CKD , Dyslipidemia, chronic blood loss anemia, came to ER c/o shortness of breath, wheezing with productive cough, neck and back pain, "pneumonia", "COPD". For the past  few days, productive cough, increased shortness of breath and increased leg swelling. He also c/o neck pain and lower back painreported dark colored stools.    Of note, patient was admitted to [facility] on march and april with GIB, COPD exacerbation and HCAP. lowest hb on 4/2018 was 6.5. he was deemed not a good candidate for Endoscopic w/u. He has h/o afib and on xarelto which was held during hospitalizations for GIB and was restarted in discharge. Patient was admitted for symptomatic anemia.     Patient seen by GI, cardio, nephro and pulmonary in consult. Patient was transfused total 4 units prbcs and was initially planned for virtual on Friday May 25th but there was an incomplete prep. Patient was observed over the weekend and is planned for virtual    last 24 hours patient had a unit transfused yesterday.  Patient is npo for virtual       REVIEW OF SYSTEMS:    CONSTITUTIONAL: No fever, weight loss, or fatigue  RESPIRATORY: No cough, wheezing, hemoptysis; No shortness of breath  CARDIOVASCULAR: No chest pain, palpitations  GASTROINTESTINAL: No abdominal or epigastric pain. No nausea, vomiting  NEUROLOGICAL: No headaches, memory loss, loss of strength.  MISCELLANEOUS:      Vital Signs Last 24 Hrs  T(C): 36.8 (29 May 2018 05:06), Max: 37.1 (28 May 2018 11:31)  T(F): 98.2 (29 May 2018 05:06), Max: 98.7 (28 May 2018 11:31)  HR: 56 (29 May 2018 05:06) (56 - 75)  BP: 141/77 (29 May 2018 05:06) (102/52 - 146/81)  BP(mean): --  RR: 18 (29 May 2018 05:06) (14 - 18)  SpO2: 100% (29 May 2018 03:57) (93% - 100%)    PHYSICAL EXAM:    GENERAL: NAD   HEENT:  Normocephalic and atraumatic.   NECK: Supple.  No JVD.    CARDIOVASCULAR: s12+  LUNGS: clear  ABDOMEN: ND. Soft,    EXTREMITIES:  +1 edema  NEUROLOGIC: AAO*3.      LABS:                        8.4    5.0   )-----------( 182      ( 29 May 2018 06:18 )             27.2     05-29    146<H>  |  105  |  22.0<H>  ----------------------------<  109  3.7   |  30.0<H>  |  1.29    Ca    8.9      29 May 2018 06:18  Mg     2.3     05-28    TPro  5.1<L>  /  Alb  2.9<L>  /  TBili  0.9  /  DBili  x   /  AST  17  /  ALT  10  /  AlkPhos  50  05-28            MEDICATIONS  (STANDING):  atorvastatin 40 milliGRAM(s) Oral at bedtime  buDESOnide 160 MICROgram(s)/formoterol 4.5 MICROgram(s) Inhaler 2 Puff(s) Inhalation two times a day  dextrose 5%. 1000 milliLiter(s) (50 mL/Hr) IV Continuous <Continuous>  dextrose 50% Injectable 12.5 Gram(s) IV Push once  dextrose 50% Injectable 25 Gram(s) IV Push once  dextrose 50% Injectable 25 Gram(s) IV Push once  furosemide    Tablet 40 milliGRAM(s) Oral daily  insulin lispro (HumaLOG) corrective regimen sliding scale   SubCutaneous three times a day before meals  levalbuterol Inhalation 1.25 milliGRAM(s) Inhalation every 6 hours  metoprolol succinate ER 25 milliGRAM(s) Oral daily  pantoprazole    Tablet 40 milliGRAM(s) Oral two times a day  predniSONE   Tablet 30 milliGRAM(s) Oral daily    MEDICATIONS  (PRN):  acetaminophen   Tablet. 650 milliGRAM(s) Oral every 6 hours PRN Moderate Pain (4 - 6)  dextrose 40% Gel 15 Gram(s) Oral once PRN Blood Glucose LESS THAN 70 milliGRAM(s)/deciliter  glucagon  Injectable 1 milliGRAM(s) IntraMuscular once PRN Glucose LESS THAN 70 milligrams/deciliter  polyethylene glycol 3350 17 Gram(s) Oral two times a day PRN unclear stool, bowel prep      RADIOLOGY & ADDITIONAL TESTS:    virtual colonography --> DAVID VINCENT    [id number]    83y      Male    INTERVAL HPI/OVERNIGHT EVENTS:    off service note:  Patient is an 84 yo male with pmh of HTN, DM, COPD on home oxygen, A fib on Xarelto, AVReplacement 6 years ago, TRELL, CKD , Dyslipidemia, chronic blood loss anemia, came to ER c/o shortness of breath, wheezing with productive cough, neck and back pain, "pneumonia", "COPD". For the past  few days, productive cough, increased shortness of breath and increased leg swelling. He also c/o neck pain and lower back painreported dark colored stools.    Of note, patient was admitted to [facility] on march and april with GIB, COPD exacerbation and HCAP. lowest hb on 4/2018 was 6.5. he was deemed not a good candidate for Endoscopic w/u. He has h/o afib and on xarelto which was held during hospitalizations for GIB and was restarted in discharge. Patient was admitted for symptomatic anemia.     Patient seen by GI, cardio, nephro and pulmonary in consult. Patient was transfused total 4 units prbcs and was initially planned for virtual on Friday May 25th but there was an incomplete prep. Patient was observed over the weekend and is planned for virtual today     last 24 hours patient had a unit transfused yesterday.  Patient is npo for virtual       REVIEW OF SYSTEMS:    CONSTITUTIONAL: No fever, weight loss, or fatigue  RESPIRATORY: No cough, wheezing, hemoptysis; No shortness of breath  CARDIOVASCULAR: No chest pain, palpitations  GASTROINTESTINAL: No abdominal or epigastric pain. No nausea, vomiting  NEUROLOGICAL: No headaches, memory loss, loss of strength.  MISCELLANEOUS:      Vital Signs Last 24 Hrs  T(C): 36.8 (29 May 2018 05:06), Max: 37.1 (28 May 2018 11:31)  T(F): 98.2 (29 May 2018 05:06), Max: 98.7 (28 May 2018 11:31)  HR: 56 (29 May 2018 05:06) (56 - 75)  BP: 141/77 (29 May 2018 05:06) (102/52 - 146/81)  BP(mean): --  RR: 18 (29 May 2018 05:06) (14 - 18)  SpO2: 100% (29 May 2018 03:57) (93% - 100%)    PHYSICAL EXAM:    GENERAL: NAD   HEENT:  Normocephalic and atraumatic.   NECK: Supple.  No JVD.    CARDIOVASCULAR: s12+  LUNGS: clear  ABDOMEN: ND. Soft,    EXTREMITIES:  +1 edema  NEUROLOGIC: AAO*3.      LABS:                        8.4    5.0   )-----------( 182      ( 29 May 2018 06:18 )             27.2     05-29    146<H>  |  105  |  22.0<H>  ----------------------------<  109  3.7   |  30.0<H>  |  1.29    Ca    8.9      29 May 2018 06:18  Mg     2.3     05-28    TPro  5.1<L>  /  Alb  2.9<L>  /  TBili  0.9  /  DBili  x   /  AST  17  /  ALT  10  /  AlkPhos  50  05-28            MEDICATIONS  (STANDING):  atorvastatin 40 milliGRAM(s) Oral at bedtime  buDESOnide 160 MICROgram(s)/formoterol 4.5 MICROgram(s) Inhaler 2 Puff(s) Inhalation two times a day  dextrose 5%. 1000 milliLiter(s) (50 mL/Hr) IV Continuous <Continuous>  dextrose 50% Injectable 12.5 Gram(s) IV Push once  dextrose 50% Injectable 25 Gram(s) IV Push once  dextrose 50% Injectable 25 Gram(s) IV Push once  furosemide    Tablet 40 milliGRAM(s) Oral daily  insulin lispro (HumaLOG) corrective regimen sliding scale   SubCutaneous three times a day before meals  levalbuterol Inhalation 1.25 milliGRAM(s) Inhalation every 6 hours  metoprolol succinate ER 25 milliGRAM(s) Oral daily  pantoprazole    Tablet 40 milliGRAM(s) Oral two times a day  predniSONE   Tablet 30 milliGRAM(s) Oral daily    MEDICATIONS  (PRN):  acetaminophen   Tablet. 650 milliGRAM(s) Oral every 6 hours PRN Moderate Pain (4 - 6)  dextrose 40% Gel 15 Gram(s) Oral once PRN Blood Glucose LESS THAN 70 milliGRAM(s)/deciliter  glucagon  Injectable 1 milliGRAM(s) IntraMuscular once PRN Glucose LESS THAN 70 milligrams/deciliter  polyethylene glycol 3350 17 Gram(s) Oral two times a day PRN unclear stool, bowel prep      RADIOLOGY & ADDITIONAL TESTS:    virtual colonography -->

## 2018-05-30 LAB
ANION GAP SERPL CALC-SCNC: 12 MMOL/L — SIGNIFICANT CHANGE UP (ref 5–17)
BUN SERPL-MCNC: 19 MG/DL — SIGNIFICANT CHANGE UP (ref 8–20)
CALCIUM SERPL-MCNC: 9.1 MG/DL — SIGNIFICANT CHANGE UP (ref 8.6–10.2)
CHLORIDE SERPL-SCNC: 102 MMOL/L — SIGNIFICANT CHANGE UP (ref 98–107)
CO2 SERPL-SCNC: 33 MMOL/L — HIGH (ref 22–29)
CREAT SERPL-MCNC: 1.19 MG/DL — SIGNIFICANT CHANGE UP (ref 0.5–1.3)
GLUCOSE BLDC GLUCOMTR-MCNC: 121 MG/DL — HIGH (ref 70–99)
GLUCOSE BLDC GLUCOMTR-MCNC: 138 MG/DL — HIGH (ref 70–99)
GLUCOSE SERPL-MCNC: 135 MG/DL — HIGH (ref 70–115)
HCT VFR BLD CALC: 27.8 % — LOW (ref 42–52)
HGB BLD-MCNC: 8.6 G/DL — LOW (ref 14–18)
MAGNESIUM SERPL-MCNC: 2 MG/DL — SIGNIFICANT CHANGE UP (ref 1.6–2.6)
MCHC RBC-ENTMCNC: 24.6 PG — LOW (ref 27–31)
MCHC RBC-ENTMCNC: 30.9 G/DL — LOW (ref 32–36)
MCV RBC AUTO: 79.4 FL — LOW (ref 80–94)
PLATELET # BLD AUTO: 190 K/UL — SIGNIFICANT CHANGE UP (ref 150–400)
POTASSIUM SERPL-MCNC: 4 MMOL/L — SIGNIFICANT CHANGE UP (ref 3.5–5.3)
POTASSIUM SERPL-SCNC: 4 MMOL/L — SIGNIFICANT CHANGE UP (ref 3.5–5.3)
RBC # BLD: 3.5 M/UL — LOW (ref 4.6–6.2)
RBC # FLD: 20.5 % — HIGH (ref 11–15.6)
SODIUM SERPL-SCNC: 147 MMOL/L — HIGH (ref 135–145)
WBC # BLD: 5.8 K/UL — SIGNIFICANT CHANGE UP (ref 4.8–10.8)
WBC # FLD AUTO: 5.8 K/UL — SIGNIFICANT CHANGE UP (ref 4.8–10.8)

## 2018-05-30 PROCEDURE — 99233 SBSQ HOSP IP/OBS HIGH 50: CPT

## 2018-05-30 PROCEDURE — 36000 PLACE NEEDLE IN VEIN: CPT

## 2018-05-30 RX ADMIN — LEVALBUTEROL 1.25 MILLIGRAM(S): 1.25 SOLUTION, CONCENTRATE RESPIRATORY (INHALATION) at 09:54

## 2018-05-30 RX ADMIN — Medication 20 MILLIGRAM(S): at 06:08

## 2018-05-30 RX ADMIN — Medication 650 MILLIGRAM(S): at 21:43

## 2018-05-30 RX ADMIN — BUDESONIDE AND FORMOTEROL FUMARATE DIHYDRATE 2 PUFF(S): 160; 4.5 AEROSOL RESPIRATORY (INHALATION) at 09:58

## 2018-05-30 RX ADMIN — PANTOPRAZOLE SODIUM 40 MILLIGRAM(S): 20 TABLET, DELAYED RELEASE ORAL at 06:09

## 2018-05-30 RX ADMIN — BUDESONIDE AND FORMOTEROL FUMARATE DIHYDRATE 2 PUFF(S): 160; 4.5 AEROSOL RESPIRATORY (INHALATION) at 21:55

## 2018-05-30 RX ADMIN — LEVALBUTEROL 1.25 MILLIGRAM(S): 1.25 SOLUTION, CONCENTRATE RESPIRATORY (INHALATION) at 21:54

## 2018-05-30 RX ADMIN — Medication 650 MILLIGRAM(S): at 22:24

## 2018-05-30 RX ADMIN — LEVALBUTEROL 1.25 MILLIGRAM(S): 1.25 SOLUTION, CONCENTRATE RESPIRATORY (INHALATION) at 15:43

## 2018-05-30 RX ADMIN — ATORVASTATIN CALCIUM 40 MILLIGRAM(S): 80 TABLET, FILM COATED ORAL at 21:40

## 2018-05-30 RX ADMIN — Medication 25 MILLIGRAM(S): at 06:08

## 2018-05-30 RX ADMIN — Medication 40 MILLIGRAM(S): at 06:09

## 2018-05-30 NOTE — PROCEDURE NOTE - NSPROCDETAILS_GEN_ALL_CORE
location identified, draped/prepped, sterile technique used/secured in place/blood seen on insertion/sterile technique, catheter placed/dressing applied/flushes easily

## 2018-05-30 NOTE — PROGRESS NOTE ADULT - SUBJECTIVE AND OBJECTIVE BOX
seen for anemia  no acute complaints  Ros otherwise negative     MEDICATIONS  (STANDING):  atorvastatin 40 milliGRAM(s) Oral at bedtime  buDESOnide 160 MICROgram(s)/formoterol 4.5 MICROgram(s) Inhaler 2 Puff(s) Inhalation two times a day  dextrose 5%. 1000 milliLiter(s) (50 mL/Hr) IV Continuous <Continuous>  dextrose 50% Injectable 12.5 Gram(s) IV Push once  dextrose 50% Injectable 25 Gram(s) IV Push once  dextrose 50% Injectable 25 Gram(s) IV Push once  furosemide    Tablet 40 milliGRAM(s) Oral daily  insulin lispro (HumaLOG) corrective regimen sliding scale   SubCutaneous three times a day before meals  levalbuterol Inhalation 1.25 milliGRAM(s) Inhalation every 6 hours  pantoprazole    Tablet 40 milliGRAM(s) Oral two times a day  predniSONE   Tablet 20 milliGRAM(s) Oral daily    MEDICATIONS  (PRN):  acetaminophen   Tablet. 650 milliGRAM(s) Oral every 6 hours PRN Moderate Pain (4 - 6)  dextrose 40% Gel 15 Gram(s) Oral once PRN Blood Glucose LESS THAN 70 milliGRAM(s)/deciliter  glucagon  Injectable 1 milliGRAM(s) IntraMuscular once PRN Glucose LESS THAN 70 milligrams/deciliter  polyethylene glycol 3350 17 Gram(s) Oral two times a day PRN unclear stool, bowel prep      Allergies    No Known Allergies      Vital Signs Last 24 Hrs  T(C): 36.7 (30 May 2018 06:03), Max: 37.1 (29 May 2018 20:59)  T(F): 98.1 (30 May 2018 06:03), Max: 98.8 (29 May 2018 20:59)  HR: 57 (30 May 2018 06:03) (56 - 80)  BP: 138/67 (30 May 2018 06:03) (123/81 - 170/98)  BP(mean): --  RR: 18 (30 May 2018 06:03) (16 - 18)  SpO2: 99% (30 May 2018 06:03) (97% - 99%)    PHYSICAL EXAM:    GENERAL: NAD  CHEST/LUNG: Clear to percussion bilaterall  HEART: Regular rate and rhythm; S1 S2  ABDOMEN: Soft, Nontender, Nondistended; Bowel sounds present  EXTREMITIES: no edema  NERVOUS SYSTEM:  Alert & Oriented X3, nonfocal  PSYCH: normal mood, appropriate response.    LABS:                        8.6    5.8   )-----------( 190      ( 30 May 2018 07:11 )             27.8     05-30    147<H>  |  102  |  19.0  ----------------------------<  135<H>  4.0   |  33.0<H>  |  1.19    Ca    9.1      30 May 2018 07:11  Mg     2.0     05-30            CAPILLARY BLOOD GLUCOSE      POCT Blood Glucose.: 138 mg/dL (30 May 2018 08:04)  POCT Blood Glucose.: 123 mg/dL (29 May 2018 21:17)  POCT Blood Glucose.: 129 mg/dL (29 May 2018 17:14)        RADIOLOGY & ADDITIONAL TESTS:

## 2018-05-30 NOTE — PROGRESS NOTE ADULT - SUBJECTIVE AND OBJECTIVE BOX
Patient seen and examined;  feels OK.  Tolerated Ct colography yesterday:  Diverticulosis only.  No polyps or tumors.    No bleeding;  tolerates diet.  Some diarrhea today:  sitting on commode.    PAST MEDICAL & SURGICAL HISTORY:  COPD (chronic obstructive pulmonary disease)  Hyperlipidemia, unspecified hyperlipidemia type  Essential hypertension  Afib  Chronic systolic CHF (congestive heart failure)  Hypertrophic cardiomyopathy  CKD (chronic kidney disease), stage 4 (severe)  Gassiness  Diabetes  Asthma  Prostate cancer  Hypertension  No significant past surgical history  History of valvuloplasty  History of knee surgery      ROS:  No Heartburn, regurgitation, dysphagia, odynophagia.  No dyspepsia  No abdominal pain.    No Nausea, vomiting.  No Bleeding.  No hematemesis.   No diarrhea.    No hematochesia.  No weight loss, anorexia.  No edema.      MEDICATIONS  (STANDING):  atorvastatin 40 milliGRAM(s) Oral at bedtime  buDESOnide 160 MICROgram(s)/formoterol 4.5 MICROgram(s) Inhaler 2 Puff(s) Inhalation two times a day  dextrose 5%. 1000 milliLiter(s) (50 mL/Hr) IV Continuous <Continuous>  dextrose 50% Injectable 12.5 Gram(s) IV Push once  dextrose 50% Injectable 25 Gram(s) IV Push once  dextrose 50% Injectable 25 Gram(s) IV Push once  furosemide    Tablet 40 milliGRAM(s) Oral daily  insulin lispro (HumaLOG) corrective regimen sliding scale   SubCutaneous three times a day before meals  levalbuterol Inhalation 1.25 milliGRAM(s) Inhalation every 6 hours  metoprolol succinate ER 25 milliGRAM(s) Oral daily  pantoprazole    Tablet 40 milliGRAM(s) Oral two times a day  predniSONE   Tablet 20 milliGRAM(s) Oral daily    MEDICATIONS  (PRN):  acetaminophen   Tablet. 650 milliGRAM(s) Oral every 6 hours PRN Moderate Pain (4 - 6)  dextrose 40% Gel 15 Gram(s) Oral once PRN Blood Glucose LESS THAN 70 milliGRAM(s)/deciliter  glucagon  Injectable 1 milliGRAM(s) IntraMuscular once PRN Glucose LESS THAN 70 milligrams/deciliter  hydrALAZINE Injectable 10 milliGRAM(s) IV Push every 6 hours PRN for sbp above 160 mmhg  polyethylene glycol 3350 17 Gram(s) Oral two times a day PRN unclear stool, bowel prep      Allergies    No Known Allergies    Intolerances        Vital Signs Last 24 Hrs  T(C): 36.7 (30 May 2018 06:03), Max: 37.1 (29 May 2018 20:59)  T(F): 98.1 (30 May 2018 06:03), Max: 98.8 (29 May 2018 20:59)  HR: 57 (30 May 2018 06:03) (46 - 80)  BP: 138/67 (30 May 2018 06:03) (123/81 - 170/98)  BP(mean): --  RR: 18 (30 May 2018 06:03) (16 - 18)  SpO2: 99% (30 May 2018 06:03) (95% - 99%)    PHYSICAL EXAM:    GENERAL: NAD, well-groomed, well-developed  HEAD:  Atraumatic, Normocephalic  EYES: EOMI, PERRLA, conjunctiva and sclera clear  ENMT: No tonsillar erythema, exudates, or enlargement; Moist mucous membranes, Good dentition, No lesions  NECK: Supple, No JVD, Normal thyroid  CHEST/LUNG: Clear to percussion bilaterally; No rales, rhonchi, wheezing, or rubs  HEART: Regular rate and rhythm; No murmurs, rubs, or gallops  ABDOMEN: Soft, Nontender, Nondistended; Bowel sounds present  EXTREMITIES:  2+ Peripheral Pulses, No clubbing, cyanosis, or edema  LYMPH: No lymphadenopathy noted  SKIN: No rashes or lesions      LABS:                        8.6    5.8   )-----------( 190      ( 30 May 2018 07:11 )             27.8     05-30    147<H>  |  102  |  19.0  ----------------------------<  135<H>  4.0   |  33.0<H>  |  1.19    Ca    9.1      30 May 2018 07:11  Mg     2.0     05-30               RADIOLOGY & ADDITIONAL STUDIES:  VC::  Diverticulosis.

## 2018-05-30 NOTE — PROGRESS NOTE ADULT - SUBJECTIVE AND OBJECTIVE BOX
DAVID MELISA  [id number]      Chief Complaint:  GIB/AF/CAD/BioAVR      Interval History:  Patient feels well and has no c/o.  Wants to go home.  Denies CP/SOB/palps.    Tele:  AF with controlled VR      acetaminophen   Tablet. 650 milliGRAM(s) Oral every 6 hours PRN  atorvastatin 40 milliGRAM(s) Oral at bedtime  buDESOnide 160 MICROgram(s)/formoterol 4.5 MICROgram(s) Inhaler 2 Puff(s) Inhalation two times a day  dextrose 40% Gel 15 Gram(s) Oral once PRN  dextrose 5%. 1000 milliLiter(s) IV Continuous <Continuous>  dextrose 50% Injectable 12.5 Gram(s) IV Push once  dextrose 50% Injectable 25 Gram(s) IV Push once  dextrose 50% Injectable 25 Gram(s) IV Push once  furosemide    Tablet 40 milliGRAM(s) Oral daily  glucagon  Injectable 1 milliGRAM(s) IntraMuscular once PRN  insulin lispro (HumaLOG) corrective regimen sliding scale   SubCutaneous three times a day before meals  levalbuterol Inhalation 1.25 milliGRAM(s) Inhalation every 6 hours  pantoprazole    Tablet 40 milliGRAM(s) Oral two times a day  polyethylene glycol 3350 17 Gram(s) Oral two times a day PRN  predniSONE   Tablet 20 milliGRAM(s) Oral daily          Physical Exam:  T(C): 36.7 (05-30-18 @ 06:03), Max: 37.1 (05-29-18 @ 20:59)  HR: 57 (05-30-18 @ 06:03) (56 - 80)  BP: 138/67 (05-30-18 @ 06:03) (123/81 - 170/98)  RR: 18 (05-30-18 @ 06:03) (16 - 18)  SpO2: 99% (05-30-18 @ 06:03) (97% - 99%)  Wt(kg): --  General: Comfortable in NAD  Neck: No JVD  CVS: nl s1s2, no s3, +YELENA  Pulm: CTA b/l  Abd: soft, non-tender  Ext: 1-2+ b/l pretibial edema  Neuro A&O x3  Psych: Normal affect      Labs:   30 May 2018 07:11    147    |  102    |  19.0   ----------------------------<  135    4.0     |  33.0   |  1.19     Ca    9.1        30 May 2018 07:11  Mg     2.0       30 May 2018 07:11                            8.6    5.8   )-----------( 190      ( 30 May 2018 07:11 )             27.8             Echo 4/2018:   1. Left ventricular ejection fraction, by visual estimation, is 60 to   65%.   2. Normal global left ventricular systolic function.   3. The mitral in-flow pattern reveals no discernable A-wave, therefore   no comment on diastolic function can be made.   4. Normal left ventricular internal cavity size.   5. Normal right ventricular size and function.   6. Severely enlarged left atrium.   7. The right atrium is normal in size.   8. Mild mitral annular calcification.   9. Thickening of the anterior and posterior mitral valve leaflets.  10. Mild to moderate mitral valve regurgitation.  11. Bioprosthesis in the aortic position demonstrating normal function.  12. Dilatation of the ascending aorta.  13. There is no evidence of pericardial effusion.  14. No evidence of endocarditis. Tricuspid valve not well seen.      Assessment:   Patient is a  83y Male with h/o CAD s/p CABG, Bio AVR, persistent AF, COPD on home O2 (2-3L), KRYSTIN here with recurrent GI bleed leading to severe symptomatic anemia.   -Has been transfused, no further GI bleeding and H/H stable in 7's  -No signs decompensated CHF at this time and AF rate controlled  -Volume overloaded sec to Pulm HTN and CKD, stable.  -Now s/p virtual colonoscopy with only diverticulosis.  Pending CT enteroscopy.    Plan:  1. Continue eval per GI.  2. A/C will be determined once GI work up complete, will consider ULISES closure as well as OP.  3. Continue Metoprolol AF rate controlled.  Keep off Amio.  4. Continue current CV meds at current doses.  Lasix restarted.

## 2018-05-31 LAB
GLUCOSE BLDC GLUCOMTR-MCNC: 108 MG/DL — HIGH (ref 70–99)
GLUCOSE BLDC GLUCOMTR-MCNC: 109 MG/DL — HIGH (ref 70–99)
GLUCOSE BLDC GLUCOMTR-MCNC: 118 MG/DL — HIGH (ref 70–99)
GLUCOSE BLDC GLUCOMTR-MCNC: 190 MG/DL — HIGH (ref 70–99)

## 2018-05-31 PROCEDURE — 99232 SBSQ HOSP IP/OBS MODERATE 35: CPT

## 2018-05-31 PROCEDURE — 74177 CT ABD & PELVIS W/CONTRAST: CPT | Mod: 26

## 2018-05-31 RX ADMIN — LEVALBUTEROL 1.25 MILLIGRAM(S): 1.25 SOLUTION, CONCENTRATE RESPIRATORY (INHALATION) at 20:54

## 2018-05-31 RX ADMIN — BUDESONIDE AND FORMOTEROL FUMARATE DIHYDRATE 2 PUFF(S): 160; 4.5 AEROSOL RESPIRATORY (INHALATION) at 09:40

## 2018-05-31 RX ADMIN — Medication 650 MILLIGRAM(S): at 22:06

## 2018-05-31 RX ADMIN — Medication 650 MILLIGRAM(S): at 22:40

## 2018-05-31 RX ADMIN — Medication 20 MILLIGRAM(S): at 05:34

## 2018-05-31 RX ADMIN — Medication 40 MILLIGRAM(S): at 05:34

## 2018-05-31 RX ADMIN — LEVALBUTEROL 1.25 MILLIGRAM(S): 1.25 SOLUTION, CONCENTRATE RESPIRATORY (INHALATION) at 09:39

## 2018-05-31 RX ADMIN — ATORVASTATIN CALCIUM 40 MILLIGRAM(S): 80 TABLET, FILM COATED ORAL at 22:06

## 2018-05-31 RX ADMIN — PANTOPRAZOLE SODIUM 40 MILLIGRAM(S): 20 TABLET, DELAYED RELEASE ORAL at 17:18

## 2018-05-31 RX ADMIN — PANTOPRAZOLE SODIUM 40 MILLIGRAM(S): 20 TABLET, DELAYED RELEASE ORAL at 05:34

## 2018-05-31 RX ADMIN — LEVALBUTEROL 1.25 MILLIGRAM(S): 1.25 SOLUTION, CONCENTRATE RESPIRATORY (INHALATION) at 15:30

## 2018-05-31 RX ADMIN — BUDESONIDE AND FORMOTEROL FUMARATE DIHYDRATE 2 PUFF(S): 160; 4.5 AEROSOL RESPIRATORY (INHALATION) at 20:53

## 2018-05-31 NOTE — PROGRESS NOTE ADULT - SUBJECTIVE AND OBJECTIVE BOX
INTERVAL HPI/OVERNIGHT EVENTS:FU for rectal bleeding. No masses noted on CT colonography. Ct enterography performed. Results pending. No complaints at this time. Hct stable.     MEDICATIONS  (STANDING):  atorvastatin 40 milliGRAM(s) Oral at bedtime  buDESOnide 160 MICROgram(s)/formoterol 4.5 MICROgram(s) Inhaler 2 Puff(s) Inhalation two times a day  dextrose 5%. 1000 milliLiter(s) (50 mL/Hr) IV Continuous <Continuous>  dextrose 50% Injectable 12.5 Gram(s) IV Push once  dextrose 50% Injectable 25 Gram(s) IV Push once  dextrose 50% Injectable 25 Gram(s) IV Push once  furosemide    Tablet 40 milliGRAM(s) Oral daily  insulin lispro (HumaLOG) corrective regimen sliding scale   SubCutaneous three times a day before meals  levalbuterol Inhalation 1.25 milliGRAM(s) Inhalation every 6 hours  pantoprazole    Tablet 40 milliGRAM(s) Oral two times a day  predniSONE   Tablet 20 milliGRAM(s) Oral daily    MEDICATIONS  (PRN):  acetaminophen   Tablet. 650 milliGRAM(s) Oral every 6 hours PRN Moderate Pain (4 - 6)  dextrose 40% Gel 15 Gram(s) Oral once PRN Blood Glucose LESS THAN 70 milliGRAM(s)/deciliter  glucagon  Injectable 1 milliGRAM(s) IntraMuscular once PRN Glucose LESS THAN 70 milligrams/deciliter  polyethylene glycol 3350 17 Gram(s) Oral two times a day PRN unclear stool, bowel prep      Allergies    No Known Allergies    Intolerances        Vital Signs Last 24 Hrs  T(C): 36.7 (31 May 2018 11:11), Max: 37.3 (30 May 2018 16:42)  T(F): 98 (31 May 2018 11:11), Max: 99.1 (30 May 2018 16:42)  HR: 54 (31 May 2018 11:11) (52 - 62)  BP: 128/77 (31 May 2018 11:11) (116/63 - 139/68)  BP(mean): --  RR: 20 (31 May 2018 11:11) (20 - 20)  SpO2: 98% (31 May 2018 11:11) (98% - 100%)    LABS:                        8.6    5.8   )-----------( 190      ( 30 May 2018 07:11 )             27.8     05-30    147<H>  |  102  |  19.0  ----------------------------<  135<H>  4.0   |  33.0<H>  |  1.19    Ca    9.1      30 May 2018 07:11  Mg     2.0     05-30            RADIOLOGY & ADDITIONAL TESTS:  < from: CT Colonography, Diagnosis (05.29.18 @ 16:08) >    INTERPRETATION:  History:  Deficiency anemia and positive fecal occult blood sample. Patient unable   to undergo direct colonoscopy as patient is on anticoagulant therapy..    Date and time of exam: 5/29/2018 3:18 PM.    Comparison: No prior exam.    Technique: The patient underwent routine bowel cleansing preparation   prior to the exam.  Oral contrast was not administered for this exam.    The colon was insufflated with air and the patient was scanned in supine   and prone positions.  2D and 3D images were then reviewed.    Findings: The colon is well visualized from the anus around to the cecum.   The ileocecal valve and appendix are visualized.    Diverticulosis of the sigmoid colon and left colon is noted. No evidence   of diverticulitis. No evidence of obstructing mass. No annular   constricting lesions noted. No evidence of polyp. No evidence of colitis..    Impression:  Diverticulosis. No evidence of polyp or mass..    < end of copied text >

## 2018-05-31 NOTE — PROGRESS NOTE ADULT - SUBJECTIVE AND OBJECTIVE BOX
seen for anemia    upset that ct scan not yet done  ros negative     MEDICATIONS  (STANDING):  atorvastatin 40 milliGRAM(s) Oral at bedtime  buDESOnide 160 MICROgram(s)/formoterol 4.5 MICROgram(s) Inhaler 2 Puff(s) Inhalation two times a day  dextrose 5%. 1000 milliLiter(s) (50 mL/Hr) IV Continuous <Continuous>  dextrose 50% Injectable 12.5 Gram(s) IV Push once  dextrose 50% Injectable 25 Gram(s) IV Push once  dextrose 50% Injectable 25 Gram(s) IV Push once  furosemide    Tablet 40 milliGRAM(s) Oral daily  insulin lispro (HumaLOG) corrective regimen sliding scale   SubCutaneous three times a day before meals  levalbuterol Inhalation 1.25 milliGRAM(s) Inhalation every 6 hours  pantoprazole    Tablet 40 milliGRAM(s) Oral two times a day  predniSONE   Tablet 20 milliGRAM(s) Oral daily    MEDICATIONS  (PRN):  acetaminophen   Tablet. 650 milliGRAM(s) Oral every 6 hours PRN Moderate Pain (4 - 6)  dextrose 40% Gel 15 Gram(s) Oral once PRN Blood Glucose LESS THAN 70 milliGRAM(s)/deciliter  glucagon  Injectable 1 milliGRAM(s) IntraMuscular once PRN Glucose LESS THAN 70 milligrams/deciliter  polyethylene glycol 3350 17 Gram(s) Oral two times a day PRN unclear stool, bowel prep      Allergies    No Known Allergies      Vital Signs Last 24 Hrs  T(C): 36.9 (31 May 2018 05:14), Max: 37.3 (30 May 2018 16:42)  T(F): 98.4 (31 May 2018 05:14), Max: 99.1 (30 May 2018 16:42)  HR: 62 (31 May 2018 05:14) (52 - 62)  BP: 116/63 (31 May 2018 05:14) (116/63 - 139/68)  BP(mean): --  RR: 20 (31 May 2018 05:14) (20 - 20)  SpO2: 100% (31 May 2018 09:42) (96% - 100%)    PHYSICAL EXAM:    GENERAL: NAD  CHEST/LUNG: Clear to percussion bilaterally  HEART: Regular rate and rhythm; S1 S2  ABDOMEN: Soft, Nontender, Nondistended; Bowel sounds present  EXTREMITIES: + 1 edema    LABS:                        8.6    5.8   )-----------( 190      ( 30 May 2018 07:11 )             27.8     05-30    147<H>  |  102  |  19.0  ----------------------------<  135<H>  4.0   |  33.0<H>  |  1.19    Ca    9.1      30 May 2018 07:11  Mg     2.0     05-30            CAPILLARY BLOOD GLUCOSE      POCT Blood Glucose.: 109 mg/dL (31 May 2018 07:32)  POCT Blood Glucose.: 121 mg/dL (30 May 2018 21:36)        RADIOLOGY & ADDITIONAL TESTS:

## 2018-06-01 VITALS
TEMPERATURE: 98 F | HEART RATE: 78 BPM | DIASTOLIC BLOOD PRESSURE: 69 MMHG | SYSTOLIC BLOOD PRESSURE: 106 MMHG | RESPIRATION RATE: 18 BRPM

## 2018-06-01 LAB
ANION GAP SERPL CALC-SCNC: 12 MMOL/L — SIGNIFICANT CHANGE UP (ref 5–17)
BUN SERPL-MCNC: 16 MG/DL — SIGNIFICANT CHANGE UP (ref 8–20)
CALCIUM SERPL-MCNC: 8.6 MG/DL — SIGNIFICANT CHANGE UP (ref 8.6–10.2)
CHLORIDE SERPL-SCNC: 100 MMOL/L — SIGNIFICANT CHANGE UP (ref 98–107)
CO2 SERPL-SCNC: 31 MMOL/L — HIGH (ref 22–29)
CREAT SERPL-MCNC: 1.44 MG/DL — HIGH (ref 0.5–1.3)
GLUCOSE BLDC GLUCOMTR-MCNC: 136 MG/DL — HIGH (ref 70–99)
GLUCOSE BLDC GLUCOMTR-MCNC: 166 MG/DL — HIGH (ref 70–99)
GLUCOSE SERPL-MCNC: 117 MG/DL — HIGH (ref 70–115)
HCT VFR BLD CALC: 29 % — LOW (ref 42–52)
HGB BLD-MCNC: 8.9 G/DL — LOW (ref 14–18)
MCHC RBC-ENTMCNC: 24.3 PG — LOW (ref 27–31)
MCHC RBC-ENTMCNC: 30.7 G/DL — LOW (ref 32–36)
MCV RBC AUTO: 79.2 FL — LOW (ref 80–94)
PLATELET # BLD AUTO: 134 K/UL — LOW (ref 150–400)
POTASSIUM SERPL-MCNC: 3.3 MMOL/L — LOW (ref 3.5–5.3)
POTASSIUM SERPL-SCNC: 3.3 MMOL/L — LOW (ref 3.5–5.3)
RBC # BLD: 3.66 M/UL — LOW (ref 4.6–6.2)
RBC # FLD: 20.2 % — HIGH (ref 11–15.6)
SODIUM SERPL-SCNC: 143 MMOL/L — SIGNIFICANT CHANGE UP (ref 135–145)
WBC # BLD: 5.7 K/UL — SIGNIFICANT CHANGE UP (ref 4.8–10.8)
WBC # FLD AUTO: 5.7 K/UL — SIGNIFICANT CHANGE UP (ref 4.8–10.8)

## 2018-06-01 PROCEDURE — 85610 PROTHROMBIN TIME: CPT

## 2018-06-01 PROCEDURE — 94760 N-INVAS EAR/PLS OXIMETRY 1: CPT

## 2018-06-01 PROCEDURE — 71250 CT THORAX DX C-: CPT

## 2018-06-01 PROCEDURE — 83935 ASSAY OF URINE OSMOLALITY: CPT

## 2018-06-01 PROCEDURE — 81001 URINALYSIS AUTO W/SCOPE: CPT

## 2018-06-01 PROCEDURE — 84300 ASSAY OF URINE SODIUM: CPT

## 2018-06-01 PROCEDURE — 96375 TX/PRO/DX INJ NEW DRUG ADDON: CPT

## 2018-06-01 PROCEDURE — 94640 AIRWAY INHALATION TREATMENT: CPT

## 2018-06-01 PROCEDURE — 83550 IRON BINDING TEST: CPT

## 2018-06-01 PROCEDURE — 86850 RBC ANTIBODY SCREEN: CPT

## 2018-06-01 PROCEDURE — 86901 BLOOD TYPING SEROLOGIC RH(D): CPT

## 2018-06-01 PROCEDURE — 99239 HOSP IP/OBS DSCHRG MGMT >30: CPT

## 2018-06-01 PROCEDURE — 99285 EMERGENCY DEPT VISIT HI MDM: CPT | Mod: 25

## 2018-06-01 PROCEDURE — 82728 ASSAY OF FERRITIN: CPT

## 2018-06-01 PROCEDURE — 85730 THROMBOPLASTIN TIME PARTIAL: CPT

## 2018-06-01 PROCEDURE — 36415 COLL VENOUS BLD VENIPUNCTURE: CPT

## 2018-06-01 PROCEDURE — 82272 OCCULT BLD FECES 1-3 TESTS: CPT

## 2018-06-01 PROCEDURE — 86923 COMPATIBILITY TEST ELECTRIC: CPT

## 2018-06-01 PROCEDURE — 85027 COMPLETE CBC AUTOMATED: CPT

## 2018-06-01 PROCEDURE — 87040 BLOOD CULTURE FOR BACTERIA: CPT

## 2018-06-01 PROCEDURE — 83735 ASSAY OF MAGNESIUM: CPT

## 2018-06-01 PROCEDURE — 82550 ASSAY OF CK (CPK): CPT

## 2018-06-01 PROCEDURE — 93005 ELECTROCARDIOGRAM TRACING: CPT

## 2018-06-01 PROCEDURE — 83880 ASSAY OF NATRIURETIC PEPTIDE: CPT

## 2018-06-01 PROCEDURE — 85045 AUTOMATED RETICULOCYTE COUNT: CPT

## 2018-06-01 PROCEDURE — 97163 PT EVAL HIGH COMPLEX 45 MIN: CPT

## 2018-06-01 PROCEDURE — 82962 GLUCOSE BLOOD TEST: CPT

## 2018-06-01 PROCEDURE — 86900 BLOOD TYPING SEROLOGIC ABO: CPT

## 2018-06-01 PROCEDURE — 84484 ASSAY OF TROPONIN QUANT: CPT

## 2018-06-01 PROCEDURE — 80053 COMPREHEN METABOLIC PANEL: CPT

## 2018-06-01 PROCEDURE — P9016: CPT

## 2018-06-01 PROCEDURE — 71045 X-RAY EXAM CHEST 1 VIEW: CPT

## 2018-06-01 PROCEDURE — 74177 CT ABD & PELVIS W/CONTRAST: CPT

## 2018-06-01 PROCEDURE — 82803 BLOOD GASES ANY COMBINATION: CPT

## 2018-06-01 PROCEDURE — 74018 RADEX ABDOMEN 1 VIEW: CPT

## 2018-06-01 PROCEDURE — 76770 US EXAM ABDO BACK WALL COMP: CPT

## 2018-06-01 PROCEDURE — 84466 ASSAY OF TRANSFERRIN: CPT

## 2018-06-01 PROCEDURE — 99232 SBSQ HOSP IP/OBS MODERATE 35: CPT

## 2018-06-01 PROCEDURE — 84156 ASSAY OF PROTEIN URINE: CPT

## 2018-06-01 PROCEDURE — 84145 PROCALCITONIN (PCT): CPT

## 2018-06-01 PROCEDURE — 83036 HEMOGLOBIN GLYCOSYLATED A1C: CPT

## 2018-06-01 PROCEDURE — 83605 ASSAY OF LACTIC ACID: CPT

## 2018-06-01 PROCEDURE — 80048 BASIC METABOLIC PNL TOTAL CA: CPT

## 2018-06-01 PROCEDURE — 36430 TRANSFUSION BLD/BLD COMPNT: CPT

## 2018-06-01 PROCEDURE — 96374 THER/PROPH/DIAG INJ IV PUSH: CPT

## 2018-06-01 PROCEDURE — 84100 ASSAY OF PHOSPHORUS: CPT

## 2018-06-01 PROCEDURE — 74261 CT COLONOGRAPHY DX: CPT

## 2018-06-01 PROCEDURE — T1013: CPT

## 2018-06-01 PROCEDURE — 36600 WITHDRAWAL OF ARTERIAL BLOOD: CPT

## 2018-06-01 RX ORDER — POTASSIUM CHLORIDE 20 MEQ
40 PACKET (EA) ORAL ONCE
Qty: 0 | Refills: 0 | Status: COMPLETED | OUTPATIENT
Start: 2018-06-01 | End: 2018-06-01

## 2018-06-01 RX ORDER — RIVAROXABAN 15 MG-20MG
15 KIT ORAL EVERY 24 HOURS
Qty: 0 | Refills: 0 | Status: DISCONTINUED | OUTPATIENT
Start: 2018-06-01 | End: 2018-06-01

## 2018-06-01 RX ADMIN — Medication 40 MILLIGRAM(S): at 06:08

## 2018-06-01 RX ADMIN — LEVALBUTEROL 1.25 MILLIGRAM(S): 1.25 SOLUTION, CONCENTRATE RESPIRATORY (INHALATION) at 09:27

## 2018-06-01 RX ADMIN — Medication 1: at 12:09

## 2018-06-01 RX ADMIN — Medication 20 MILLIGRAM(S): at 06:08

## 2018-06-01 RX ADMIN — PANTOPRAZOLE SODIUM 40 MILLIGRAM(S): 20 TABLET, DELAYED RELEASE ORAL at 06:08

## 2018-06-01 RX ADMIN — BUDESONIDE AND FORMOTEROL FUMARATE DIHYDRATE 2 PUFF(S): 160; 4.5 AEROSOL RESPIRATORY (INHALATION) at 09:28

## 2018-06-01 RX ADMIN — Medication 40 MILLIEQUIVALENT(S): at 13:46

## 2018-06-01 NOTE — PROGRESS NOTE ADULT - SUBJECTIVE AND OBJECTIVE BOX
DAVIDRAQUEL VINCENT  [id number]      Chief Complaint:  GIB/AF/CAD/BioAVR    Interval History:  Patient feels well and has no c/o.  Denies CP/SOB/palps.    Tele:  AF with controlled VR          acetaminophen   Tablet. 650 milliGRAM(s) Oral every 6 hours PRN  atorvastatin 40 milliGRAM(s) Oral at bedtime  buDESOnide 160 MICROgram(s)/formoterol 4.5 MICROgram(s) Inhaler 2 Puff(s) Inhalation two times a day  dextrose 40% Gel 15 Gram(s) Oral once PRN  dextrose 5%. 1000 milliLiter(s) IV Continuous <Continuous>  dextrose 50% Injectable 12.5 Gram(s) IV Push once  dextrose 50% Injectable 25 Gram(s) IV Push once  dextrose 50% Injectable 25 Gram(s) IV Push once  furosemide    Tablet 40 milliGRAM(s) Oral daily  glucagon  Injectable 1 milliGRAM(s) IntraMuscular once PRN  insulin lispro (HumaLOG) corrective regimen sliding scale   SubCutaneous three times a day before meals  levalbuterol Inhalation 1.25 milliGRAM(s) Inhalation every 6 hours  pantoprazole    Tablet 40 milliGRAM(s) Oral two times a day  polyethylene glycol 3350 17 Gram(s) Oral two times a day PRN  potassium chloride    Tablet ER 40 milliEquivalent(s) Oral once  predniSONE   Tablet 20 milliGRAM(s) Oral daily          Physical Exam:  T(C): 36.8 (06-01-18 @ 05:17), Max: 37.1 (05-31-18 @ 20:27)  HR: 52 (06-01-18 @ 09:29) (51 - 74)  BP: 101/57 (06-01-18 @ 05:17) (101/57 - 149/93)  RR: 20 (06-01-18 @ 05:17) (20 - 20)  SpO2: 98% (05-31-18 @ 21:00) (98% - 98%)  Wt(kg): --  General: Comfortable in NAD  Neck: No JVD  CVS: nl s1s2, no s3, +YELENA  Pulm: CTA b/l  Abd: soft, non-tender  Ext: 1-2+ b/l pretibial edema  Neuro A&O x3  Psych: Normal affect        Labs:   01 Jun 2018 06:59    143    |  100    |  16.0   ----------------------------<  117    3.3     |  31.0   |  1.44     Ca    8.6        01 Jun 2018 06:59                            8.9    5.7   )-----------( 134      ( 01 Jun 2018 06:59 )             29.0         Echo 4/2018:   1. Left ventricular ejection fraction, by visual estimation, is 60 to   65%.   2. Normal global left ventricular systolic function.   3. The mitral in-flow pattern reveals no discernable A-wave, therefore   no comment on diastolic function can be made.   4. Normal left ventricular internal cavity size.   5. Normal right ventricular size and function.   6. Severely enlarged left atrium.   7. The right atrium is normal in size.   8. Mild mitral annular calcification.   9. Thickening of the anterior and posterior mitral valve leaflets.  10. Mild to moderate mitral valve regurgitation.  11. Bioprosthesis in the aortic position demonstrating normal function.  12. Dilatation of the ascending aorta.  13. There is no evidence of pericardial effusion.  14. No evidence of endocarditis. Tricuspid valve not well seen.      Assessment:   Patient is a  83y Male with h/o CAD s/p CABG, Bio AVR, persistent AF, COPD on home O2 (2-3L), KRYSTIN here with recurrent GI bleed leading to severe symptomatic anemia.   -Has been transfused, no further GI bleeding and H/H stable in 7's  -No signs decompensated CHF at this time and AF rate controlled  -Volume overloaded sec to Pulm HTN and CKD, stable.  -Now s/p virtual colonoscopy and CT enteroscopy, per GI bleeding likely hemorrhoidal and can restart A/C.    Plan:  1. Restart Xarelto.  Can continue off ASA at this time given bleeding.  Will consider ULISES closure as OP.  2. Continue other current CV meds at current doses  3. CV stable for d/c.

## 2018-06-01 NOTE — PROGRESS NOTE ADULT - PROVIDER SPECIALTY LIST ADULT
Cardiology
Gastroenterology
Hospitalist
Internal Medicine
Internal Medicine
Nephrology
Nephrology
Palliative Care
Cardiology
Cardiology
Internal Medicine
Gastroenterology
Internal Medicine
Nephrology
Gastroenterology

## 2018-06-01 NOTE — PROGRESS NOTE ADULT - SUBJECTIVE AND OBJECTIVE BOX
INTERVAL HPI/OVERNIGHT EVENTS:FU for Iron deficiency anemia. CT colonography showed diverticulosis. CT enterography showed normal small bowel. Cecal angiodysplasia. No bleeding. Hct stable.     MEDICATIONS  (STANDING):  atorvastatin 40 milliGRAM(s) Oral at bedtime  buDESOnide 160 MICROgram(s)/formoterol 4.5 MICROgram(s) Inhaler 2 Puff(s) Inhalation two times a day  dextrose 5%. 1000 milliLiter(s) (50 mL/Hr) IV Continuous <Continuous>  dextrose 50% Injectable 12.5 Gram(s) IV Push once  dextrose 50% Injectable 25 Gram(s) IV Push once  dextrose 50% Injectable 25 Gram(s) IV Push once  furosemide    Tablet 40 milliGRAM(s) Oral daily  insulin lispro (HumaLOG) corrective regimen sliding scale   SubCutaneous three times a day before meals  levalbuterol Inhalation 1.25 milliGRAM(s) Inhalation every 6 hours  pantoprazole    Tablet 40 milliGRAM(s) Oral two times a day  predniSONE   Tablet 20 milliGRAM(s) Oral daily    MEDICATIONS  (PRN):  acetaminophen   Tablet. 650 milliGRAM(s) Oral every 6 hours PRN Moderate Pain (4 - 6)  dextrose 40% Gel 15 Gram(s) Oral once PRN Blood Glucose LESS THAN 70 milliGRAM(s)/deciliter  glucagon  Injectable 1 milliGRAM(s) IntraMuscular once PRN Glucose LESS THAN 70 milligrams/deciliter  polyethylene glycol 3350 17 Gram(s) Oral two times a day PRN unclear stool, bowel prep      Allergies    No Known Allergies    Intolerances        Vital Signs Last 24 Hrs  T(C): 36.8 (01 Jun 2018 05:17), Max: 37.1 (31 May 2018 20:27)  T(F): 98.2 (01 Jun 2018 05:17), Max: 98.7 (31 May 2018 20:27)  HR: 63 (01 Jun 2018 05:17) (51 - 74)  BP: 101/57 (01 Jun 2018 05:17) (101/57 - 149/93)  BP(mean): --  RR: 20 (01 Jun 2018 05:17) (20 - 20)  SpO2: 98% (31 May 2018 21:00) (98% - 100%)    LABS:                        8.9    5.7   )-----------( 134      ( 01 Jun 2018 06:59 )             29.0     06-01    143  |  100  |  16.0  ----------------------------<  117<H>  3.3<L>   |  31.0<H>  |  1.44<H>    Ca    8.6      01 Jun 2018 06:59            RADIOLOGY & ADDITIONAL TESTS:

## 2018-06-01 NOTE — PROGRESS NOTE ADULT - ASSESSMENT
Patient with TRELL. CT colonography-negative. CT enterography results pending.    1. If CT enterography is negative and patient hct is stable, can be discharged  2. Iron supplementation
Problem/Plan - 1:  ·  Problem: Gastrointestinal hemorrhage with melena.  s/p 3 units prbcs  --> spoke to GI, plan for virtual colonscopy tomorrow  --> will order another prbc today      Problem/Plan - 2:  ·  Problem: afib --> cardio following  --> hold xarelto       Problem/Plan - 3:  ·  Problem: Chronic systolic CHF (congestive heart failure).  stable  --> c/w plan     Problem/Plan - 4  Problem: Chronic kidney disease (CKD), stage III (moderate). Plan: monitor RF>nephrology eval appreciated.     Problem/Plan - 5:  ·  Problem: Essential hypertension.  Plan: Continue home meds w/ parameters.        Problem/Plan - 6  ·  Problem: Chronic respiratory failure with hypoxia and hypercapnia.  Plan: on home oxygen and needs BiPAP. at baseline. refused BiPAP.
1) [place] on CKD  2) HTN  3) DM  4) HLD    Renal function improving s/p PRBC and fluids  Signing off  Please recall when needed
Problem/Plan - 1:  ·  Problem: Gastrointestinal hemorrhage with melena.  s/p 3 units prbcs  --> spoke to GI, plan for virtual on tuesday   --> monitor cbc     Problem/Plan - 2:  ·  Problem: afib --> cardio following  --> hold xarelto until appropriate       Problem/Plan - 3:  ·  Problem: Chronic systolic CHF (congestive heart failure).  stable  --> c/w plan     Problem/Plan - 4  Problem: Chronic kidney disease (CKD), stage III (moderate). Plan: monitor RF>nephrology eval appreciated.     Problem/Plan - 5:  ·  Problem: Essential hypertension.  Plan: Continue home meds w/ parameters.        Problem/Plan - 6  ·  Problem: Chronic respiratory failure with hypoxia and hypercapnia.  Plan: on home oxygen and needs BiPAP. at baseline. refused BiPAP.      dispo -->  spoke to cardio and will need xarelto  --> virtual colonoscopy monday or tuesday
83 yrs old man with multiple medical problems currently admitted for GIB, severe blood loss anemia, COPD exacerbation, CHF, HCAP.    H/H stable for 48 hrs, no abdominal pain, or e/o active bleeding.  Stable for downgrade to monitored bed, anticipating discharge tomorrow
83 yrs old man with multiple medical problems currently being admitted for GIB, severe blood loss anemia, COPD exacerbation, CHF, HCAP
83 yrs old man with multiple medical problems currently being admitted for GIB, severe blood loss anemia, COPD exacerbation, CHF, HCAP
83 yrs old man with multiple medical problems currently being admitted for GIB, severe blood loss anemia, COPD exacerbation, CHF, HCAP    Problem/Plan - 1:  ·  Problem: Gastrointestinal hemorrhage with melena.  Plan: c/w SDU  still on Protonix gtt  PRBC transfusion 3 units  monitor VS and call MICU eval clinically deteriorating   monitor CBC  consider IV iron    Problem/Plan - 2:  ·  Problem: Chronic obstructive pulmonary disease with acute exacerbation.  Plan: neb, steroid taper, Symbicort,     Problem/Plan - 3:  ·  Problem: HCAP (healthcare-associated pneumonia).  Plan: CT chest negative.    Problem/Plan - 4:  ·  Problem: Chronic systolic CHF (congestive heart failure).  Plan: acute on chronic diastolic HF  c/w lasix with holding parameter. daily weight. strict I and O. monitor Renal function. lasix after every transfusion.     Problem/Plan - 5:  ·  Problem: Chronic atrial fibrillation.  Plan: h/o AVR, CAD, Rate control. on xarelto. hold xarelto. hold ASA,     Problem/Plan - 6:  Problem: Chronic kidney disease (CKD), stage III (moderate).     Problem/Plan - 7:  ·  Problem: Essential hypertension.       Problem/Plan - 8:  ·  Problem: Coagulopathy.  Plan: s/p Vit K, f/u TSH, EDUARDO.
84yo  Male with GI BLeed  Origin unknown  Virtual Colonoscopy planned this afternoon  NPO-bowel preps completed    Symptomatic Anemia  H/H trends monitored  Transfuse as parameters dictate    COPD- O2 Dependent    Nocturnal Hypoxia KRYSTIN  Cannot tolerate BIPAP refusing to wear mask.    ADVANCE Directives Discussion    I discussed what happens if he doesn't use BIPAP he is at risk for Respiratory Failure  He said "I would rather die than be on that thing"  Used this opportunity to discuss Intubation and Mechanical Ventilation.  He seems to be refusing Intubation, running risk of Respiratory Failure with BIPAP noncompliance  Will continue to explore his feelings about Natural death after Colonoscopy today  May qualify for Hospice  support at home
Gastrointestinal hemorrhage with melena.  s/p 4 units prbcs  -->  virtual colonoscopy with diverticuli  --> GI following--ct enterography ordered  --> ppi bid        afib --> cardio following  --> hold xarelto  --> plan as per cardio         Chronic systolic CHF (congestive heart failure).  stable  --> lasix       Chronic kidney disease (CKD), stage III (moderate).   --> improving       essential hypertension.  Plan: Continue home meds w/ parameters.       Chronic respiratory failure with hypoxia and hypercapnia.  Plan: on home oxygen and needs BiPAP. at baseline. refused BiPAP.  --> taper steroids    palliative care following...possible home hospice candidate given frequent hospitalizations and multiple comorbid conditions  dc planning per GI plans
Gastrointestinal hemorrhage with melena.  s/p 4 units prbcs  -->  virtual colonoscopy with diverticuli  --> GI following--ct enterography ordered  --> ppi bid        afib --> cardio following  --> hold xarelto  --> plan as per cardio         Chronic systolic CHF (congestive heart failure).  stable  --> lasix       Chronic kidney disease (CKD), stage III (moderate).   --> improving       essential hypertension.  Plan: Continue home meds w/ parameters.       Chronic respiratory failure with hypoxia and hypercapnia.  Plan: on home oxygen and needs BiPAP. at baseline. refused BiPAP.  --> taper steroids    palliative care following...possible home hospice candidate given frequent hospitalizations and multiple comorbid conditions  dc planning per GI plans
Problem/Plan - 1:  ·  Problem: Gastrointestinal hemorrhage with melena.  s/p 3 units prbcs  --> spoke to GI, outpatient virtual colonoscopy  --> monitor cbc     Problem/Plan - 2:  ·  Problem: afib --> cardio following  --> hold xarelto until appropriate       Problem/Plan - 3:  ·  Problem: Chronic systolic CHF (congestive heart failure).  stable  --> c/w plan     Problem/Plan - 4  Problem: Chronic kidney disease (CKD), stage III (moderate). Plan: monitor RF>nephrology eval appreciated.     Problem/Plan - 5:  ·  Problem: Essential hypertension.  Plan: Continue home meds w/ parameters.        Problem/Plan - 6  ·  Problem: Chronic respiratory failure with hypoxia and hypercapnia.  Plan: on home oxygen and needs BiPAP. at baseline. refused BiPAP.      dispo --> c.w to monitor, decision on xarelto  --> eventual home with home care  plan discussed with son and patient
Problem/Plan - 1:  ·  Problem: Gastrointestinal hemorrhage with melena.  s/p 4 units prbcs  -->  for virtual colonscopy today  --> GI following  --> ppi bid       Problem/Plan - 2:  ·  Problem: afib --> cardio following  --> hold xarelto  --> plan as per cardio        Problem/Plan - 3:  ·  Problem: Chronic systolic CHF (congestive heart failure).  stable  --> lasix      Problem/Plan - 4  Problem: Chronic kidney disease (CKD), stage III (moderate).   --> improving      Problem/Plan - 5:  ·  Problem: Essential hypertension.  Plan: Continue home meds w/ parameters.        Problem/Plan - 6  ·  Problem: Chronic respiratory failure with hypoxia and hypercapnia.  Plan: on home oxygen and needs BiPAP. at baseline. refused BiPAP.  --> taper steroids    dispo --> f.u virtual and plan as per cardio
Resolved GI bleed.  Possibly due to diverticulosis.  Need to exclude more proximal disease:  CT Enterography ordered.  If negative then OK for Discharge from GI POV.  No plans for scopes currently.
Patient with TRELL, rectal bleeding, COPD. Most likely diverticular/hemorrhoidal bleeding. No more bleeding    1. No contraindication for resuming anticoagulation. But consider either antiplatelet or anticoagulation. But will be dependent upon the cardiology team  2. Follow up with GI as outpatient on elective basis
Patient with severe TRELL, A fib on anticoagulation, COPD, KRYSTIN    1. Will recommend PPI bid  2. CT colonography to be scheduled once hct stable  3. Needs CT enterography after that, if no lesions  4. High risk for EGD/colonoscopy

## 2018-06-07 ENCOUNTER — APPOINTMENT (OUTPATIENT)
Dept: CARDIOLOGY | Facility: CLINIC | Age: 83
End: 2018-06-07
Payer: MEDICARE

## 2018-06-07 VITALS
RESPIRATION RATE: 16 BRPM | BODY MASS INDEX: 42.67 KG/M2 | WEIGHT: 226 LBS | SYSTOLIC BLOOD PRESSURE: 110 MMHG | HEART RATE: 80 BPM | HEIGHT: 61 IN | DIASTOLIC BLOOD PRESSURE: 60 MMHG

## 2018-06-07 DIAGNOSIS — G47.33 OBSTRUCTIVE SLEEP APNEA (ADULT) (PEDIATRIC): ICD-10-CM

## 2018-06-07 DIAGNOSIS — E66.01 MORBID (SEVERE) OBESITY DUE TO EXCESS CALORIES: ICD-10-CM

## 2018-06-07 PROCEDURE — 93000 ELECTROCARDIOGRAM COMPLETE: CPT

## 2018-06-07 PROCEDURE — 99214 OFFICE O/P EST MOD 30 MIN: CPT

## 2018-06-28 ENCOUNTER — APPOINTMENT (OUTPATIENT)
Dept: GASTROENTEROLOGY | Facility: CLINIC | Age: 83
End: 2018-06-28

## 2018-07-16 ENCOUNTER — APPOINTMENT (OUTPATIENT)
Dept: ORTHOPEDIC SURGERY | Facility: CLINIC | Age: 83
End: 2018-07-16
Payer: MEDICARE

## 2018-07-16 ENCOUNTER — RECORD ABSTRACTING (OUTPATIENT)
Age: 83
End: 2018-07-16

## 2018-07-16 VITALS
DIASTOLIC BLOOD PRESSURE: 53 MMHG | HEIGHT: 61 IN | BODY MASS INDEX: 42.67 KG/M2 | HEART RATE: 69 BPM | WEIGHT: 226 LBS | SYSTOLIC BLOOD PRESSURE: 95 MMHG

## 2018-07-16 DIAGNOSIS — Z87.438 PERSONAL HISTORY OF OTHER DISEASES OF MALE GENITAL ORGANS: ICD-10-CM

## 2018-07-16 DIAGNOSIS — Z92.89 PERSONAL HISTORY OF OTHER MEDICAL TREATMENT: ICD-10-CM

## 2018-07-16 DIAGNOSIS — E11.40 TYPE 2 DIABETES MELLITUS WITH DIABETIC NEUROPATHY, UNSPECIFIED: ICD-10-CM

## 2018-07-16 DIAGNOSIS — Z09 ENCOUNTER FOR FOLLOW-UP EXAMINATION AFTER COMPLETED TREATMENT FOR CONDITIONS OTHER THAN MALIGNANT NEOPLASM: ICD-10-CM

## 2018-07-16 DIAGNOSIS — B96.20 URINARY TRACT INFECTION, SITE NOT SPECIFIED: ICD-10-CM

## 2018-07-16 DIAGNOSIS — R63.1 POLYDIPSIA: ICD-10-CM

## 2018-07-16 DIAGNOSIS — Z87.19 PERSONAL HISTORY OF OTHER DISEASES OF THE DIGESTIVE SYSTEM: ICD-10-CM

## 2018-07-16 DIAGNOSIS — R60.0 LOCALIZED EDEMA: ICD-10-CM

## 2018-07-16 DIAGNOSIS — F54 POLYDIPSIA: ICD-10-CM

## 2018-07-16 DIAGNOSIS — Z86.19 PERSONAL HISTORY OF OTHER INFECTIOUS AND PARASITIC DISEASES: ICD-10-CM

## 2018-07-16 DIAGNOSIS — Z78.9 OTHER SPECIFIED HEALTH STATUS: ICD-10-CM

## 2018-07-16 DIAGNOSIS — N39.0 URINARY TRACT INFECTION, SITE NOT SPECIFIED: ICD-10-CM

## 2018-07-16 DIAGNOSIS — A49.1 STREPTOCOCCAL INFECTION, UNSPECIFIED SITE: ICD-10-CM

## 2018-07-16 PROBLEM — J44.9 CHRONIC OBSTRUCTIVE PULMONARY DISEASE, UNSPECIFIED: Chronic | Status: ACTIVE | Noted: 2017-12-24

## 2018-07-16 PROCEDURE — 20610 DRAIN/INJ JOINT/BURSA W/O US: CPT | Mod: RT

## 2018-07-16 PROCEDURE — 99213 OFFICE O/P EST LOW 20 MIN: CPT | Mod: 25

## 2018-07-19 ENCOUNTER — MEDICATION RENEWAL (OUTPATIENT)
Age: 83
End: 2018-07-19

## 2018-07-20 ENCOUNTER — MEDICATION RENEWAL (OUTPATIENT)
Age: 83
End: 2018-07-20

## 2018-07-21 RX ORDER — IPRATROPIUM BROMIDE AND ALBUTEROL SULFATE 2.5; .5 MG/3ML; MG/3ML
0.5-2.5 (3) SOLUTION RESPIRATORY (INHALATION)
Qty: 90 | Refills: 5 | Status: ACTIVE | COMMUNITY
Start: 1900-01-01 | End: 1900-01-01

## 2018-07-23 ENCOUNTER — APPOINTMENT (OUTPATIENT)
Dept: PULMONOLOGY | Facility: CLINIC | Age: 83
End: 2018-07-23

## 2018-07-26 ENCOUNTER — APPOINTMENT (OUTPATIENT)
Dept: INTERNAL MEDICINE | Facility: CLINIC | Age: 83
End: 2018-07-26
Payer: MEDICARE

## 2018-07-26 VITALS
DIASTOLIC BLOOD PRESSURE: 60 MMHG | BODY MASS INDEX: 40.97 KG/M2 | SYSTOLIC BLOOD PRESSURE: 120 MMHG | WEIGHT: 217 LBS | HEIGHT: 61 IN

## 2018-07-26 DIAGNOSIS — Z95.2 PRESENCE OF PROSTHETIC HEART VALVE: ICD-10-CM

## 2018-07-26 PROCEDURE — 99213 OFFICE O/P EST LOW 20 MIN: CPT

## 2018-07-26 RX ORDER — METFORMIN HYDROCHLORIDE 500 MG/1
500 TABLET, COATED ORAL DAILY
Refills: 0 | Status: COMPLETED | COMMUNITY
End: 2018-07-26

## 2018-07-26 NOTE — HISTORY OF PRESENT ILLNESS
[Family Member] : family member [FreeTextEntry1] : f/up to diabetes, bp, chol [de-identified] : Patient here for routine followup. 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 He received 6 weeks of IV antibiotics and has been doing well since. He is on chronic oxygen but has been doing very well. His last labs were within normal limits with a normal A1c and his metformin will be discontinued today.\par \par He has no significant complaints .

## 2018-07-26 NOTE — ASSESSMENT
[FreeTextEntry1] : Patient has multiple significant medical problems all of which seem to be under control today. Review of his diabetic A1c's for the last year revealed a number on the 6% and most recently 4.8%. After the gut discussion with the patient and his son it was decided to discontinue metformin.\par \par Patient has chronic nocturnal hypoxia and his oxygen will be continued. His blood pressure and physical exam was otherwise unremarkable today. Repeat potassium levels were ordered as it was low the last time he was seen.\par \par Patient examined and adjustments to therapy for HBP not needed All labs reviewed with patient as well. Review of systems and physical exam discussed with patient. Care plan for future followups discussed with patient. All issues of health for the current year discussed in depth with patient who was conversant and all relevant issues addressed.\par \par

## 2018-07-26 NOTE — REVIEW OF SYSTEMS
[Chest Pain] : chest pain [Shortness Of Breath] : shortness of breath [Cough] : cough [Negative] : Heme/Lymph [Palpitations] : no palpitations [FreeTextEntry5] : avr viridans tx endocarditis neg fanny home iv abs [FreeTextEntry6] : chronic sob

## 2018-07-29 LAB
ANION GAP SERPL CALC-SCNC: 12 MMOL/L
BUN SERPL-MCNC: 49 MG/DL
CALCIUM SERPL-MCNC: 9.3 MG/DL
CHLORIDE SERPL-SCNC: 90 MMOL/L
CO2 SERPL-SCNC: 35 MMOL/L
CREAT SERPL-MCNC: 2 MG/DL
GLUCOSE SERPL-MCNC: 94 MG/DL
POTASSIUM SERPL-SCNC: 4 MMOL/L
SODIUM SERPL-SCNC: 137 MMOL/L

## 2018-07-30 ENCOUNTER — MEDICATION RENEWAL (OUTPATIENT)
Age: 83
End: 2018-07-30

## 2018-07-30 ENCOUNTER — APPOINTMENT (OUTPATIENT)
Dept: CARDIOLOGY | Facility: CLINIC | Age: 83
End: 2018-07-30

## 2018-08-09 ENCOUNTER — MEDICATION RENEWAL (OUTPATIENT)
Age: 83
End: 2018-08-09

## 2018-08-09 RX ORDER — ATORVASTATIN CALCIUM 10 MG/1
10 TABLET, FILM COATED ORAL DAILY
Qty: 30 | Refills: 5 | Status: ACTIVE | COMMUNITY

## 2018-08-09 RX ORDER — BLOOD-GLUCOSE METER
W/DEVICE KIT MISCELLANEOUS
Refills: 0 | Status: ACTIVE | COMMUNITY

## 2018-08-09 RX ORDER — LANCETS
EACH MISCELLANEOUS
Refills: 0 | Status: ACTIVE | COMMUNITY

## 2018-08-09 RX ORDER — SOFT LENS DISINFECTANT
SOLUTION, NON-ORAL MISCELLANEOUS
Refills: 0 | Status: ACTIVE | COMMUNITY

## 2018-08-16 ENCOUNTER — APPOINTMENT (OUTPATIENT)
Dept: CARDIOLOGY | Facility: CLINIC | Age: 83
End: 2018-08-16
Payer: MEDICARE

## 2018-08-20 ENCOUNTER — APPOINTMENT (OUTPATIENT)
Dept: CARDIOLOGY | Facility: CLINIC | Age: 83
End: 2018-08-20
Payer: MEDICARE

## 2018-08-20 VITALS
SYSTOLIC BLOOD PRESSURE: 110 MMHG | RESPIRATION RATE: 14 BRPM | BODY MASS INDEX: 43.05 KG/M2 | HEIGHT: 61 IN | DIASTOLIC BLOOD PRESSURE: 70 MMHG | WEIGHT: 228 LBS | HEART RATE: 60 BPM

## 2018-08-20 DIAGNOSIS — I35.9 NONRHEUMATIC AORTIC VALVE DISORDER, UNSPECIFIED: ICD-10-CM

## 2018-08-20 DIAGNOSIS — E87.1 HYPO-OSMOLALITY AND HYPONATREMIA: ICD-10-CM

## 2018-08-20 DIAGNOSIS — E78.5 HYPERLIPIDEMIA, UNSPECIFIED: ICD-10-CM

## 2018-08-20 DIAGNOSIS — E66.2 MORBID (SEVERE) OBESITY WITH ALVEOLAR HYPOVENTILATION: ICD-10-CM

## 2018-08-20 DIAGNOSIS — I27.20 PULMONARY HYPERTENSION, UNSPECIFIED: ICD-10-CM

## 2018-08-20 DIAGNOSIS — R10.9 UNSPECIFIED ABDOMINAL PAIN: ICD-10-CM

## 2018-08-20 DIAGNOSIS — N18.9 CHRONIC KIDNEY DISEASE, UNSPECIFIED: ICD-10-CM

## 2018-08-20 DIAGNOSIS — I48.91 UNSPECIFIED ATRIAL FIBRILLATION: ICD-10-CM

## 2018-08-20 PROCEDURE — 93000 ELECTROCARDIOGRAM COMPLETE: CPT

## 2018-08-20 PROCEDURE — 99214 OFFICE O/P EST MOD 30 MIN: CPT

## 2018-08-20 RX ORDER — CHROMIUM 200 MCG
800 TABLET ORAL DAILY
Refills: 0 | Status: DISCONTINUED | COMMUNITY
End: 2018-08-20

## 2018-08-20 RX ORDER — TIOTROPIUM BROMIDE INHALATION SPRAY 3.12 UG/1
2.5 SPRAY, METERED RESPIRATORY (INHALATION) DAILY
Refills: 0 | Status: DISCONTINUED | COMMUNITY
End: 2018-08-20

## 2018-08-20 RX ORDER — CYCLOBENZAPRINE HCL 10 MG
10 TABLET ORAL TWICE DAILY
Refills: 0 | Status: DISCONTINUED | COMMUNITY
End: 2018-08-20

## 2018-08-20 RX ORDER — OXYCODONE HYDROCHLORIDE AND ACETAMINOPHEN 5; 325 MG/1; MG/1
5-325 TABLET ORAL
Refills: 0 | Status: DISCONTINUED | COMMUNITY
End: 2018-08-20

## 2018-09-02 ENCOUNTER — INPATIENT (INPATIENT)
Facility: HOSPITAL | Age: 83
LOS: 2 days | Discharge: HOME | DRG: 291 | End: 2018-09-05
Attending: HOSPITALIST | Admitting: HOSPITALIST
Payer: MEDICARE

## 2018-09-02 VITALS
WEIGHT: 227.96 LBS | SYSTOLIC BLOOD PRESSURE: 108 MMHG | HEART RATE: 58 BPM | TEMPERATURE: 99 F | RESPIRATION RATE: 22 BRPM | OXYGEN SATURATION: 96 % | HEIGHT: 63 IN | DIASTOLIC BLOOD PRESSURE: 62 MMHG

## 2018-09-02 DIAGNOSIS — I50.23 ACUTE ON CHRONIC SYSTOLIC (CONGESTIVE) HEART FAILURE: ICD-10-CM

## 2018-09-02 DIAGNOSIS — D64.9 ANEMIA, UNSPECIFIED: ICD-10-CM

## 2018-09-02 DIAGNOSIS — D50.0 IRON DEFICIENCY ANEMIA SECONDARY TO BLOOD LOSS (CHRONIC): ICD-10-CM

## 2018-09-02 DIAGNOSIS — Z98.89 OTHER SPECIFIED POSTPROCEDURAL STATES: Chronic | ICD-10-CM

## 2018-09-02 DIAGNOSIS — I48.2 CHRONIC ATRIAL FIBRILLATION: ICD-10-CM

## 2018-09-02 LAB
ANION GAP SERPL CALC-SCNC: 10 MMOL/L — SIGNIFICANT CHANGE UP (ref 5–17)
ANISOCYTOSIS BLD QL: SLIGHT — SIGNIFICANT CHANGE UP
APTT BLD: 38.2 SEC — HIGH (ref 27.5–37.4)
BASOPHILS # BLD AUTO: 0 K/UL — SIGNIFICANT CHANGE UP (ref 0–0.2)
BLD GP AB SCN SERPL QL: SIGNIFICANT CHANGE UP
BUN SERPL-MCNC: 39 MG/DL — HIGH (ref 8–20)
BURR CELLS BLD QL SMEAR: PRESENT — SIGNIFICANT CHANGE UP
CALCIUM SERPL-MCNC: 9.3 MG/DL — SIGNIFICANT CHANGE UP (ref 8.6–10.2)
CHLORIDE SERPL-SCNC: 92 MMOL/L — LOW (ref 98–107)
CO2 SERPL-SCNC: 32 MMOL/L — HIGH (ref 22–29)
CREAT SERPL-MCNC: 1.47 MG/DL — HIGH (ref 0.5–1.3)
DACRYOCYTES BLD QL SMEAR: SLIGHT — SIGNIFICANT CHANGE UP
ELLIPTOCYTES BLD QL SMEAR: SLIGHT — SIGNIFICANT CHANGE UP
EOSINOPHIL # BLD AUTO: 0 K/UL — SIGNIFICANT CHANGE UP (ref 0–0.5)
EOSINOPHIL NFR BLD AUTO: 1 % — SIGNIFICANT CHANGE UP (ref 0–6)
GLUCOSE SERPL-MCNC: 111 MG/DL — SIGNIFICANT CHANGE UP (ref 70–115)
HCT VFR BLD CALC: 23.3 % — LOW (ref 42–52)
HGB BLD-MCNC: 6.1 G/DL — CRITICAL LOW (ref 14–18)
HYPOCHROMIA BLD QL: SIGNIFICANT CHANGE UP
INR BLD: 2.75 RATIO — HIGH (ref 0.88–1.16)
LACTATE BLDV-MCNC: 0.8 MMOL/L — SIGNIFICANT CHANGE UP (ref 0.5–2)
LYMPHOCYTES # BLD AUTO: 0.8 K/UL — LOW (ref 1–4.8)
LYMPHOCYTES # BLD AUTO: 14 % — LOW (ref 20–55)
MACROCYTES BLD QL: SLIGHT — SIGNIFICANT CHANGE UP
MCHC RBC-ENTMCNC: 17.8 PG — LOW (ref 27–31)
MCHC RBC-ENTMCNC: 26.2 G/DL — LOW (ref 32–36)
MCV RBC AUTO: 67.9 FL — LOW (ref 80–94)
MICROCYTES BLD QL: SLIGHT — SIGNIFICANT CHANGE UP
MONOCYTES # BLD AUTO: 0.5 K/UL — SIGNIFICANT CHANGE UP (ref 0–0.8)
MONOCYTES NFR BLD AUTO: 9 % — SIGNIFICANT CHANGE UP (ref 3–10)
NEUTROPHILS # BLD AUTO: 4 K/UL — SIGNIFICANT CHANGE UP (ref 1.8–8)
NEUTROPHILS NFR BLD AUTO: 74 % — HIGH (ref 37–73)
NRBC # BLD: 3 /100 — HIGH (ref 0–0)
NT-PROBNP SERPL-SCNC: 3309 PG/ML — HIGH (ref 0–300)
OB PNL STL: NEGATIVE — SIGNIFICANT CHANGE UP
OVALOCYTES BLD QL SMEAR: SLIGHT — SIGNIFICANT CHANGE UP
PLAT MORPH BLD: NORMAL — SIGNIFICANT CHANGE UP
PLATELET # BLD AUTO: 219 K/UL — SIGNIFICANT CHANGE UP (ref 150–400)
POIKILOCYTOSIS BLD QL AUTO: SLIGHT — SIGNIFICANT CHANGE UP
POTASSIUM SERPL-MCNC: 4.6 MMOL/L — SIGNIFICANT CHANGE UP (ref 3.5–5.3)
POTASSIUM SERPL-SCNC: 4.6 MMOL/L — SIGNIFICANT CHANGE UP (ref 3.5–5.3)
PROTHROM AB SERPL-ACNC: 30.9 SEC — HIGH (ref 9.8–12.7)
RBC # BLD: 3.43 M/UL — LOW (ref 4.6–6.2)
RBC # FLD: 20.2 % — HIGH (ref 11–15.6)
RBC BLD AUTO: ABNORMAL
SCHISTOCYTES BLD QL AUTO: SLIGHT — SIGNIFICANT CHANGE UP
SODIUM SERPL-SCNC: 134 MMOL/L — LOW (ref 135–145)
TROPONIN T SERPL-MCNC: 0.04 NG/ML — SIGNIFICANT CHANGE UP (ref 0–0.06)
TYPE + AB SCN PNL BLD: SIGNIFICANT CHANGE UP
VARIANT LYMPHS # BLD: 2 % — SIGNIFICANT CHANGE UP (ref 0–6)
WBC # BLD: 5.4 K/UL — SIGNIFICANT CHANGE UP (ref 4.8–10.8)
WBC # FLD AUTO: 5.4 K/UL — SIGNIFICANT CHANGE UP (ref 4.8–10.8)

## 2018-09-02 PROCEDURE — 71045 X-RAY EXAM CHEST 1 VIEW: CPT | Mod: 26

## 2018-09-02 PROCEDURE — 99285 EMERGENCY DEPT VISIT HI MDM: CPT

## 2018-09-02 PROCEDURE — 99223 1ST HOSP IP/OBS HIGH 75: CPT

## 2018-09-02 PROCEDURE — 93010 ELECTROCARDIOGRAM REPORT: CPT

## 2018-09-02 RX ORDER — POTASSIUM CHLORIDE 20 MEQ
20 PACKET (EA) ORAL DAILY
Qty: 0 | Refills: 0 | Status: DISCONTINUED | OUTPATIENT
Start: 2018-09-02 | End: 2018-09-05

## 2018-09-02 RX ORDER — SODIUM CHLORIDE 9 MG/ML
3 INJECTION INTRAMUSCULAR; INTRAVENOUS; SUBCUTANEOUS ONCE
Qty: 0 | Refills: 0 | Status: COMPLETED | OUTPATIENT
Start: 2018-09-02 | End: 2018-09-02

## 2018-09-02 RX ORDER — INFLUENZA VIRUS VACCINE 15; 15; 15; 15 UG/.5ML; UG/.5ML; UG/.5ML; UG/.5ML
0.5 SUSPENSION INTRAMUSCULAR ONCE
Qty: 0 | Refills: 0 | Status: COMPLETED | OUTPATIENT
Start: 2018-09-02 | End: 2018-09-02

## 2018-09-02 RX ORDER — AMIODARONE HYDROCHLORIDE 400 MG/1
200 TABLET ORAL DAILY
Qty: 0 | Refills: 0 | Status: DISCONTINUED | OUTPATIENT
Start: 2018-09-02 | End: 2018-09-05

## 2018-09-02 RX ORDER — FUROSEMIDE 40 MG
20 TABLET ORAL ONCE
Qty: 0 | Refills: 0 | Status: COMPLETED | OUTPATIENT
Start: 2018-09-02 | End: 2018-09-02

## 2018-09-02 RX ORDER — IPRATROPIUM/ALBUTEROL SULFATE 18-103MCG
3 AEROSOL WITH ADAPTER (GRAM) INHALATION ONCE
Qty: 0 | Refills: 0 | Status: COMPLETED | OUTPATIENT
Start: 2018-09-02 | End: 2018-09-02

## 2018-09-02 RX ORDER — PREGABALIN 225 MG/1
1000 CAPSULE ORAL DAILY
Qty: 0 | Refills: 0 | Status: DISCONTINUED | OUTPATIENT
Start: 2018-09-02 | End: 2018-09-05

## 2018-09-02 RX ORDER — GABAPENTIN 400 MG/1
200 CAPSULE ORAL EVERY 8 HOURS
Qty: 0 | Refills: 0 | Status: DISCONTINUED | OUTPATIENT
Start: 2018-09-02 | End: 2018-09-05

## 2018-09-02 RX ORDER — FUROSEMIDE 40 MG
40 TABLET ORAL DAILY
Qty: 0 | Refills: 0 | Status: DISCONTINUED | OUTPATIENT
Start: 2018-09-02 | End: 2018-09-05

## 2018-09-02 RX ORDER — PANTOPRAZOLE SODIUM 20 MG/1
40 TABLET, DELAYED RELEASE ORAL
Qty: 0 | Refills: 0 | Status: DISCONTINUED | OUTPATIENT
Start: 2018-09-02 | End: 2018-09-05

## 2018-09-02 RX ORDER — ACETAMINOPHEN 500 MG
650 TABLET ORAL EVERY 6 HOURS
Qty: 0 | Refills: 0 | Status: DISCONTINUED | OUTPATIENT
Start: 2018-09-02 | End: 2018-09-05

## 2018-09-02 RX ORDER — RIVAROXABAN 15 MG-20MG
15 KIT ORAL EVERY 24 HOURS
Qty: 0 | Refills: 0 | Status: DISCONTINUED | OUTPATIENT
Start: 2018-09-02 | End: 2018-09-05

## 2018-09-02 RX ORDER — LACTULOSE 10 G/15ML
20 SOLUTION ORAL
Qty: 0 | Refills: 0 | Status: DISCONTINUED | OUTPATIENT
Start: 2018-09-02 | End: 2018-09-05

## 2018-09-02 RX ADMIN — GABAPENTIN 200 MILLIGRAM(S): 400 CAPSULE ORAL at 21:35

## 2018-09-02 RX ADMIN — Medication 20 MILLIGRAM(S): at 14:26

## 2018-09-02 RX ADMIN — SODIUM CHLORIDE 3 MILLILITER(S): 9 INJECTION INTRAMUSCULAR; INTRAVENOUS; SUBCUTANEOUS at 14:26

## 2018-09-02 RX ADMIN — Medication 3 MILLILITER(S): at 14:25

## 2018-09-02 RX ADMIN — Medication 20 MILLIGRAM(S): at 21:35

## 2018-09-02 NOTE — ED ADULT TRIAGE NOTE - CHIEF COMPLAINT QUOTE
Patient BIBA to ED today with c/o shortness of breath and trouble breathing.  Patient arrived from home.  Patient uses 2.5L nc at home 24/7.  Patient hx of COPD

## 2018-09-02 NOTE — ED PROVIDER NOTE - MEDICAL DECISION MAKING DETAILS
83 yo M with SOB presented with sob, off lasix x 1 week, will get EKG, CXR, blood work, lacate. concerned for pneumonia vs copd exacerbation vs fluid retension. will get duobneb for sob and lasix 20 mg IV for fluid retension.

## 2018-09-02 NOTE — ED PROVIDER NOTE - OBJECTIVE STATEMENT
83 yo M hx of COPD on 2.5 L home O2, Afib on xelto, DM, sleep apnea, valve repair presented with SOB and cough x few days. He was seen by his PMD and started on steroid. Family reported the cough is more productive and sounds more coarse. He had multiple episodes of pneumonia in the past was concerned that this might be another episode. Family also reported that he ran out of lasix 1 week prior. (-) fever (-) chest pain (-) abdominal pain.     cards: Dr. Bond ([place])

## 2018-09-02 NOTE — ED ADULT NURSE NOTE - NSIMPLEMENTINTERV_GEN_ALL_ED
Implemented All Fall with Harm Risk Interventions:  [place] to call system. Call bell, personal items and telephone within reach. Instruct patient to call for assistance. Room bathroom lighting operational. Non-slip footwear when patient is off stretcher. Physically safe environment: no spills, clutter or unnecessary equipment. Stretcher in lowest position, wheels locked, appropriate side rails in place. Provide visual cue, wrist band, yellow gown, etc. Monitor gait and stability. Monitor for mental status changes and reorient to person, place, and time. Review medications for side effects contributing to fall risk. Reinforce activity limits and safety measures with patient and family. Provide visual clues: red socks.

## 2018-09-02 NOTE — H&P ADULT - PROBLEM SELECTOR PLAN 3
-cont xarelto for now 15mg Qdaily  -cont amiodarone 200mg qdaily  -consider cardiology consult in am

## 2018-09-02 NOTE — H&P ADULT - HISTORY OF PRESENT ILLNESS
Ford is an 83 y/o male w/PMhx of afib, chf,  is an 85 y/o male w/PMhx of afib, chf, ckd, copd, presenting with symptoms of acute systolic heart failure. He admits to "salty food intake" and noncompliance which his daughter confirms. He has a hx of anemia (no cause), denies bloody bms. His daughter states that his hemoglobin "goes low" from time to time.    His occult blood is negative. For now, we are treating his acute on chronic systolic heart failure with IV lasix and we are giving him 2 U PRBCs with lasx in between. His labs demonstrate a low MCV consistent with iron deficiency and he can benefit from IV venofer tomm after iron levels result in tomm's labs.

## 2018-09-02 NOTE — ED ADULT NURSE NOTE - OBJECTIVE STATEMENT
pt presents with c/o worsening shortness of breath / + GILBERT, and generalized weakness, chronic home oxygen due to COPD, speaking 4-5 word sentences. + rales. pt presents with c/o worsening shortness of breath / + GILBERT, and generalized weakness, chronic home oxygen due to COPD, speaking 4-5 word sentences. + rales. bilaterally, + distended abd, + dependent edema. denies fever and chills.

## 2018-09-02 NOTE — H&P ADULT - PROBLEM SELECTOR PLAN 1
-lasix 40mg IV qdaily  -due to salty food intake and noncompliance  -admit to 4 tower  -ECHO recently done  -DASH diet  -lasix 40mg IV Qdaily

## 2018-09-02 NOTE — H&P ADULT - NSHPPHYSICALEXAM_GEN_ALL_CORE
Gen: NAD, sitting up in bed, awake, alert, comfortable, [place]ian speaking  HEENT: dry MM  NECK: supple  CVS: s1S2 RRR  PULM: good breath sounds but decreased breath sounds at the bases, mild crackles at the bases  aBD: Soft, nontender, obese, no rebound, no guarding  eXTREM: +2 edema  NEURO: intact  PSYCH: appropriate

## 2018-09-02 NOTE — ED PROVIDER NOTE - PHYSICAL EXAMINATION
VITAL SIGNS: I have reviewed nursing notes and confirm.  CONSTITUTIONAL: Well-developed; well-nourished;   SKIN: Skin exam is warm and dry, no acute rash.  HEAD: Normocephalic; atraumatic.  EYES: PERRL, EOM intact; conjunctiva and sclera clear.  ENT: No nasal discharge; airway clear. Throat clear. Nasal cannula in place   NECK: Supple; non tender.  No lymphadenopathy.  CARD: S1, S2 normal; no murmurs, gallops, or rubs. Regular rate and rhythm.  RESP: No wheezes, (+) coarse lung sound (+) few rales when coughing (+) occasional crackle at right base   ABD: Normal bowel sounds; soft; non-distended; non-tender; no hepatosplenomegaly.  EXT: Normal ROM. No clubbing, cyanosis  (+) 1+ pitting edema to b/l LE to calf   NEURO: Alert, oriented. Grossly unremarkable. No focal deficits. no facial droop. moves all extremities.   PSYCH: Cooperative, appropriate.

## 2018-09-02 NOTE — ED PROVIDER NOTE - NS ED ROS FT
Review of Systems:  	•	CONSTITUTIONAL - no fever, no diaphoresis, no weight change  	•	SKIN - no rash  	•	HEMATOLOGIC - no bleeding, no bruising  	•	EYES - no eye pain, no blurred vision  	•	ENT - no change in hearing, no pain  	•	RESPIRATORY - (+) shortness of breath, (+)  cough  	•	CARDIAC - no chest pain, no palpitations  	•	GI - no abd pain, no nausea, no vomiting, no diarrhea, no constipation, no bleeding  	•	GENITO-URINARY - no discharge, no dysuria; no hematuria,   	•	ENDO - no polydypsia, no polyurea, no heat/no cold intolerance  	•	MUSCULOSKELETAL - no joint paint, (+) leg swelling, no redness  	•	NEUROLOGIC - no weakness, no headache, no anesthesia, no paresthesias  	•	PSYCH - no anxiety, non suicidal, non homicidal, no hallucination, no depression

## 2018-09-02 NOTE — ED PROVIDER NOTE - PROGRESS NOTE DETAILS
HB 6.1, ordered 1 U RBC, consent in chart, ordered occult blood spoke to hospitalist Dr. Fitzgerald, agree with admission

## 2018-09-03 LAB
ANION GAP SERPL CALC-SCNC: 12 MMOL/L — SIGNIFICANT CHANGE UP (ref 5–17)
BUN SERPL-MCNC: 39 MG/DL — HIGH (ref 8–20)
CALCIUM SERPL-MCNC: 9.8 MG/DL — SIGNIFICANT CHANGE UP (ref 8.6–10.2)
CHLORIDE SERPL-SCNC: 91 MMOL/L — LOW (ref 98–107)
CO2 SERPL-SCNC: 37 MMOL/L — HIGH (ref 22–29)
CREAT SERPL-MCNC: 1.4 MG/DL — HIGH (ref 0.5–1.3)
FERRITIN SERPL-MCNC: 11 NG/ML — LOW (ref 30–400)
GLUCOSE SERPL-MCNC: 125 MG/DL — HIGH (ref 70–115)
HCT VFR BLD CALC: 29.9 % — LOW (ref 42–52)
HGB BLD-MCNC: 8.6 G/DL — LOW (ref 14–18)
IRON SATN MFR SERPL: 46 UG/DL — LOW (ref 59–158)
IRON SATN MFR SERPL: 9 % — LOW (ref 16–55)
MAGNESIUM SERPL-MCNC: 1.6 MG/DL — SIGNIFICANT CHANGE UP (ref 1.6–2.6)
MCHC RBC-ENTMCNC: 19.6 PG — LOW (ref 27–31)
MCHC RBC-ENTMCNC: 28.8 G/DL — LOW (ref 32–36)
MCV RBC AUTO: 68.1 FL — LOW (ref 80–94)
PHOSPHATE SERPL-MCNC: 4.3 MG/DL — SIGNIFICANT CHANGE UP (ref 2.4–4.7)
PLATELET # BLD AUTO: 233 K/UL — SIGNIFICANT CHANGE UP (ref 150–400)
POTASSIUM SERPL-MCNC: 4.8 MMOL/L — SIGNIFICANT CHANGE UP (ref 3.5–5.3)
POTASSIUM SERPL-SCNC: 4.8 MMOL/L — SIGNIFICANT CHANGE UP (ref 3.5–5.3)
RBC # BLD: 4.39 M/UL — LOW (ref 4.6–6.2)
RBC # FLD: 23.1 % — HIGH (ref 11–15.6)
SODIUM SERPL-SCNC: 140 MMOL/L — SIGNIFICANT CHANGE UP (ref 135–145)
TIBC SERPL-MCNC: 526 UG/DL — HIGH (ref 220–430)
TRANSFERRIN SERPL-MCNC: 368 MG/DL — HIGH (ref 180–329)
WBC # BLD: 5.2 K/UL — SIGNIFICANT CHANGE UP (ref 4.8–10.8)
WBC # FLD AUTO: 5.2 K/UL — SIGNIFICANT CHANGE UP (ref 4.8–10.8)

## 2018-09-03 PROCEDURE — 99233 SBSQ HOSP IP/OBS HIGH 50: CPT

## 2018-09-03 RX ORDER — IRON SUCROSE 20 MG/ML
200 INJECTION, SOLUTION INTRAVENOUS EVERY 24 HOURS
Qty: 0 | Refills: 0 | Status: DISCONTINUED | OUTPATIENT
Start: 2018-09-03 | End: 2018-09-05

## 2018-09-03 RX ADMIN — IRON SUCROSE 110 MILLIGRAM(S): 20 INJECTION, SOLUTION INTRAVENOUS at 16:20

## 2018-09-03 RX ADMIN — PANTOPRAZOLE SODIUM 40 MILLIGRAM(S): 20 TABLET, DELAYED RELEASE ORAL at 05:19

## 2018-09-03 RX ADMIN — Medication 20 MILLIEQUIVALENT(S): at 12:25

## 2018-09-03 RX ADMIN — LACTULOSE 20 GRAM(S): 10 SOLUTION ORAL at 17:46

## 2018-09-03 RX ADMIN — GABAPENTIN 200 MILLIGRAM(S): 400 CAPSULE ORAL at 12:25

## 2018-09-03 RX ADMIN — GABAPENTIN 200 MILLIGRAM(S): 400 CAPSULE ORAL at 05:18

## 2018-09-03 RX ADMIN — LACTULOSE 20 GRAM(S): 10 SOLUTION ORAL at 05:17

## 2018-09-03 RX ADMIN — PREGABALIN 1000 MICROGRAM(S): 225 CAPSULE ORAL at 12:24

## 2018-09-03 RX ADMIN — AMIODARONE HYDROCHLORIDE 200 MILLIGRAM(S): 400 TABLET ORAL at 05:18

## 2018-09-03 RX ADMIN — RIVAROXABAN 15 MILLIGRAM(S): KIT at 17:47

## 2018-09-03 RX ADMIN — Medication 40 MILLIGRAM(S): at 05:18

## 2018-09-03 RX ADMIN — GABAPENTIN 200 MILLIGRAM(S): 400 CAPSULE ORAL at 21:43

## 2018-09-03 NOTE — PROGRESS NOTE ADULT - SUBJECTIVE AND OBJECTIVE BOX
is an 85 y/o male w/acute on chronic systolic heart failure, and severe iron deficiency anemia with no acute signs of bleeding. He appears to be more comfortable today.    I'm continuing IV lasix 40mg qdaily and I'm starting IV venofer to help treat his severe iron deficiency.    Summary:   REVIEW OF SYSTEMS      General:	    Skin/Breast:  	  Ophthalmologic:  	  ENMT:	    Respiratory and Thorax:  	  Cardiovascular:	    Gastrointestinal:	    Genitourinary:	    Musculoskeletal:	    Neurological:	    Psychiatric:	    Hematology/Lymphatics:	    Endocrine:	    Allergic/Immunologic:	  Vital Signs Last 24 Hrs  T(C): 36.6 (03 Sep 2018 08:12), Max: 37.1 (02 Sep 2018 13:09)  T(F): 97.9 (03 Sep 2018 08:12), Max: 98.7 (02 Sep 2018 13:09)  HR: 54 (03 Sep 2018 08:12) (54 - 78)  BP: 147/88 (03 Sep 2018 08:12) (108/62 - 160/92)  BP(mean): --  RR: 18 (03 Sep 2018 08:12) (18 - 22)  SpO2: 100% (03 Sep 2018 08:12) (96% - 100%)  PHYSICAL EXAM:  GEN:  HEENT:   CVS:  PULM:  ABD:  EXTREM:  NEURO:  PSYCH  SKIN:                          8.6    5.2   )-----------( 233      ( 03 Sep 2018 09:01 )             29.9     09-03    140  |  91<L>  |  39.0<H>  ----------------------------<  125<H>  4.8   |  37.0<H>  |  1.40<H>    Ca    9.8      03 Sep 2018 09:01  Phos  4.3     09-03  Mg     1.6     09-03        PT/INR - ( 02 Sep 2018 14:48 )   PT: 30.9 sec;   INR: 2.75 ratio         PTT - ( 02 Sep 2018 14:48 )  PTT:38.2 sec    Radiology:     MEDICATIONS  (STANDING):  amiodarone    Tablet 200 milliGRAM(s) Oral daily  cyanocobalamin 1000 MICROGram(s) Oral daily  furosemide   Injectable 40 milliGRAM(s) IV Push daily  gabapentin 200 milliGRAM(s) Oral every 8 hours  influenza   Vaccine 0.5 milliLiter(s) IntraMuscular once  iron sucrose IVPB 200 milliGRAM(s) IV Intermittent every 24 hours  lactulose Syrup 20 Gram(s) Oral two times a day  pantoprazole    Tablet 40 milliGRAM(s) Oral before breakfast  potassium chloride    Tablet ER 20 milliEquivalent(s) Oral daily  rivaroxaban 15 milliGRAM(s) Oral every 24 hours    MEDICATIONS  (PRN):  acetaminophen   Tablet 650 milliGRAM(s) Oral every 6 hours PRN Mild Pain (1 - 3)  guaiFENesin ER 1200 milliGRAM(s) Oral every 12 hours PRN Cough  is an 83 y/o male w/acute on chronic systolic heart failure, and severe iron deficiency anemia with no acute signs of bleeding. He appears to be more comfortable today.    I'm continuing IV lasix 40mg qdaily and I'm starting IV venofer to help treat his severe iron deficiency.    Summary:   REVIEW OF SYSTEMS      General:	    Skin/Breast:  	  Ophthalmologic:  	  ENMT:	    Respiratory and Thorax:  	  Cardiovascular:	    Gastrointestinal:	    Genitourinary:	    Musculoskeletal:	    Neurological:	    Psychiatric:	    Hematology/Lymphatics:	    Endocrine:	    Allergic/Immunologic:	  Vital Signs Last 24 Hrs  T(C): 36.6 (03 Sep 2018 08:12), Max: 37.1 (02 Sep 2018 13:09)  T(F): 97.9 (03 Sep 2018 08:12), Max: 98.7 (02 Sep 2018 13:09)  HR: 54 (03 Sep 2018 08:12) (54 - 78)  BP: 147/88 (03 Sep 2018 08:12) (108/62 - 160/92)  BP(mean): --  RR: 18 (03 Sep 2018 08:12) (18 - 22)  SpO2: 100% (03 Sep 2018 08:12) (96% - 100%)  PHYSICAL EXAM:  GEN: obese, Dominican speaking male, NAD, comfortable, sitting up in bed, awake, alert, mentating  HEENT: dry MM  CVS: S1S2 RRR  PULM: good breath sounds  ABD: obese, soft, nontender, no rebound, no guarding  EXTREM: +2 edema  NEURO: intact  PSYCH: mentating well  SKIN: warm                     8.6    5.2   )-----------( 233      ( 03 Sep 2018 09:01 )             29.9     09-03    140  |  91<L>  |  39.0<H>  ----------------------------<  125<H>  4.8   |  37.0<H>  |  1.40<H>    Ca    9.8      03 Sep 2018 09:01  Phos  4.3     09-03  Mg     1.6     09-03    PT/INR - ( 02 Sep 2018 14:48 )   PT: 30.9 sec;   INR: 2.75 ratio         PTT - ( 02 Sep 2018 14:48 )  PTT:38.2 sec    Radiology:     MEDICATIONS  (STANDING):  amiodarone    Tablet 200 milliGRAM(s) Oral daily  cyanocobalamin 1000 MICROGram(s) Oral daily  furosemide   Injectable 40 milliGRAM(s) IV Push daily  gabapentin 200 milliGRAM(s) Oral every 8 hours  influenza   Vaccine 0.5 milliLiter(s) IntraMuscular once  iron sucrose IVPB 200 milliGRAM(s) IV Intermittent every 24 hours  lactulose Syrup 20 Gram(s) Oral two times a day  pantoprazole    Tablet 40 milliGRAM(s) Oral before breakfast  potassium chloride    Tablet ER 20 milliEquivalent(s) Oral daily  rivaroxaban 15 milliGRAM(s) Oral every 24 hours    MEDICATIONS  (PRN):  acetaminophen   Tablet 650 milliGRAM(s) Oral every 6 hours PRN Mild Pain (1 - 3)  guaiFENesin ER 1200 milliGRAM(s) Oral every 12 hours PRN Cough

## 2018-09-04 DIAGNOSIS — N17.9 ACUTE KIDNEY FAILURE, UNSPECIFIED: ICD-10-CM

## 2018-09-04 PROCEDURE — 99223 1ST HOSP IP/OBS HIGH 75: CPT

## 2018-09-04 PROCEDURE — 99232 SBSQ HOSP IP/OBS MODERATE 35: CPT

## 2018-09-04 PROCEDURE — 93010 ELECTROCARDIOGRAM REPORT: CPT

## 2018-09-04 RX ORDER — CARVEDILOL PHOSPHATE 80 MG/1
3.12 CAPSULE, EXTENDED RELEASE ORAL EVERY 12 HOURS
Qty: 0 | Refills: 0 | Status: DISCONTINUED | OUTPATIENT
Start: 2018-09-04 | End: 2018-09-05

## 2018-09-04 RX ORDER — GABAPENTIN 400 MG/1
300 CAPSULE ORAL THREE TIMES A DAY
Qty: 0 | Refills: 0 | Status: DISCONTINUED | OUTPATIENT
Start: 2018-09-04 | End: 2018-09-04

## 2018-09-04 RX ADMIN — LACTULOSE 20 GRAM(S): 10 SOLUTION ORAL at 17:44

## 2018-09-04 RX ADMIN — Medication 40 MILLIGRAM(S): at 06:15

## 2018-09-04 RX ADMIN — IRON SUCROSE 110 MILLIGRAM(S): 20 INJECTION, SOLUTION INTRAVENOUS at 13:59

## 2018-09-04 RX ADMIN — PANTOPRAZOLE SODIUM 40 MILLIGRAM(S): 20 TABLET, DELAYED RELEASE ORAL at 06:16

## 2018-09-04 RX ADMIN — GABAPENTIN 200 MILLIGRAM(S): 400 CAPSULE ORAL at 22:29

## 2018-09-04 RX ADMIN — Medication 650 MILLIGRAM(S): at 15:14

## 2018-09-04 RX ADMIN — Medication 20 MILLIEQUIVALENT(S): at 13:36

## 2018-09-04 RX ADMIN — GABAPENTIN 200 MILLIGRAM(S): 400 CAPSULE ORAL at 13:35

## 2018-09-04 RX ADMIN — AMIODARONE HYDROCHLORIDE 200 MILLIGRAM(S): 400 TABLET ORAL at 06:15

## 2018-09-04 RX ADMIN — PREGABALIN 1000 MICROGRAM(S): 225 CAPSULE ORAL at 14:03

## 2018-09-04 RX ADMIN — GABAPENTIN 200 MILLIGRAM(S): 400 CAPSULE ORAL at 06:15

## 2018-09-04 RX ADMIN — RIVAROXABAN 15 MILLIGRAM(S): KIT at 18:29

## 2018-09-04 RX ADMIN — CARVEDILOL PHOSPHATE 3.12 MILLIGRAM(S): 80 CAPSULE, EXTENDED RELEASE ORAL at 17:45

## 2018-09-04 RX ADMIN — LACTULOSE 20 GRAM(S): 10 SOLUTION ORAL at 06:16

## 2018-09-04 NOTE — PROGRESS NOTE ADULT - SUBJECTIVE AND OBJECTIVE BOX
is an 83 y/o male w/acute on chronic systolic heart failure, and severe iron deficiency anemia with no acute signs of bleeding. He appears to be more comfortable today.    I'm continuing IV lasix 40mg qdaily and I'm starting IV venofer to help treat his severe iron deficiency.    Summary:   REVIEW OF SYSTEMS    General:	"I'm doing okay, am feeling much better."    Skin/Breast:  	  Ophthalmologic:  	  ENMT:	    Respiratory and Thorax:  	  Cardiovascular:	    Gastrointestinal:	    Genitourinary:	    Musculoskeletal:	    Neurological:	    Psychiatric:	    Hematology/Lymphatics:	    Endocrine:	    Allergic/Immunologic:	    Vital Signs Last 24 Hrs  T(C): 36.6 (04 Sep 2018 11:50), Max: 36.9 (03 Sep 2018 23:49)  T(F): 97.9 (04 Sep 2018 11:50), Max: 98.5 (03 Sep 2018 23:49)  HR: 72 (04 Sep 2018 11:50) (58 - 72)  BP: 125/71 (04 Sep 2018 11:50) (114/80 - 153/80)  BP(mean): --  RR: 20 (04 Sep 2018 11:50) (18 - 20)  SpO2: 96% (04 Sep 2018 08:21) (96% - 99%)  PHYSICAL EXAM:  GEN: obese, Tristanian speaking male, NAD, comfortable, sitting up in bed, awake, alert, mentating  HEENT: dry MM  CVS: S1S2 RRR  PULM: good breath sounds  ABD: obese, soft, nontender, no rebound, no guarding  EXTREM: +2 edema  NEURO: intact  PSYCH: mentating well  SKIN: warm                        8.6    5.2   )-----------( 233      ( 03 Sep 2018 09:01 )             29.9     09-03    140  |  91<L>  |  39.0<H>  ----------------------------<  125<H>  4.8   |  37.0<H>  |  1.40<H>    Ca    9.8      03 Sep 2018 09:01  Phos  4.3     09-03  Mg     1.6     09-03    Radiology:     MEDICATIONS  (STANDING):  amiodarone    Tablet 200 milliGRAM(s) Oral daily  cyanocobalamin 1000 MICROGram(s) Oral daily  furosemide   Injectable 40 milliGRAM(s) IV Push daily  gabapentin 200 milliGRAM(s) Oral every 8 hours  influenza   Vaccine 0.5 milliLiter(s) IntraMuscular once  iron sucrose IVPB 200 milliGRAM(s) IV Intermittent every 24 hours  lactulose Syrup 20 Gram(s) Oral two times a day  pantoprazole    Tablet 40 milliGRAM(s) Oral before breakfast  potassium chloride    Tablet ER 20 milliEquivalent(s) Oral daily  rivaroxaban 15 milliGRAM(s) Oral every 24 hours    MEDICATIONS  (PRN):  acetaminophen   Tablet 650 milliGRAM(s) Oral every 6 hours PRN Mild Pain (1 - 3)  guaiFENesin ER 1200 milliGRAM(s) Oral every 12 hours PRN Cough  is an 85 y/o male w/acute on chronic systolic heart failure, and severe iron deficiency anemia with no acute signs of bleeding. He appears to be more comfortable today.    I'm continuing IV lasix 40mg qdaily and I'm continuing IV venofer to help treat his severe iron deficiency.    Summary:   REVIEW OF SYSTEMS    General:	"I'm doing okay, am feeling much better."    Skin/Breast:  	  Ophthalmologic:  	  ENMT:	    Respiratory and Thorax:  	  Cardiovascular:	    Gastrointestinal:	    Genitourinary:	    Musculoskeletal:	    Neurological:	    Psychiatric:	    Hematology/Lymphatics:	    Endocrine:	    Allergic/Immunologic:	    Vital Signs Last 24 Hrs  T(C): 36.6 (04 Sep 2018 11:50), Max: 36.9 (03 Sep 2018 23:49)  T(F): 97.9 (04 Sep 2018 11:50), Max: 98.5 (03 Sep 2018 23:49)  HR: 72 (04 Sep 2018 11:50) (58 - 72)  BP: 125/71 (04 Sep 2018 11:50) (114/80 - 153/80)  BP(mean): --  RR: 20 (04 Sep 2018 11:50) (18 - 20)  SpO2: 96% (04 Sep 2018 08:21) (96% - 99%)  PHYSICAL EXAM:  GEN: obese, Libyan speaking male, NAD, comfortable, sitting up in bed, awake, alert, mentating  HEENT: dry MM  CVS: S1S2 RRR  PULM: good breath sounds  ABD: obese, soft, nontender, no rebound, no guarding  EXTREM: +2 edema  NEURO: intact  PSYCH: mentating well  SKIN: warm                        8.6    5.2   )-----------( 233      ( 03 Sep 2018 09:01 )             29.9     09-03    140  |  91<L>  |  39.0<H>  ----------------------------<  125<H>  4.8   |  37.0<H>  |  1.40<H>    Ca    9.8      03 Sep 2018 09:01  Phos  4.3     09-03  Mg     1.6     09-03    Radiology:     MEDICATIONS  (STANDING):  amiodarone    Tablet 200 milliGRAM(s) Oral daily  cyanocobalamin 1000 MICROGram(s) Oral daily  furosemide   Injectable 40 milliGRAM(s) IV Push daily  gabapentin 200 milliGRAM(s) Oral every 8 hours  influenza   Vaccine 0.5 milliLiter(s) IntraMuscular once  iron sucrose IVPB 200 milliGRAM(s) IV Intermittent every 24 hours  lactulose Syrup 20 Gram(s) Oral two times a day  pantoprazole    Tablet 40 milliGRAM(s) Oral before breakfast  potassium chloride    Tablet ER 20 milliEquivalent(s) Oral daily  rivaroxaban 15 milliGRAM(s) Oral every 24 hours    MEDICATIONS  (PRN):  acetaminophen   Tablet 650 milliGRAM(s) Oral every 6 hours PRN Mild Pain (1 - 3)  guaiFENesin ER 1200 milliGRAM(s) Oral every 12 hours PRN Cough

## 2018-09-04 NOTE — ED ADULT NURSE REASSESSMENT NOTE - NS ED NURSE REASSESS COMMENT FT1
1615 - IV lock to r ac d/c's r/t, unable to flush, #20 IV lock restarted to r upper arm , for blood transfusion. T+C drawn ans sent. pt jeffrey it well, blood consent form obtained by md. + witnessed and scanned into chart. questions answered, reassurance given. will continue to monitor pt.
1st unit PRBC's O POS, unit # [id number] started at 1815 infusing via alaris pump per protocol. pt jeffrey it well. pt continues to diuresis well s/p IV Lasix earlier. will continue to monitor pt.
tolerated hot dinner tray well. reports he is starting to feel better, speaking full sentences talking on cell phone. no s+s of blood reaction noted. will continue to follow pt.
patient rec'd at 1900, A+Ox4, offering no complaints at this time.  pt is OOB to chair.  CM in place, reviewed with monitor tech.  Report given to 4T ALVIN Russo, transported to 4T on monitor.

## 2018-09-04 NOTE — PROGRESS NOTE ADULT - PROBLEM SELECTOR PLAN 2
-IV venofer  -he has a hx of anemia as per his daughter  -outpatient f/u, he looks much better after his transfusion on admission

## 2018-09-04 NOTE — CONSULT NOTE ADULT - SUBJECTIVE AND OBJECTIVE BOX
CHIEF COMPLAINT: shortness of breath    HPI: Patient is a  84y Male well known to our practice with chronic diastolic CHF, CAD s/p CABGand bio AVR. PAF, COPD on home O2, KRYSTIN, GIB here with SOB for 1-2 days prior to admission. WOrse when laying flat and exertion. Legs more swollen as well. HAs been taking medications but diet very poor including Big Mac and french fries. GIven IV diuretic and feeling a bit better. ALso anemic and transfused. NO active bleeding now he states.     PAST MEDICAL & SURGICAL HISTORY:  COPD (chronic obstructive pulmonary disease)  Hyperlipidemia, unspecified hyperlipidemia type  Essential hypertension  Afib  Chronic systolic CHF (congestive heart failure)  Hypertrophic cardiomyopathy  CKD (chronic kidney disease), stage 4 (severe)  Gassiness  Diabetes  Asthma  Prostate cancer  Hypertension  No significant past surgical history  History of valvuloplasty  History of knee surgery      MEDICATIONS:  MEDICATIONS  (STANDING):  amiodarone    Tablet 200 milliGRAM(s) Oral daily  cyanocobalamin 1000 MICROGram(s) Oral daily  furosemide   Injectable 40 milliGRAM(s) IV Push daily  gabapentin 200 milliGRAM(s) Oral every 8 hours  influenza   Vaccine 0.5 milliLiter(s) IntraMuscular once  iron sucrose IVPB 200 milliGRAM(s) IV Intermittent every 24 hours  lactulose Syrup 20 Gram(s) Oral two times a day  pantoprazole    Tablet 40 milliGRAM(s) Oral before breakfast  potassium chloride    Tablet ER 20 milliEquivalent(s) Oral daily  rivaroxaban 15 milliGRAM(s) Oral every 24 hours    MEDICATIONS  (PRN):  acetaminophen   Tablet 650 milliGRAM(s) Oral every 6 hours PRN Mild Pain (1 - 3)  guaiFENesin ER 1200 milliGRAM(s) Oral every 12 hours PRN Cough    acetaminophen   Tablet 650 milliGRAM(s) Oral every 6 hours PRN  amiodarone    Tablet 200 milliGRAM(s) Oral daily  cyanocobalamin 1000 MICROGram(s) Oral daily  furosemide   Injectable 40 milliGRAM(s) IV Push daily  gabapentin 200 milliGRAM(s) Oral every 8 hours  guaiFENesin ER 1200 milliGRAM(s) Oral every 12 hours PRN  influenza   Vaccine 0.5 milliLiter(s) IntraMuscular once  iron sucrose IVPB 200 milliGRAM(s) IV Intermittent every 24 hours  lactulose Syrup 20 Gram(s) Oral two times a day  pantoprazole    Tablet 40 milliGRAM(s) Oral before breakfast  potassium chloride    Tablet ER 20 milliEquivalent(s) Oral daily  rivaroxaban 15 milliGRAM(s) Oral every 24 hours      FAMILY HISTORY:  FAMILY HISTORY:  No pertinent family history in first degree relatives  No pertinent family history in first degree relatives      SOCIAL HISTORY: no EtOH, drugs or tobacco    ROS:  negative, All others negative    PHYSCIAL EXAM:  Vital Signs Last 24 Hrs  T(C): 36.6 (04 Sep 2018 11:50), Max: 36.9 (03 Sep 2018 23:49)  T(F): 97.9 (04 Sep 2018 11:50), Max: 98.5 (03 Sep 2018 23:49)  HR: 72 (04 Sep 2018 11:50) (58 - 72)  BP: 125/71 (04 Sep 2018 11:50) (114/80 - 153/80)  BP(mean): --  RR: 20 (04 Sep 2018 11:50) (18 - 20)  SpO2: 96% (04 Sep 2018 08:21) (96% - 99%)  I&O's Summary    GEN: NAD  HEENT: MMM, sclera anicteric  RESP: CTA bilaterally, mildly reduced air movement  CVS: S1S2, RRR, II/VI sysotlic murmur, mild-moderate JVD  GI: Soft, NT, ND, BS+  EXT: 2-3+ pitting edema bilaterally  NEURO: AAOX3  PSYCH: Normal affect    ECG: SR, 1st degree AV delay, NSST    LABS:                        8.6    5.2   )-----------( 233      ( 03 Sep 2018 09:01 )             29.9     09-03    140  |  91<L>  |  39.0<H>  ----------------------------<  125<H>  4.8   |  37.0<H>  |  1.40<H>    Ca    9.8      03 Sep 2018 09:01  Phos  4.3     09-03  Mg     1.6     09-03      CARDIAC MARKERS ( 02 Sep 2018 14:48 )  x     / 0.04 ng/mL / x     / x     / x          PT/INR - ( 02 Sep 2018 14:48 )   PT: 30.9 sec;   INR: 2.75 ratio         PTT - ( 02 Sep 2018 14:48 )  PTT:38.2 sec    CXR: bilateral interstitial edema    ECHO 9/2018   1. Left ventricular ejection fraction, by visual estimation, is >75%.   2. Technically difficult study.   3. Hyperdynamic global left ventricular systolic function.   4. Spectral Doppler shows pseudonormal pattern of left ventricular   myocardial filling (Grade II diastolic dysfunction).   5. There is moderate concentric left ventricular hypertrophy.   6. Moderately enlarged right ventricle.   7. There is no evidence of pericardial effusion.   8. Moderate mitral annular calcification.   9. Mild tricuspid regurgitation.  10. Bioprosthesis in the aortic position.  11. Peak aortic valve gradient is 38.9 mmHg and the mean gradient is 21.3   mmHg, which is probably normal in the setting of a prosthetic aortic   valve.        Assessment:    84y Male with CAD s/p CABGand bio AVR. PAF, COPD on home O2, KRYSTIN, GIB here with acute on chronic diastolic CHF, likely due to dietary indiscretion. Anemia contributing as well, transfused already. No active bleeding at this time however and would continue A/C for PAF. REmains in SR , no active ischemia.     Plan:  1. Continue A/C for PAF and amio to maintain SR  2. Continue IV diuretics at least another 24 hours  3. IF renal function remains stable tomorrow, will add low dose ACEI for BP control and afterload reduction  4. Possible dc in next 24-48 hours  5. Thanks!      Eloy Cruz MD

## 2018-09-05 ENCOUNTER — TRANSCRIPTION ENCOUNTER (OUTPATIENT)
Age: 83
End: 2018-09-05

## 2018-09-05 VITALS
TEMPERATURE: 99 F | RESPIRATION RATE: 19 BRPM | DIASTOLIC BLOOD PRESSURE: 68 MMHG | HEART RATE: 64 BPM | SYSTOLIC BLOOD PRESSURE: 120 MMHG | OXYGEN SATURATION: 93 %

## 2018-09-05 LAB
ANION GAP SERPL CALC-SCNC: 10 MMOL/L — SIGNIFICANT CHANGE UP (ref 5–17)
BUN SERPL-MCNC: 37 MG/DL — HIGH (ref 8–20)
CALCIUM SERPL-MCNC: 9.3 MG/DL — SIGNIFICANT CHANGE UP (ref 8.6–10.2)
CHLORIDE SERPL-SCNC: 93 MMOL/L — LOW (ref 98–107)
CO2 SERPL-SCNC: 39 MMOL/L — HIGH (ref 22–29)
CREAT SERPL-MCNC: 1.51 MG/DL — HIGH (ref 0.5–1.3)
GLUCOSE SERPL-MCNC: 95 MG/DL — SIGNIFICANT CHANGE UP (ref 70–115)
HCT VFR BLD CALC: 30.2 % — LOW (ref 42–52)
HGB BLD-MCNC: 8.3 G/DL — LOW (ref 14–18)
MAGNESIUM SERPL-MCNC: 1.5 MG/DL — LOW (ref 1.6–2.6)
MCHC RBC-ENTMCNC: 19.2 PG — LOW (ref 27–31)
MCHC RBC-ENTMCNC: 27.5 G/DL — LOW (ref 32–36)
MCV RBC AUTO: 69.7 FL — LOW (ref 80–94)
PHOSPHATE SERPL-MCNC: 4.2 MG/DL — SIGNIFICANT CHANGE UP (ref 2.4–4.7)
PLATELET # BLD AUTO: 219 K/UL — SIGNIFICANT CHANGE UP (ref 150–400)
POTASSIUM SERPL-MCNC: 4.7 MMOL/L — SIGNIFICANT CHANGE UP (ref 3.5–5.3)
POTASSIUM SERPL-SCNC: 4.7 MMOL/L — SIGNIFICANT CHANGE UP (ref 3.5–5.3)
RBC # BLD: 4.33 M/UL — LOW (ref 4.6–6.2)
RBC # FLD: 25.1 % — HIGH (ref 11–15.6)
SODIUM SERPL-SCNC: 142 MMOL/L — SIGNIFICANT CHANGE UP (ref 135–145)
WBC # BLD: 5.6 K/UL — SIGNIFICANT CHANGE UP (ref 4.8–10.8)
WBC # FLD AUTO: 5.6 K/UL — SIGNIFICANT CHANGE UP (ref 4.8–10.8)

## 2018-09-05 PROCEDURE — 99239 HOSP IP/OBS DSCHRG MGMT >30: CPT

## 2018-09-05 PROCEDURE — 99232 SBSQ HOSP IP/OBS MODERATE 35: CPT

## 2018-09-05 RX ORDER — FERROUS SULFATE 325(65) MG
1 TABLET ORAL
Qty: 30 | Refills: 0 | OUTPATIENT
Start: 2018-09-05 | End: 2018-10-04

## 2018-09-05 RX ORDER — CARVEDILOL PHOSPHATE 80 MG/1
1 CAPSULE, EXTENDED RELEASE ORAL
Qty: 60 | Refills: 0 | OUTPATIENT
Start: 2018-09-05 | End: 2018-10-04

## 2018-09-05 RX ORDER — FUROSEMIDE 40 MG
1 TABLET ORAL
Qty: 30 | Refills: 0 | OUTPATIENT
Start: 2018-09-05 | End: 2018-10-04

## 2018-09-05 RX ORDER — FERROUS SULFATE 325(65) MG
1 TABLET ORAL
Qty: 90 | Refills: 0 | OUTPATIENT
Start: 2018-09-05 | End: 2018-10-04

## 2018-09-05 RX ORDER — MAGNESIUM SULFATE 500 MG/ML
2 VIAL (ML) INJECTION ONCE
Qty: 0 | Refills: 0 | Status: COMPLETED | OUTPATIENT
Start: 2018-09-05 | End: 2018-09-05

## 2018-09-05 RX ADMIN — LACTULOSE 20 GRAM(S): 10 SOLUTION ORAL at 17:37

## 2018-09-05 RX ADMIN — Medication 40 MILLIGRAM(S): at 05:50

## 2018-09-05 RX ADMIN — CARVEDILOL PHOSPHATE 3.12 MILLIGRAM(S): 80 CAPSULE, EXTENDED RELEASE ORAL at 17:36

## 2018-09-05 RX ADMIN — RIVAROXABAN 15 MILLIGRAM(S): KIT at 17:36

## 2018-09-05 RX ADMIN — GABAPENTIN 200 MILLIGRAM(S): 400 CAPSULE ORAL at 14:30

## 2018-09-05 RX ADMIN — Medication 50 GRAM(S): at 12:22

## 2018-09-05 RX ADMIN — PANTOPRAZOLE SODIUM 40 MILLIGRAM(S): 20 TABLET, DELAYED RELEASE ORAL at 05:50

## 2018-09-05 RX ADMIN — GABAPENTIN 200 MILLIGRAM(S): 400 CAPSULE ORAL at 05:52

## 2018-09-05 RX ADMIN — LACTULOSE 20 GRAM(S): 10 SOLUTION ORAL at 05:51

## 2018-09-05 RX ADMIN — Medication 20 MILLIEQUIVALENT(S): at 11:58

## 2018-09-05 RX ADMIN — PREGABALIN 1000 MICROGRAM(S): 225 CAPSULE ORAL at 11:58

## 2018-09-05 RX ADMIN — AMIODARONE HYDROCHLORIDE 200 MILLIGRAM(S): 400 TABLET ORAL at 05:50

## 2018-09-05 RX ADMIN — IRON SUCROSE 110 MILLIGRAM(S): 20 INJECTION, SOLUTION INTRAVENOUS at 14:30

## 2018-09-05 NOTE — DISCHARGE NOTE ADULT - VISION (WITH CORRECTIVE LENSES IF THE PATIENT USUALLY WEARS THEM):
How Severe Are Your Spot(S)?: mild Hpi Title: Evaluation of Skin Lesions Normal vision: sees adequately in most situations; can see medication labels, newsprint

## 2018-09-05 NOTE — DISCHARGE NOTE ADULT - MEDICATION SUMMARY - MEDICATIONS TO STOP TAKING
I will STOP taking the medications listed below when I get home from the hospital:    predniSONE 10 mg oral tablet  -- 2 tab(s) oral - by mouth once a day x 3 days  1 tab(s) oral - by mouth once a day x 3 days  -- It is very important that you take or use this exactly as directed.  Do not skip doses or discontinue unless directed by your doctor.  Obtain medical advice before taking any non-prescription drugs as some may affect the action of this medication.  Take with food or milk.

## 2018-09-05 NOTE — DISCHARGE NOTE ADULT - PATIENT PORTAL LINK FT
You can access the [employer] Patient Portal, offered by [facility], by registering with the following website: [email or web address]

## 2018-09-05 NOTE — DISCHARGE NOTE ADULT - CARE PROVIDER_API CALL
Jameson Looney (MD), Cardiovascular Disease  [address] [place] [address]  Phone: [phone]  Fax: [phone]

## 2018-09-05 NOTE — DISCHARGE NOTE ADULT - MEDICATION SUMMARY - MEDICATIONS TO TAKE
I will START or STAY ON the medications listed below when I get home from the hospital:    acetaminophen 325 mg oral tablet  -- 2 tab(s) by mouth every 6 hours, As needed, Mild Pain (1 - 3)  -- Indication: For pain    amiodarone 200 mg oral tablet  -- 1 tab(s) by mouth once a day  -- Indication: For Atrial fib    rivaroxaban 15 mg oral tablet  -- 1 tab(s) by mouth every 24 hours  -- Indication: For Afib    gabapentin 100 mg oral capsule  -- 2 cap(s) by mouth 3 times a day   -- It is very important that you take or use this exactly as directed.  Do not skip doses or discontinue unless directed by your doctor.  May cause drowsiness.  Alcohol may intensify this effect.  Use care when operating dangerous machinery.    -- Indication: For pain    lovastatin 40 mg oral tablet  -- 1 tab(s) by mouth once a day  -- Indication: For hyperlipidmiea    carvedilol 3.125 mg oral tablet  -- 1 tab(s) by mouth every 12 hours  -- Indication: For Afib    ipratropium 500 mcg/2.5 mL inhalation solution  -- 2.5 milliliter(s) inhaled 3 times a day  -- Indication: For Copd    albuterol 2.5 mg/3 mL (0.083%) inhalation solution  -- 3 milliliter(s) inhaled every 6 hours  -- Indication: For Copd    Breo Ellipta 200 mcg-25 mcg/inh inhalation powder  -- 1 puff(s) inhaled once a day  -- Indication: For Copd    furosemide 40 mg oral tablet  -- 1 tab(s) by mouth once a day  -- Indication: For Chf    guaiFENesin 1200 mg oral tablet, extended release  -- 1 tab(s) by mouth every 12 hours, As Needed cough   over the counter   -- Indication: For Cough    ferrous sulfate 325 mg (65 mg elemental iron) oral tablet  -- 1 tab(s) by mouth 3 times a day   -- Check with your doctor before becoming pregnant.  Do not chew, break, or crush.  May discolor urine or feces.    -- Indication: For Anemia    lactulose 10 g/15 mL oral syrup  -- 30 milliliter(s) by mouth 2 times a day  -- Indication: For Constipaiton    K-Tab 20 mEq oral tablet, extended release  -- 1 tab(s) by mouth once a day   -- It is very important that you take or use this exactly as directed.  Do not skip doses or discontinue unless directed by your doctor.  Medication should be taken with plenty of water.  Take with food or milk.    -- Indication: For replacements    pantoprazole 40 mg oral delayed release tablet  -- 1 tab(s) by mouth 2 times a day  -- Indication: For gerd    cyanocobalamin 1000 mcg oral tablet  -- 1 tab(s) by mouth once a day  -- Indication: For vit b12 def

## 2018-09-05 NOTE — PROGRESS NOTE ADULT - SUBJECTIVE AND OBJECTIVE BOX
DAVID VINCENT  [id number]        Chief Complaint: follow up acute HFpEF      Subjective: feeling much better, back to baseline      24 hour Tele: SR/SB        acetaminophen   Tablet 650 milliGRAM(s) Oral every 6 hours PRN  amiodarone    Tablet 200 milliGRAM(s) Oral daily  carvedilol 3.125 milliGRAM(s) Oral every 12 hours  cyanocobalamin 1000 MICROGram(s) Oral daily  furosemide   Injectable 40 milliGRAM(s) IV Push daily  gabapentin 200 milliGRAM(s) Oral every 8 hours  guaiFENesin ER 1200 milliGRAM(s) Oral every 12 hours PRN  influenza   Vaccine 0.5 milliLiter(s) IntraMuscular once  iron sucrose IVPB 200 milliGRAM(s) IV Intermittent every 24 hours  lactulose Syrup 20 Gram(s) Oral two times a day  magnesium sulfate  IVPB 2 Gram(s) IV Intermittent once  pantoprazole    Tablet 40 milliGRAM(s) Oral before breakfast  potassium chloride    Tablet ER 20 milliEquivalent(s) Oral daily  rivaroxaban 15 milliGRAM(s) Oral every 24 hours          Physical Exam:  T(C): 36.6 (09-05-18 @ 10:50), Max: 37 (09-04-18 @ 22:50)  HR: 54 (09-05-18 @ 10:50) (54 - 81)  BP: 97/68 (09-05-18 @ 10:50) (97/68 - 125/71)  RR: 18 (09-05-18 @ 10:50) (18 - 20)  SpO2: 91% (09-05-18 @ 10:50) (91% - 100%)  Wt(kg): --  HEENT: MMM, sclera anicteric  RESP: CTA bilaterally, mildly reduced air movement  CVS: S1S2, RRR, II/VI sysotlic murmur, minimal JVD  GI: Soft, NT, ND, BS+  EXT: 1-2+ pitting edema bilaterally  NEURO: AAOX3  PSYCH: Normal affect    I&O's Summary    04 Sep 2018 07:01  -  05 Sep 2018 07:00  --------------------------------------------------------  IN: 0 mL / OUT: 700 mL / NET: -700 mL    05 Sep 2018 07:01  -  05 Sep 2018 11:41  --------------------------------------------------------  IN: 300 mL / OUT: 650 mL / NET: -350 mL          Labs:   05 Sep 2018 05:48    142    |  93     |  37.0   ----------------------------<  95     4.7     |  39.0   |  1.51     Ca    9.3        05 Sep 2018 05:48  Phos  4.2       05 Sep 2018 05:48  Mg     1.5       05 Sep 2018 05:48                            8.3    5.6   )-----------( 219      ( 05 Sep 2018 05:48 )             30.2   ECHO 9/2018   1. Left ventricular ejection fraction, by visual estimation, is >75%.   2. Technically difficult study.   3. Hyperdynamic global left ventricular systolic function.   4. Spectral Doppler shows pseudonormal pattern of left ventricular   myocardial filling (Grade II diastolic dysfunction).   5. There is moderate concentric left ventricular hypertrophy.   6. Moderately enlarged right ventricle.   7. There is no evidence of pericardial effusion.   8. Moderate mitral annular calcification.   9. Mild tricuspid regurgitation.  10. Bioprosthesis in the aortic position.  11. Peak aortic valve gradient is 38.9 mmHg and the mean gradient is 21.3   mmHg, which is probably normal in the setting of a prosthetic aortic   valve.        Assessment:    84y Male with CAD s/p CABGand bio AVR. PAF, COPD on home O2, KRYSTIN, GIB here with acute on chronic diastolic CHF, likely due to dietary indiscretion. Anemia contributing as well, transfused already. No active bleeding at this time however and would continue A/C for PAF. REmains in SR , no active ischemia.   -Diuresed well and back to baseline  -Will not add ACEI given mild bump in creatinine from lasix and low normal BP      Plan:  1. CV stable for discharge on current medication regimen  2. Continue A/C and daily lasix  3. FOllow up with DR. Looney 1 week  4. thanks!      Eloy Cruz MD

## 2018-09-05 NOTE — DISCHARGE NOTE ADULT - HOSPITAL COURSE
is an 83 y/o male w/PMhx of afib, chf, ckd, COPD on chronic home o2, presenting with symptoms of acute systolic heart failure. He admits to "salty food intake" and noncompliance; which his daughter confirms. He has a hx of anemia (no cause), denies bloody bms. His daughter states that his hemoglobin "goes low" from time to time. His occult blood wasnegative.     patient admitted to [facility] and seen by cardio in consult. patient given 2 units prbcs and iv lasix. patient improved and also given iv venofer. p;atient improved and is now stable for dc home. patient advised to watch his diet.     time spent on dc 32 minutes

## 2018-09-05 NOTE — DISCHARGE NOTE ADULT - CARE PLAN
Principal Discharge DX:	Acute on chronic systolic (congestive) heart failure  Goal:	resolved with lasix  Assessment and plan of treatment:	advised better dietary compliance  follow up with cardio  Secondary Diagnosis:	Afib  Goal:	home coumadin  Secondary Diagnosis:	Anemia  Goal:	iron po tid  Assessment and plan of treatment:	s/p 2 units prbcs  Secondary Diagnosis:	COPD (chronic obstructive pulmonary disease)  Goal:	on home o2  Assessment and plan of treatment:	inhalers  Secondary Diagnosis:	Essential hypertension  Assessment and plan of treatment:	home meds  Secondary Diagnosis:	Hyperlipidemia, unspecified hyperlipidemia type  Assessment and plan of treatment:	home meds  Secondary Diagnosis:	CKD (chronic kidney disease) stage 3, GFR 30-59 ml/min  Goal:	avoid nsaids

## 2018-09-05 NOTE — DISCHARGE NOTE ADULT - PLAN OF CARE
iron po tid s/p 2 units prbcs on home o2 inhalers home meds avoid nsaids resolved with lasix advised better dietary compliance  follow up with cardio home coumadin

## 2018-09-05 NOTE — PHYSICAL THERAPY INITIAL EVALUATION ADULT - ADDITIONAL COMMENTS
pt used RW prior to admission to [facility], 2 steps 1 rail to negotiate at home(son assists in bring RW up/down stairs),

## 2018-09-05 NOTE — DISCHARGE NOTE ADULT - MEDICATION SUMMARY - MEDICATIONS TO CHANGE
I will SWITCH the dose or number of times a day I take the medications listed below when I get home from the hospital:    ferrous sulfate 325 mg (65 mg elemental iron) oral delayed release tablet  -- 1 tab(s) by mouth once a week  -- May discolor urine or feces.  Swallow whole.  Do not crush.

## 2018-09-05 NOTE — DISCHARGE NOTE ADULT - SECONDARY DIAGNOSIS.
Anemia COPD (chronic obstructive pulmonary disease) Essential hypertension Hyperlipidemia, unspecified hyperlipidemia type CKD (chronic kidney disease) stage 3, GFR 30-59 ml/min Afib

## 2018-09-18 ENCOUNTER — EMERGENCY (EMERGENCY)
Facility: HOSPITAL | Age: 83
LOS: 1 days | Discharge: HOME | End: 2018-09-18
Attending: STUDENT IN AN ORGANIZED HEALTH CARE EDUCATION/TRAINING PROGRAM
Payer: MEDICARE

## 2018-09-18 VITALS
HEART RATE: 57 BPM | DIASTOLIC BLOOD PRESSURE: 65 MMHG | TEMPERATURE: 98 F | SYSTOLIC BLOOD PRESSURE: 108 MMHG | HEIGHT: 68 IN | RESPIRATION RATE: 20 BRPM | WEIGHT: 229.94 LBS | OXYGEN SATURATION: 98 %

## 2018-09-18 DIAGNOSIS — Z98.89 OTHER SPECIFIED POSTPROCEDURAL STATES: Chronic | ICD-10-CM

## 2018-09-18 LAB
ALBUMIN SERPL ELPH-MCNC: 3.4 G/DL — SIGNIFICANT CHANGE UP (ref 3.3–5.2)
ALBUMIN SERPL ELPH-MCNC: 3.6 G/DL — SIGNIFICANT CHANGE UP (ref 3.3–5.2)
ALP SERPL-CCNC: 65 U/L — SIGNIFICANT CHANGE UP (ref 40–120)
ALP SERPL-CCNC: 73 U/L — SIGNIFICANT CHANGE UP (ref 40–120)
ALT FLD-CCNC: 10 U/L — SIGNIFICANT CHANGE UP
ALT FLD-CCNC: 15 U/L — SIGNIFICANT CHANGE UP
ANION GAP SERPL CALC-SCNC: 11 MMOL/L — SIGNIFICANT CHANGE UP (ref 5–17)
ANION GAP SERPL CALC-SCNC: 9 MMOL/L — SIGNIFICANT CHANGE UP (ref 5–17)
ANISOCYTOSIS BLD QL: SIGNIFICANT CHANGE UP
APTT BLD: 30.5 SEC — SIGNIFICANT CHANGE UP (ref 27.5–37.4)
AST SERPL-CCNC: 18 U/L — SIGNIFICANT CHANGE UP
AST SERPL-CCNC: 53 U/L — HIGH
BASO STIPL BLD QL SMEAR: PRESENT — SIGNIFICANT CHANGE UP
BASOPHILS # BLD AUTO: 0 K/UL — SIGNIFICANT CHANGE UP (ref 0–0.2)
BASOPHILS NFR BLD AUTO: 0.8 % — SIGNIFICANT CHANGE UP (ref 0–2)
BILIRUB SERPL-MCNC: 0.5 MG/DL — SIGNIFICANT CHANGE UP (ref 0.4–2)
BILIRUB SERPL-MCNC: 0.6 MG/DL — SIGNIFICANT CHANGE UP (ref 0.4–2)
BLD GP AB SCN SERPL QL: SIGNIFICANT CHANGE UP
BUN SERPL-MCNC: 29 MG/DL — HIGH (ref 8–20)
BUN SERPL-MCNC: 30 MG/DL — HIGH (ref 8–20)
BURR CELLS BLD QL SMEAR: PRESENT — SIGNIFICANT CHANGE UP
CALCIUM SERPL-MCNC: 8.8 MG/DL — SIGNIFICANT CHANGE UP (ref 8.6–10.2)
CALCIUM SERPL-MCNC: 8.8 MG/DL — SIGNIFICANT CHANGE UP (ref 8.6–10.2)
CHLORIDE SERPL-SCNC: 82 MMOL/L — LOW (ref 98–107)
CHLORIDE SERPL-SCNC: 85 MMOL/L — LOW (ref 98–107)
CK SERPL-CCNC: 125 U/L — SIGNIFICANT CHANGE UP (ref 30–200)
CO2 SERPL-SCNC: 35 MMOL/L — HIGH (ref 22–29)
CO2 SERPL-SCNC: 38 MMOL/L — HIGH (ref 22–29)
CREAT SERPL-MCNC: 1.89 MG/DL — HIGH (ref 0.5–1.3)
CREAT SERPL-MCNC: 1.9 MG/DL — HIGH (ref 0.5–1.3)
ELLIPTOCYTES BLD QL SMEAR: SLIGHT — SIGNIFICANT CHANGE UP
EOSINOPHIL # BLD AUTO: 0.2 K/UL — SIGNIFICANT CHANGE UP (ref 0–0.5)
EOSINOPHIL NFR BLD AUTO: 4 % — SIGNIFICANT CHANGE UP (ref 0–5)
GLUCOSE SERPL-MCNC: 118 MG/DL — HIGH (ref 70–115)
GLUCOSE SERPL-MCNC: 162 MG/DL — HIGH (ref 70–115)
HCT VFR BLD CALC: 28.2 % — LOW (ref 42–52)
HCT VFR BLD CALC: 28.4 % — LOW (ref 42–52)
HGB BLD-MCNC: 7.9 G/DL — LOW (ref 14–18)
HGB BLD-MCNC: 8.1 G/DL — LOW (ref 14–18)
HYPOCHROMIA BLD QL: SIGNIFICANT CHANGE UP
INR BLD: 1.36 RATIO — HIGH (ref 0.88–1.16)
LYMPHOCYTES # BLD AUTO: 0.7 K/UL — LOW (ref 1–4.8)
LYMPHOCYTES # BLD AUTO: 18.3 % — LOW (ref 20–55)
MCHC RBC-ENTMCNC: 20.9 PG — LOW (ref 27–31)
MCHC RBC-ENTMCNC: 21 PG — LOW (ref 27–31)
MCHC RBC-ENTMCNC: 28 G/DL — LOW (ref 32–36)
MCHC RBC-ENTMCNC: 28.5 G/DL — LOW (ref 32–36)
MCV RBC AUTO: 73.8 FL — LOW (ref 80–94)
MCV RBC AUTO: 74.6 FL — LOW (ref 80–94)
MICROCYTES BLD QL: SLIGHT — SIGNIFICANT CHANGE UP
MONOCYTES # BLD AUTO: 0.5 K/UL — SIGNIFICANT CHANGE UP (ref 0–0.8)
MONOCYTES NFR BLD AUTO: 14 % — HIGH (ref 3–10)
NEUTROPHILS # BLD AUTO: 2.3 K/UL — SIGNIFICANT CHANGE UP (ref 1.8–8)
NEUTROPHILS NFR BLD AUTO: 62.6 % — SIGNIFICANT CHANGE UP (ref 37–73)
OB PNL STL: NEGATIVE — SIGNIFICANT CHANGE UP
PLAT MORPH BLD: ABNORMAL
PLATELET # BLD AUTO: 140 K/UL — LOW (ref 150–400)
PLATELET # BLD AUTO: 152 K/UL — SIGNIFICANT CHANGE UP (ref 150–400)
POIKILOCYTOSIS BLD QL AUTO: SLIGHT — SIGNIFICANT CHANGE UP
POTASSIUM SERPL-MCNC: 4.2 MMOL/L — SIGNIFICANT CHANGE UP (ref 3.5–5.3)
POTASSIUM SERPL-MCNC: 4.9 MMOL/L — SIGNIFICANT CHANGE UP (ref 3.5–5.3)
POTASSIUM SERPL-SCNC: 4.2 MMOL/L — SIGNIFICANT CHANGE UP (ref 3.5–5.3)
POTASSIUM SERPL-SCNC: 4.9 MMOL/L — SIGNIFICANT CHANGE UP (ref 3.5–5.3)
PROT SERPL-MCNC: 6 G/DL — LOW (ref 6.6–8.7)
PROT SERPL-MCNC: 6.1 G/DL — LOW (ref 6.6–8.7)
PROTHROM AB SERPL-ACNC: 15 SEC — HIGH (ref 9.8–12.7)
RBC # BLD: 3.78 M/UL — LOW (ref 4.6–6.2)
RBC # BLD: 3.85 M/UL — LOW (ref 4.6–6.2)
RBC # FLD: 26.9 % — HIGH (ref 11–15.6)
RBC # FLD: 28 % — HIGH (ref 11–15.6)
RBC BLD AUTO: ABNORMAL
SCHISTOCYTES BLD QL AUTO: SLIGHT — SIGNIFICANT CHANGE UP
SODIUM SERPL-SCNC: 128 MMOL/L — LOW (ref 135–145)
SODIUM SERPL-SCNC: 132 MMOL/L — LOW (ref 135–145)
TARGETS BLD QL SMEAR: SLIGHT — SIGNIFICANT CHANGE UP
TROPONIN T SERPL-MCNC: 0.03 NG/ML — SIGNIFICANT CHANGE UP (ref 0–0.06)
TYPE + AB SCN PNL BLD: SIGNIFICANT CHANGE UP
WBC # BLD: 3.6 K/UL — LOW (ref 4.8–10.8)
WBC # BLD: 3.7 K/UL — LOW (ref 4.8–10.8)
WBC # FLD AUTO: 3.6 K/UL — LOW (ref 4.8–10.8)
WBC # FLD AUTO: 3.7 K/UL — LOW (ref 4.8–10.8)

## 2018-09-18 PROCEDURE — 36415 COLL VENOUS BLD VENIPUNCTURE: CPT

## 2018-09-18 PROCEDURE — 80053 COMPREHEN METABOLIC PANEL: CPT

## 2018-09-18 PROCEDURE — 71046 X-RAY EXAM CHEST 2 VIEWS: CPT | Mod: 26

## 2018-09-18 PROCEDURE — 84484 ASSAY OF TROPONIN QUANT: CPT

## 2018-09-18 PROCEDURE — 99284 EMERGENCY DEPT VISIT MOD MDM: CPT

## 2018-09-18 PROCEDURE — 99283 EMERGENCY DEPT VISIT LOW MDM: CPT | Mod: 25

## 2018-09-18 PROCEDURE — 82550 ASSAY OF CK (CPK): CPT

## 2018-09-18 PROCEDURE — 85610 PROTHROMBIN TIME: CPT

## 2018-09-18 PROCEDURE — 86901 BLOOD TYPING SEROLOGIC RH(D): CPT

## 2018-09-18 PROCEDURE — 93010 ELECTROCARDIOGRAM REPORT: CPT

## 2018-09-18 PROCEDURE — 85027 COMPLETE CBC AUTOMATED: CPT

## 2018-09-18 PROCEDURE — 82272 OCCULT BLD FECES 1-3 TESTS: CPT

## 2018-09-18 PROCEDURE — 93005 ELECTROCARDIOGRAM TRACING: CPT

## 2018-09-18 PROCEDURE — 86900 BLOOD TYPING SEROLOGIC ABO: CPT

## 2018-09-18 PROCEDURE — 71046 X-RAY EXAM CHEST 2 VIEWS: CPT

## 2018-09-18 PROCEDURE — 86850 RBC ANTIBODY SCREEN: CPT

## 2018-09-18 PROCEDURE — 85730 THROMBOPLASTIN TIME PARTIAL: CPT

## 2018-09-18 PROCEDURE — T1013: CPT

## 2018-09-18 RX ORDER — SODIUM CHLORIDE 9 MG/ML
1000 INJECTION INTRAMUSCULAR; INTRAVENOUS; SUBCUTANEOUS ONCE
Qty: 0 | Refills: 0 | Status: COMPLETED | OUTPATIENT
Start: 2018-09-18 | End: 2018-09-18

## 2018-09-18 RX ADMIN — SODIUM CHLORIDE 1000 MILLILITER(S): 9 INJECTION INTRAMUSCULAR; INTRAVENOUS; SUBCUTANEOUS at 20:35

## 2018-09-18 NOTE — ED PROVIDER NOTE - GASTROINTESTINAL NEGATIVE STATEMENT, MLM
no abdominal pain, no bloating, no constipation, no diarrhea, no nausea and no vomiting. + Black stools

## 2018-09-18 NOTE — ED PROVIDER NOTE - OBJECTIVE STATEMENT
This patient is an 84 year old man hx of COPD (on home O2 at 2.5 L), A-fib (on xarelto), anemia and GI bleed who presents to the ER with his family member reporting black stools and generalized weakness.  Patient states that the weakness is similar to when he needed a blood transfusion in the past.  He denies palpitations and increased SOB from baseline.  His niece states that he had black stool today.  Patient does consume iron daily.  He denies abdominal pain.

## 2018-09-18 NOTE — ED PROVIDER NOTE - CARE PLAN
Principal Discharge DX:	Generalized weakness Principal Discharge DX:	Generalized weakness  Secondary Diagnosis:	Anemia

## 2018-09-18 NOTE — ED PROVIDER NOTE - MEDICAL DECISION MAKING DETAILS
Patient hx of anemia and GI bleed c/o dark stools and generalized weakness.  Will check occult guaiac, labs, EKG, and Re-eval.

## 2018-09-18 NOTE — ED PROVIDER NOTE - PROGRESS NOTE DETAILS
As per sign-out from Dr. Coy, Patient with recent admission for chf/anemia and hx of chronic anemia, chf, and previous GI bleed with current complaint of generalized weakness for the past two days. Labs are noted and she request repeat in several hours. If significant change ( ie. lower sodium or Hgb) patient will need admission for further management. Patient is stable. Labs are as noted. Patient to follow-up with PMD and possibly [facility]. Results discussed with patient and family at bedside. Voiced concern that he will need repeat labs in several days but otherwise stable for discharge home.

## 2018-09-18 NOTE — ED ADULT NURSE NOTE - OBJECTIVE STATEMENT
feeling weak, dark stools, hx gi bleed with transfusion, on xaralto. Pt feeling sleepy and lethargic , denies  chest pain. Pt denies abd pian

## 2018-09-18 NOTE — ED ADULT NURSE NOTE - NSIMPLEMENTINTERV_GEN_ALL_ED
Implemented All Fall Risk Interventions:  [place] to call system. Call bell, personal items and telephone within reach. Instruct patient to call for assistance. Room bathroom lighting operational. Non-slip footwear when patient is off stretcher. Physically safe environment: no spills, clutter or unnecessary equipment. Stretcher in lowest position, wheels locked, appropriate side rails in place. Provide visual cue, wrist band, yellow gown, etc. Monitor gait and stability. Monitor for mental status changes and reorient to person, place, and time. Review medications for side effects contributing to fall risk. Reinforce activity limits and safety measures with patient and family.

## 2018-09-18 NOTE — ED ADULT NURSE NOTE - ALCOHOL PRE SCREEN (AUDIT - C)
Vitals obtained. Allergies and medications reviewed verbally with patient via Epic.    Statement Selected

## 2018-09-19 VITALS
RESPIRATION RATE: 17 BRPM | HEART RATE: 57 BPM | TEMPERATURE: 98 F | SYSTOLIC BLOOD PRESSURE: 118 MMHG | DIASTOLIC BLOOD PRESSURE: 63 MMHG | OXYGEN SATURATION: 99 %

## 2018-09-24 ENCOUNTER — INPATIENT (INPATIENT)
Facility: HOSPITAL | Age: 83
LOS: 7 days | Discharge: HOME | DRG: 291 | End: 2018-10-02
Admitting: INTERNAL MEDICINE
Payer: MEDICARE

## 2018-09-24 VITALS
RESPIRATION RATE: 18 BRPM | DIASTOLIC BLOOD PRESSURE: 70 MMHG | WEIGHT: 250 LBS | HEIGHT: 65 IN | TEMPERATURE: 98 F | SYSTOLIC BLOOD PRESSURE: 113 MMHG | HEART RATE: 52 BPM | OXYGEN SATURATION: 100 %

## 2018-09-24 DIAGNOSIS — Z98.89 OTHER SPECIFIED POSTPROCEDURAL STATES: Chronic | ICD-10-CM

## 2018-09-24 DIAGNOSIS — E83.42 HYPOMAGNESEMIA: ICD-10-CM

## 2018-09-24 DIAGNOSIS — E87.1 HYPO-OSMOLALITY AND HYPONATREMIA: ICD-10-CM

## 2018-09-24 DIAGNOSIS — I48.0 PAROXYSMAL ATRIAL FIBRILLATION: ICD-10-CM

## 2018-09-24 DIAGNOSIS — N18.3 CHRONIC KIDNEY DISEASE, STAGE 3 (MODERATE): ICD-10-CM

## 2018-09-24 DIAGNOSIS — J44.9 CHRONIC OBSTRUCTIVE PULMONARY DISEASE, UNSPECIFIED: ICD-10-CM

## 2018-09-24 DIAGNOSIS — I50.33 ACUTE ON CHRONIC DIASTOLIC (CONGESTIVE) HEART FAILURE: ICD-10-CM

## 2018-09-24 LAB
ALBUMIN SERPL ELPH-MCNC: 3.3 G/DL — SIGNIFICANT CHANGE UP (ref 3.3–5.2)
ALP SERPL-CCNC: 71 U/L — SIGNIFICANT CHANGE UP (ref 40–120)
ALT FLD-CCNC: 10 U/L — SIGNIFICANT CHANGE UP
ANION GAP SERPL CALC-SCNC: 10 MMOL/L — SIGNIFICANT CHANGE UP (ref 5–17)
APPEARANCE UR: CLEAR — SIGNIFICANT CHANGE UP
APTT BLD: 42.5 SEC — HIGH (ref 27.5–37.4)
AST SERPL-CCNC: 35 U/L — SIGNIFICANT CHANGE UP
BASOPHILS # BLD AUTO: 0 K/UL — SIGNIFICANT CHANGE UP (ref 0–0.2)
BASOPHILS NFR BLD AUTO: 0.4 % — SIGNIFICANT CHANGE UP (ref 0–2)
BILIRUB SERPL-MCNC: 1 MG/DL — SIGNIFICANT CHANGE UP (ref 0.4–2)
BILIRUB UR-MCNC: NEGATIVE — SIGNIFICANT CHANGE UP
BUN SERPL-MCNC: 24 MG/DL — HIGH (ref 8–20)
CALCIUM SERPL-MCNC: 8.9 MG/DL — SIGNIFICANT CHANGE UP (ref 8.6–10.2)
CHLORIDE SERPL-SCNC: 64 MMOL/L — LOW (ref 98–107)
CHLORIDE UR-SCNC: 57 MMOL/L — SIGNIFICANT CHANGE UP
CK SERPL-CCNC: 335 U/L — HIGH (ref 30–200)
CO2 SERPL-SCNC: 41 MMOL/L — HIGH (ref 22–29)
COLOR SPEC: YELLOW — SIGNIFICANT CHANGE UP
CREAT ?TM UR-MCNC: 24 MG/DL — SIGNIFICANT CHANGE UP
CREAT SERPL-MCNC: 1.34 MG/DL — HIGH (ref 0.5–1.3)
DIFF PNL FLD: NEGATIVE — SIGNIFICANT CHANGE UP
EOSINOPHIL # BLD AUTO: 0.1 K/UL — SIGNIFICANT CHANGE UP (ref 0–0.5)
EOSINOPHIL NFR BLD AUTO: 1.8 % — SIGNIFICANT CHANGE UP (ref 0–5)
GLUCOSE SERPL-MCNC: 102 MG/DL — SIGNIFICANT CHANGE UP (ref 70–115)
GLUCOSE UR QL: NEGATIVE MG/DL — SIGNIFICANT CHANGE UP
HCT VFR BLD CALC: 32 % — LOW (ref 42–52)
HGB BLD-MCNC: 9.2 G/DL — LOW (ref 14–18)
INR BLD: 2.72 RATIO — HIGH (ref 0.88–1.16)
KETONES UR-MCNC: NEGATIVE — SIGNIFICANT CHANGE UP
LEUKOCYTE ESTERASE UR-ACNC: ABNORMAL
LYMPHOCYTES # BLD AUTO: 0.6 K/UL — LOW (ref 1–4.8)
LYMPHOCYTES # BLD AUTO: 13.4 % — LOW (ref 20–55)
MAGNESIUM SERPL-MCNC: 1.4 MG/DL — LOW (ref 1.6–2.6)
MCHC RBC-ENTMCNC: 20.8 PG — LOW (ref 27–31)
MCHC RBC-ENTMCNC: 28.8 G/DL — LOW (ref 32–36)
MCV RBC AUTO: 72.4 FL — LOW (ref 80–94)
MONOCYTES # BLD AUTO: 0.5 K/UL — SIGNIFICANT CHANGE UP (ref 0–0.8)
MONOCYTES NFR BLD AUTO: 11 % — HIGH (ref 3–10)
NEUTROPHILS # BLD AUTO: 3.3 K/UL — SIGNIFICANT CHANGE UP (ref 1.8–8)
NEUTROPHILS NFR BLD AUTO: 73.2 % — HIGH (ref 37–73)
NITRITE UR-MCNC: NEGATIVE — SIGNIFICANT CHANGE UP
OSMOLALITY UR: 225 MOSM/KG — LOW (ref 300–1000)
PH UR: 8 — SIGNIFICANT CHANGE UP (ref 5–8)
PHOSPHATE SERPL-MCNC: 3.1 MG/DL — SIGNIFICANT CHANGE UP (ref 2.4–4.7)
PLATELET # BLD AUTO: 182 K/UL — SIGNIFICANT CHANGE UP (ref 150–400)
POTASSIUM SERPL-MCNC: 3.7 MMOL/L — SIGNIFICANT CHANGE UP (ref 3.5–5.3)
POTASSIUM SERPL-SCNC: 3.7 MMOL/L — SIGNIFICANT CHANGE UP (ref 3.5–5.3)
POTASSIUM UR-SCNC: 16 MMOL/L — SIGNIFICANT CHANGE UP
PROT ?TM UR-MCNC: 7 MG/DL — SIGNIFICANT CHANGE UP (ref 0–12)
PROT SERPL-MCNC: 5.7 G/DL — LOW (ref 6.6–8.7)
PROT UR-MCNC: 15 MG/DL
PROT/CREAT UR-RTO: 0.3 RATIO — HIGH
PROTHROM AB SERPL-ACNC: 30.5 SEC — HIGH (ref 9.8–12.7)
RBC # BLD: 4.42 M/UL — LOW (ref 4.6–6.2)
RBC # FLD: 25.4 % — HIGH (ref 11–15.6)
SODIUM SERPL-SCNC: 115 MMOL/L — CRITICAL LOW (ref 135–145)
SODIUM UR-SCNC: 74 MMOL/L — SIGNIFICANT CHANGE UP
SODIUM UR-SCNC: 74 MMOL/L — SIGNIFICANT CHANGE UP
SP GR SPEC: 1.01 — SIGNIFICANT CHANGE UP (ref 1.01–1.02)
TROPONIN T SERPL-MCNC: 0.04 NG/ML — SIGNIFICANT CHANGE UP (ref 0–0.06)
TYPE + AB SCN PNL BLD: SIGNIFICANT CHANGE UP
UROBILINOGEN FLD QL: 1 MG/DL
WBC # BLD: 4.6 K/UL — LOW (ref 4.8–10.8)
WBC # FLD AUTO: 4.6 K/UL — LOW (ref 4.8–10.8)

## 2018-09-24 PROCEDURE — 93010 ELECTROCARDIOGRAM REPORT: CPT

## 2018-09-24 PROCEDURE — 99285 EMERGENCY DEPT VISIT HI MDM: CPT

## 2018-09-24 PROCEDURE — 99223 1ST HOSP IP/OBS HIGH 75: CPT

## 2018-09-24 RX ORDER — MAGNESIUM SULFATE 500 MG/ML
2 VIAL (ML) INJECTION EVERY 4 HOURS
Qty: 0 | Refills: 0 | Status: COMPLETED | OUTPATIENT
Start: 2018-09-24 | End: 2018-09-25

## 2018-09-24 RX ORDER — SODIUM CHLORIDE 9 MG/ML
1000 INJECTION INTRAMUSCULAR; INTRAVENOUS; SUBCUTANEOUS
Qty: 0 | Refills: 0 | Status: DISCONTINUED | OUTPATIENT
Start: 2018-09-24 | End: 2018-09-24

## 2018-09-24 RX ORDER — MAGNESIUM OXIDE 400 MG ORAL TABLET 241.3 MG
400 TABLET ORAL
Qty: 0 | Refills: 0 | Status: DISCONTINUED | OUTPATIENT
Start: 2018-09-24 | End: 2018-10-02

## 2018-09-24 RX ORDER — POTASSIUM CHLORIDE 20 MEQ
20 PACKET (EA) ORAL
Qty: 0 | Refills: 0 | Status: DISCONTINUED | OUTPATIENT
Start: 2018-09-24 | End: 2018-10-02

## 2018-09-24 RX ORDER — GABAPENTIN 400 MG/1
200 CAPSULE ORAL EVERY 8 HOURS
Qty: 0 | Refills: 0 | Status: DISCONTINUED | OUTPATIENT
Start: 2018-09-24 | End: 2018-10-02

## 2018-09-24 RX ORDER — CARVEDILOL PHOSPHATE 80 MG/1
3.12 CAPSULE, EXTENDED RELEASE ORAL EVERY 12 HOURS
Qty: 0 | Refills: 0 | Status: DISCONTINUED | OUTPATIENT
Start: 2018-09-24 | End: 2018-10-02

## 2018-09-24 RX ORDER — PANTOPRAZOLE SODIUM 20 MG/1
40 TABLET, DELAYED RELEASE ORAL
Qty: 0 | Refills: 0 | Status: DISCONTINUED | OUTPATIENT
Start: 2018-09-24 | End: 2018-10-02

## 2018-09-24 RX ORDER — ATORVASTATIN CALCIUM 80 MG/1
10 TABLET, FILM COATED ORAL AT BEDTIME
Qty: 0 | Refills: 0 | Status: DISCONTINUED | OUTPATIENT
Start: 2018-09-24 | End: 2018-10-02

## 2018-09-24 RX ORDER — IPRATROPIUM/ALBUTEROL SULFATE 18-103MCG
3 AEROSOL WITH ADAPTER (GRAM) INHALATION EVERY 6 HOURS
Qty: 0 | Refills: 0 | Status: DISCONTINUED | OUTPATIENT
Start: 2018-09-24 | End: 2018-10-02

## 2018-09-24 RX ORDER — RIVAROXABAN 15 MG-20MG
15 KIT ORAL EVERY 24 HOURS
Qty: 0 | Refills: 0 | Status: DISCONTINUED | OUTPATIENT
Start: 2018-09-24 | End: 2018-10-02

## 2018-09-24 RX ORDER — AMIODARONE HYDROCHLORIDE 400 MG/1
200 TABLET ORAL DAILY
Qty: 0 | Refills: 0 | Status: DISCONTINUED | OUTPATIENT
Start: 2018-09-24 | End: 2018-10-02

## 2018-09-24 RX ORDER — PREGABALIN 225 MG/1
1000 CAPSULE ORAL DAILY
Qty: 0 | Refills: 0 | Status: DISCONTINUED | OUTPATIENT
Start: 2018-09-24 | End: 2018-10-02

## 2018-09-24 RX ORDER — FERROUS SULFATE 325(65) MG
325 TABLET ORAL DAILY
Qty: 0 | Refills: 0 | Status: DISCONTINUED | OUTPATIENT
Start: 2018-09-24 | End: 2018-10-02

## 2018-09-24 RX ORDER — FUROSEMIDE 40 MG
5 TABLET ORAL
Qty: 500 | Refills: 0 | Status: DISCONTINUED | OUTPATIENT
Start: 2018-09-24 | End: 2018-09-27

## 2018-09-24 RX ORDER — SODIUM CHLORIDE 5 G/100ML
1000 INJECTION, SOLUTION INTRAVENOUS
Qty: 0 | Refills: 0 | Status: DISCONTINUED | OUTPATIENT
Start: 2018-09-24 | End: 2018-09-24

## 2018-09-24 RX ORDER — ACETAMINOPHEN 500 MG
650 TABLET ORAL EVERY 6 HOURS
Qty: 0 | Refills: 0 | Status: DISCONTINUED | OUTPATIENT
Start: 2018-09-24 | End: 2018-10-02

## 2018-09-24 RX ADMIN — Medication 20 MILLIEQUIVALENT(S): at 17:28

## 2018-09-24 RX ADMIN — PREGABALIN 1000 MICROGRAM(S): 225 CAPSULE ORAL at 17:29

## 2018-09-24 RX ADMIN — Medication 2.5 MG/HR: at 18:35

## 2018-09-24 RX ADMIN — Medication 50 GRAM(S): at 17:31

## 2018-09-24 RX ADMIN — RIVAROXABAN 15 MILLIGRAM(S): KIT at 17:29

## 2018-09-24 RX ADMIN — MAGNESIUM OXIDE 400 MG ORAL TABLET 400 MILLIGRAM(S): 241.3 TABLET ORAL at 17:29

## 2018-09-24 RX ADMIN — Medication 2 GRAM(S): at 18:35

## 2018-09-24 RX ADMIN — GABAPENTIN 200 MILLIGRAM(S): 400 CAPSULE ORAL at 17:28

## 2018-09-24 RX ADMIN — PANTOPRAZOLE SODIUM 40 MILLIGRAM(S): 20 TABLET, DELAYED RELEASE ORAL at 17:30

## 2018-09-24 NOTE — ED ADULT TRIAGE NOTE - CHIEF COMPLAINT QUOTE
PT BIBA c/o generalized weakness denies pain, pt on 2L home O2. Pt denies SOB. Pt has productive sounding cough

## 2018-09-24 NOTE — H&P ADULT - ASSESSMENT
83 y/o male w/PMhx of afib on xarelto, diastolic HF, COPD on chronic O2 presents with generalized weakness. Found to have significant fluid overload and

## 2018-09-24 NOTE — H&P ADULT - HISTORY OF PRESENT ILLNESS
83 y/o male w/PMhx of afib on xarelto, diastolic HF, COPD on chronic O2 presents with generalized weakness.  no Fevers/chills/sweats or cough. no chest pain. + edema of legs/ abdomen and Dyspnea on exertion that has been worsening.  states compliant with medications and diet.  has known history of dietary indiscretion    in ER found to have fluid overload and Na 115

## 2018-09-24 NOTE — ED PROVIDER NOTE - OBJECTIVE STATEMENT
This patient is an 84 year old man with hx of CHF, COPD (on home O2), HTN, Dm who presents to the ER c/o generalized weakness.  Patient was recently discharged 23 days ago for anemia requiring a blood transfusion.  Patient denies increase SOB from baseline, chest pain, and blood stools.

## 2018-09-24 NOTE — ED PROVIDER NOTE - MEDICAL DECISION MAKING DETAILS
Patient c/o generalized weakness recently treated for anemia.  Will check EKG, Labs, UA, and Re-eval.

## 2018-09-25 DIAGNOSIS — D63.1 ANEMIA IN CHRONIC KIDNEY DISEASE: ICD-10-CM

## 2018-09-25 DIAGNOSIS — N18.4 CHRONIC KIDNEY DISEASE, STAGE 4 (SEVERE): ICD-10-CM

## 2018-09-25 LAB
ANION GAP SERPL CALC-SCNC: 13 MMOL/L — SIGNIFICANT CHANGE UP (ref 5–17)
BASOPHILS # BLD AUTO: 0 K/UL — SIGNIFICANT CHANGE UP (ref 0–0.2)
BASOPHILS NFR BLD AUTO: 0.8 % — SIGNIFICANT CHANGE UP (ref 0–2)
BUN SERPL-MCNC: 21 MG/DL — HIGH (ref 8–20)
CALCIUM SERPL-MCNC: 9.1 MG/DL — SIGNIFICANT CHANGE UP (ref 8.6–10.2)
CHLORIDE SERPL-SCNC: 67 MMOL/L — LOW (ref 98–107)
CO2 SERPL-SCNC: 42 MMOL/L — HIGH (ref 22–29)
CREAT SERPL-MCNC: 1.47 MG/DL — HIGH (ref 0.5–1.3)
EOSINOPHIL # BLD AUTO: 0.2 K/UL — SIGNIFICANT CHANGE UP (ref 0–0.5)
EOSINOPHIL NFR BLD AUTO: 5.2 % — HIGH (ref 0–5)
FERRITIN SERPL-MCNC: 77 NG/ML — SIGNIFICANT CHANGE UP (ref 30–400)
GLUCOSE SERPL-MCNC: 89 MG/DL — SIGNIFICANT CHANGE UP (ref 70–115)
HCT VFR BLD CALC: 32.6 % — LOW (ref 42–52)
HGB BLD-MCNC: 9.3 G/DL — LOW (ref 14–18)
IRON SATN MFR SERPL: 35 UG/DL — LOW (ref 59–158)
IRON SATN MFR SERPL: 8 % — LOW (ref 16–55)
LYMPHOCYTES # BLD AUTO: 0.6 K/UL — LOW (ref 1–4.8)
LYMPHOCYTES # BLD AUTO: 15.8 % — LOW (ref 20–55)
MCHC RBC-ENTMCNC: 20.8 PG — LOW (ref 27–31)
MCHC RBC-ENTMCNC: 28.5 G/DL — LOW (ref 32–36)
MCV RBC AUTO: 72.8 FL — LOW (ref 80–94)
MONOCYTES # BLD AUTO: 0.5 K/UL — SIGNIFICANT CHANGE UP (ref 0–0.8)
MONOCYTES NFR BLD AUTO: 14.2 % — HIGH (ref 3–10)
NEUTROPHILS # BLD AUTO: 2.4 K/UL — SIGNIFICANT CHANGE UP (ref 1.8–8)
NEUTROPHILS NFR BLD AUTO: 64 % — SIGNIFICANT CHANGE UP (ref 37–73)
NT-PROBNP SERPL-SCNC: 660 PG/ML — HIGH (ref 0–300)
PLATELET # BLD AUTO: 196 K/UL — SIGNIFICANT CHANGE UP (ref 150–400)
POTASSIUM SERPL-MCNC: 3.5 MMOL/L — SIGNIFICANT CHANGE UP (ref 3.5–5.3)
POTASSIUM SERPL-SCNC: 3.5 MMOL/L — SIGNIFICANT CHANGE UP (ref 3.5–5.3)
RBC # BLD: 4.48 M/UL — LOW (ref 4.6–6.2)
RBC # FLD: 25.6 % — HIGH (ref 11–15.6)
SODIUM SERPL-SCNC: 122 MMOL/L — LOW (ref 135–145)
SODIUM SERPL-SCNC: 122 MMOL/L — LOW (ref 135–145)
SODIUM SERPL-SCNC: 124 MMOL/L — LOW (ref 135–145)
SODIUM SERPL-SCNC: 124 MMOL/L — LOW (ref 135–145)
TIBC SERPL-MCNC: 423 UG/DL — SIGNIFICANT CHANGE UP (ref 220–430)
TRANSFERRIN SERPL-MCNC: 296 MG/DL — SIGNIFICANT CHANGE UP (ref 180–329)
UUN UR-MCNC: 108 MG/DL — SIGNIFICANT CHANGE UP
WBC # BLD: 3.7 K/UL — LOW (ref 4.8–10.8)
WBC # FLD AUTO: 3.7 K/UL — LOW (ref 4.8–10.8)

## 2018-09-25 PROCEDURE — 99223 1ST HOSP IP/OBS HIGH 75: CPT

## 2018-09-25 PROCEDURE — 99232 SBSQ HOSP IP/OBS MODERATE 35: CPT

## 2018-09-25 PROCEDURE — 99233 SBSQ HOSP IP/OBS HIGH 50: CPT

## 2018-09-25 RX ORDER — POTASSIUM CHLORIDE 20 MEQ
40 PACKET (EA) ORAL ONCE
Qty: 0 | Refills: 0 | Status: COMPLETED | OUTPATIENT
Start: 2018-09-25 | End: 2018-09-25

## 2018-09-25 RX ADMIN — ATORVASTATIN CALCIUM 10 MILLIGRAM(S): 80 TABLET, FILM COATED ORAL at 23:06

## 2018-09-25 RX ADMIN — Medication 325 MILLIGRAM(S): at 13:17

## 2018-09-25 RX ADMIN — GABAPENTIN 200 MILLIGRAM(S): 400 CAPSULE ORAL at 05:04

## 2018-09-25 RX ADMIN — GABAPENTIN 200 MILLIGRAM(S): 400 CAPSULE ORAL at 23:06

## 2018-09-25 RX ADMIN — MAGNESIUM OXIDE 400 MG ORAL TABLET 400 MILLIGRAM(S): 241.3 TABLET ORAL at 08:21

## 2018-09-25 RX ADMIN — GABAPENTIN 200 MILLIGRAM(S): 400 CAPSULE ORAL at 00:11

## 2018-09-25 RX ADMIN — MAGNESIUM OXIDE 400 MG ORAL TABLET 400 MILLIGRAM(S): 241.3 TABLET ORAL at 18:56

## 2018-09-25 RX ADMIN — AMIODARONE HYDROCHLORIDE 200 MILLIGRAM(S): 400 TABLET ORAL at 05:05

## 2018-09-25 RX ADMIN — MAGNESIUM OXIDE 400 MG ORAL TABLET 400 MILLIGRAM(S): 241.3 TABLET ORAL at 13:17

## 2018-09-25 RX ADMIN — Medication 40 MILLIEQUIVALENT(S): at 13:18

## 2018-09-25 RX ADMIN — ATORVASTATIN CALCIUM 10 MILLIGRAM(S): 80 TABLET, FILM COATED ORAL at 00:12

## 2018-09-25 RX ADMIN — CARVEDILOL PHOSPHATE 3.12 MILLIGRAM(S): 80 CAPSULE, EXTENDED RELEASE ORAL at 05:04

## 2018-09-25 RX ADMIN — PANTOPRAZOLE SODIUM 40 MILLIGRAM(S): 20 TABLET, DELAYED RELEASE ORAL at 18:56

## 2018-09-25 RX ADMIN — Medication 20 MILLIEQUIVALENT(S): at 18:56

## 2018-09-25 RX ADMIN — Medication 50 GRAM(S): at 00:11

## 2018-09-25 RX ADMIN — Medication 20 MILLIEQUIVALENT(S): at 05:03

## 2018-09-25 RX ADMIN — GABAPENTIN 200 MILLIGRAM(S): 400 CAPSULE ORAL at 13:18

## 2018-09-25 RX ADMIN — Medication 2.5 MG/HR: at 13:18

## 2018-09-25 RX ADMIN — RIVAROXABAN 15 MILLIGRAM(S): KIT at 18:55

## 2018-09-25 RX ADMIN — CARVEDILOL PHOSPHATE 3.12 MILLIGRAM(S): 80 CAPSULE, EXTENDED RELEASE ORAL at 18:56

## 2018-09-25 RX ADMIN — PANTOPRAZOLE SODIUM 40 MILLIGRAM(S): 20 TABLET, DELAYED RELEASE ORAL at 08:21

## 2018-09-25 RX ADMIN — PREGABALIN 1000 MICROGRAM(S): 225 CAPSULE ORAL at 13:18

## 2018-09-25 NOTE — PROGRESS NOTE ADULT - SUBJECTIVE AND OBJECTIVE BOX
DAVID VINCENT  [id number]      Chief Complaint:  Hyponatremia/Weakness    Interval History:  Patient feeling a little better, more energy  Still with some LE edema.  Denies CP/SOB/orthopnea/palps.    Tele:  No events      acetaminophen   Tablet .. 650 milliGRAM(s) Oral every 6 hours PRN  ALBUTerol/ipratropium for Nebulization 3 milliLiter(s) Nebulizer every 6 hours PRN  amiodarone    Tablet 200 milliGRAM(s) Oral daily  atorvastatin 10 milliGRAM(s) Oral at bedtime  carvedilol 3.125 milliGRAM(s) Oral every 12 hours  cyanocobalamin 1000 MICROGram(s) Oral daily  ferrous    sulfate 325 milliGRAM(s) Oral daily  furosemide Infusion 5 mG/Hr IV Continuous <Continuous>  gabapentin 200 milliGRAM(s) Oral every 8 hours  magnesium oxide 400 milliGRAM(s) Oral three times a day with meals  pantoprazole    Tablet 40 milliGRAM(s) Oral two times a day  potassium chloride    Tablet ER 20 milliEquivalent(s) Oral two times a day  rivaroxaban 15 milliGRAM(s) Oral every 24 hours  Urea 15 Gram(s) 30 Gram(s) Oral two times a day          Physical Exam:  T(C): 37.1 (09-25-18 @ 15:16), Max: 37.1 (09-25-18 @ 15:16)  HR: 58 (09-25-18 @ 15:16) (56 - 68)  BP: 101/61 (09-25-18 @ 15:16) (95/60 - 124/73)  RR: 20 (09-25-18 @ 15:16) (18 - 23)  SpO2: 98% (09-25-18 @ 15:16) (97% - 100%)  Wt(kg): --  General: Comfortable in NAD  Neck: ?JVD  CVS: nl s1s2, no s3  Pulm: CTA b/l, diminished at bases  Abd: soft, non-tender  Ext: 1-2+ b/l LE edema  Neuro A&O x3  Psych: Normal affect      Labs:   25 Sep 2018 12:09    124    |  x      |  x      ----------------------------<  x      x       |  x      |  x        Ca    9.1        25 Sep 2018 06:55  Phos  3.1       24 Sep 2018 13:09  Mg     1.4       24 Sep 2018 13:09    TPro  5.7    /  Alb  3.3    /  TBili  1.0    /  DBili  x      /  AST  35     /  ALT  10     /  AlkPhos  71     24 Sep 2018 13:09                          9.3    3.7   )-----------( 196      ( 25 Sep 2018 06:55 )             32.6     PT/INR - ( 24 Sep 2018 13:09 )   PT: 30.5 sec;   INR: 2.72 ratio         PTT - ( 24 Sep 2018 13:09 )  PTT:42.5 sec  CARDIAC MARKERS ( 24 Sep 2018 13:09 )  x     / 0.04 ng/mL / 335 U/L / x     / 4.4 ng/mL      ECHO 9/2018:   1. Left ventricular ejection fraction, by visual estimation, is >75%.   2. Technically difficult study.   3. Hyperdynamic global left ventricular systolic function.   4. Spectral Doppler shows pseudonormal pattern of left ventricular   myocardial filling (Grade II diastolic dysfunction).   5. There is moderate concentric left ventricular hypertrophy.   6. Moderately enlarged right ventricle.   7. There is no evidence of pericardial effusion.   8. Moderate mitral annular calcification.   9. Mild tricuspid regurgitation.  10. Bioprosthesis in the aortic position.  11. Peak aortic valve gradient is 38.9 mmHg and the mean gradient is 21.3   mmHg, which is probably normal in the setting of a prosthetic aortic   valve.      Assessment:    85yo Male  with chronic HFpEF, CAD s/p CABG, bio AVR, PAF, COPD on home O2, GIB here with weakness and hypervolemic hyponatremia. Agree with lasix gtt, can add tolvaptan if needed. Close monitoring of sodium and lytes.   -Patient feeling better, sodium and edema slightly improved.     Plan:  1. Lasix gtt, ordered per renal . Follow sodium and lytes closely.    2. Continue Amio and Xarelto for AF.  3. Continue other current CV meds at current doses  4. Consider tolvaptan

## 2018-09-25 NOTE — ED ADULT NURSE REASSESSMENT NOTE - NS ED NURSE REASSESS COMMENT FT1
Pt. received at 1800, denies any pain or discomfort at this time, no SOB or difficulty breathing. Furosemide infusion initiated as per orders. will continue to monitor and maintain safety.
Pt. sleeping but awakens to voice, vital signs remain within defined limits. offers no complaints at this time. will continue to monitor and maintain safety
pt AOX4 in no apparent distress, denies any pain at the moment, IV patent and flushing without difficulty, no signs of infiltration. plan of care explained to pt, pt verbalized understanding

## 2018-09-25 NOTE — PROGRESS NOTE ADULT - SUBJECTIVE AND OBJECTIVE BOX
seen for hypervolemic hyponatremia    no acute complaints.  high urine output.  ros otherwise negative      MEDICATIONS  (STANDING):  amiodarone    Tablet 200 milliGRAM(s) Oral daily  atorvastatin 10 milliGRAM(s) Oral at bedtime  carvedilol 3.125 milliGRAM(s) Oral every 12 hours  cyanocobalamin 1000 MICROGram(s) Oral daily  ferrous    sulfate 325 milliGRAM(s) Oral daily  furosemide Infusion 5 mG/Hr (2.5 mL/Hr) IV Continuous <Continuous>  gabapentin 200 milliGRAM(s) Oral every 8 hours  magnesium oxide 400 milliGRAM(s) Oral three times a day with meals  pantoprazole    Tablet 40 milliGRAM(s) Oral two times a day  potassium chloride    Tablet ER 20 milliEquivalent(s) Oral two times a day  rivaroxaban 15 milliGRAM(s) Oral every 24 hours    MEDICATIONS  (PRN):  acetaminophen   Tablet .. 650 milliGRAM(s) Oral every 6 hours PRN Temp greater or equal to 38C (100.4F), Mild Pain (1 - 3), Moderate Pain (4 - 6)  ALBUTerol/ipratropium for Nebulization 3 milliLiter(s) Nebulizer every 6 hours PRN Shortness of Breath and/or Wheezing      Allergies    No Known Allergies    Vital Signs Last 24 Hrs  T(C): 36.4 (25 Sep 2018 08:23), Max: 36.9 (24 Sep 2018 10:51)  T(F): 97.5 (25 Sep 2018 08:23), Max: 98.4 (24 Sep 2018 10:51)  HR: 57 (25 Sep 2018 08:23) (52 - 68)  BP: 104/64 (25 Sep 2018 08:23) (104/64 - 124/73)  BP(mean): --  RR: 20 (25 Sep 2018 08:23) (18 - 23)  SpO2: 100% (25 Sep 2018 08:23) (97% - 100%)    PHYSICAL EXAM:    GENERAL: NAD  CHEST/LUNG: dec bs at bases  no wheeze  HEART: Regular rate and rhythm; S1 S2  ABDOMEN: Soft, mild ascites Bowel sounds present  EXTREMITIES: +2 edema improved   NERVOUS SYSTEM:  Alert & Oriented X3, nonfocal    LABS:                        9.3    3.7   )-----------( 196      ( 25 Sep 2018 06:55 )             32.6     09-25    122<L>  |  67<L>  |  21.0<H>  ----------------------------<  89  3.5   |  42.0<H>  |  1.47<H>    Ca    9.1      25 Sep 2018 06:55  Phos  3.1       Mg     1.4         TPro  5.7<L>  /  Alb  3.3  /  TBili  1.0  /  DBili  x   /  AST  35  /  ALT  10  /  AlkPhos  71      PT/INR - ( 24 Sep 2018 13:09 )   PT: 30.5 sec;   INR: 2.72 ratio         PTT - ( 24 Sep 2018 13:09 )  PTT:42.5 sec  Urinalysis Basic - ( 24 Sep 2018 13:18 )    Color: Yellow / Appearance: Clear / S.010 / pH: x  Gluc: x / Ketone: Negative  / Bili: Negative / Urobili: 1 mg/dL   Blood: x / Protein: 15 mg/dL / Nitrite: Negative   Leuk Esterase: Moderate / RBC: x / WBC 3-5   Sq Epi: x / Non Sq Epi: x / Bacteria: Occasional        CAPILLARY BLOOD GLUCOSE            RADIOLOGY & ADDITIONAL TESTS:

## 2018-09-25 NOTE — CONSULT NOTE ADULT - PROBLEM SELECTOR RECOMMENDATION 6
Treat fluid overload  Work with PT to maintain strength and stability  Fall precautions  [facility] in process

## 2018-09-25 NOTE — PROGRESS NOTE ADULT - ASSESSMENT
83yo Male  with chronic HFpEF, CAD s/p CABG, bio AVR, PAF, COPD on home O2, GIB here with weakness and severe hypervolemic hyponatremia. Agreer with lasix gtt, can add tolvaptan if needed. CLose monitoring of sodium and lytes.     Plan:  1. Lasix  Infusion , Follow sodium and lytes closely  2. Continue amiodarone  and Xarelto for AF  3. No other change to CV meds.   4. Urea,    Will follow,

## 2018-09-25 NOTE — PROGRESS NOTE ADULT - SUBJECTIVE AND OBJECTIVE BOX
Vital Signs Last 24 Hrs  T(C): 37.1 (25 Sep 2018 15:16), Max: 37.1 (25 Sep 2018 15:16)  T(F): 98.7 (25 Sep 2018 15:16), Max: 98.7 (25 Sep 2018 15:16)  HR: 58 (25 Sep 2018 15:16) (56 - 68)  BP: 101/61 (25 Sep 2018 15:16) (95/60 - 124/73)  BP(mean): --  RR: 20 (25 Sep 2018 15:16) (18 - 23)  SpO2: 98% (25 Sep 2018 15:16) (97% - 100%)    124<L>  |  x      |  x      ----------------------------<  x      Ca:x     (25 Sep 2018 12:09)  x       |  x      |  x        eGFR if Non : 43 <L>  eGFR if : 50 <L>    TPro  5.7<L>  /  Alb  3.3    /  TBili  1.0    /  DBili  x      /  AST  35     /  ALT  10     /  AlkPhos  71     24 Sep 2018 13:09                           9.3<L>  3.7<L> )-----------( 196      ( 25 Sep 2018 06:55 )              32.6<L>    Phos:-- Mg:-- PTH:-- Uric acid:-- Serum Osm:--  Ferritin:77 ng/mL Iron:35 ug/dL<L> TIBC:423 ug/dL Tsat:8 %<L>  B12:-- TSH:-- ( @ 06:55)    Urinalysis Basic - ( 24 Sep 2018 13:18 )  Color: Yellow / Appearance: Clear / S.010 / pH: x  Gluc: x / Ketone: Negative  / Bili: Negative / Urobili: 1 mg/dL<!>   Blood: x / Protein: 15 mg/dL<!> / Nitrite: Negative   Leuk Esterase: Moderate<!> / RBC: x / WBC 3-5   Sq Epi: x / Non Sq Epi: x / Bacteria: Occasional<!>      UProt:-- UCr:24 mg/dL P/C Ratio:0.3 Ratio<H> 24 hour Prot:-- UVol:-- CrCl:--  Lev:74 mmol/L UOsm:225 mosm/kg<L> UVol:-- UCl:57 mmol/L UK:16 mmol/L ( @ 15:19)      Progress Note:   · Provider Specialty	Hospitalist	    Reason for Admission:   Reason for Admission:  · Reason for Admission	weakness	      · Subjective and Objective: 	    seen for hypervolemic hyponatremia    no acute complaints.  high urine output.  ros otherwise negative      MEDICATIONS  (STANDING):  amiodarone    Tablet 200 milliGRAM(s) Oral daily  atorvastatin 10 milliGRAM(s) Oral at bedtime  carvedilol 3.125 milliGRAM(s) Oral every 12 hours  cyanocobalamin 1000 MICROGram(s) Oral daily  ferrous    sulfate 325 milliGRAM(s) Oral daily  furosemide Infusion 5 mG/Hr (2.5 mL/Hr) IV Continuous <Continuous>  gabapentin 200 milliGRAM(s) Oral every 8 hours  magnesium oxide 400 milliGRAM(s) Oral three times a day with meals  pantoprazole    Tablet 40 milliGRAM(s) Oral two times a day  potassium chloride    Tablet ER 20 milliEquivalent(s) Oral two times a day  rivaroxaban 15 milliGRAM(s) Oral every 24 hours    MEDICATIONS  (PRN):  acetaminophen   Tablet .. 650 milliGRAM(s) Oral every 6 hours PRN Temp greater or equal to 38C (100.4F), Mild Pain (1 - 3), Moderate Pain (4 - 6)  ALBUTerol/ipratropium for Nebulization 3 milliLiter(s) Nebulizer every 6 hours PRN Shortness of Breath and/or Wheezing      Allergies    No Known Allergies    Vital Signs Last 24 Hrs  T(C): 36.4 (25 Sep 2018 08:23), Max: 36.9 (24 Sep 2018 10:51)  T(F): 97.5 (25 Sep 2018 08:23), Max: 98.4 (24 Sep 2018 10:51)  HR: 57 (25 Sep 2018 08:23) (52 - 68)  BP: 104/64 (25 Sep 2018 08:23) (104/64 - 124/73)  BP(mean): --  RR: 20 (25 Sep 2018 08:23) (18 - 23)  SpO2: 100% (25 Sep 2018 08:23) (97% - 100%)    PHYSICAL EXAM:    GENERAL: NAD  CHEST/LUNG: dec bs at bases  no wheeze  HEART: Regular rate and rhythm; S1 S2  ABDOMEN: Soft, mild ascites Bowel sounds present  EXTREMITIES: +2 edema improved   NERVOUS SYSTEM:  Alert & Oriented X3, nonfocal    LABS:                        9.3    3.7   )-----------( 196      ( 25 Sep 2018 06:55 )             32.6     09-    122<L>  |  67<L>  |  21.0<H>  ----------------------------<  89  3.5   |  42.0<H>  |  1.47<H>    Ca    9.1      25 Sep 2018 06:55  Phos  3.1       Mg     1.4         TPro  5.7<L>  /  Alb  3.3  /  TBili  1.0  /  DBili  x   /  AST  35  /  ALT  10  /  AlkPhos  71  -    PT/INR - ( 24 Sep 2018 13:09 )   PT: 30.5 sec;   INR: 2.72 ratio         PTT - ( 24 Sep 2018 13:09 )  PTT:42.5 sec  Urinalysis Basic - ( 24 Sep 2018 13:18 )    Color: Yellow / Appearance: Clear / S.010 / pH: x  Gluc: x / Ketone: Negative  / Bili: Negative / Urobili: 1 mg/dL   Blood: x / Protein: 15 mg/dL / Nitrite: Negative   Leuk Esterase: Moderate / RBC: x / WBC 3-5   Sq Epi: x / Non Sq Epi: x / Bacteria: Occasional    83 y/o male w/PMhx of afib on xarelto, diastolic HF, COPD on chronic O2 presents with generalized weakness. Found to have significant fluid overload and     Problem/Plan - 1:  ·  Problem: Hyponatremia with excess extracellular fluid volume.      Plan: Lasix drip  Trend Na--improving.     Problem/Plan - 2:  ·  Problem: Acute on chronic diastolic heart failure.      Plan: As Above,    Problem/Plan - 3:  ·  Problem: CKD (chronic kidney disease) stage 3, GFR 30-59 ml/min.      Plan: at baseline.     Problem/Plan - 4:  ·  Problem: Hypomagnesemia.      Plan: Repleted,    Problem/Plan - 5:  ·  Problem: Chronic obstructive pulmonary disease, unspecified COPD type.      Plan: Per HM,    Problem/Plan - 6:  Problem: Paroxysmal atrial fibrillation.     Plan: Xarelto - YUSRA,    Problem/Plan - 7:  ·  Problem: Anemia of renal disease.      Plan: Per ,

## 2018-09-26 DIAGNOSIS — Z71.89 OTHER SPECIFIED COUNSELING: ICD-10-CM

## 2018-09-26 LAB
ANION GAP SERPL CALC-SCNC: 12 MMOL/L — SIGNIFICANT CHANGE UP (ref 5–17)
BUN SERPL-MCNC: 55 MG/DL — HIGH (ref 8–20)
CALCIUM SERPL-MCNC: 9.7 MG/DL — SIGNIFICANT CHANGE UP (ref 8.6–10.2)
CHLORIDE SERPL-SCNC: 71 MMOL/L — LOW (ref 98–107)
CO2 SERPL-SCNC: 39 MMOL/L — HIGH (ref 22–29)
CREAT SERPL-MCNC: 1.84 MG/DL — HIGH (ref 0.5–1.3)
GLUCOSE SERPL-MCNC: 110 MG/DL — SIGNIFICANT CHANGE UP (ref 70–115)
HCT VFR BLD CALC: 31.3 % — LOW (ref 42–52)
HGB BLD-MCNC: 9.4 G/DL — LOW (ref 14–18)
MAGNESIUM SERPL-MCNC: 2.1 MG/DL — SIGNIFICANT CHANGE UP (ref 1.6–2.6)
MCHC RBC-ENTMCNC: 21.5 PG — LOW (ref 27–31)
MCHC RBC-ENTMCNC: 30 G/DL — LOW (ref 32–36)
MCV RBC AUTO: 71.6 FL — LOW (ref 80–94)
PLATELET # BLD AUTO: 187 K/UL — SIGNIFICANT CHANGE UP (ref 150–400)
POTASSIUM SERPL-MCNC: 4.9 MMOL/L — SIGNIFICANT CHANGE UP (ref 3.5–5.3)
POTASSIUM SERPL-SCNC: 4.9 MMOL/L — SIGNIFICANT CHANGE UP (ref 3.5–5.3)
RBC # BLD: 4.37 M/UL — LOW (ref 4.6–6.2)
SODIUM SERPL-SCNC: 122 MMOL/L — LOW (ref 135–145)
WBC # BLD: 4.9 K/UL — SIGNIFICANT CHANGE UP (ref 4.8–10.8)
WBC # FLD AUTO: 4.9 K/UL — SIGNIFICANT CHANGE UP (ref 4.8–10.8)

## 2018-09-26 PROCEDURE — 99232 SBSQ HOSP IP/OBS MODERATE 35: CPT

## 2018-09-26 PROCEDURE — 99233 SBSQ HOSP IP/OBS HIGH 50: CPT

## 2018-09-26 RX ORDER — INFLUENZA VIRUS VACCINE 15; 15; 15; 15 UG/.5ML; UG/.5ML; UG/.5ML; UG/.5ML
0.5 SUSPENSION INTRAMUSCULAR ONCE
Qty: 0 | Refills: 0 | Status: DISCONTINUED | OUTPATIENT
Start: 2018-09-26 | End: 2018-10-02

## 2018-09-26 RX ADMIN — GABAPENTIN 200 MILLIGRAM(S): 400 CAPSULE ORAL at 05:11

## 2018-09-26 RX ADMIN — PANTOPRAZOLE SODIUM 40 MILLIGRAM(S): 20 TABLET, DELAYED RELEASE ORAL at 05:11

## 2018-09-26 RX ADMIN — GABAPENTIN 200 MILLIGRAM(S): 400 CAPSULE ORAL at 22:27

## 2018-09-26 RX ADMIN — MAGNESIUM OXIDE 400 MG ORAL TABLET 400 MILLIGRAM(S): 241.3 TABLET ORAL at 11:00

## 2018-09-26 RX ADMIN — GABAPENTIN 200 MILLIGRAM(S): 400 CAPSULE ORAL at 11:00

## 2018-09-26 RX ADMIN — Medication 650 MILLIGRAM(S): at 07:43

## 2018-09-26 RX ADMIN — RIVAROXABAN 15 MILLIGRAM(S): KIT at 17:01

## 2018-09-26 RX ADMIN — Medication 650 MILLIGRAM(S): at 17:37

## 2018-09-26 RX ADMIN — Medication 650 MILLIGRAM(S): at 09:52

## 2018-09-26 RX ADMIN — PREGABALIN 1000 MICROGRAM(S): 225 CAPSULE ORAL at 11:00

## 2018-09-26 RX ADMIN — Medication 20 MILLIEQUIVALENT(S): at 17:01

## 2018-09-26 RX ADMIN — PANTOPRAZOLE SODIUM 40 MILLIGRAM(S): 20 TABLET, DELAYED RELEASE ORAL at 17:01

## 2018-09-26 RX ADMIN — ATORVASTATIN CALCIUM 10 MILLIGRAM(S): 80 TABLET, FILM COATED ORAL at 22:27

## 2018-09-26 RX ADMIN — AMIODARONE HYDROCHLORIDE 200 MILLIGRAM(S): 400 TABLET ORAL at 05:11

## 2018-09-26 RX ADMIN — CARVEDILOL PHOSPHATE 3.12 MILLIGRAM(S): 80 CAPSULE, EXTENDED RELEASE ORAL at 05:11

## 2018-09-26 RX ADMIN — Medication 20 MILLIEQUIVALENT(S): at 05:11

## 2018-09-26 RX ADMIN — Medication 650 MILLIGRAM(S): at 17:00

## 2018-09-26 RX ADMIN — Medication 325 MILLIGRAM(S): at 11:00

## 2018-09-26 RX ADMIN — MAGNESIUM OXIDE 400 MG ORAL TABLET 400 MILLIGRAM(S): 241.3 TABLET ORAL at 17:01

## 2018-09-26 RX ADMIN — MAGNESIUM OXIDE 400 MG ORAL TABLET 400 MILLIGRAM(S): 241.3 TABLET ORAL at 07:43

## 2018-09-26 NOTE — PHYSICAL THERAPY INITIAL EVALUATION ADULT - GAIT PATTERN USED, PT EVAL
unsteadiness throughout session requiring assist, decreased gait velocity and activity tolerance, increased coby UE support, verbal cues for energy conservation

## 2018-09-26 NOTE — PROGRESS NOTE ADULT - SUBJECTIVE AND OBJECTIVE BOX
[facility] DIVISION OF KIDNEY DISEASES AND HYPERTENSION -- FOLLOW UP NOTE  --------------------------------------------------------------------------------  Chief Complaint: Hypervolemic hyponatremia    24 hour events/subjective:  Pt seen and examined  NAD  Lasix drip  On Urea      PAST HISTORY  --------------------------------------------------------------------------------  No significant changes to PMH, PSH, FHx, SHx, unless otherwise noted    ALLERGIES & MEDICATIONS  --------------------------------------------------------------------------------  Allergies    No Known Allergies    Intolerances      Standing Inpatient Medications  amiodarone    Tablet 200 milliGRAM(s) Oral daily  atorvastatin 10 milliGRAM(s) Oral at bedtime  carvedilol 3.125 milliGRAM(s) Oral every 12 hours  cyanocobalamin 1000 MICROGram(s) Oral daily  ferrous    sulfate 325 milliGRAM(s) Oral daily  furosemide Infusion 5 mG/Hr IV Continuous <Continuous>  gabapentin 200 milliGRAM(s) Oral every 8 hours  influenza   Vaccine 0.5 milliLiter(s) IntraMuscular once  magnesium oxide 400 milliGRAM(s) Oral three times a day with meals  pantoprazole    Tablet 40 milliGRAM(s) Oral two times a day  potassium chloride    Tablet ER 20 milliEquivalent(s) Oral two times a day  rivaroxaban 15 milliGRAM(s) Oral every 24 hours  Urea 15 Gram(s) 30 Gram(s) Oral two times a day    PRN Inpatient Medications  acetaminophen   Tablet .. 650 milliGRAM(s) Oral every 6 hours PRN  ALBUTerol/ipratropium for Nebulization 3 milliLiter(s) Nebulizer every 6 hours PRN      REVIEW OF SYSTEMS  --------------------------------------------------------------------------------  Gen: No weight changes, fatigue, fevers/chills, weakness  Skin: No rashes  Head/Eyes/Ears/Mouth: No headache; Normal hearing; Normal vision w/o blurriness; No sinus pain/discomfort, sore throat  Respiratory: No dyspnea, cough, wheezing, hemoptysis  CV: No chest pain, PND, orthopnea  GI: No abdominal pain, diarrhea, constipation, nausea, vomiting, melena, hematochezia  : No increased frequency, dysuria, hematuria, nocturia  MSK: No joint pain/swelling; no back pain; no edema  Neuro: No dizziness/lightheadedness, weakness, seizures, numbness, tingling  Heme: No easy bruising or bleeding  Endo: No heat/cold intolerance  Psych: No significant nervousness, anxiety, stress, depression    All other systems were reviewed and are negative, except as noted.    VITALS/PHYSICAL EXAM  --------------------------------------------------------------------------------  T(C): 36.7 (09-26-18 @ 10:30), Max: 37.1 (09-25-18 @ 15:16)  HR: 49 (09-26-18 @ 10:30) (49 - 79)  BP: 91/57 (09-26-18 @ 10:30) (91/57 - 119/70)  RR: 20 (09-26-18 @ 10:30) (20 - 20)  SpO2: 98% (09-26-18 @ 10:30) (94% - 100%)  Wt(kg): --        09-25-18 @ 07:01  -  09-26-18 @ 07:00  --------------------------------------------------------  IN: 10 mL / OUT: 0 mL / NET: 10 mL      Physical Exam:  	Gen: NAD, well-appearing  	HEENT: PERRL, supple neck, clear oropharynx  	Pulm: decreased at bases bilaterally, no wheeze  	CV: RRR, S1S2; no rub  	Back: No spinal or CVA tenderness; no sacral edema  	Abd: +BS, soft, nontender, mild ascities  	: No suprapubic tenderness  	UE: Warm, FROM, no clubbing, intact strength; no edema; no asterixis  	LE: Warm, FROM, no clubbing, intact strength; 2+ edema improving  	Neuro: No focal deficits, intact gait  	Psych: Normal affect and mood  	Skin: Warm, without rashes  	Vascular access:  N/A    LABS/STUDIES  --------------------------------------------------------------------------------              9.4    4.9   >-----------<  187      [09-26-18 @ 07:46]              31.3     122  |  71  |  55.0  ----------------------------<  110      [09-26-18 @ 07:46]  4.9   |  39.0  |  1.84        Ca     9.7     [09-26-18 @ 07:46]      Mg     2.1     [09-26-18 @ 07:46]            Creatinine Trend:  SCr 1.84 [09-26 @ 07:46]  SCr 1.47 [09-25 @ 06:55]  SCr 1.34 [09-24 @ 13:09]  SCr 1.90 [09-18 @ 22:21]  SCr 1.89 [09-18 @ 16:30]    Urinalysis - [09-24-18 @ 13:18]      Color Yellow / Appearance Clear / SG 1.010 / pH 8.0      Gluc Negative / Ketone Negative  / Bili Negative / Urobili 1       Blood Negative / Protein 15 / Leuk Est Moderate / Nitrite Negative      RBC  / WBC 3-5 / Hyaline  / Gran  / Sq Epi  / Non Sq Epi  / Bacteria Occasional    Urine Creatinine 24      [09-24-18 @ 15:19]  Urine Protein 7.0      [09-24-18 @ 15:19]  Urine Sodium 74      [09-24-18 @ 15:19]  Urine Urea Nitrogen 108      [09-25-18 @ 17:35]  Urine Potassium 16      [09-24-18 @ 15:19]  Urine Chloride 57      [09-24-18 @ 15:19]  Urine Osmolality 225      [09-24-18 @ 15:19]    Iron 35, TIBC 423, %sat 8      [09-25-18 @ 06:55]  Ferritin 77      [09-25-18 @ 06:55]  HbA1c 4.9      [05-25-18 @ 06:44]

## 2018-09-26 NOTE — PROGRESS NOTE ADULT - SUBJECTIVE AND OBJECTIVE BOX
DAVID VINCENT  [id number]      Chief Complaint:  Hyponatremia/Weakness    Interval History:  Patient resting comfortably, offering no c/o.    Tele:  No events          acetaminophen   Tablet .. 650 milliGRAM(s) Oral every 6 hours PRN  ALBUTerol/ipratropium for Nebulization 3 milliLiter(s) Nebulizer every 6 hours PRN  amiodarone    Tablet 200 milliGRAM(s) Oral daily  atorvastatin 10 milliGRAM(s) Oral at bedtime  carvedilol 3.125 milliGRAM(s) Oral every 12 hours  cyanocobalamin 1000 MICROGram(s) Oral daily  ferrous    sulfate 325 milliGRAM(s) Oral daily  furosemide Infusion 5 mG/Hr IV Continuous <Continuous>  gabapentin 200 milliGRAM(s) Oral every 8 hours  influenza   Vaccine 0.5 milliLiter(s) IntraMuscular once  magnesium oxide 400 milliGRAM(s) Oral three times a day with meals  pantoprazole    Tablet 40 milliGRAM(s) Oral two times a day  potassium chloride    Tablet ER 20 milliEquivalent(s) Oral two times a day  rivaroxaban 15 milliGRAM(s) Oral every 24 hours  Urea 15 Gram(s) 30 Gram(s) Oral two times a day          Physical Exam:  T(C): 36.7 (09-26-18 @ 10:30), Max: 37.1 (09-25-18 @ 15:16)  HR: 59 (09-26-18 @ 10:30) (55 - 79)  BP: 91/57 (09-26-18 @ 10:30) (91/57 - 119/70)  RR: 20 (09-26-18 @ 10:30) (20 - 20)  SpO2: 98% (09-26-18 @ 10:30) (94% - 100%)  Wt(kg): --  General: Comfortable in NAD  Neck: ?JVD  CVS: nl s1s2, no s3  Pulm: CTA b/l, diminished at bases  Abd: soft, non-tender  Ext: 1-2+ b/l LE edema  Neuro A&O x3  Psych: Normal affect      Labs:   26 Sep 2018 07:46    122    |  71     |  55.0   ----------------------------<  110    4.9     |  39.0   |  1.84     Ca    9.7        26 Sep 2018 07:46  Mg     2.1       26 Sep 2018 07:46                            9.4    4.9   )-----------( 187      ( 26 Sep 2018 07:46 )             31.3         ECHO 9/2018:   1. Left ventricular ejection fraction, by visual estimation, is >75%.   2. Technically difficult study.   3. Hyperdynamic global left ventricular systolic function.   4. Spectral Doppler shows pseudonormal pattern of left ventricular myocardial filling (Grade II diastolic dysfunction).   5. There is moderate concentric left ventricular hypertrophy.   6. Moderately enlarged right ventricle.   7. There is no evidence of pericardial effusion.   8. Moderate mitral annular calcification.   9. Mild tricuspid regurgitation.  10. Bioprosthesis in the aortic position.  11. Peak aortic valve gradient is 38.9 mmHg and the mean gradient is 21.3 mmHg, which is probably normal in the setting of a prosthetic aortic valve.      Assessment:    83yo Male  with chronic HFpEF, CAD s/p CABG, bio AVR, PAF, COPD on home O2, GIB here with weakness and hypervolemic hyponatremia. Agree with lasix gtt, can add tolvaptan if needed. Close monitoring of sodium and lytes.   -Patient feeling a bit better, edema somewhat improved.  Na improved initially, not improving significantly more at this time.    Plan:  1. Lasix gtt as per renal.  Follow sodium and lytes closely.    2. Continue Amio and Xarelto for AF.  3. Continue other current CV meds at current doses.  4. Consider Tolvaptan, Na not improving.

## 2018-09-26 NOTE — PROGRESS NOTE ADULT - ASSESSMENT
1. Hypervolemic hyponatremia  2. Acute on Chronic Heart Failure  3. CKD III  4. Hypomagnesemia  5. COPD  6. PAF  7. Anemia of chronic renal disease w/mild iron deficiency    Lasix drip  Urea  Strict I/O  Monitor and trend serum Na+ (improving)  Cardiology following  Echo completed recently - no need to repeat   Renal function at baseline.   Replete Magnesium   COPD stable   Continue nebs  On Xarelto and Coreg  On ferrous sulfate

## 2018-09-26 NOTE — PHYSICAL THERAPY INITIAL EVALUATION ADULT - ADDITIONAL COMMENTS
pt used RW prior to admission to [facility], 2 steps 1 rail to negotiate at home(son assists in bring RW up/down stairs), was participating in home care PT prior to arrival

## 2018-09-26 NOTE — PROGRESS NOTE ADULT - SUBJECTIVE AND OBJECTIVE BOX
seen for HF, hypoNa    no acute complaints  feels better  edema improved  ROS negative     MEDICATIONS  (STANDING):  amiodarone    Tablet 200 milliGRAM(s) Oral daily  atorvastatin 10 milliGRAM(s) Oral at bedtime  carvedilol 3.125 milliGRAM(s) Oral every 12 hours  cyanocobalamin 1000 MICROGram(s) Oral daily  ferrous    sulfate 325 milliGRAM(s) Oral daily  furosemide Infusion 5 mG/Hr (2.5 mL/Hr) IV Continuous <Continuous>  gabapentin 200 milliGRAM(s) Oral every 8 hours  influenza   Vaccine 0.5 milliLiter(s) IntraMuscular once  magnesium oxide 400 milliGRAM(s) Oral three times a day with meals  pantoprazole    Tablet 40 milliGRAM(s) Oral two times a day  potassium chloride    Tablet ER 20 milliEquivalent(s) Oral two times a day  rivaroxaban 15 milliGRAM(s) Oral every 24 hours  Urea 15 Gram(s) 30 Gram(s) Oral two times a day    MEDICATIONS  (PRN):  acetaminophen   Tablet .. 650 milliGRAM(s) Oral every 6 hours PRN Temp greater or equal to 38C (100.4F), Mild Pain (1 - 3), Moderate Pain (4 - 6)  ALBUTerol/ipratropium for Nebulization 3 milliLiter(s) Nebulizer every 6 hours PRN Shortness of Breath and/or Wheezing      Allergies    No Known Allergies    Vital Signs Last 24 Hrs  T(C): 36.7 (26 Sep 2018 10:30), Max: 37.1 (25 Sep 2018 15:16)  T(F): 98.1 (26 Sep 2018 10:30), Max: 98.7 (25 Sep 2018 15:16)  HR: 59 (26 Sep 2018 10:30) (55 - 79)  BP: 91/57 (26 Sep 2018 10:30) (91/57 - 119/70)  BP(mean): --  RR: 20 (26 Sep 2018 10:30) (20 - 20)  SpO2: 98% (26 Sep 2018 10:30) (94% - 100%)    PHYSICAL EXAM:    GENERAL: NAD  CHEST/LUNG: Clear to percussion bilaterally  HEART: Regular rate and rhythm; S1 S2;  ABDOMEN: Soft, mild ascites, Nondistended; Bowel sounds present  EXTREMITIES:  trace edema  NERVOUS SYSTEM:  Alert & Oriented X3, nonfocal    LABS:                        9.4    4.9   )-----------( 187      ( 26 Sep 2018 07:46 )             31.3     09-26    122<L>  |  71<L>  |  55.0<H>  ----------------------------<  110  4.9   |  39.0<H>  |  1.84<H>    Ca    9.7      26 Sep 2018 07:46  Mg     2.1     09-26            CAPILLARY BLOOD GLUCOSE            RADIOLOGY & ADDITIONAL TESTS:

## 2018-09-26 NOTE — PROGRESS NOTE ADULT - ASSESSMENT
85 y/o male w/PMhx of afib on xarelto, diastolic HF, COPD on chronic O2 presents with generalized weakness. Found to have significant fluid overload and

## 2018-09-27 DIAGNOSIS — E87.1 HYPO-OSMOLALITY AND HYPONATREMIA: ICD-10-CM

## 2018-09-27 DIAGNOSIS — I50.33 ACUTE ON CHRONIC DIASTOLIC (CONGESTIVE) HEART FAILURE: ICD-10-CM

## 2018-09-27 LAB
ANION GAP SERPL CALC-SCNC: 10 MMOL/L — SIGNIFICANT CHANGE UP (ref 5–17)
BUN SERPL-MCNC: 78 MG/DL — HIGH (ref 8–20)
CALCIUM SERPL-MCNC: 10 MG/DL — SIGNIFICANT CHANGE UP (ref 8.6–10.2)
CHLORIDE SERPL-SCNC: 73 MMOL/L — LOW (ref 98–107)
CO2 SERPL-SCNC: 42 MMOL/L — HIGH (ref 22–29)
CREAT SERPL-MCNC: 2.08 MG/DL — HIGH (ref 0.5–1.3)
GLUCOSE SERPL-MCNC: 121 MG/DL — HIGH (ref 70–115)
OSMOLALITY SERPL: 304 MOSM/KG — HIGH (ref 280–300)
OSMOLALITY UR: 232 MOSM/KG — LOW (ref 300–1000)
POTASSIUM SERPL-MCNC: 5.3 MMOL/L — SIGNIFICANT CHANGE UP (ref 3.5–5.3)
POTASSIUM SERPL-SCNC: 5.3 MMOL/L — SIGNIFICANT CHANGE UP (ref 3.5–5.3)
SODIUM SERPL-SCNC: 125 MMOL/L — LOW (ref 135–145)
SODIUM UR-SCNC: 36 MMOL/L — SIGNIFICANT CHANGE UP

## 2018-09-27 PROCEDURE — 99233 SBSQ HOSP IP/OBS HIGH 50: CPT

## 2018-09-27 RX ORDER — ERYTHROPOIETIN 10000 [IU]/ML
10000 INJECTION, SOLUTION INTRAVENOUS; SUBCUTANEOUS
Qty: 0 | Refills: 0 | Status: DISCONTINUED | OUTPATIENT
Start: 2018-09-27 | End: 2018-10-02

## 2018-09-27 RX ADMIN — ATORVASTATIN CALCIUM 10 MILLIGRAM(S): 80 TABLET, FILM COATED ORAL at 21:44

## 2018-09-27 RX ADMIN — MAGNESIUM OXIDE 400 MG ORAL TABLET 400 MILLIGRAM(S): 241.3 TABLET ORAL at 12:25

## 2018-09-27 RX ADMIN — GABAPENTIN 200 MILLIGRAM(S): 400 CAPSULE ORAL at 05:01

## 2018-09-27 RX ADMIN — MAGNESIUM OXIDE 400 MG ORAL TABLET 400 MILLIGRAM(S): 241.3 TABLET ORAL at 17:52

## 2018-09-27 RX ADMIN — Medication 20 MILLIEQUIVALENT(S): at 17:52

## 2018-09-27 RX ADMIN — GABAPENTIN 200 MILLIGRAM(S): 400 CAPSULE ORAL at 12:53

## 2018-09-27 RX ADMIN — Medication 650 MILLIGRAM(S): at 22:48

## 2018-09-27 RX ADMIN — Medication 650 MILLIGRAM(S): at 21:47

## 2018-09-27 RX ADMIN — Medication 20 MILLIEQUIVALENT(S): at 05:03

## 2018-09-27 RX ADMIN — PANTOPRAZOLE SODIUM 40 MILLIGRAM(S): 20 TABLET, DELAYED RELEASE ORAL at 05:03

## 2018-09-27 RX ADMIN — Medication 325 MILLIGRAM(S): at 12:25

## 2018-09-27 RX ADMIN — GABAPENTIN 200 MILLIGRAM(S): 400 CAPSULE ORAL at 21:44

## 2018-09-27 RX ADMIN — RIVAROXABAN 15 MILLIGRAM(S): KIT at 17:52

## 2018-09-27 RX ADMIN — Medication 650 MILLIGRAM(S): at 13:53

## 2018-09-27 RX ADMIN — ERYTHROPOIETIN 10000 UNIT(S): 10000 INJECTION, SOLUTION INTRAVENOUS; SUBCUTANEOUS at 18:16

## 2018-09-27 RX ADMIN — PREGABALIN 1000 MICROGRAM(S): 225 CAPSULE ORAL at 12:25

## 2018-09-27 RX ADMIN — AMIODARONE HYDROCHLORIDE 200 MILLIGRAM(S): 400 TABLET ORAL at 05:02

## 2018-09-27 RX ADMIN — CARVEDILOL PHOSPHATE 3.12 MILLIGRAM(S): 80 CAPSULE, EXTENDED RELEASE ORAL at 05:02

## 2018-09-27 RX ADMIN — Medication 650 MILLIGRAM(S): at 12:53

## 2018-09-27 RX ADMIN — MAGNESIUM OXIDE 400 MG ORAL TABLET 400 MILLIGRAM(S): 241.3 TABLET ORAL at 10:06

## 2018-09-27 RX ADMIN — Medication 3 MILLILITER(S): at 21:00

## 2018-09-27 RX ADMIN — PANTOPRAZOLE SODIUM 40 MILLIGRAM(S): 20 TABLET, DELAYED RELEASE ORAL at 17:55

## 2018-09-27 NOTE — PROGRESS NOTE ADULT - SUBJECTIVE AND OBJECTIVE BOX
seen for HF, hyponatremia    no acute complaints  ros otherwise negative      MEDICATIONS  (STANDING):  amiodarone    Tablet 200 milliGRAM(s) Oral daily  atorvastatin 10 milliGRAM(s) Oral at bedtime  carvedilol 3.125 milliGRAM(s) Oral every 12 hours  cyanocobalamin 1000 MICROGram(s) Oral daily  ferrous    sulfate 325 milliGRAM(s) Oral daily  gabapentin 200 milliGRAM(s) Oral every 8 hours  influenza   Vaccine 0.5 milliLiter(s) IntraMuscular once  magnesium oxide 400 milliGRAM(s) Oral three times a day with meals  pantoprazole    Tablet 40 milliGRAM(s) Oral two times a day  potassium chloride    Tablet ER 20 milliEquivalent(s) Oral two times a day  rivaroxaban 15 milliGRAM(s) Oral every 24 hours  Urea 15 Gram(s) 30 Gram(s) Oral two times a day    MEDICATIONS  (PRN):  acetaminophen   Tablet .. 650 milliGRAM(s) Oral every 6 hours PRN Temp greater or equal to 38C (100.4F), Mild Pain (1 - 3), Moderate Pain (4 - 6)  ALBUTerol/ipratropium for Nebulization 3 milliLiter(s) Nebulizer every 6 hours PRN Shortness of Breath and/or Wheezing      Allergies    No Known Allergies    Vital Signs Last 24 Hrs  T(C): 36.5 (27 Sep 2018 08:58), Max: 37.1 (26 Sep 2018 23:32)  T(F): 97.7 (27 Sep 2018 08:58), Max: 98.7 (26 Sep 2018 23:32)  HR: 54 (27 Sep 2018 08:58) (54 - 91)  BP: 113/76 (27 Sep 2018 08:58) (100/65 - 129/86)  BP(mean): --  RR: 20 (27 Sep 2018 08:58) (19 - 20)  SpO2: 98% (27 Sep 2018 08:58) (96% - 98%)    PHYSICAL EXAM:    GENERAL: NAD  CHEST/LUNG: Clear to percussion bilaterally  HEART: Regular rate and rhythm; S1 S2  ABDOMEN: Soft, , Bowel sounds present  EXTREMITIES:  trace edema  NERVOUS SYSTEM:  Alert & Oriented X3, nonfocal    LABS:                        9.4    4.9   )-----------( 187      ( 26 Sep 2018 07:46 )             31.3     09-27    125<L>  |  73<L>  |  78.0<H>  ----------------------------<  121<H>  5.3   |  42.0<H>  |  2.08<H>    Ca    10.0      27 Sep 2018 06:17  Mg     2.1     09-26            CAPILLARY BLOOD GLUCOSE            RADIOLOGY & ADDITIONAL TESTS:

## 2018-09-27 NOTE — PROGRESS NOTE ADULT - SUBJECTIVE AND OBJECTIVE BOX
Vital Signs Last 24 Hrs  T(C): 36.5 (27 Sep 2018 08:58), Max: 37.1 (26 Sep 2018 23:32)  T(F): 97.7 (27 Sep 2018 08:58), Max: 98.7 (26 Sep 2018 23:32)  HR: 54 (27 Sep 2018 08:58) (54 - 91)  BP: 113/76 (27 Sep 2018 08:58) (91/57 - 129/86)  BP(mean): --  RR: 20 (27 Sep 2018 08:58) (19 - 20)  SpO2: 98% (27 Sep 2018 08:58) (96% - 98%)    125<L>  |  73<L>  |  78.0<H>  ----------------------------<  121<H>  Ca:10.0  (27 Sep 2018 06:17)  5.3     |  42.0<H>  |  2.08<H>      eGFR if Non : 28 <L>  eGFR if : 33 <L>    TPro  5.7<L>  /  Alb  3.3    /  TBili  1.0    /  DBili  x      /  AST  35     /  ALT  10     /  AlkPhos  71     24 Sep 2018 13:09                        9.4<L>  4.9   )-----------( 187      ( 26 Sep 2018 07:46 )             31.3<L>    Phos:-- M.1 mg/dL PTH:-- Uric acid:-- Serum Osm:--  Ferritin:-- Iron:-- TIBC:-- Tsat:--  B12:-- TSH:-- ( @ 07:46)    Urinalysis Basic - ( 24 Sep 2018 13:18 )  Color: Yellow / Appearance: Clear / S.010 / pH: x  Gluc: x / Ketone: Negative  / Bili: Negative / Urobili: 1 mg/dL<!>   Blood: x / Protein: 15 mg/dL<!> / Nitrite: Negative   Leuk Esterase: Moderate<!> / RBC: x / WBC 3-5   Sq Epi: x / Non Sq Epi: x / Bacteria: Occasional<!>      UProt:-- UCr:24 mg/dL P/C Ratio:0.3 Ratio<H> 24 hour Prot:-- UVol:-- CrCl:--  Lev:74 mmol/L UOsm:225 mosm/kg<L> UVol:-- UCl:57 mmol/L UK:16 mmol/L ( @ 15:19)    CHIEF COMPLAINT: weakness , Lethargic this AM,    HPI: Patient is a  84y Male  with chronic HFpEF, CAD s/p CABG, bio AVR, PAF, COPD on home O2, GIB here with weakness. Started this am and felt very weak . Had otherwise been feeling fairly well, denies worsening dyspnea or CP. No PND /orthopnea / palps/ sycnope. States has been more compliant with diet recently as well. No cough or productive sputum. Has chronic edema.     PAST MEDICAL & SURGICAL HISTORY:  COPD (chronic obstructive pulmonary disease)  Essential hypertension  Afib  Chronic systolic CHF (congestive heart failure)  Hypertrophic cardiomyopathy  CKD (chronic kidney disease), stage 4 (severe)  Diabetes  Prostate cancer    History of valvuloplasty  History of knee surgery    MEDICATIONS  (STANDING):  amiodarone    Tablet 200 milliGRAM(s) Oral daily  atorvastatin 10 milliGRAM(s) Oral at bedtime  carvedilol 3.125 milliGRAM(s) Oral every 12 hours  cyanocobalamin 1000 MICROGram(s) Oral daily  ferrous    sulfate 325 milliGRAM(s) Oral daily  gabapentin 200 milliGRAM(s) Oral every 8 hours  magnesium oxide 400 milliGRAM(s) Oral three times a day with meals  magnesium sulfate  IVPB 2 Gram(s) IV Intermittent every 4 hours  pantoprazole    Tablet 40 milliGRAM(s) Oral two times a day  potassium chloride    Tablet ER 20 milliEquivalent(s) Oral two times a day  rivaroxaban 15 milliGRAM(s) Oral every 24 hours    FAMILY HISTORY:  No pertinent family history in first degree relatives  No pertinent family history in first degree relatives    SOCIAL HISTORY: no EtOH, drugs or tobacco    ROS: GI negative, All others negative    PHYSICAL   EXAM:    GEN: NAD  HEENT: MMM, sclera anicteric  RESP: CTA bilaterally, mildly diminished at bases  CVS: S1S2, RRR, No  JVD ,  no M/R/G  GI: Soft, NT, ND, BS+  EXT: pitting edema - Resolved-  LE bilaterally  NEURO:  Lethargic,     ECHO 2018:     1. Left ventricular ejection fraction, by visual estimation, is >75%.   2. Technically difficult study.   3. Hyperdynamic global left ventricular systolic function.   4. Spectral Doppler shows pseudo normal pattern of left ventricular   myocardial filling (Grade II diastolic dysfunction).   5. There is moderate concentric left ventricular hypertrophy.   6. Moderately enlarged right ventricle.   7. There is no evidence of pericardial effusion.   8. Moderate mitral annular calcification.   9. Mild tricuspid regurgitation.  10. Bioprosthesis in the aortic position.  11. Peak aortic valve gradient is 38.9 mmHg and the mean gradient is 21.3   mmHg, which is probably normal in the setting of a prosthetic aortic   valve.      ECG: SR with marked 1st degree AV delay, NSST    LABS:                        9.2    4.6   )-----------( 182      ( 24 Sep 2018 14:20 )             32.0     -    115<LL>  |  64<L>  |  24.0<H>  ----------------------------<  102  3.7   |  41.0<H>  |  1.34<H>    Ca    8.9      24 Sep 2018 13:09  Phos  3.1       Mg     1.4         TPro  5.7<L>  /  Alb  3.3  /  TBili  1.0  /  DBili  x   /  AST  35  /  ALT  10  /  AlkPhos  71  09-24    CARDIAC MARKERS ( 24 Sep 2018 13:09 )  x     / 0.04 ng/mL / 335 U/L / x     / 4.4 ng/mL      PT/INR - ( 24 Sep 2018 13:09 )   PT: 30.5 sec;   INR: 2.72 ratio         PTT - ( 24 Sep 2018 13:09 )  PTT:42.5 sec    RADIOLOGY & ADDITIONAL STUDIES:    EXAM:  XR CHEST PA LAT 2V                          PROCEDURE DATE:  2018      INTERPRETATION:      INDICATION: Generalized weakness.    PA and lateral views of chest. [id number].    FINDINGS:       Patient is status post sternotomy and cardiac surgery. There are   bibasilar atelectasis. There is cardiomegaly and marked tortuous thoracic   aorta. There is thoracic spondylosis and mild kyphosis.    IMPRESSION:  Postoperative changes, cardiomegaly, left base subsegmental atelectasis.      JOVANA MARRERO M.D., ATTENDING RADIOLOGIST  This document has been electronically signed. Sep 18 2018  8:09PM    Assessment:      83yo Male  with chronic HFpEF, CAD s/p CABG, bio AVR, PAF, COPD on home O2, GIB here with weakness and severe hypervolemic hyponatremia. Close monitoring of sodium and lytes.     Plan:  1. Lasix D.C Temporarily & Follow closely  2. Continue amiodarone  and Xarelto for AF - Per Cardiology,   3. Continue oral FR , Urea,

## 2018-09-27 NOTE — PROGRESS NOTE ADULT - SUBJECTIVE AND OBJECTIVE BOX
DAVID MELISA  [id number]      Chief Complaint:  Hyponatremia/Weakness    Interval History:  Patient much more awake and alert today.  Reports mild LBP.  No other c/o.  Denies CP/SOB/palps.    Tele:  No events        acetaminophen   Tablet .. 650 milliGRAM(s) Oral every 6 hours PRN  ALBUTerol/ipratropium for Nebulization 3 milliLiter(s) Nebulizer every 6 hours PRN  amiodarone    Tablet 200 milliGRAM(s) Oral daily  atorvastatin 10 milliGRAM(s) Oral at bedtime  carvedilol 3.125 milliGRAM(s) Oral every 12 hours  cyanocobalamin 1000 MICROGram(s) Oral daily  ferrous    sulfate 325 milliGRAM(s) Oral daily  gabapentin 200 milliGRAM(s) Oral every 8 hours  influenza   Vaccine 0.5 milliLiter(s) IntraMuscular once  magnesium oxide 400 milliGRAM(s) Oral three times a day with meals  pantoprazole    Tablet 40 milliGRAM(s) Oral two times a day  potassium chloride    Tablet ER 20 milliEquivalent(s) Oral two times a day  rivaroxaban 15 milliGRAM(s) Oral every 24 hours  Urea 15 Gram(s) 30 Gram(s) Oral two times a day          Physical Exam:  T(C): 36.5 (09-27-18 @ 08:58), Max: 37.1 (09-26-18 @ 23:32)  HR: 54 (09-27-18 @ 08:58) (54 - 91)  BP: 113/76 (09-27-18 @ 08:58) (100/65 - 129/86)  RR: 20 (09-27-18 @ 08:58) (19 - 20)  SpO2: 98% (09-27-18 @ 08:58) (96% - 98%)  Wt(kg): --  General: Comfortable in NAD  Neck: ?JVD  CVS: nl s1s2, no s3  Pulm: CTA b/l  Abd: soft, non-tender  Ext: Tr b/l LE edema  Neuro A&O x3  Psych: Normal affect      Labs:   27 Sep 2018 06:17    125    |  73     |  78.0   ----------------------------<  121    5.3     |  42.0   |  2.08     Ca    10.0       27 Sep 2018 06:17  Mg     2.1       26 Sep 2018 07:46                            9.4    4.9   )-----------( 187      ( 26 Sep 2018 07:46 )             31.3         ECHO 9/2018:   1. Left ventricular ejection fraction, by visual estimation, is >75%.   2. Technically difficult study.   3. Hyperdynamic global left ventricular systolic function.   4. Spectral Doppler shows pseudonormal pattern of left ventricular myocardial filling (Grade II diastolic dysfunction).   5. There is moderate concentric left ventricular hypertrophy.   6. Moderately enlarged right ventricle.   7. There is no evidence of pericardial effusion.   8. Moderate mitral annular calcification.   9. Mild tricuspid regurgitation.  10. Bioprosthesis in the aortic position.  11. Peak aortic valve gradient is 38.9 mmHg and the mean gradient is 21.3 mmHg, which is probably normal in the setting of a prosthetic aortic valve.      Assessment:    83yo Male  with chronic HFpEF, CAD s/p CABG, bio AVR, PAF, COPD on home O2, GIB here with weakness and hypervolemic hyponatremia. Agree with lasix gtt, can add tolvaptan if needed. Close monitoring of sodium and lytes.   -Na improving slowly.  Mental status better.  Edema better.      Plan:  1. Lasix gtt d/c'ed as per renal.  Follow sodium and lytes closely.    2. Continue Amio and Xarelto for AF.  3. Continue other current CV meds at current doses.  4. Incentive roger.  5. Agree with paliiative/hospice eval.

## 2018-09-28 DIAGNOSIS — E87.3 ALKALOSIS: ICD-10-CM

## 2018-09-28 LAB
ANION GAP SERPL CALC-SCNC: 10 MMOL/L — SIGNIFICANT CHANGE UP (ref 5–17)
BUN SERPL-MCNC: 110 MG/DL — HIGH (ref 8–20)
CALCIUM SERPL-MCNC: 10.8 MG/DL — HIGH (ref 8.6–10.2)
CHLORIDE SERPL-SCNC: 75 MMOL/L — LOW (ref 98–107)
CO2 SERPL-SCNC: 45 MMOL/L — CRITICAL HIGH (ref 22–29)
CREAT SERPL-MCNC: 1.91 MG/DL — HIGH (ref 0.5–1.3)
GLUCOSE SERPL-MCNC: 111 MG/DL — SIGNIFICANT CHANGE UP (ref 70–115)
HCT VFR BLD CALC: 38.6 % — LOW (ref 42–52)
HGB BLD-MCNC: 10.4 G/DL — LOW (ref 14–18)
MAGNESIUM SERPL-MCNC: 2.4 MG/DL — SIGNIFICANT CHANGE UP (ref 1.6–2.6)
MCHC RBC-ENTMCNC: 20.9 PG — LOW (ref 27–31)
MCHC RBC-ENTMCNC: 26.9 G/DL — LOW (ref 32–36)
MCV RBC AUTO: 77.5 FL — LOW (ref 80–94)
PLATELET # BLD AUTO: 183 K/UL — SIGNIFICANT CHANGE UP (ref 150–400)
POTASSIUM SERPL-MCNC: 4.4 MMOL/L — SIGNIFICANT CHANGE UP (ref 3.5–5.3)
POTASSIUM SERPL-SCNC: 4.4 MMOL/L — SIGNIFICANT CHANGE UP (ref 3.5–5.3)
RBC # BLD: 4.98 M/UL — SIGNIFICANT CHANGE UP (ref 4.6–6.2)
RBC # FLD: 26.1 % — HIGH (ref 11–15.6)
SODIUM SERPL-SCNC: 130 MMOL/L — LOW (ref 135–145)
WBC # BLD: 5.6 K/UL — SIGNIFICANT CHANGE UP (ref 4.8–10.8)
WBC # FLD AUTO: 5.6 K/UL — SIGNIFICANT CHANGE UP (ref 4.8–10.8)

## 2018-09-28 PROCEDURE — 99233 SBSQ HOSP IP/OBS HIGH 50: CPT

## 2018-09-28 PROCEDURE — 99232 SBSQ HOSP IP/OBS MODERATE 35: CPT

## 2018-09-28 RX ORDER — ACETAZOLAMIDE 250 MG/1
250 TABLET ORAL THREE TIMES A DAY
Qty: 0 | Refills: 0 | Status: COMPLETED | OUTPATIENT
Start: 2018-09-28 | End: 2018-09-30

## 2018-09-28 RX ADMIN — Medication 325 MILLIGRAM(S): at 14:41

## 2018-09-28 RX ADMIN — RIVAROXABAN 15 MILLIGRAM(S): KIT at 18:17

## 2018-09-28 RX ADMIN — PANTOPRAZOLE SODIUM 40 MILLIGRAM(S): 20 TABLET, DELAYED RELEASE ORAL at 05:10

## 2018-09-28 RX ADMIN — Medication 3 MILLILITER(S): at 08:32

## 2018-09-28 RX ADMIN — GABAPENTIN 200 MILLIGRAM(S): 400 CAPSULE ORAL at 05:10

## 2018-09-28 RX ADMIN — Medication 3 MILLILITER(S): at 21:04

## 2018-09-28 RX ADMIN — MAGNESIUM OXIDE 400 MG ORAL TABLET 400 MILLIGRAM(S): 241.3 TABLET ORAL at 18:13

## 2018-09-28 RX ADMIN — ACETAZOLAMIDE 250 MILLIGRAM(S): 250 TABLET ORAL at 18:03

## 2018-09-28 RX ADMIN — Medication 20 MILLIEQUIVALENT(S): at 18:17

## 2018-09-28 RX ADMIN — Medication 20 MILLIEQUIVALENT(S): at 05:10

## 2018-09-28 RX ADMIN — PREGABALIN 1000 MICROGRAM(S): 225 CAPSULE ORAL at 14:41

## 2018-09-28 RX ADMIN — MAGNESIUM OXIDE 400 MG ORAL TABLET 400 MILLIGRAM(S): 241.3 TABLET ORAL at 10:26

## 2018-09-28 RX ADMIN — PANTOPRAZOLE SODIUM 40 MILLIGRAM(S): 20 TABLET, DELAYED RELEASE ORAL at 18:13

## 2018-09-28 RX ADMIN — CARVEDILOL PHOSPHATE 3.12 MILLIGRAM(S): 80 CAPSULE, EXTENDED RELEASE ORAL at 18:11

## 2018-09-28 RX ADMIN — MAGNESIUM OXIDE 400 MG ORAL TABLET 400 MILLIGRAM(S): 241.3 TABLET ORAL at 14:41

## 2018-09-28 RX ADMIN — GABAPENTIN 200 MILLIGRAM(S): 400 CAPSULE ORAL at 21:37

## 2018-09-28 RX ADMIN — ATORVASTATIN CALCIUM 10 MILLIGRAM(S): 80 TABLET, FILM COATED ORAL at 21:37

## 2018-09-28 RX ADMIN — AMIODARONE HYDROCHLORIDE 200 MILLIGRAM(S): 400 TABLET ORAL at 05:10

## 2018-09-28 RX ADMIN — GABAPENTIN 200 MILLIGRAM(S): 400 CAPSULE ORAL at 14:41

## 2018-09-28 RX ADMIN — ACETAZOLAMIDE 250 MILLIGRAM(S): 250 TABLET ORAL at 21:37

## 2018-09-28 NOTE — PROGRESS NOTE ADULT - PROBLEM SELECTOR PLAN 8
hospice following per family request    planned for home hospice once medically optimized
hospice following per family request
hospice following per family request

## 2018-09-28 NOTE — PROGRESS NOTE ADULT - PROBLEM SELECTOR PLAN 7
along with mild Fe def  c/w ferrous sulfate

## 2018-09-28 NOTE — PROGRESS NOTE ADULT - SUBJECTIVE AND OBJECTIVE BOX
[facility] DIVISION OF KIDNEY DISEASES AND HYPERTENSION -- FOLLOW UP NOTE  --------------------------------------------------------------------------------  Chief Complaint: Hypervolemic hyponatremia    24 hour events/subjective:  Pt seen and examined  NAD  Urea  Na+ improving - 130  IV Bicarb      PAST HISTORY  --------------------------------------------------------------------------------  No significant changes to PMH, PSH, FHx, SHx, unless otherwise noted    ALLERGIES & MEDICATIONS  --------------------------------------------------------------------------------  Allergies    No Known Allergies    Intolerances      Standing Inpatient Medications  acetazolamide    Tablet 250 milliGRAM(s) Oral three times a day  amiodarone    Tablet 200 milliGRAM(s) Oral daily  atorvastatin 10 milliGRAM(s) Oral at bedtime  carvedilol 3.125 milliGRAM(s) Oral every 12 hours  cyanocobalamin 1000 MICROGram(s) Oral daily  epoetin lopez Injectable 57298 Unit(s) SubCutaneous <User Schedule>  ferrous    sulfate 325 milliGRAM(s) Oral daily  gabapentin 200 milliGRAM(s) Oral every 8 hours  influenza   Vaccine 0.5 milliLiter(s) IntraMuscular once  magnesium oxide 400 milliGRAM(s) Oral three times a day with meals  pantoprazole    Tablet 40 milliGRAM(s) Oral two times a day  potassium chloride    Tablet ER 20 milliEquivalent(s) Oral two times a day  rivaroxaban 15 milliGRAM(s) Oral every 24 hours  Urea 15 Gram(s) 30 Gram(s) Oral two times a day    PRN Inpatient Medications  acetaminophen   Tablet .. 650 milliGRAM(s) Oral every 6 hours PRN  ALBUTerol/ipratropium for Nebulization 3 milliLiter(s) Nebulizer every 6 hours PRN      REVIEW OF SYSTEMS  --------------------------------------------------------------------------------  Gen: No weight changes, fatigue, fevers/chills, weakness  Skin: No rashes  Head/Eyes/Ears/Mouth: No headache; Normal hearing; Normal vision w/o blurriness; No sinus pain/discomfort, sore throat  Respiratory: No dyspnea, cough, wheezing, hemoptysis  CV: No chest pain, PND, orthopnea  GI: No abdominal pain, diarrhea, constipation, nausea, vomiting, melena, hematochezia  : No increased frequency, dysuria, hematuria, nocturia  MSK: No joint pain/swelling; no back pain; no edema  Neuro: No dizziness/lightheadedness, weakness, seizures, numbness, tingling, Tremor  Heme: No easy bruising or bleeding  Endo: No heat/cold intolerance  Psych: No significant nervousness, anxiety, stress, depression    All other systems were reviewed and are negative, except as noted.    VITALS/PHYSICAL EXAM  --------------------------------------------------------------------------------  T(C): 36.8 (09-28-18 @ 11:45), Max: 37 (09-28-18 @ 04:55)  HR: 61 (09-28-18 @ 11:45) (49 - 85)  BP: 112/72 (09-28-18 @ 11:45) (91/57 - 112/72)  RR: 18 (09-28-18 @ 11:45) (18 - 20)  SpO2: 97% (09-28-18 @ 08:33) (95% - 100%)  Wt(kg): --        09-27-18 @ 07:01  -  09-28-18 @ 07:00  --------------------------------------------------------  IN: 480 mL / OUT: 450 mL / NET: 30 mL    09-28-18 @ 07:01  -  09-28-18 @ 13:56  --------------------------------------------------------  IN: 0 mL / OUT: 500 mL / NET: -500 mL      Physical Exam:  	Gen: NAD  	HEENT: PERRL, supple neck, clear oropharynx  	Pulm: CTA B/L  	CV: RRR, S1S2; no rub  	Back: No spinal or CVA tenderness; no sacral edema  	Abd: +BS, soft, nontender/nondistended  	: No suprapubic tenderness  	UE: Warm, FROM, no clubbing, intact strength; no edema; no asterixis  	LE: Warm, FROM, no clubbing, intact strength; no edema, + resting tremor R>L  	Neuro: No focal deficits, intact gait  	Psych: Normal affect and mood  	Skin: Warm, without rashes  	Vascular access: N/A    LABS/STUDIES  --------------------------------------------------------------------------------              10.4   5.6   >-----------<  183      [09-28-18 @ 07:43]              38.6     130  |  75  |  110.0  ----------------------------<  111      [09-28-18 @ 07:43]  4.4   |  45.0  |  1.91        Ca     10.8     [09-28-18 @ 07:43]      Mg     2.4     [09-28-18 @ 07:43]          Serum Osmolality 304      [09-27-18 @ 06:17]    Creatinine Trend:  SCr 1.91 [09-28 @ 07:43]  SCr 2.08 [09-27 @ 06:17]  SCr 1.84 [09-26 @ 07:46]  SCr 1.47 [09-25 @ 06:55]  SCr 1.34 [09-24 @ 13:09]    Urinalysis - [09-24-18 @ 13:18]      Color Yellow / Appearance Clear / SG 1.010 / pH 8.0      Gluc Negative / Ketone Negative  / Bili Negative / Urobili 1       Blood Negative / Protein 15 / Leuk Est Moderate / Nitrite Negative      RBC  / WBC 3-5 / Hyaline  / Gran  / Sq Epi  / Non Sq Epi  / Bacteria Occasional    Urine Creatinine 24      [09-24-18 @ 15:19]  Urine Protein 7.0      [09-24-18 @ 15:19]  Urine Sodium 36      [09-27-18 @ 10:35]  Urine Urea Nitrogen 108      [09-25-18 @ 17:35]  Urine Potassium 16      [09-24-18 @ 15:19]  Urine Chloride 57      [09-24-18 @ 15:19]  Urine Osmolality 232      [09-27-18 @ 10:35]    Iron 35, TIBC 423, %sat 8      [09-25-18 @ 06:55]  Ferritin 77      [09-25-18 @ 06:55]  HbA1c 4.9      [05-25-18 @ 06:44]

## 2018-09-28 NOTE — PROGRESS NOTE ADULT - ASSESSMENT
Assessment and Plan:   · Assessment		  85 y/o male w/PMhx of afib on xarelto, diastolic HF, COPD on chronic O2 presents with generalized weakness. Found to have significant fluid overload and      Problem/Plan - 1:  ·  Problem: Hyponatremia with excess extracellular fluid volume.    Plan: Experiencing severe tremors from electrolyte imbalance  s/p lasix drip--DC'd  Renal following  on urea  Na stable at 125.      Problem/Plan - 2:  ·  Problem: Acute on chronic diastolic heart failure.  Plan: resolved as clinically close to euvolemia  Cardiology note appreciated    Problem/Plan - 3:  ·  Problem: CKD (chronic kidney disease) stage 3, GFR 30-59 ml/min.    Plan: at baseline. Hospitalist will reach out to Renal again today     Problem/Plan - 4:  ·  Problem: Chronic obstructive pulmonary disease, unspecified COPD type.    Plan: stable  c/w duonebs.        .Problem/Plan - 5:  ·  Problem: Goals of care, counseling/discussion.    Plan: hospice following per family request.   If his electrolytes can be normalized, he will be discharged home on Monday with Hospice

## 2018-09-28 NOTE — PROGRESS NOTE ADULT - SUBJECTIVE AND OBJECTIVE BOX
seen for HF, hyponatremia    complaining of tremors and weakness  ros otherwise negative    MEDICATIONS  (STANDING):  acetazolamide    Tablet 250 milliGRAM(s) Oral three times a day  amiodarone    Tablet 200 milliGRAM(s) Oral daily  atorvastatin 10 milliGRAM(s) Oral at bedtime  carvedilol 3.125 milliGRAM(s) Oral every 12 hours  cyanocobalamin 1000 MICROGram(s) Oral daily  epoetin lopez Injectable 19156 Unit(s) SubCutaneous <User Schedule>  ferrous    sulfate 325 milliGRAM(s) Oral daily  gabapentin 200 milliGRAM(s) Oral every 8 hours  influenza   Vaccine 0.5 milliLiter(s) IntraMuscular once  magnesium oxide 400 milliGRAM(s) Oral three times a day with meals  pantoprazole    Tablet 40 milliGRAM(s) Oral two times a day  potassium chloride    Tablet ER 20 milliEquivalent(s) Oral two times a day  rivaroxaban 15 milliGRAM(s) Oral every 24 hours  Urea 15 Gram(s) 30 Gram(s) Oral two times a day    MEDICATIONS  (PRN):  acetaminophen   Tablet .. 650 milliGRAM(s) Oral every 6 hours PRN Temp greater or equal to 38C (100.4F), Mild Pain (1 - 3), Moderate Pain (4 - 6)  ALBUTerol/ipratropium for Nebulization 3 milliLiter(s) Nebulizer every 6 hours PRN Shortness of Breath and/or Wheezing      Allergies    No Known Allergies    Vital Signs Last 24 Hrs  T(C): 36.8 (28 Sep 2018 11:45), Max: 37 (28 Sep 2018 04:55)  T(F): 98.3 (28 Sep 2018 11:45), Max: 98.6 (28 Sep 2018 04:55)  HR: 61 (28 Sep 2018 11:45) (49 - 85)  BP: 112/72 (28 Sep 2018 11:45) (91/57 - 112/72)  BP(mean): --  RR: 18 (28 Sep 2018 11:45) (18 - 20)  SpO2: 97% (28 Sep 2018 08:33) (95% - 100%)    PHYSICAL EXAM:    GENERAL: NAD  CHEST/LUNG: Clear to percussion bilaterally  HEART: Regular rate and rhythm; S1 S2  ABDOMEN: Soft, Bowel sounds present  EXTREMITIES: no edema  NERVOUS SYSTEM:  Alert & Oriented X3, resting tremor UE R>L    LABS:                        10.4   5.6   )-----------( 183      ( 28 Sep 2018 07:43 )             38.6     09-28    130<L>  |  75<L>  |  110.0<H>  ----------------------------<  111  4.4   |  45.0<HH>  |  1.91<H>    Ca    10.8<H>      28 Sep 2018 07:43  Mg     2.4     09-28            CAPILLARY BLOOD GLUCOSE            RADIOLOGY & ADDITIONAL TESTS:

## 2018-09-28 NOTE — PROGRESS NOTE ADULT - SUBJECTIVE AND OBJECTIVE BOX
DAVID VINCENT  [id number]      Chief Complaint:  Hyponatremia/Weakness    Interval History:  Patient c/o feeling "shaky" today, no other c/o.  Denies CP/SOB/palps.    Tele:  No events        acetaminophen   Tablet .. 650 milliGRAM(s) Oral every 6 hours PRN  ALBUTerol/ipratropium for Nebulization 3 milliLiter(s) Nebulizer every 6 hours PRN  amiodarone    Tablet 200 milliGRAM(s) Oral daily  atorvastatin 10 milliGRAM(s) Oral at bedtime  carvedilol 3.125 milliGRAM(s) Oral every 12 hours  cyanocobalamin 1000 MICROGram(s) Oral daily  epoetin lopez Injectable 59669 Unit(s) SubCutaneous <User Schedule>  ferrous    sulfate 325 milliGRAM(s) Oral daily  gabapentin 200 milliGRAM(s) Oral every 8 hours  influenza   Vaccine 0.5 milliLiter(s) IntraMuscular once  magnesium oxide 400 milliGRAM(s) Oral three times a day with meals  pantoprazole    Tablet 40 milliGRAM(s) Oral two times a day  potassium chloride    Tablet ER 20 milliEquivalent(s) Oral two times a day  rivaroxaban 15 milliGRAM(s) Oral every 24 hours  Urea 15 Gram(s) 30 Gram(s) Oral two times a day          Physical Exam:  T(C): 36.8 (09-28-18 @ 11:45), Max: 37 (09-28-18 @ 04:55)  HR: 61 (09-28-18 @ 11:45) (49 - 85)  BP: 112/72 (09-28-18 @ 11:45) (91/57 - 112/72)  RR: 18 (09-28-18 @ 11:45) (18 - 20)  SpO2: 97% (09-28-18 @ 08:33) (95% - 100%)  Wt(kg): --  General: Comfortable in NAD  Neck: ?JVD  CVS: nl s1s2, no s3  Pulm: CTA b/l  Abd: soft, non-tender  Ext: Tr b/l LE edema  Neuro A&O x3  Psych: Normal affect      Labs:   28 Sep 2018 07:43    130    |  75     |  110.0  ----------------------------<  111    4.4     |  45.0   |  1.91     Ca    10.8       28 Sep 2018 07:43  Mg     2.4       28 Sep 2018 07:43                            10.4   5.6   )-----------( 183      ( 28 Sep 2018 07:43 )             38.6           ECHO 9/2018:   1. Left ventricular ejection fraction, by visual estimation, is >75%.   2. Technically difficult study.   3. Hyperdynamic global left ventricular systolic function.   4. Spectral Doppler shows pseudonormal pattern of left ventricular myocardial filling (Grade II diastolic dysfunction).   5. There is moderate concentric left ventricular hypertrophy.   6. Moderately enlarged right ventricle.   7. There is no evidence of pericardial effusion.   8. Moderate mitral annular calcification.   9. Mild tricuspid regurgitation.  10. Bioprosthesis in the aortic position.  11. Peak aortic valve gradient is 38.9 mmHg and the mean gradient is 21.3 mmHg, which is probably normal in the setting of a prosthetic aortic valve.      Assessment:    83yo Male  with chronic HFpEF, CAD s/p CABG, bio AVR, PAF, COPD on home O2, GIB here with weakness and hypervolemic hyponatremia. Agree with lasix gtt, can add tolvaptan if needed. Close monitoring of sodium and lytes.   -Mental status better.  Edema better.  -Na improved today.  C/o feeling shaky, no fevers.      Plan:  1. Lasix gtt d/c'ed as per renal.  Follow sodium and lytes closely.  Consider Lasix po.  2. Continue Amio and Xarelto for AF.  3. Continue other current CV meds at current doses.  4. Incentive roger.  5. Agree with palliative/hospice eval.  6. OOB/PT.

## 2018-09-28 NOTE — PROGRESS NOTE ADULT - ASSESSMENT
1. Hypervolemic hyponatremia  2. Acute on Chronic Heart Failure  3. CKD III  4. Hypomagnesemia - Resolved  5. COPD  6. PAF  7. Anemia of chronic renal disease w/mild iron deficiency      Urea  IV Sodium Bicarbonate  Strict I/O  Monitor and trend serum Na+ (improving) 130 today  Renal function at baseline   COPD stable   Continue nebs  On Xarelto and Coreg  On ferrous sulfate  Will continue to follow

## 2018-09-29 DIAGNOSIS — E87.4 MIXED DISORDER OF ACID-BASE BALANCE: ICD-10-CM

## 2018-09-29 DIAGNOSIS — E87.1 HYPO-OSMOLALITY AND HYPONATREMIA: ICD-10-CM

## 2018-09-29 DIAGNOSIS — I10 ESSENTIAL (PRIMARY) HYPERTENSION: ICD-10-CM

## 2018-09-29 DIAGNOSIS — I48.91 UNSPECIFIED ATRIAL FIBRILLATION: ICD-10-CM

## 2018-09-29 DIAGNOSIS — I50.22 CHRONIC SYSTOLIC (CONGESTIVE) HEART FAILURE: ICD-10-CM

## 2018-09-29 LAB
AMMONIA BLD-MCNC: 64 UMOL/L — HIGH (ref 11–55)
ANION GAP SERPL CALC-SCNC: 7 MMOL/L — SIGNIFICANT CHANGE UP (ref 5–17)
BASE EXCESS BLDA CALC-SCNC: 16.2 MMOL/L — HIGH (ref -2–2)
BLOOD GAS COMMENTS ARTERIAL: SIGNIFICANT CHANGE UP
BUN SERPL-MCNC: 127 MG/DL — HIGH (ref 8–20)
CALCIUM SERPL-MCNC: 9.8 MG/DL — SIGNIFICANT CHANGE UP (ref 8.6–10.2)
CHLORIDE SERPL-SCNC: 82 MMOL/L — LOW (ref 98–107)
CO2 SERPL-SCNC: 43 MMOL/L — HIGH (ref 22–29)
CREAT SERPL-MCNC: 2.02 MG/DL — HIGH (ref 0.5–1.3)
GAS PNL BLDA: SIGNIFICANT CHANGE UP
GLUCOSE SERPL-MCNC: 119 MG/DL — HIGH (ref 70–115)
HCO3 BLDA-SCNC: 40 MMOL/L — HIGH (ref 20–26)
HOROWITZ INDEX BLDA+IHG-RTO: SIGNIFICANT CHANGE UP
PCO2 BLDA: 80 MMHG — CRITICAL HIGH (ref 35–45)
PH BLDA: 7.36 — SIGNIFICANT CHANGE UP (ref 7.35–7.45)
PO2 BLDA: 100 MMHG — SIGNIFICANT CHANGE UP (ref 83–108)
POTASSIUM SERPL-MCNC: 4.8 MMOL/L — SIGNIFICANT CHANGE UP (ref 3.5–5.3)
POTASSIUM SERPL-SCNC: 4.8 MMOL/L — SIGNIFICANT CHANGE UP (ref 3.5–5.3)
SAO2 % BLDA: 98 % — SIGNIFICANT CHANGE UP (ref 95–99)
SODIUM SERPL-SCNC: 132 MMOL/L — LOW (ref 135–145)

## 2018-09-29 PROCEDURE — 99233 SBSQ HOSP IP/OBS HIGH 50: CPT

## 2018-09-29 PROCEDURE — 99232 SBSQ HOSP IP/OBS MODERATE 35: CPT

## 2018-09-29 PROCEDURE — 93010 ELECTROCARDIOGRAM REPORT: CPT

## 2018-09-29 RX ADMIN — Medication 325 MILLIGRAM(S): at 13:29

## 2018-09-29 RX ADMIN — ACETAZOLAMIDE 250 MILLIGRAM(S): 250 TABLET ORAL at 13:29

## 2018-09-29 RX ADMIN — ATORVASTATIN CALCIUM 10 MILLIGRAM(S): 80 TABLET, FILM COATED ORAL at 21:01

## 2018-09-29 RX ADMIN — AMIODARONE HYDROCHLORIDE 200 MILLIGRAM(S): 400 TABLET ORAL at 05:38

## 2018-09-29 RX ADMIN — MAGNESIUM OXIDE 400 MG ORAL TABLET 400 MILLIGRAM(S): 241.3 TABLET ORAL at 08:13

## 2018-09-29 RX ADMIN — ACETAZOLAMIDE 250 MILLIGRAM(S): 250 TABLET ORAL at 05:38

## 2018-09-29 RX ADMIN — MAGNESIUM OXIDE 400 MG ORAL TABLET 400 MILLIGRAM(S): 241.3 TABLET ORAL at 18:16

## 2018-09-29 RX ADMIN — Medication 3 MILLILITER(S): at 13:36

## 2018-09-29 RX ADMIN — GABAPENTIN 200 MILLIGRAM(S): 400 CAPSULE ORAL at 21:02

## 2018-09-29 RX ADMIN — Medication 20 MILLIEQUIVALENT(S): at 05:38

## 2018-09-29 RX ADMIN — PREGABALIN 1000 MICROGRAM(S): 225 CAPSULE ORAL at 13:29

## 2018-09-29 RX ADMIN — PANTOPRAZOLE SODIUM 40 MILLIGRAM(S): 20 TABLET, DELAYED RELEASE ORAL at 18:15

## 2018-09-29 RX ADMIN — GABAPENTIN 200 MILLIGRAM(S): 400 CAPSULE ORAL at 13:29

## 2018-09-29 RX ADMIN — PANTOPRAZOLE SODIUM 40 MILLIGRAM(S): 20 TABLET, DELAYED RELEASE ORAL at 05:38

## 2018-09-29 RX ADMIN — RIVAROXABAN 15 MILLIGRAM(S): KIT at 18:16

## 2018-09-29 RX ADMIN — GABAPENTIN 200 MILLIGRAM(S): 400 CAPSULE ORAL at 05:38

## 2018-09-29 RX ADMIN — Medication 20 MILLIEQUIVALENT(S): at 18:15

## 2018-09-29 RX ADMIN — MAGNESIUM OXIDE 400 MG ORAL TABLET 400 MILLIGRAM(S): 241.3 TABLET ORAL at 13:29

## 2018-09-29 RX ADMIN — ACETAZOLAMIDE 250 MILLIGRAM(S): 250 TABLET ORAL at 21:01

## 2018-09-29 NOTE — CONSULT NOTE ADULT - ASSESSMENT
85 y/o obese male with pmhx of COPD on home O2, CHF, HLD, afib on xarelto, CKD, DM now with chronic hypercapnic respiratory failure with metabolic compensation. Patient does not require admission to MICU as he is hemodynamically stable with no signs of respiratory distress and a compensated respiratory acidosis. Patient would benefit from bipap prn as well as during sleep to help ventilate off CO2 which will in turn decrease serum bicarb. Of note patient is also a DNR/DNI, therefore would recommend transfer to step down unit since requiring bipap. Please reconsult if needed.
85 yo M with multiple medical co-morbidities admitted with fluid overload and Hyponatremia; extreme weakness

## 2018-09-29 NOTE — CONSULT NOTE ADULT - PROBLEM SELECTOR RECOMMENDATION 9
-bipap PRN and QHS. trend abg as appropriate  -nephrology following
·  Problem: Hyponatremia with excess extracellular fluid volume.    Plan: Lasix drip  Renal following  strick I/O  trend Na--improving.

## 2018-09-29 NOTE — CONSULT NOTE ADULT - PROBLEM SELECTOR RECOMMENDATION 3
-continue care as per nephrology
Duoneb treatments  Continuous O2 and Pulse Oximetry  Stable at this time

## 2018-09-29 NOTE — CONSULT NOTE ADULT - ATTENDING COMMENTS
patient was seen and examined and lab reports and imaging studies reviewed and case discussed with Edwar BARRIENTOS. I agree with the documentation and my addendum is noted below      Agree with BIPAP as needed, no MICU level care required
COUNSELING:    Face to face meeting to discuss Advanced Care Planning - Time Spent ______ Minutes.  See goals of care note.    More than 50% time spent in counseling and coordinating care. _30_____ Minutes.     Thank you for the opportunity to assist with the care of this patient.   [facility] [phone].

## 2018-09-29 NOTE — PROGRESS NOTE ADULT - SUBJECTIVE AND OBJECTIVE BOX
[facility] DIVISION OF KIDNEY DISEASES AND HYPERTENSION -- FOLLOW UP NOTE  --------------------------------------------------------------------------------  Chief Complaint: Hyponatremia    24 hour events/subjective:  Pt seen and examined  Complaining of UE shaking  Sitting up in chair in NAD      PAST HISTORY  --------------------------------------------------------------------------------  No significant changes to PMH, PSH, FHx, SHx, unless otherwise noted    ALLERGIES & MEDICATIONS  --------------------------------------------------------------------------------  Allergies    No Known Allergies    Intolerances      Standing Inpatient Medications  acetazolamide    Tablet 250 milliGRAM(s) Oral three times a day  amiodarone    Tablet 200 milliGRAM(s) Oral daily  atorvastatin 10 milliGRAM(s) Oral at bedtime  carvedilol 3.125 milliGRAM(s) Oral every 12 hours  cyanocobalamin 1000 MICROGram(s) Oral daily  epoetin lopez Injectable 52286 Unit(s) SubCutaneous <User Schedule>  ferrous    sulfate 325 milliGRAM(s) Oral daily  gabapentin 200 milliGRAM(s) Oral every 8 hours  influenza   Vaccine 0.5 milliLiter(s) IntraMuscular once  magnesium oxide 400 milliGRAM(s) Oral three times a day with meals  pantoprazole    Tablet 40 milliGRAM(s) Oral two times a day  potassium chloride    Tablet ER 20 milliEquivalent(s) Oral two times a day  rivaroxaban 15 milliGRAM(s) Oral every 24 hours    PRN Inpatient Medications  acetaminophen   Tablet .. 650 milliGRAM(s) Oral every 6 hours PRN  ALBUTerol/ipratropium for Nebulization 3 milliLiter(s) Nebulizer every 6 hours PRN      REVIEW OF SYSTEMS  --------------------------------------------------------------------------------  Gen: No weight changes, fatigue, fevers/chills, weakness  Skin: No rashes  Head/Eyes/Ears/Mouth: No headache; Normal hearing; Normal vision w/o blurriness; No sinus pain/discomfort, sore throat  Respiratory: No dyspnea, cough, wheezing, hemoptysis  CV: No chest pain, PND, orthopnea  GI: No abdominal pain, diarrhea, constipation, nausea, vomiting, melena, hematochezia  : No increased frequency, dysuria, hematuria, nocturia  MSK: No joint pain/swelling; no back pain; no edema  Neuro: UE tremors  Heme: No easy bruising or bleeding  Endo: No heat/cold intolerance  Psych: No significant nervousness, anxiety, stress, depression    All other systems were reviewed and are negative, except as noted.    VITALS/PHYSICAL EXAM  --------------------------------------------------------------------------------  T(C): 36.7 (09-29-18 @ 08:05), Max: 37.1 (09-28-18 @ 19:43)  HR: 57 (09-29-18 @ 08:05) (50 - 105)  BP: 104/66 (09-29-18 @ 08:05) (102/58 - 135/86)  RR: 12 (09-29-18 @ 08:05) (12 - 18)  SpO2: 98% (09-29-18 @ 08:05) (98% - 99%)  Wt(kg): --        09-28-18 @ 07:01  -  09-29-18 @ 07:00  --------------------------------------------------------  IN: 840 mL / OUT: 1550 mL / NET: -710 mL      Physical Exam:  	Gen: NAD  	HEENT: PERRL, supple neck, clear oropharynx  	Pulm: CTA B/L  	CV: RRR, S1S2; no rub  	Back: No spinal or CVA tenderness; no sacral edema  	Abd: +BS, soft, nontender/nondistended  	: No suprapubic tenderness  	UE: + asterixis  	Skin: Warm, without rashes    LABS/STUDIES  --------------------------------------------------------------------------------              10.4   5.6   >-----------<  183      [09-28-18 @ 07:43]              38.6     132  |  82  |  127.0  ----------------------------<  119      [09-29-18 @ 08:53]  4.8   |  43.0  |  2.02        Ca     9.8     [09-29-18 @ 08:53]      Mg     2.4     [09-28-18 @ 07:43]            Creatinine Trend:  SCr 2.02 [09-29 @ 08:53]  SCr 1.91 [09-28 @ 07:43]  SCr 2.08 [09-27 @ 06:17]  SCr 1.84 [09-26 @ 07:46]  SCr 1.47 [09-25 @ 06:55]    Urinalysis - [09-24-18 @ 13:18]      Color Yellow / Appearance Clear / SG 1.010 / pH 8.0      Gluc Negative / Ketone Negative  / Bili Negative / Urobili 1       Blood Negative / Protein 15 / Leuk Est Moderate / Nitrite Negative      RBC  / WBC 3-5 / Hyaline  / Gran  / Sq Epi  / Non Sq Epi  / Bacteria Occasional    Urine Creatinine 24      [09-24-18 @ 15:19]  Urine Protein 7.0      [09-24-18 @ 15:19]  Urine Sodium 36      [09-27-18 @ 10:35]  Urine Urea Nitrogen 108      [09-25-18 @ 17:35]  Urine Potassium 16      [09-24-18 @ 15:19]  Urine Chloride 57      [09-24-18 @ 15:19]  Urine Osmolality 232      [09-27-18 @ 10:35]    Iron 35, TIBC 423, %sat 8      [09-25-18 @ 06:55]  Ferritin 77      [09-25-18 @ 06:55]  HbA1c 4.9      [05-25-18 @ 06:44]

## 2018-09-29 NOTE — PROGRESS NOTE ADULT - SUBJECTIVE AND OBJECTIVE BOX
INTERVAL HPI/OVERNIGHT EVENTS:    CC: metabolic alkalosis, hypercapnia, hyponatremia, hypomagnesemia, diastolic CHF exacerbation    Chart and course reviewed. Lethargic this am, improved. Noted to have tremors.    Vital Signs Last 24 Hrs  T(C): 36.7 (29 Sep 2018 08:05), Max: 37.1 (28 Sep 2018 19:43)  T(F): 98.1 (29 Sep 2018 08:05), Max: 98.8 (28 Sep 2018 19:43)  HR: 57 (29 Sep 2018 08:05) (50 - 105)  BP: 104/66 (29 Sep 2018 08:05) (102/58 - 135/86)  BP(mean): --  RR: 12 (29 Sep 2018 08:05) (12 - 18)  SpO2: 98% (29 Sep 2018 08:05) (98% - 99%)    PHYSICAL EXAM:    GENERAL: Not in distress, alert, obese, sitting in chair  CHEST/LUNG: b/l air entry, clear  HEART: irregular  ABDOMEN: Soft, BS+  EXTREMITIES:  No edema, tenderness.     MEDICATIONS  (STANDING):  acetazolamide    Tablet 250 milliGRAM(s) Oral three times a day  amiodarone    Tablet 200 milliGRAM(s) Oral daily  atorvastatin 10 milliGRAM(s) Oral at bedtime  carvedilol 3.125 milliGRAM(s) Oral every 12 hours  cyanocobalamin 1000 MICROGram(s) Oral daily  epoetin lopez Injectable 54910 Unit(s) SubCutaneous <User Schedule>  ferrous    sulfate 325 milliGRAM(s) Oral daily  gabapentin 200 milliGRAM(s) Oral every 8 hours  influenza   Vaccine 0.5 milliLiter(s) IntraMuscular once  magnesium oxide 400 milliGRAM(s) Oral three times a day with meals  pantoprazole    Tablet 40 milliGRAM(s) Oral two times a day  potassium chloride    Tablet ER 20 milliEquivalent(s) Oral two times a day  rivaroxaban 15 milliGRAM(s) Oral every 24 hours    MEDICATIONS  (PRN):  acetaminophen   Tablet .. 650 milliGRAM(s) Oral every 6 hours PRN Temp greater or equal to 38C (100.4F), Mild Pain (1 - 3), Moderate Pain (4 - 6)  ALBUTerol/ipratropium for Nebulization 3 milliLiter(s) Nebulizer every 6 hours PRN Shortness of Breath and/or Wheezing      Allergies    No Known Allergies    Intolerances          LABS:                          10.4   5.6   )-----------( 183      ( 28 Sep 2018 07:43 )             38.6     09-29    132<L>  |  82<L>  |  127.0<H>  ----------------------------<  119<H>  4.8   |  43.0<H>  |  2.02<H>    Ca    9.8      29 Sep 2018 08:53  Mg     2.4     09-28            RADIOLOGY & ADDITIONAL TESTS: INTERVAL HPI/OVERNIGHT EVENTS:    CC: metabolic alkalosis, hypercapnia, hyponatremia, diastolic CHF exacerbation    Chart and course reviewed. Lethargic this am, improved. Noted to have tremors.    Vital Signs Last 24 Hrs  T(C): 36.7 (29 Sep 2018 08:05), Max: 37.1 (28 Sep 2018 19:43)  T(F): 98.1 (29 Sep 2018 08:05), Max: 98.8 (28 Sep 2018 19:43)  HR: 57 (29 Sep 2018 08:05) (50 - 105)  BP: 104/66 (29 Sep 2018 08:05) (102/58 - 135/86)  BP(mean): --  RR: 12 (29 Sep 2018 08:05) (12 - 18)  SpO2: 98% (29 Sep 2018 08:05) (98% - 99%)    PHYSICAL EXAM:    GENERAL: Not in distress, alert, obese, sitting in chair  CHEST/LUNG: b/l air entry, clear  HEART: irregular  ABDOMEN: Soft, BS+  EXTREMITIES:  No edema, tenderness.     MEDICATIONS  (STANDING):  acetazolamide    Tablet 250 milliGRAM(s) Oral three times a day  amiodarone    Tablet 200 milliGRAM(s) Oral daily  atorvastatin 10 milliGRAM(s) Oral at bedtime  carvedilol 3.125 milliGRAM(s) Oral every 12 hours  cyanocobalamin 1000 MICROGram(s) Oral daily  epoetin lopez Injectable 38015 Unit(s) SubCutaneous <User Schedule>  ferrous    sulfate 325 milliGRAM(s) Oral daily  gabapentin 200 milliGRAM(s) Oral every 8 hours  influenza   Vaccine 0.5 milliLiter(s) IntraMuscular once  magnesium oxide 400 milliGRAM(s) Oral three times a day with meals  pantoprazole    Tablet 40 milliGRAM(s) Oral two times a day  potassium chloride    Tablet ER 20 milliEquivalent(s) Oral two times a day  rivaroxaban 15 milliGRAM(s) Oral every 24 hours    MEDICATIONS  (PRN):  acetaminophen   Tablet .. 650 milliGRAM(s) Oral every 6 hours PRN Temp greater or equal to 38C (100.4F), Mild Pain (1 - 3), Moderate Pain (4 - 6)  ALBUTerol/ipratropium for Nebulization 3 milliLiter(s) Nebulizer every 6 hours PRN Shortness of Breath and/or Wheezing      Allergies    No Known Allergies    Intolerances          LABS:                          10.4   5.6   )-----------( 183      ( 28 Sep 2018 07:43 )             38.6     09-29    132<L>  |  82<L>  |  127.0<H>  ----------------------------<  119<H>  4.8   |  43.0<H>  |  2.02<H>    Ca    9.8      29 Sep 2018 08:53  Mg     2.4     09-28            RADIOLOGY & ADDITIONAL TESTS:

## 2018-09-29 NOTE — CONSULT NOTE ADULT - PROBLEM SELECTOR RECOMMENDATION 4
-continue current management with amiodarone and carvedilol  -cardio following
IV Lasix Infusion  F/U with Electrolyte Lab values  Monitor lower extremity edema and Hyponatremia

## 2018-09-29 NOTE — CONSULT NOTE ADULT - PROBLEM SELECTOR PROBLEM 3
CKD (chronic kidney disease) stage 3, GFR 30-59 ml/min
Chronic obstructive pulmonary disease, unspecified COPD type

## 2018-09-29 NOTE — PROGRESS NOTE ADULT - ASSESSMENT
84 yr old male with atrial fibrillation on Xarelto, COPD on home oxygen, diastolic CHF, hypertension, hyperlipidemia presented with generalized weakness. He was noted to have severe hyponatremia, 115. Nephrology and cardiology were consulted, hyponatremia attributed to hypervolemia secondary to CHF exacerbation. He was placed on a Lasix gtt by nephrology. Sodium improved slowly. He was evaluated by palliative and hospice as well. Sodium improved to 125. Lasix was discontinued and he was started on 84 yr old male with atrial fibrillation on Xarelto, COPD on home oxygen, diastolic CHF, hypertension, hyperlipidemia presented with generalized weakness. He was noted to have severe hyponatremia, 115. Nephrology and cardiology were consulted, hyponatremia attributed to hypervolemia secondary to CHF exacerbation. He was placed on a Lasix gtt by nephrology. Sodium improved slowly. He was evaluated by palliative and hospice as well. Sodium improved to 125. Lasix was discontinued and he was started on Urea by nephrology. Acetazolamide was added after discussion with nephrology. Noted to have worsening metabolic acidosis. ABG was done on 9/29 as patient with worsening mental status, showed pCO2 80. Also noted to have uremia.

## 2018-09-29 NOTE — PROGRESS NOTE ADULT - ASSESSMENT
1) Hyponatremia  2) CHF  3) Uremia  4) CKD III    Rising BUN ; stop urea  ABG checked ; pCO2 80 ;   Needs BIPAP ;   Stopping acetazolamide for now  Fluid restrict 1L  d/w Dr Wheatley 1) Hyponatremia  2) CHF  3) Uremia  4) CKD III    Rising BUN ; stop urea ; check occult blood  ABG checked ; pCO2 80 ;   Needs BIPAP ;   Stopping acetazolamide for now  Fluid restrict 1L  d/w Dr Wheatley

## 2018-09-29 NOTE — PROGRESS NOTE ADULT - SUBJECTIVE AND OBJECTIVE BOX
DAVIDJUAN MANUEL VINCENT  [id number]      Chief Complaint: Hyponatremia/Weakness      Interval History: Na level correcting. Patient denies CP or SOB but is "Shaking a lot".      Tele: SB 50's        acetaminophen   Tablet .. 650 milliGRAM(s) Oral every 6 hours PRN  acetazolamide    Tablet 250 milliGRAM(s) Oral three times a day  ALBUTerol/ipratropium for Nebulization 3 milliLiter(s) Nebulizer every 6 hours PRN  amiodarone    Tablet 200 milliGRAM(s) Oral daily  atorvastatin 10 milliGRAM(s) Oral at bedtime  carvedilol 3.125 milliGRAM(s) Oral every 12 hours  cyanocobalamin 1000 MICROGram(s) Oral daily  epoetin lopez Injectable 80242 Unit(s) SubCutaneous <User Schedule>  ferrous    sulfate 325 milliGRAM(s) Oral daily  gabapentin 200 milliGRAM(s) Oral every 8 hours  influenza   Vaccine 0.5 milliLiter(s) IntraMuscular once  magnesium oxide 400 milliGRAM(s) Oral three times a day with meals  pantoprazole    Tablet 40 milliGRAM(s) Oral two times a day  potassium chloride    Tablet ER 20 milliEquivalent(s) Oral two times a day  rivaroxaban 15 milliGRAM(s) Oral every 24 hours          Physical Exam:  T(C): 36.7 (09-29-18 @ 08:05), Max: 37.1 (09-28-18 @ 19:43)  HR: 57 (09-29-18 @ 08:05) (50 - 105)  BP: 104/66 (09-29-18 @ 08:05) (102/58 - 135/86)  RR: 12 (09-29-18 @ 08:05) (12 - 18)  SpO2: 98% (09-29-18 @ 08:05) (98% - 99%)  Wt(kg): --  General: WD/obese in NAD  Skin: Warm, normal turgor  Eyes: Normal conjunctivae  ENMT: Normal hearing, mmm  Lungs: Scattered rhonchi  CVS: no JVD, no carotid bruits, RRR  Abdomen: normal active bowel sounds  MSK: normal ROM  Extremities: Trace-1+ edema  Neuro: A&O x3  Psych: Normal affect    EKG: Pending    Labs:   29 Sep 2018 08:53    132    |  82     |  127.0  ----------------------------<  119    4.8     |  43.0   |  2.02     Ca    9.8        29 Sep 2018 08:53  Mg     2.4       28 Sep 2018 07:43                            10.4   5.6   )-----------( 183      ( 28 Sep 2018 07:43 )             38.6                     Assessment:  1. 83yo Male  with chronic HFpEF, CAD s/p CABG.  2. S/P bio AVR  3. PAF  4. COPD on home O2,   5. GIB   6. Hypervolemic hyponatremia/fatigue. Sodium 130 today.  7. Shaking (Metabolic ?)      Plan:  1. Agree with current management.  2. Serial electrolytes.  3. Serum ammonia level

## 2018-09-29 NOTE — CONSULT NOTE ADULT - PROBLEM SELECTOR RECOMMENDATION 2
-most recent BMP shows Na 132. continue to trend and avoid increase of more than 6 meq in 24 hour period.   -nephro following
Medical Management  Diuresis with IV Lasix infusion  Manage AFIB Xarelto

## 2018-09-29 NOTE — CONSULT NOTE ADULT - SUBJECTIVE AND OBJECTIVE BOX
CHIEF COMPLAINT: weakness    HPI: Patient is a  84y Male  with chronic HFpEF, CAD s/p CABG, bio AVR, PAF, COPD on home O2, GIB here with weakness. Started this am and felt very weak . Had otherwise been feeling fairly well, denies worsening dyspnea or CP. No PND/orthopnea/palps/sycnope. States has been more compliant with diet recently as well. No cough or productive sputum. HAs chronic edema.     PAST MEDICAL & SURGICAL HISTORY:  COPD (chronic obstructive pulmonary disease)  Hyperlipidemia, unspecified hyperlipidemia type  Essential hypertension  Afib  Chronic systolic CHF (congestive heart failure)  Hypertrophic cardiomyopathy  CKD (chronic kidney disease), stage 4 (severe)  Gassiness  Diabetes  Asthma  Prostate cancer  Hypertension  No significant past surgical history  History of valvuloplasty  History of knee surgery      MEDICATIONS:  MEDICATIONS  (STANDING):  amiodarone    Tablet 200 milliGRAM(s) Oral daily  atorvastatin 10 milliGRAM(s) Oral at bedtime  carvedilol 3.125 milliGRAM(s) Oral every 12 hours  cyanocobalamin 1000 MICROGram(s) Oral daily  ferrous    sulfate 325 milliGRAM(s) Oral daily  furosemide Infusion 5 mG/Hr (2.5 mL/Hr) IV Continuous <Continuous>  gabapentin 200 milliGRAM(s) Oral every 8 hours  magnesium oxide 400 milliGRAM(s) Oral three times a day with meals  magnesium sulfate  IVPB 2 Gram(s) IV Intermittent every 4 hours  pantoprazole    Tablet 40 milliGRAM(s) Oral two times a day  potassium chloride    Tablet ER 20 milliEquivalent(s) Oral two times a day  rivaroxaban 15 milliGRAM(s) Oral every 24 hours    MEDICATIONS  (PRN):  acetaminophen   Tablet .. 650 milliGRAM(s) Oral every 6 hours PRN Temp greater or equal to 38C (100.4F), Mild Pain (1 - 3), Moderate Pain (4 - 6)  ALBUTerol/ipratropium for Nebulization 3 milliLiter(s) Nebulizer every 6 hours PRN Shortness of Breath and/or Wheezing    acetaminophen   Tablet .. 650 milliGRAM(s) Oral every 6 hours PRN  ALBUTerol/ipratropium for Nebulization 3 milliLiter(s) Nebulizer every 6 hours PRN  amiodarone    Tablet 200 milliGRAM(s) Oral daily  atorvastatin 10 milliGRAM(s) Oral at bedtime  carvedilol 3.125 milliGRAM(s) Oral every 12 hours  cyanocobalamin 1000 MICROGram(s) Oral daily  ferrous    sulfate 325 milliGRAM(s) Oral daily  furosemide Infusion 5 mG/Hr IV Continuous <Continuous>  gabapentin 200 milliGRAM(s) Oral every 8 hours  magnesium oxide 400 milliGRAM(s) Oral three times a day with meals  magnesium sulfate  IVPB 2 Gram(s) IV Intermittent every 4 hours  pantoprazole    Tablet 40 milliGRAM(s) Oral two times a day  potassium chloride    Tablet ER 20 milliEquivalent(s) Oral two times a day  rivaroxaban 15 milliGRAM(s) Oral every 24 hours      FAMILY HISTORY:  FAMILY HISTORY:  No pertinent family history in first degree relatives  No pertinent family history in first degree relatives      SOCIAL HISTORY: no EtOH, drugs or tobacco    ROS: GI negative, All others negative    PHYSCIAL EXAM:  Vital Signs Last 24 Hrs  T(C): 36.8 (24 Sep 2018 15:20), Max: 36.9 (24 Sep 2018 10:51)  T(F): 98.2 (24 Sep 2018 15:20), Max: 98.4 (24 Sep 2018 10:51)  HR: 56 (24 Sep 2018 15:20) (52 - 56)  BP: 111/72 (24 Sep 2018 15:20) (111/72 - 113/70)  BP(mean): --  RR: 18 (24 Sep 2018 15:20) (18 - 18)  SpO2: 100% (24 Sep 2018 15:20) (100% - 100%)  I&O's Summary    GEN: NAD  HEENT: MMM, sclera anicteric  RESP: CTA bilaterally, mildly diminished at bases  CVS: S1S2, RRR, mild JVD (difficult to assess due to body habitus), no M/R/G  GI: Soft, NT, ND, BS+  EXT: 2+ pitting edema LE bilaterally  NEURO: AAOX3  PSYCH: Normal affect    ECHO 9/2018:   1. Left ventricular ejection fraction, by visual estimation, is >75%.   2. Technically difficult study.   3. Hyperdynamic global left ventricular systolic function.   4. Spectral Doppler shows pseudonormal pattern of left ventricular   myocardial filling (Grade II diastolic dysfunction).   5. There is moderate concentric left ventricular hypertrophy.   6. Moderately enlarged right ventricle.   7. There is no evidence of pericardial effusion.   8. Moderate mitral annular calcification.   9. Mild tricuspid regurgitation.  10. Bioprosthesis in the aortic position.  11. Peak aortic valve gradient is 38.9 mmHg and the mean gradient is 21.3   mmHg, which is probably normal in the setting of a prosthetic aortic   valve.      ECG: SR with marked 1st degree AV delay, NSST    LABS:                        9.2    4.6   )-----------( 182      ( 24 Sep 2018 14:20 )             32.0     09-24    115<LL>  |  64<L>  |  24.0<H>  ----------------------------<  102  3.7   |  41.0<H>  |  1.34<H>    Ca    8.9      24 Sep 2018 13:09  Phos  3.1     09-24  Mg     1.4     09-24    TPro  5.7<L>  /  Alb  3.3  /  TBili  1.0  /  DBili  x   /  AST  35  /  ALT  10  /  AlkPhos  71  09-24    CARDIAC MARKERS ( 24 Sep 2018 13:09 )  x     / 0.04 ng/mL / 335 U/L / x     / 4.4 ng/mL      PT/INR - ( 24 Sep 2018 13:09 )   PT: 30.5 sec;   INR: 2.72 ratio         PTT - ( 24 Sep 2018 13:09 )  PTT:42.5 sec      RADIOLOGY & ADDITIONAL STUDIES:    Assessment:    83yo Male  with chronic HFpEF, CAD s/p CABG, bio AVR, PAF, COPD on home O2, GIB here with weakness and severe hypervolemic hyponatremia. Agreer with lasix gtt, can add tolvaptan if needed. CLose monitoring of sodium and lytes.     Plan:  1. LAsix gtt, ordered per renal . Follow sodium and lytes closely  2. Continue amio and Xarelto for AF  3. No other change to CV meds.   4. Consider tolvaptan  5. Will follow, thanks!      Eloy Cruz MD
This is an 85yo M  with Hypertrophic Cardiomyopathy and Diastolic Heart Failure. (See entire list of PMH below). He has COPD and oxygen dependent.  Mr Ford is an obese pleasant 85yo  Male, having had multiple readmissions for PNA, COPD and Anemia from GI Bleed. He was brought to ED  with worsening weakness, B/L lower extremity edema and GILBERT.  He is alert and oriented x2 lives with son and daughter-in-law. He denies CP, Palpitations  N/V/D BM everyday except today.  HPI:  85 y/o male w/PM hx of afib on xarelto, diastolic HF, COPD on chronic O2 presents with generalized weakness.  no Fevers/chills/sweats or cough. no chest pain. + edema of legs/ abdomen and Dyspnea on exertion that has been worsening.  states compliant with medications and diet.  has known history of dietary indiscretion    When in  ER found to have fluid overload and Na 115 (24 Sep 2018 15:50)      PERTINENT PMH REVIEWED: Yes     PAST MEDICAL & SURGICAL HISTORY:  COPD (chronic obstructive pulmonary disease)  Hyperlipidemia, unspecified hyperlipidemia type  Essential hypertension  Afib  Chronic systolic CHF (congestive heart failure)  Hypertrophic cardiomyopathy  CKD (chronic kidney disease), stage 4 (severe)  Gassiness  Diabetes  Asthma  Prostate cancer  Hypertension  No significant past surgical history  History of valvuloplasty  History of knee surgery      SOCIAL HISTORY:  EtOH    No                                    Drugs    No                            nonsmoker                                    Admitted from: home  LIves with son and daughter-in-law [facility] [phone]  FAMILY HISTORY:  No pertinent family history in first degree relatives  No pertinent family history in first degree relatives    No Known Allergies    Baseline ADLs (prior to admission):  semi-Dependent      Present Symptoms:     Dyspnea:  2- 3 O2 NC  2L POX 98%  Nausea/Vomiting: No  Anxiety:   No  Depression: No  Fatigue: Yes   Loss of appetite: Yes     Pain: Denies            Character-            Duration-            Effect-            Factors-            Frequency-            Location-            Severity-    Review of Systems: Reviewed                All others negative    MEDICATIONS  (STANDING):  amiodarone    Tablet 200 milliGRAM(s) Oral daily  atorvastatin 10 milliGRAM(s) Oral at bedtime  carvedilol 3.125 milliGRAM(s) Oral every 12 hours  cyanocobalamin 1000 MICROGram(s) Oral daily  ferrous    sulfate 325 milliGRAM(s) Oral daily  furosemide Infusion 5 mG/Hr (2.5 mL/Hr) IV Continuous <Continuous>  gabapentin 200 milliGRAM(s) Oral every 8 hours  magnesium oxide 400 milliGRAM(s) Oral three times a day with meals  pantoprazole    Tablet 40 milliGRAM(s) Oral two times a day  potassium chloride    Tablet ER 20 milliEquivalent(s) Oral two times a day  rivaroxaban 15 milliGRAM(s) Oral every 24 hours    MEDICATIONS  (PRN):  acetaminophen   Tablet .. 650 milliGRAM(s) Oral every 6 hours PRN Temp greater or equal to 38C (100.4F), Mild Pain (1 - 3), Moderate Pain (4 - 6)  ALBUTerol/ipratropium for Nebulization 3 milliLiter(s) Nebulizer every 6 hours PRN Shortness of Breath and/or Wheezing      PHYSICAL EXAM:    Vital Signs Last 24 Hrs  T(C): 37 (25 Sep 2018 13:51), Max: 37 (25 Sep 2018 12:22)  T(F): 98.6 (25 Sep 2018 13:51), Max: 98.6 (25 Sep 2018 12:22)  HR: 58 (25 Sep 2018 13:51) (56 - 68)  BP: 103/66 (25 Sep 2018 13:51) (95/60 - 124/73)  BP(mean): --  RR: 20 (25 Sep 2018 13:51) (18 - 23)  SpO2: 98% (25 Sep 2018 13:51) (97% - 100%)    General: alert  oriented x __3__           obese     HEENT: normal      Lungs: no wheezing or rhonchi  in no distress at rest    CV: normal  Bradycardia    GI: obese  distended                  constipation  last BM: yesterday    : normal  voiding    MSK:  weakness  edema BLLE            ambulatory   Skin: normal no rash    LABS:                        9.3    3.7   )-----------( 196      ( 25 Sep 2018 06:55 )             32.6         124<L>  |  x   |  x   ----------------------------<  x   x    |  x   |  x     Ca    9.1      25 Sep 2018 06:55  Phos  3.1     09-24  Mg     1.4         TPro  5.7<L>  /  Alb  3.3  /  TBili  1.0  /  DBili  x   /  AST  35  /  ALT  10  /  AlkPhos  71  24    PT/INR - ( 24 Sep 2018 13:09 )   PT: 30.5 sec;   INR: 2.72 ratio       PTT - ( 24 Sep 2018 13:09 )  PTT:42.5 sec  Urinalysis Basic - ( 24 Sep 2018 13:18 )    Color: Yellow / Appearance: Clear / S.010 / pH: x  Gluc: x / Ketone: Negative  / Bili: Negative / Urobili: 1 mg/dL   Blood: x / Protein: 15 mg/dL / Nitrite: Negative   Leuk Esterase: Moderate / RBC: x / WBC 3-5   Sq Epi: x / Non Sq Epi: x / Bacteria: Occasional      I&O's Summary    24 Sep 2018 07:  -  25 Sep 2018 07:00  --------------------------------------------------------  IN: 0 mL / OUT: 1300 mL / NET: -1300 mL    25 Sep 2018 07:  -  25 Sep 2018 14:47  --------------------------------------------------------  IN: 2.5 mL / OUT: 0 mL / NET: 2.5 mL    RADIOLOGY & ADDITIONAL STUDIES:    ADVANCE DIRECTIVES:   DNR NO  Completed on:         Daughter-in-law Kira was undecided about establishing Advance Directives.                  NEGRO   NO   Completed on:  Is asking for Hospice home support now.  Living Will   NO   Completed on:
Patient is a 84y old  Male who presents with a chief complaint of weakness (29 Sep 2018 15:08)      BRIEF HOSPITAL COURSE: 85 y/o obese male with pmhx of COPD on home O2, CHF, HLD, afib on xarelto, CKD, DM who presented to ED on 9/24 with generalized weakness found to be hyponatremic likely secondary to CHF exacerbation. He was diuresed with improvement in symptoms and sodium was corrected. Patient was started on urea, bicarb gtt, and lasix gtt, and acetazolamide to increase urine output.     Events last 24 hours: ICU evaluation called for hypercapnia on ABG with question if new bipap is required. ABG results 7.36/80/100/40 on 4 L NC. Patient denies chest pain, shortness of breath, abdominal pain, headache, nausea/vomiting. He states he is feeling much better today versus yesterday.    Patient denied request for .    PAST MEDICAL & SURGICAL HISTORY:  COPD (chronic obstructive pulmonary disease)  Hyperlipidemia, unspecified hyperlipidemia type  Essential hypertension  Afib  Chronic systolic CHF (congestive heart failure)  Hypertrophic cardiomyopathy  CKD (chronic kidney disease), stage 4 (severe)  Gassiness  Diabetes  Asthma  Prostate cancer  Hypertension  No significant past surgical history  History of valvuloplasty  History of knee surgery    Allergies    No Known Allergies    Intolerances      FAMILY HISTORY:  No pertinent family history in first degree relatives  No pertinent family history in first degree relatives      Family history otherwise noncontributory.      Review of Systems:  CONSTITUTIONAL: No fever, chills, or fatigue  EYES: No eye pain, visual disturbances, or discharge  ENMT:  No difficulty hearing, tinnitus, vertigo; No sinus or throat pain  NECK: No pain or stiffness  RESPIRATORY: No cough, wheezing, chills or hemoptysis; No shortness of breath  CARDIOVASCULAR: No chest pain, palpitations, dizziness, or leg swelling  GASTROINTESTINAL: No abdominal or epigastric pain. No nausea, vomiting, or hematemesis; No diarrhea or constipation. No melena or hematochezia.  GENITOURINARY: No dysuria, frequency, hematuria, or incontinence  NEUROLOGICAL: No headaches, memory loss, loss of strength, numbness, or tremors  SKIN: No itching, burning, rashes, or lesions   MUSCULOSKELETAL: No joint pain or swelling; No muscle, back, or extremity pain  PSYCHIATRIC: No depression, anxiety, mood swings, or difficulty sleeping    All other review of systems negative except as mentioned in HPI.      Social History: denies history of smoking or etoh abuse      Medications:    acetazolamide    Tablet 250 milliGRAM(s) Oral three times a day  amiodarone    Tablet 200 milliGRAM(s) Oral daily  carvedilol 3.125 milliGRAM(s) Oral every 12 hours    ALBUTerol/ipratropium for Nebulization 3 milliLiter(s) Nebulizer every 6 hours PRN    acetaminophen   Tablet .. 650 milliGRAM(s) Oral every 6 hours PRN  gabapentin 200 milliGRAM(s) Oral every 8 hours      rivaroxaban 15 milliGRAM(s) Oral every 24 hours    pantoprazole    Tablet 40 milliGRAM(s) Oral two times a day      atorvastatin 10 milliGRAM(s) Oral at bedtime    cyanocobalamin 1000 MICROGram(s) Oral daily  ferrous    sulfate 325 milliGRAM(s) Oral daily  magnesium oxide 400 milliGRAM(s) Oral three times a day with meals  potassium chloride    Tablet ER 20 milliEquivalent(s) Oral two times a day    epoetin lopez Injectable 24793 Unit(s) SubCutaneous <User Schedule>  influenza   Vaccine 0.5 milliLiter(s) IntraMuscular once              ICU Vital Signs Last 24 Hrs  T(C): 36.7 (29 Sep 2018 08:05), Max: 37.1 (28 Sep 2018 19:43)  T(F): 98.1 (29 Sep 2018 08:05), Max: 98.8 (28 Sep 2018 19:43)  HR: 57 (29 Sep 2018 08:05) (50 - 105)  BP: 104/66 (29 Sep 2018 08:05) (102/58 - 135/86)  BP(mean): --  ABP: --  ABP(mean): --  RR: 12 (29 Sep 2018 08:05) (12 - 18)  SpO2: 98% (29 Sep 2018 08:05) (98% - 99%)    Vital Signs Last 24 Hrs  T(C): 36.7 (29 Sep 2018 08:05), Max: 37.1 (28 Sep 2018 19:43)  T(F): 98.1 (29 Sep 2018 08:05), Max: 98.8 (28 Sep 2018 19:43)  HR: 57 (29 Sep 2018 08:05) (50 - 105)  BP: 104/66 (29 Sep 2018 08:05) (102/58 - 135/86)  BP(mean): --  RR: 12 (29 Sep 2018 08:05) (12 - 18)  SpO2: 98% (29 Sep 2018 08:05) (98% - 99%)    ABG - ( 29 Sep 2018 13:55 )  pH, Arterial: 7.36  pH, Blood: x     /  pCO2: 80    /  pO2: 100   / HCO3: 40    / Base Excess: 16.2  /  SaO2: 98                  I&O's Detail    28 Sep 2018 07:01  -  29 Sep 2018 07:00  --------------------------------------------------------  IN:    Oral Fluid: 840 mL  Total IN: 840 mL    OUT:    Voided: 1550 mL  Total OUT: 1550 mL    Total NET: -710 mL            LABS:                        10.4   5.6   )-----------( 183      ( 28 Sep 2018 07:43 )             38.6     09-29    132<L>  |  82<L>  |  127.0<H>  ----------------------------<  119<H>  4.8   |  43.0<H>  |  2.02<H>    Ca    9.8      29 Sep 2018 08:53  Mg     2.4     09-28            CAPILLARY BLOOD GLUCOSE            CULTURES:      Physical Examination:    GENERAL: No acute distress. sitting in chair eating. no respiratory distress     EYES: Pupils equal, reactive to light.  Symmetric.    EARS, NOSE, THROAT: Normal; supple neck, no lymphadenopathy; trachea midline    PULM: Clear to auscultation bilaterally, no significant sputum production. No use of accessory respiratory muscles. speaking in full sentences.    CVS: Regular rate and rhythm, no murmurs, rubs, or gallops    GI: Soft, nondistended, nontender, normoactive bowel sounds, no masses, no guarding    EXTREMITIES: No edema. DP and radial pulses 2+ bilaterally.    SKIN: Warm and well perfused, no rashes noted.    NEURO: Alert, oriented, interactive, nonfocal    DEVICES: none    RADIOLOGY: no recent radiology.

## 2018-09-30 LAB
ANION GAP SERPL CALC-SCNC: 14 MMOL/L — SIGNIFICANT CHANGE UP (ref 5–17)
APPEARANCE UR: CLEAR — SIGNIFICANT CHANGE UP
BACTERIA # UR AUTO: ABNORMAL
BASE EXCESS BLDA CALC-SCNC: 14.9 MMOL/L — HIGH (ref -2–2)
BILIRUB UR-MCNC: NEGATIVE — SIGNIFICANT CHANGE UP
BLOOD GAS COMMENTS ARTERIAL: SIGNIFICANT CHANGE UP
BUN SERPL-MCNC: 104 MG/DL — HIGH (ref 8–20)
CALCIUM SERPL-MCNC: 10.3 MG/DL — HIGH (ref 8.6–10.2)
CHLORIDE SERPL-SCNC: 86 MMOL/L — LOW (ref 98–107)
CO2 SERPL-SCNC: 37 MMOL/L — HIGH (ref 22–29)
COLOR SPEC: YELLOW — SIGNIFICANT CHANGE UP
CREAT SERPL-MCNC: 2.4 MG/DL — HIGH (ref 0.5–1.3)
DIFF PNL FLD: ABNORMAL
EPI CELLS # UR: SIGNIFICANT CHANGE UP
GAS PNL BLDA: SIGNIFICANT CHANGE UP
GLUCOSE SERPL-MCNC: 125 MG/DL — HIGH (ref 70–115)
GLUCOSE UR QL: NEGATIVE MG/DL — SIGNIFICANT CHANGE UP
HCO3 BLDA-SCNC: 39 MMOL/L — HIGH (ref 20–26)
HOROWITZ INDEX BLDA+IHG-RTO: SIGNIFICANT CHANGE UP
KETONES UR-MCNC: NEGATIVE — SIGNIFICANT CHANGE UP
LEUKOCYTE ESTERASE UR-ACNC: ABNORMAL
NITRITE UR-MCNC: NEGATIVE — SIGNIFICANT CHANGE UP
PCO2 BLDA: 74 MMHG — CRITICAL HIGH (ref 35–45)
PH BLDA: 7.37 — SIGNIFICANT CHANGE UP (ref 7.35–7.45)
PH UR: 9 — HIGH (ref 5–8)
PO2 BLDA: 141 MMHG — HIGH (ref 83–108)
POTASSIUM SERPL-MCNC: 4.7 MMOL/L — SIGNIFICANT CHANGE UP (ref 3.5–5.3)
POTASSIUM SERPL-SCNC: 4.7 MMOL/L — SIGNIFICANT CHANGE UP (ref 3.5–5.3)
PROT UR-MCNC: 30 MG/DL
RBC CASTS # UR COMP ASSIST: ABNORMAL /HPF (ref 0–4)
SAO2 % BLDA: 100 % — HIGH (ref 95–99)
SODIUM SERPL-SCNC: 137 MMOL/L — SIGNIFICANT CHANGE UP (ref 135–145)
SP GR SPEC: 1.01 — SIGNIFICANT CHANGE UP (ref 1.01–1.02)
TRI-PHOS CRY UR QL COMP ASSIST: ABNORMAL
UROBILINOGEN FLD QL: NEGATIVE MG/DL — SIGNIFICANT CHANGE UP
WBC UR QL: ABNORMAL

## 2018-09-30 PROCEDURE — 99233 SBSQ HOSP IP/OBS HIGH 50: CPT

## 2018-09-30 RX ADMIN — Medication 20 MILLIEQUIVALENT(S): at 17:30

## 2018-09-30 RX ADMIN — GABAPENTIN 200 MILLIGRAM(S): 400 CAPSULE ORAL at 13:48

## 2018-09-30 RX ADMIN — GABAPENTIN 200 MILLIGRAM(S): 400 CAPSULE ORAL at 22:03

## 2018-09-30 RX ADMIN — GABAPENTIN 200 MILLIGRAM(S): 400 CAPSULE ORAL at 06:14

## 2018-09-30 RX ADMIN — PANTOPRAZOLE SODIUM 40 MILLIGRAM(S): 20 TABLET, DELAYED RELEASE ORAL at 17:31

## 2018-09-30 RX ADMIN — ACETAZOLAMIDE 250 MILLIGRAM(S): 250 TABLET ORAL at 06:14

## 2018-09-30 RX ADMIN — Medication 325 MILLIGRAM(S): at 11:23

## 2018-09-30 RX ADMIN — ATORVASTATIN CALCIUM 10 MILLIGRAM(S): 80 TABLET, FILM COATED ORAL at 22:03

## 2018-09-30 RX ADMIN — RIVAROXABAN 15 MILLIGRAM(S): KIT at 17:31

## 2018-09-30 RX ADMIN — MAGNESIUM OXIDE 400 MG ORAL TABLET 400 MILLIGRAM(S): 241.3 TABLET ORAL at 11:24

## 2018-09-30 RX ADMIN — PANTOPRAZOLE SODIUM 40 MILLIGRAM(S): 20 TABLET, DELAYED RELEASE ORAL at 06:14

## 2018-09-30 RX ADMIN — PREGABALIN 1000 MICROGRAM(S): 225 CAPSULE ORAL at 11:23

## 2018-09-30 RX ADMIN — MAGNESIUM OXIDE 400 MG ORAL TABLET 400 MILLIGRAM(S): 241.3 TABLET ORAL at 17:30

## 2018-09-30 NOTE — DIETITIAN INITIAL EVALUATION ADULT. - OTHER INFO
Chart reviewed, Pt is being followed by palliative care and plan for home hospice upon d/c planned for tomorrow.

## 2018-09-30 NOTE — PROGRESS NOTE ADULT - SUBJECTIVE AND OBJECTIVE BOX
INTERVAL HPI/OVERNIGHT EVENTS:    CC: metabolic alkalosis, hypercapnia, hyponatremia, diastolic CHF exacerbation    No overnight events, alert, denies complaints.    Vital Signs Last 24 Hrs  T(C): 36.7 (30 Sep 2018 08:54), Max: 37 (30 Sep 2018 00:16)  T(F): 98 (30 Sep 2018 08:54), Max: 98.6 (30 Sep 2018 00:16)  HR: 57 (30 Sep 2018 11:25) (51 - 57)  BP: 114/62 (30 Sep 2018 11:25) (92/61 - 119/73)  BP(mean): 81 (30 Sep 2018 06:07) (73 - 88)  RR: 17 (30 Sep 2018 11:25) (14 - 20)  SpO2: 100% (30 Sep 2018 11:25) (98% - 100%)    PHYSICAL EXAM:    GENERAL: Not in distress, alert, tremors improved.  LUNGS: b/l air entry, clear  HEART: Regular  ABDOMEN: Soft, BS+  EXTREMITIES: No edema, tenderness    MEDICATIONS  (STANDING):  amiodarone    Tablet 200 milliGRAM(s) Oral daily  atorvastatin 10 milliGRAM(s) Oral at bedtime  carvedilol 3.125 milliGRAM(s) Oral every 12 hours  cyanocobalamin 1000 MICROGram(s) Oral daily  epoetin lopez Injectable 45986 Unit(s) SubCutaneous <User Schedule>  ferrous    sulfate 325 milliGRAM(s) Oral daily  gabapentin 200 milliGRAM(s) Oral every 8 hours  influenza   Vaccine 0.5 milliLiter(s) IntraMuscular once  magnesium oxide 400 milliGRAM(s) Oral three times a day with meals  pantoprazole    Tablet 40 milliGRAM(s) Oral two times a day  potassium chloride    Tablet ER 20 milliEquivalent(s) Oral two times a day  rivaroxaban 15 milliGRAM(s) Oral every 24 hours    MEDICATIONS  (PRN):  acetaminophen   Tablet .. 650 milliGRAM(s) Oral every 6 hours PRN Temp greater or equal to 38C (100.4F), Mild Pain (1 - 3), Moderate Pain (4 - 6)  ALBUTerol/ipratropium for Nebulization 3 milliLiter(s) Nebulizer every 6 hours PRN Shortness of Breath and/or Wheezing      Allergies    No Known Allergies    Intolerances          LABS:      09-30    137  |  86<L>  |  104.0<H>  ----------------------------<  125<H>  4.7   |  37.0<H>  |  2.40<H>    Ca    10.3<H>      30 Sep 2018 05:59            RADIOLOGY & ADDITIONAL TESTS:

## 2018-09-30 NOTE — PROGRESS NOTE ADULT - SUBJECTIVE AND OBJECTIVE BOX
[facility] DIVISION OF KIDNEY DISEASES AND HYPERTENSION -- FOLLOW UP NOTE  --------------------------------------------------------------------------------  Chief Complaint:  Hyponatremia    24 hour events/subjective:  Pt seen and examined  Sitting up in chair in NAD      PAST HISTORY  --------------------------------------------------------------------------------  No significant changes to PMH, PSH, FHx, SHx, unless otherwise noted    ALLERGIES & MEDICATIONS  --------------------------------------------------------------------------------  Allergies    No Known Allergies    Intolerances      Standing Inpatient Medications  amiodarone    Tablet 200 milliGRAM(s) Oral daily  atorvastatin 10 milliGRAM(s) Oral at bedtime  carvedilol 3.125 milliGRAM(s) Oral every 12 hours  cyanocobalamin 1000 MICROGram(s) Oral daily  epoetin lopez Injectable 06882 Unit(s) SubCutaneous <User Schedule>  ferrous    sulfate 325 milliGRAM(s) Oral daily  gabapentin 200 milliGRAM(s) Oral every 8 hours  influenza   Vaccine 0.5 milliLiter(s) IntraMuscular once  magnesium oxide 400 milliGRAM(s) Oral three times a day with meals  pantoprazole    Tablet 40 milliGRAM(s) Oral two times a day  potassium chloride    Tablet ER 20 milliEquivalent(s) Oral two times a day  rivaroxaban 15 milliGRAM(s) Oral every 24 hours    PRN Inpatient Medications  acetaminophen   Tablet .. 650 milliGRAM(s) Oral every 6 hours PRN  ALBUTerol/ipratropium for Nebulization 3 milliLiter(s) Nebulizer every 6 hours PRN      REVIEW OF SYSTEMS  --------------------------------------------------------------------------------  Gen: No weight changes, fatigue, fevers/chills, weakness  Skin: No rashes  Head/Eyes/Ears/Mouth: No headache; Normal hearing; Normal vision w/o blurriness; No sinus pain/discomfort, sore throat  Respiratory: No dyspnea, cough, wheezing, hemoptysis  CV: No chest pain, PND, orthopnea  GI: No abdominal pain, diarrhea, constipation, nausea, vomiting, melena, hematochezia  : No increased frequency, dysuria, hematuria, nocturia  MSK: No joint pain/swelling; no back pain; no edema  Neuro: No dizziness/lightheadedness, weakness, seizures, numbness, tingling  Heme: No easy bruising or bleeding  Endo: No heat/cold intolerance  Psych: No significant nervousness, anxiety, stress, depression    All other systems were reviewed and are negative, except as noted.    VITALS/PHYSICAL EXAM  --------------------------------------------------------------------------------  T(C): 37.1 (09-30-18 @ 14:17), Max: 37.1 (09-30-18 @ 14:17)  HR: 56 (09-30-18 @ 12:00) (51 - 57)  BP: 111/76 (09-30-18 @ 12:00) (92/61 - 119/73)  RR: 17 (09-30-18 @ 12:00) (14 - 20)  SpO2: 100% (09-30-18 @ 12:00) (98% - 100%)  Wt(kg): --        09-29-18 @ 07:01  -  09-30-18 @ 07:00  --------------------------------------------------------  IN: 0 mL / OUT: 275 mL / NET: -275 mL    09-30-18 @ 07:01  -  09-30-18 @ 15:27  --------------------------------------------------------  IN: 120 mL / OUT: 750 mL / NET: -630 mL      Physical Exam:  	Gen: NAD  	HEENT: PERRL, supple neck, clear oropharynx  	Pulm: CTA B/L  	CV: RRR, S1S2; no rub  	Back: No spinal or CVA tenderness; no sacral edema  	Abd: +BS, soft, nontender/nondistended  	: No suprapubic tenderness  	UE: + asterixis ; improved  	Skin: Warm, without rashes    LABS/STUDIES  --------------------------------------------------------------------------------    137  |  86  |  104.0  ----------------------------<  125      [09-30-18 @ 05:59]  4.7   |  37.0  |  2.40        Ca     10.3     [09-30-18 @ 05:59]            Creatinine Trend:  SCr 2.40 [09-30 @ 05:59]  SCr 2.02 [09-29 @ 08:53]  SCr 1.91 [09-28 @ 07:43]  SCr 2.08 [09-27 @ 06:17]  SCr 1.84 [09-26 @ 07:46]    Urinalysis - [09-30-18 @ 13:32]      Color Yellow / Appearance Clear / SG 1.010 / pH 9.0      Gluc Negative / Ketone Negative  / Bili Negative / Urobili Negative       Blood Small / Protein 30 / Leuk Est Moderate / Nitrite Negative      RBC 3-5 / WBC 6-10 / Hyaline  / Gran  / Sq Epi  / Non Sq Epi Few / Bacteria Few    Urine Creatinine 24      [09-24-18 @ 15:19]  Urine Protein 7.0      [09-24-18 @ 15:19]  Urine Sodium 36      [09-27-18 @ 10:35]  Urine Urea Nitrogen 108      [09-25-18 @ 17:35]  Urine Potassium 16      [09-24-18 @ 15:19]  Urine Chloride 57      [09-24-18 @ 15:19]  Urine Osmolality 232      [09-27-18 @ 10:35]    Iron 35, TIBC 423, %sat 8      [09-25-18 @ 06:55]  Ferritin 77      [09-25-18 @ 06:55]  HbA1c 4.9      [05-25-18 @ 06:44]

## 2018-09-30 NOTE — PROGRESS NOTE ADULT - ASSESSMENT
1) Hyponatremia  2) CHF  3) Uremia  4) CKD III    BUN improving; off urea;  Still retaining CO2 ; place back on BIPAP  Na improved  Fluid restrict 1L  Going home with hospice  Signing off  Recall if needed    d/w Dr Wheatley

## 2018-09-30 NOTE — PROGRESS NOTE ADULT - ASSESSMENT
84 yr old male with atrial fibrillation on Xarelto, COPD on home oxygen, diastolic CHF, hypertension, hyperlipidemia presented with generalized weakness. He was noted to have severe hyponatremia, 115. Nephrology and cardiology were consulted, hyponatremia attributed to hypervolemia secondary to CHF exacerbation. He was placed on a Lasix gtt by nephrology. Sodium improved slowly. He was evaluated by palliative and hospice as well. Sodium improved to 125. Lasix was discontinued and he was started on Urea by nephrology. Acetazolamide was added after discussion with nephrology. Noted to have worsening metabolic acidosis. ABG was done on 9/29 as patient with worsening mental status, showed pCO2 80. Also noted to have uremia. ICU was consulted, he was placed on BiPAP, after discussion with family. Mild improvement noted in pCO2.

## 2018-10-01 ENCOUNTER — TRANSCRIPTION ENCOUNTER (OUTPATIENT)
Age: 83
End: 2018-10-01

## 2018-10-01 LAB
ANION GAP SERPL CALC-SCNC: 10 MMOL/L — SIGNIFICANT CHANGE UP (ref 5–17)
BUN SERPL-MCNC: 85 MG/DL — HIGH (ref 8–20)
CALCIUM SERPL-MCNC: 9.9 MG/DL — SIGNIFICANT CHANGE UP (ref 8.6–10.2)
CHLORIDE SERPL-SCNC: 91 MMOL/L — LOW (ref 98–107)
CO2 SERPL-SCNC: 38 MMOL/L — HIGH (ref 22–29)
CREAT SERPL-MCNC: 2.12 MG/DL — HIGH (ref 0.5–1.3)
GLUCOSE SERPL-MCNC: 133 MG/DL — HIGH (ref 70–115)
POTASSIUM SERPL-MCNC: 4.2 MMOL/L — SIGNIFICANT CHANGE UP (ref 3.5–5.3)
POTASSIUM SERPL-SCNC: 4.2 MMOL/L — SIGNIFICANT CHANGE UP (ref 3.5–5.3)
SODIUM SERPL-SCNC: 139 MMOL/L — SIGNIFICANT CHANGE UP (ref 135–145)

## 2018-10-01 PROCEDURE — 99232 SBSQ HOSP IP/OBS MODERATE 35: CPT

## 2018-10-01 RX ADMIN — Medication 20 MILLIEQUIVALENT(S): at 17:23

## 2018-10-01 RX ADMIN — PANTOPRAZOLE SODIUM 40 MILLIGRAM(S): 20 TABLET, DELAYED RELEASE ORAL at 05:33

## 2018-10-01 RX ADMIN — RIVAROXABAN 15 MILLIGRAM(S): KIT at 17:23

## 2018-10-01 RX ADMIN — MAGNESIUM OXIDE 400 MG ORAL TABLET 400 MILLIGRAM(S): 241.3 TABLET ORAL at 13:37

## 2018-10-01 RX ADMIN — MAGNESIUM OXIDE 400 MG ORAL TABLET 400 MILLIGRAM(S): 241.3 TABLET ORAL at 08:04

## 2018-10-01 RX ADMIN — ATORVASTATIN CALCIUM 10 MILLIGRAM(S): 80 TABLET, FILM COATED ORAL at 21:50

## 2018-10-01 RX ADMIN — AMIODARONE HYDROCHLORIDE 200 MILLIGRAM(S): 400 TABLET ORAL at 05:30

## 2018-10-01 RX ADMIN — GABAPENTIN 200 MILLIGRAM(S): 400 CAPSULE ORAL at 13:37

## 2018-10-01 RX ADMIN — Medication 325 MILLIGRAM(S): at 13:37

## 2018-10-01 RX ADMIN — GABAPENTIN 200 MILLIGRAM(S): 400 CAPSULE ORAL at 21:50

## 2018-10-01 RX ADMIN — PANTOPRAZOLE SODIUM 40 MILLIGRAM(S): 20 TABLET, DELAYED RELEASE ORAL at 17:23

## 2018-10-01 RX ADMIN — Medication 20 MILLIEQUIVALENT(S): at 05:30

## 2018-10-01 RX ADMIN — MAGNESIUM OXIDE 400 MG ORAL TABLET 400 MILLIGRAM(S): 241.3 TABLET ORAL at 17:23

## 2018-10-01 RX ADMIN — PREGABALIN 1000 MICROGRAM(S): 225 CAPSULE ORAL at 13:37

## 2018-10-01 RX ADMIN — GABAPENTIN 200 MILLIGRAM(S): 400 CAPSULE ORAL at 05:30

## 2018-10-01 NOTE — DISCHARGE NOTE ADULT - SECONDARY DIAGNOSIS.
Metabolic alkalosis Hypertension Stage 4 chronic kidney disease Acute on chronic diastolic heart failure Afib

## 2018-10-01 NOTE — PROGRESS NOTE ADULT - SUBJECTIVE AND OBJECTIVE BOX
INTERVAL HPI/OVERNIGHT EVENTS:    CC: metabolic alkalosis, hypercapnia, hyponatremia, diastolic CHF exacerbation    No overnight events, more alert, tremors improved.    Vital Signs Last 24 Hrs  T(C): 36.7 (01 Oct 2018 08:21), Max: 37.1 (30 Sep 2018 14:17)  T(F): 98 (01 Oct 2018 08:21), Max: 98.7 (30 Sep 2018 14:17)  HR: 65 (01 Oct 2018 08:21) (48 - 68)  BP: 111/71 (01 Oct 2018 08:21) (94/58 - 123/60)  BP(mean): 83 (30 Sep 2018 20:05) (83 - 83)  RR: 18 (01 Oct 2018 08:21) (16 - 19)  SpO2: 99% (01 Oct 2018 08:21) (99% - 100%)    PHYSICAL EXAM:    GENERAL: Not in distress, alert, no tremors  LUNGS: b/l air entry, clear  HEART: Regular   ABDOMEN: Soft, BS+  EXTREMITIES: No edema, tenderness    MEDICATIONS  (STANDING):  amiodarone    Tablet 200 milliGRAM(s) Oral daily  atorvastatin 10 milliGRAM(s) Oral at bedtime  carvedilol 3.125 milliGRAM(s) Oral every 12 hours  cyanocobalamin 1000 MICROGram(s) Oral daily  epoetin lopez Injectable 46968 Unit(s) SubCutaneous <User Schedule>  ferrous    sulfate 325 milliGRAM(s) Oral daily  gabapentin 200 milliGRAM(s) Oral every 8 hours  influenza   Vaccine 0.5 milliLiter(s) IntraMuscular once  magnesium oxide 400 milliGRAM(s) Oral three times a day with meals  pantoprazole    Tablet 40 milliGRAM(s) Oral two times a day  potassium chloride    Tablet ER 20 milliEquivalent(s) Oral two times a day  rivaroxaban 15 milliGRAM(s) Oral every 24 hours    MEDICATIONS  (PRN):  acetaminophen   Tablet .. 650 milliGRAM(s) Oral every 6 hours PRN Temp greater or equal to 38C (100.4F), Mild Pain (1 - 3), Moderate Pain (4 - 6)  ALBUTerol/ipratropium for Nebulization 3 milliLiter(s) Nebulizer every 6 hours PRN Shortness of Breath and/or Wheezing      Allergies    No Known Allergies    Intolerances          LABS:      10-01    139  |  91<L>  |  85.0<H>  ----------------------------<  133<H>  4.2   |  38.0<H>  |  2.12<H>    Ca    9.9      01 Oct 2018 07:09        Urinalysis Basic - ( 30 Sep 2018 13:32 )    Color: Yellow / Appearance: Clear / S.010 / pH: x  Gluc: x / Ketone: Negative  / Bili: Negative / Urobili: Negative mg/dL   Blood: x / Protein: 30 mg/dL / Nitrite: Negative   Leuk Esterase: Moderate / RBC: 3-5 /HPF / WBC 6-10   Sq Epi: x / Non Sq Epi: Few / Bacteria: Few        RADIOLOGY & ADDITIONAL TESTS:

## 2018-10-01 NOTE — DISCHARGE NOTE ADULT - PLAN OF CARE
Avoid recurrent episodes. Continue medications as instructed. Resolving Continue medications. Nephrology follow up. Monitor weights. Continue cardiac medications.

## 2018-10-01 NOTE — DISCHARGE NOTE ADULT - PATIENT PORTAL LINK FT
You can access the [facility] Patient Portal, offered by [facility], by registering with the following website: [email or web address]

## 2018-10-01 NOTE — PROGRESS NOTE ADULT - ASSESSMENT
84 yr old male with atrial fibrillation on Xarelto, COPD on home oxygen, diastolic CHF, hypertension, hyperlipidemia presented with generalized weakness. He was noted to have severe hyponatremia, 115. Nephrology and cardiology were consulted, hyponatremia attributed to hypervolemia secondary to CHF exacerbation. He was placed on a Lasix gtt by nephrology. Sodium improved slowly. He was evaluated by palliative and hospice as well. Sodium improved to 125. Lasix was discontinued and he was started on Urea by nephrology. Acetazolamide was added after discussion with nephrology. Noted to have worsening metabolic acidosis. ABG was done on 9/29 as patient with worsening mental status, showed pCO2 80. Also noted to have uremia. ICU was consulted, he was placed on BiPAP, after discussion with family. Mild improvement noted in pCO2. His tremors improved.

## 2018-10-01 NOTE — DISCHARGE NOTE ADULT - MEDICATION SUMMARY - MEDICATIONS TO TAKE
I will START or STAY ON the medications listed below when I get home from the hospital:    acetaminophen 325 mg oral tablet  -- 2 tab(s) by mouth every 6 hours, As needed, Mild Pain (1 - 3)  -- Indication: For Pain    morphine 20 mg/5 mL oral solution  -- 1 milliliter(s) by mouth every 8 hours PRN q8 HRS FOR MILD PAIN OR SOB 2ml prn every 8 hrs severe SOB MDD:not more than 6 ml a day  -- Caution federal law prohibits the transfer of this drug to any person other  than the person for whom it was prescribed.  May cause drowsiness.  Alcohol may intensify this effect.  Use care when operating dangerous machinery.  This prescription cannot be refilled.  Using more of this medication than prescribed may cause serious breathing problems.    -- Indication: For Shortness of breath    amiodarone 200 mg oral tablet  -- 1 tab(s) by mouth once a day  -- Indication: For Afib    rivaroxaban 15 mg oral tablet  -- 1 tab(s) by mouth every 24 hours  -- Indication: For Afib    gabapentin 100 mg oral capsule  -- 2 cap(s) by mouth 3 times a day   -- It is very important that you take or use this exactly as directed.  Do not skip doses or discontinue unless directed by your doctor.  May cause drowsiness.  Alcohol may intensify this effect.  Use care when operating dangerous machinery.    -- Indication: For Pain    LORazepam 2 mg/mL oral concentrate  -- 0.5 milliliter(s) by mouth 2 times a day MDD:NOT MORE THAN 2 MG A DAY   -- Caution federal law prohibits the transfer of this drug to any person other  than the person for whom it was prescribed.  Dilute this medication with liquid before administration.  Do not take this drug if you are pregnant.  Keep in refrigerator.  Do not freeze.  May cause drowsiness.  Alcohol may intensify this effect.  Use care when operating dangerous machinery.    -- Indication: For Shortness of breath    carvedilol 3.125 mg oral tablet  -- 1 tab(s) by mouth every 12 hours  -- Indication: For Afib    Breo Ellipta 200 mcg-25 mcg/inh inhalation powder  -- 1 puff(s) inhaled once a day  -- Indication: For Chronic obstructive pulmonary disease, unspecified COPD type    ipratropium 500 mcg/2.5 mL inhalation solution  -- 2.5 milliliter(s) inhaled 3 times a day  -- Indication: For Chronic obstructive pulmonary disease, unspecified COPD type    albuterol 2.5 mg/3 mL (0.083%) inhalation solution  -- 3 milliliter(s) inhaled every 6 hours  -- Indication: For Chronic obstructive pulmonary disease, unspecified COPD type    furosemide 40 mg oral tablet  -- 1 tab(s) by mouth once a day  -- Indication: For Chronic systolic CHF (congestive heart failure)    glycopyrrolate 1 mg/5 mL oral solution  -- 2.5 milliliter(s) by mouth 3 times a day  AS NEEDED   -- Check with your doctor before becoming pregnant.  May cause drowsiness.  Alcohol may intensify this effect.  Use care when operating dangerous machinery.  Obtain medical advice before taking any non-prescription drugs as some may affect the action of this medication.  Take medication on an empty stomach 1 hour before or 2 to 3 hours after a meal unless otherwise directed by your doctor.  This drug may impair the ability to drive or operate machinery.  Use care until you become familiar with its effects.    -- Indication: For Secretions    ferrous sulfate 325 mg (65 mg elemental iron) oral tablet  -- 1 tab(s) by mouth 3 times a day   -- Check with your doctor before becoming pregnant.  Do not chew, break, or crush.  May discolor urine or feces.    -- Indication: For Anemia of renal disease    senna oral tablet  -- 2 tab(s) by mouth once a day   -- Indication: For Constipation    lactulose 10 g/15 mL oral syrup  -- 30 milliliter(s) by mouth 2 times a day  -- Indication: For Constipation    pantoprazole 40 mg oral delayed release tablet  -- 1 tab(s) by mouth 2 times a day  -- Indication: For Gi prophylaxis    cyanocobalamin 1000 mcg oral tablet  -- 1 tab(s) by mouth once a day  -- Indication: For Supplement

## 2018-10-01 NOTE — DISCHARGE NOTE ADULT - HOSPITAL COURSE
84 yr old male with atrial fibrillation on Xarelto, COPD on home oxygen, diastolic CHF, hypertension, hyperlipidemia presented with generalized weakness. He was noted to have severe hyponatremia, 115. Nephrology and cardiology were consulted, hyponatremia attributed to hypervolemia secondary to CHF exacerbation. He was placed on a Lasix gtt by nephrology. Sodium improved slowly. He was evaluated by palliative and hospice as well. Sodium improved to 125. Lasix was discontinued and he was started on Urea by nephrology. Acetazolamide was added after discussion with nephrology. Noted to have worsening metabolic acidosis. ABG was done on 9/29 as patient with worsening mental status, showed pCO2 80. Also noted to have uremia. ICU was consulted, he was placed on BiPAP, after discussion with family. Mild improvement noted in pCO2. He improved clinically. He is stable for discharge home with hospice services. 84 yr old male with atrial fibrillation on Xarelto, COPD on home oxygen, diastolic CHF, hypertension, hyperlipidemia presented with generalized weakness. He was noted to have severe hyponatremia, 115. Nephrology and cardiology were consulted, hyponatremia attributed to hypervolemia secondary to CHF exacerbation. He was placed on a Lasix gtt by nephrology. Sodium improved slowly. He was evaluated by palliative and hospice as well. Sodium improved to 125. Lasix was discontinued and he was started on Urea by nephrology. Acetazolamide was added after discussion with nephrology. Noted to have worsening metabolic acidosis. ABG was done on 9/29 as patient with worsening mental status, showed pCO2 80. Also noted to have uremia. ICU was consulted, he was placed on BiPAP, after discussion with family. Mild improvement noted in pCO2. He improved clinically. He is stable for discharge home with hospice services.   Patient has trilogy at home per daughter.    Spent 40 mins in discharge plan and documentation.

## 2018-10-01 NOTE — DISCHARGE NOTE ADULT - CARE PLAN
Principal Discharge DX:	Hyponatremia  Secondary Diagnosis:	Metabolic alkalosis  Secondary Diagnosis:	Hypertension  Secondary Diagnosis:	Stage 4 chronic kidney disease  Secondary Diagnosis:	Acute on chronic diastolic heart failure  Secondary Diagnosis:	Afib Principal Discharge DX:	Hyponatremia  Goal:	Avoid recurrent episodes.  Assessment and plan of treatment:	Continue medications as instructed.  Secondary Diagnosis:	Metabolic alkalosis  Assessment and plan of treatment:	Resolving  Secondary Diagnosis:	Hypertension  Assessment and plan of treatment:	Continue medications.  Secondary Diagnosis:	Stage 4 chronic kidney disease  Assessment and plan of treatment:	Nephrology follow up.  Secondary Diagnosis:	Acute on chronic diastolic heart failure  Assessment and plan of treatment:	Monitor weights. Continue cardiac medications.  Secondary Diagnosis:	Afib

## 2018-10-01 NOTE — PROGRESS NOTE ADULT - SUBJECTIVE AND OBJECTIVE BOX
DAVIDGALE VINCENT  [id number]      Chief Complaint:  Hyponatremia/Weakness    Interval History:  Patient without specific c/o.  Denies CP/SOB/palps.    Tele:  No events          acetaminophen   Tablet .. 650 milliGRAM(s) Oral every 6 hours PRN  ALBUTerol/ipratropium for Nebulization 3 milliLiter(s) Nebulizer every 6 hours PRN  amiodarone    Tablet 200 milliGRAM(s) Oral daily  atorvastatin 10 milliGRAM(s) Oral at bedtime  carvedilol 3.125 milliGRAM(s) Oral every 12 hours  cyanocobalamin 1000 MICROGram(s) Oral daily  epoetin lopez Injectable 69231 Unit(s) SubCutaneous <User Schedule>  ferrous    sulfate 325 milliGRAM(s) Oral daily  gabapentin 200 milliGRAM(s) Oral every 8 hours  influenza   Vaccine 0.5 milliLiter(s) IntraMuscular once  magnesium oxide 400 milliGRAM(s) Oral three times a day with meals  pantoprazole    Tablet 40 milliGRAM(s) Oral two times a day  potassium chloride    Tablet ER 20 milliEquivalent(s) Oral two times a day  rivaroxaban 15 milliGRAM(s) Oral every 24 hours          Physical Exam:  T(C): 36.8 (10-01-18 @ 15:53), Max: 36.9 (09-30-18 @ 20:20)  HR: 59 (10-01-18 @ 17:21) (56 - 68)  BP: 110/66 (10-01-18 @ 17:21) (106/65 - 123/60)  RR: 18 (10-01-18 @ 15:53) (16 - 19)  SpO2: 100% (10-01-18 @ 15:53) (99% - 100%)  Wt(kg): --  General: Comfortable in NAD  Neck: ?JVD  CVS: nl s1s2, no s3  Pulm: CTA b/l  Abd: soft, non-tender  Ext: Tr b/l LE edema, noted tremor primarily in R hand  Neuro A&O x3  Psych: Normal affect        Labs:   01 Oct 2018 07:09    139    |  91     |  85.0   ----------------------------<  133    4.2     |  38.0   |  2.12     Ca    9.9        01 Oct 2018 07:09          ECHO 9/2018:   1. Left ventricular ejection fraction, by visual estimation, is >75%.   2. Technically difficult study.   3. Hyperdynamic global left ventricular systolic function.   4. Spectral Doppler shows pseudonormal pattern of left ventricular myocardial filling (Grade II diastolic dysfunction).   5. There is moderate concentric left ventricular hypertrophy.   6. Moderately enlarged right ventricle.   7. There is no evidence of pericardial effusion.   8. Moderate mitral annular calcification.   9. Mild tricuspid regurgitation.  10. Bioprosthesis in the aortic position.  11. Peak aortic valve gradient is 38.9 mmHg and the mean gradient is 21.3 mmHg, which is probably normal in the setting of a prosthetic aortic valve.      Assessment:    83yo Male  with chronic HFpEF, CAD s/p CABG, bio AVR, PAF, COPD on home O2, GIB here with weakness and hypervolemic hyponatremia. Agree with lasix gtt, can add tolvaptan if needed. Close monitoring of sodium and lytes.   -Mental status better.  Edema better.  -Na improved.   -Patient with some asterixis from uremia, improved today as is BUN.    Plan:  1. Consider Lasix po per renal.  2. Continue Amio and Xarelto for AF.  3. Continue other current CV meds at current doses.  4. CV stable for d/c per med and renal.  5. Will sign off.  Please call with questions.  Thanks!

## 2018-10-02 VITALS
RESPIRATION RATE: 18 BRPM | TEMPERATURE: 98 F | DIASTOLIC BLOOD PRESSURE: 56 MMHG | SYSTOLIC BLOOD PRESSURE: 98 MMHG | HEART RATE: 68 BPM | OXYGEN SATURATION: 97 %

## 2018-10-02 LAB
ANION GAP SERPL CALC-SCNC: 10 MMOL/L — SIGNIFICANT CHANGE UP (ref 5–17)
BUN SERPL-MCNC: 74 MG/DL — HIGH (ref 8–20)
CALCIUM SERPL-MCNC: 9.5 MG/DL — SIGNIFICANT CHANGE UP (ref 8.6–10.2)
CHLORIDE SERPL-SCNC: 92 MMOL/L — LOW (ref 98–107)
CO2 SERPL-SCNC: 33 MMOL/L — HIGH (ref 22–29)
CREAT SERPL-MCNC: 2.13 MG/DL — HIGH (ref 0.5–1.3)
CULTURE RESULTS: SIGNIFICANT CHANGE UP
GLUCOSE SERPL-MCNC: 130 MG/DL — HIGH (ref 70–115)
POTASSIUM SERPL-MCNC: 4 MMOL/L — SIGNIFICANT CHANGE UP (ref 3.5–5.3)
POTASSIUM SERPL-SCNC: 4 MMOL/L — SIGNIFICANT CHANGE UP (ref 3.5–5.3)
SODIUM SERPL-SCNC: 135 MMOL/L — SIGNIFICANT CHANGE UP (ref 135–145)
SPECIMEN SOURCE: SIGNIFICANT CHANGE UP

## 2018-10-02 PROCEDURE — 99239 HOSP IP/OBS DSCHRG MGMT >30: CPT

## 2018-10-02 RX ORDER — LOVASTATIN 20 MG
1 TABLET ORAL
Qty: 0 | Refills: 0 | COMMUNITY

## 2018-10-02 RX ORDER — ROBINUL 0.2 MG/ML
0.4 INJECTION INTRAMUSCULAR; INTRAVENOUS
Qty: 40 | Refills: 0 | OUTPATIENT
Start: 2018-10-02 | End: 2018-10-31

## 2018-10-02 RX ORDER — MORPHINE SULFATE 50 MG/1
1 CAPSULE, EXTENDED RELEASE ORAL
Qty: 30 | Refills: 0 | OUTPATIENT
Start: 2018-10-02

## 2018-10-02 RX ORDER — SENNA PLUS 8.6 MG/1
2 TABLET ORAL
Qty: 30 | Refills: 0 | OUTPATIENT
Start: 2018-10-02 | End: 2018-10-16

## 2018-10-02 RX ORDER — ROBINUL 0.2 MG/ML
2.5 INJECTION INTRAMUSCULAR; INTRAVENOUS
Qty: 20 | Refills: 0 | OUTPATIENT
Start: 2018-10-02 | End: 2018-10-31

## 2018-10-02 RX ADMIN — CARVEDILOL PHOSPHATE 3.12 MILLIGRAM(S): 80 CAPSULE, EXTENDED RELEASE ORAL at 05:09

## 2018-10-02 RX ADMIN — GABAPENTIN 200 MILLIGRAM(S): 400 CAPSULE ORAL at 14:52

## 2018-10-02 RX ADMIN — GABAPENTIN 200 MILLIGRAM(S): 400 CAPSULE ORAL at 05:09

## 2018-10-02 RX ADMIN — PREGABALIN 1000 MICROGRAM(S): 225 CAPSULE ORAL at 12:05

## 2018-10-02 RX ADMIN — Medication 325 MILLIGRAM(S): at 12:05

## 2018-10-02 RX ADMIN — Medication 20 MILLIEQUIVALENT(S): at 05:09

## 2018-10-02 RX ADMIN — PANTOPRAZOLE SODIUM 40 MILLIGRAM(S): 20 TABLET, DELAYED RELEASE ORAL at 05:09

## 2018-10-02 RX ADMIN — AMIODARONE HYDROCHLORIDE 200 MILLIGRAM(S): 400 TABLET ORAL at 05:09

## 2018-10-02 RX ADMIN — MAGNESIUM OXIDE 400 MG ORAL TABLET 400 MILLIGRAM(S): 241.3 TABLET ORAL at 12:05

## 2018-10-02 NOTE — PROGRESS NOTE ADULT - PROBLEM SELECTOR PROBLEM 4
Hypomagnesemia
Paroxysmal atrial fibrillation
Hypomagnesemia
Paroxysmal atrial fibrillation
Hypomagnesemia
Hypomagnesemia

## 2018-10-02 NOTE — GOALS OF CARE CONVERSATION - PERSONAL ADVANCE DIRECTIVE - NS PRO AD PATIENT TYPE
Medical Orders for Life-Sustaining Treatment (MOLST)/Health Care Proxy (HCP)
Medical Orders for Life-Sustaining Treatment (MOLST)/Health Care Proxy (HCP)

## 2018-10-02 NOTE — PROGRESS NOTE ADULT - PROBLEM SELECTOR PROBLEM 2
Acute on chronic diastolic heart failure
Hypercapnia with mixed acid-base disorder
Acute on chronic diastolic heart failure
Hypercapnia with mixed acid-base disorder
R/O Acute on chronic diastolic heart failure
Acute on chronic diastolic heart failure

## 2018-10-02 NOTE — PROGRESS NOTE ADULT - ASSESSMENT
84 yr old male with atrial fibrillation on Xarelto, COPD on home oxygen, diastolic CHF, hypertension, hyperlipidemia presented with generalized weakness. He was noted to have severe hyponatremia, 115. Nephrology and cardiology were consulted, hyponatremia attributed to hypervolemia secondary to CHF exacerbation. He was placed on a Lasix gtt by nephrology. Sodium improved slowly. He was evaluated by palliative and hospice as well. Sodium improved to 125. Lasix was discontinued and he was started on Urea by nephrology. Acetazolamide was added after discussion with nephrology. Noted to have worsening metabolic acidosis. ABG was done on 9/29 as patient with worsening mental status, showed pCO2 80. Also noted to have uremia. ICU was consulted, he was placed on BiPAP, after discussion with family. Mild improvement noted in pCO2. His tremors improved. He improved, has trilogy at home. Stable for discharge home with home hospice.

## 2018-10-02 NOTE — PROGRESS NOTE ADULT - PROBLEM SELECTOR PROBLEM 1
Hyponatremia with excess extracellular fluid volume
R/O Hyponatremia with excess extracellular fluid volume
Hyponatremia with excess extracellular fluid volume
Hyponatremia with excess extracellular fluid volume

## 2018-10-02 NOTE — GOALS OF CARE CONVERSATION - PERSONAL ADVANCE DIRECTIVE - CONVERSATION DETAILS
as per my office pt had appt in the community to be eval for hospice services but ended up hospitalized as per office daughter is   left message c contact numbers for hospice  for daughter Mrs gonsales  to arrange meeting  to discuss hospice services . Collaborated with OTONIEL Gonzalez  who is aware pt was referred in the community .  Pt being medically maximize as per md possible  d/c later this week if managed
spoke with daughter Melania Deal  she staes he had a meeting for hospice scheduled at home she is coming into the hospital tomorrow will meet with hospice at 1030 am .  She identifies that she is only biological child of pt and all health care concerns should be only discussed with her. Will meet with her and the pt tomorrow will clarify if there is an official hcp
pt is being discharge today  family aware and  agreeable to plan 4 pm -
Hospice program and servcies explained to pt and family with the assistance of  pt verbalizes undertstanding and verbally consents  states his hcp can sign for him . clinical sent for hospice  md to review . hospice will follow

## 2018-10-02 NOTE — PROGRESS NOTE ADULT - PROBLEM SELECTOR PLAN 3
at baseline
Continue Coreg
Continue Coreg
at baseline
Continue Coreg
Continue Coreg
at baseline
at baseline

## 2018-10-02 NOTE — PROGRESS NOTE ADULT - SUBJECTIVE AND OBJECTIVE BOX
INTERVAL HPI/OVERNIGHT EVENTS:    CC: CKD, diastolic CHF, metabolic alkalosis,     Vital Signs Last 24 Hrs  T(C): 36.9 (02 Oct 2018 08:38), Max: 37.1 (02 Oct 2018 00:19)  T(F): 98.4 (02 Oct 2018 08:38), Max: 98.7 (02 Oct 2018 00:19)  HR: 52 (02 Oct 2018 08:38) (50 - 59)  BP: 94/57 (02 Oct 2018 08:38) (94/57 - 110/66)  BP(mean): --  RR: 18 (02 Oct 2018 08:38) (18 - 18)  SpO2: 97% (02 Oct 2018 08:38) (97% - 100%)    PHYSICAL EXAM:    GENERAL: NAD, well-groomed, well-developed  HEAD:  Atraumatic, Normocephalic  EYES: EOMI, PERRLA, conjunctiva and sclera clear  ENMT: Moist mucous membranes  NECK: Supple, No JVD  NERVOUS SYSTEM:  Alert & Oriented X3, Motor Strength 5/5 B/L upper and lower extremities; DTRs 2+ intact and symmetric  CHEST/LUNG: Clear to auscultation bilaterally; No rales, rhonchi, wheezing, or rubs  HEART: Regular rate and rhythm; No murmurs, rubs, or gallops  ABDOMEN: Soft, Nontender, Nondistended; Bowel sounds present  EXTREMITIES:  2+ Peripheral Pulses, No clubbing, cyanosis, or edema    MEDICATIONS  (STANDING):  amiodarone    Tablet 200 milliGRAM(s) Oral daily  atorvastatin 10 milliGRAM(s) Oral at bedtime  carvedilol 3.125 milliGRAM(s) Oral every 12 hours  cyanocobalamin 1000 MICROGram(s) Oral daily  epoetin lopez Injectable 22528 Unit(s) SubCutaneous <User Schedule>  ferrous    sulfate 325 milliGRAM(s) Oral daily  gabapentin 200 milliGRAM(s) Oral every 8 hours  influenza   Vaccine 0.5 milliLiter(s) IntraMuscular once  magnesium oxide 400 milliGRAM(s) Oral three times a day with meals  pantoprazole    Tablet 40 milliGRAM(s) Oral two times a day  potassium chloride    Tablet ER 20 milliEquivalent(s) Oral two times a day  rivaroxaban 15 milliGRAM(s) Oral every 24 hours    MEDICATIONS  (PRN):  acetaminophen   Tablet .. 650 milliGRAM(s) Oral every 6 hours PRN Temp greater or equal to 38C (100.4F), Mild Pain (1 - 3), Moderate Pain (4 - 6)  ALBUTerol/ipratropium for Nebulization 3 milliLiter(s) Nebulizer every 6 hours PRN Shortness of Breath and/or Wheezing      Allergies    No Known Allergies    Intolerances          LABS:      10-02    135  |  92<L>  |  74.0<H>  ----------------------------<  130<H>  4.0   |  33.0<H>  |  2.13<H>    Ca    9.5      02 Oct 2018 07:23        Urinalysis Basic - ( 30 Sep 2018 13:32 )    Color: Yellow / Appearance: Clear / S.010 / pH: x  Gluc: x / Ketone: Negative  / Bili: Negative / Urobili: Negative mg/dL   Blood: x / Protein: 30 mg/dL / Nitrite: Negative   Leuk Esterase: Moderate / RBC: 3-5 /HPF / WBC 6-10   Sq Epi: x / Non Sq Epi: Few / Bacteria: Few        RADIOLOGY & ADDITIONAL TESTS:

## 2018-10-02 NOTE — PROGRESS NOTE ADULT - PROBLEM SELECTOR PROBLEM 6
Paroxysmal atrial fibrillation
Goals of care, counseling/discussion
Paroxysmal atrial fibrillation
Goals of care, counseling/discussion
Goals of care, counseling/discussion
Paroxysmal atrial fibrillation
Goals of care, counseling/discussion
Paroxysmal atrial fibrillation

## 2018-10-02 NOTE — PROGRESS NOTE ADULT - PROVIDER SPECIALTY LIST ADULT
Cardiology
Hospitalist
Nephrology
Palliative Care
Hospitalist

## 2018-10-02 NOTE — PROGRESS NOTE ADULT - PROBLEM SELECTOR PROBLEM 3
CKD (chronic kidney disease) stage 3, GFR 30-59 ml/min
Essential hypertension
CKD (chronic kidney disease) stage 3, GFR 30-59 ml/min
Essential hypertension
CKD (chronic kidney disease) stage 3, GFR 30-59 ml/min
CKD (chronic kidney disease) stage 3, GFR 30-59 ml/min

## 2018-10-02 NOTE — PROGRESS NOTE ADULT - ATTENDING COMMENTS
COUNSELING:    Face to face meeting to discuss Advanced Care Planning - Time Spent ______ Minutes.  See goals of care note.    More than 50% time spent in counseling and coordinating care. _25_____ Minutes.     Thank you for the opportunity to assist with the care of this patient.   [place] [facility] [phone].
Spoke with daughter Melania, discussed discharge plan. Stable for discharge home with home hospice.
updated family at bedside.  d/w hospice liaison ---home equipment delivery today.

## 2018-10-02 NOTE — PROGRESS NOTE ADULT - PROBLEM SELECTOR PLAN 2
see above  cardiology following  recent echo done will not re perform
Continue BiPAP prn and QHS.
Continue BiPAP prn and QHS. Improved.
resolved as clinically close to euvolemia  cardiology following  recent echo done will not re perform
ICU consulted, will need BiPAP
Continue BiPAP prn and QHS. Improved.
see above  cardiology following  recent echo done will not re perform
resolved as clinically close to euvolemia  cardiology following  recent echo done will not re perform

## 2018-10-02 NOTE — PROGRESS NOTE ADULT - PROBLEM SELECTOR PLAN 4
repleted
Continue Xarelto, on amiodarone and coreg
Continue Xarelto, on amiodarone and coreg
repleted
Continue Xarelto, on amiodarone and coreg
Continue Xarelto, on amiodarone and coreg
repleted
repleted

## 2018-10-02 NOTE — GOALS OF CARE CONVERSATION - PERSONAL ADVANCE DIRECTIVE - NS PRO AD PATIENT TYPE ON CHART
Medical Orders for Life-Sustaining Treatment (MOLST)/completed and on chart/Health Care Proxy (HCP)
Medical Orders for Life-Sustaining Treatment (MOLST)/completed and on chart/Health Care Proxy (HCP)

## 2018-10-02 NOTE — PROGRESS NOTE ADULT - PROBLEM SELECTOR PROBLEM 5
Acute on chronic diastolic heart failure
Chronic obstructive pulmonary disease, unspecified COPD type
Acute on chronic diastolic heart failure
Acute on chronic diastolic heart failure
Chronic obstructive pulmonary disease, unspecified COPD type
Acute on chronic diastolic heart failure
Chronic obstructive pulmonary disease, unspecified COPD type
Chronic obstructive pulmonary disease, unspecified COPD type

## 2018-10-02 NOTE — PROGRESS NOTE ADULT - REASON FOR ADMISSION
weakness

## 2018-10-02 NOTE — PROGRESS NOTE ADULT - PROBLEM SELECTOR PLAN 6
xarelto/coreg
xarelto/coreg
Patient is DNR/DNI, awaiting equipment to be delivered at home tomorrow.
xarelto/coreg
Patient DNR/DNI, will transfer to medical floor.
Patient is DNR/DNI, awaiting equipment to be delivered at home tomorrow.
xarelto/coreg
Discussed on going events with daughter Melania, she verified patient is DNR/DNI, agrees to BiPAP for now. Discussed with patient, in agreement.

## 2018-10-02 NOTE — PROGRESS NOTE ADULT - PROBLEM SELECTOR PLAN 5
Resume Lasix.
stable  c/w arianna
Improved, cardiology following. Off Lasix drip.
Improved, cardiology following. Off Lasix drip.
stable  c/w arianna
Improved, cardiology following. Off Lasix drip.
stable  c/w arianna
stable  c/w arianna

## 2018-10-10 ENCOUNTER — RX RENEWAL (OUTPATIENT)
Age: 83
End: 2018-10-10

## 2018-10-10 RX ORDER — METOLAZONE 2.5 MG/1
2.5 TABLET ORAL DAILY
Qty: 90 | Refills: 3 | Status: ACTIVE | COMMUNITY
Start: 2018-06-07 | End: 1900-01-01

## 2018-10-18 ENCOUNTER — APPOINTMENT (OUTPATIENT)
Dept: INTERNAL MEDICINE | Facility: CLINIC | Age: 83
End: 2018-10-18
Payer: MEDICARE

## 2018-10-18 VITALS
HEIGHT: 61 IN | WEIGHT: 228 LBS | SYSTOLIC BLOOD PRESSURE: 105 MMHG | DIASTOLIC BLOOD PRESSURE: 60 MMHG | BODY MASS INDEX: 43.05 KG/M2

## 2018-10-18 DIAGNOSIS — E11.9 TYPE 2 DIABETES MELLITUS W/OUT COMPLICATIONS: ICD-10-CM

## 2018-10-18 DIAGNOSIS — J44.9 CHRONIC OBSTRUCTIVE PULMONARY DISEASE, UNSPECIFIED: ICD-10-CM

## 2018-10-18 DIAGNOSIS — I48.2 CHRONIC ATRIAL FIBRILLATION: ICD-10-CM

## 2018-10-18 DIAGNOSIS — E87.1 HYPO-OSMOLALITY AND HYPONATREMIA: ICD-10-CM

## 2018-10-18 DIAGNOSIS — S76.211A STRAIN OF ADDUCTOR MUSCLE, FASCIA AND TENDON OF RIGHT THIGH, INITIAL ENCOUNTER: ICD-10-CM

## 2018-10-18 DIAGNOSIS — I10 ESSENTIAL (PRIMARY) HYPERTENSION: ICD-10-CM

## 2018-10-18 PROCEDURE — 99358 PROLONG SERVICE W/O CONTACT: CPT

## 2018-10-18 PROCEDURE — 99215 OFFICE O/P EST HI 40 MIN: CPT | Mod: 25

## 2018-10-18 PROCEDURE — 36415 COLL VENOUS BLD VENIPUNCTURE: CPT

## 2018-10-18 NOTE — PLAN
[FreeTextEntry1] : At the current time patient hasn't been inguinal strain without masses that does not require further treatment other than local warm soaks and occasional Tylenol.\par \par Patient is status post discharge on October 1 from [facility] for respiratory failure alkalosis and severe hyponatremia due to excessive fluid intake. His hospital record was extensively reviewed. Apparently he is now on home hospice.\par \par At the current time laboratory data will be repeated to see if his hyponatremia returned. Copy of this discharge summary has been included in this note.\par \par He is stable medically at the current time but his ultimate prognosis is poor and he has been failing in the past year. The family is well aware of this.\par \par All issues regarding patient's health and medical problems have been discussed. The patient understands and concurs with the treatment plan.

## 2018-10-18 NOTE — DATA REVIEWED
[FreeTextEntry1] : Hospital Course \par 84 yr old male with atrial fibrillation on Xarelto, COPD on home oxygen,\par diastolic CHF, hypertension, hyperlipidemia presented with generalized\par weakness. He was noted to have severe hyponatremia, 115. Nephrology and\par cardiology were consulted, hyponatremia attributed to hypervolemia secondary to\par CHF exacerbation. He was placed on a Lasix gtt by nephrology. Sodium improved\par slowly. He was evaluated by palliative and hospice as well. Sodium improved to\par 125. Lasix was discontinued and he was started on Urea by nephrology.\par Acetazolamide was added after discussion with nephrology. Noted to have\par worsening metabolic acidosis. ABG was done on 9/29 as patient with worsening\par mental status, showed pCO2 80. Also noted to have uremia. ICU was consulted, he\par was placed on BiPAP, after discussion with family. Mild improvement noted in\par pCO2. He improved clinically. He is stable for discharge home with hospice\par services.\par Patient has trilogy at home per daughter.\par \par Extensive medical records were reviewed both from [facility] including x-rays laboratory data hospitalist note and all consultants none prior to evaluation of the patient \par In addition extensive time was also spent in reviewing diagnostic studies.\par \par The duration of the review took 35 minutes\par \par \par \par

## 2018-10-18 NOTE — REVIEW OF SYSTEMS
[Shortness Of Breath] : shortness of breath [Wheezing] : wheezing [Cough] : cough [Dyspnea on Exertion] : dyspnea on exertion [Itching] : itching [Negative] : Neurological [FreeTextEntry8] : Right inguinal pain

## 2018-10-18 NOTE — PHYSICAL EXAM
[No Acute Distress] : no acute distress [Normal Sclera/Conjunctiva] : normal sclera/conjunctiva [Normal Outer Ear/Nose] : the outer ears and nose were normal in appearance [No JVD] : no jugular venous distention [Supple] : supple [Normal S1, S2] : normal S1 and S2 [No Carotid Bruits] : no carotid bruits [No Abdominal Bruit] : a ~M bruit was not heard ~T in the abdomen [No Edema] : there was no peripheral edema [No Extremity Clubbing/Cyanosis] : no extremity clubbing/cyanosis [Soft] : abdomen soft [Non Tender] : non-tender [Non-distended] : non-distended [No HSM] : no HSM [Normal Bowel Sounds] : normal bowel sounds [No Hernias] : no hernias [de-identified] : Shallow respirations clear lungs with some basilar rales and rhonchi [de-identified] : Examination of his groin reveals a tender area in the inguinal area or consistent with tendon that reproduced the patient's symptoms. There was no masses or obvious hernia

## 2018-10-18 NOTE — HISTORY OF PRESENT ILLNESS
[FreeTextEntry1] : pain and lump in groin area [de-identified] : Patient is following up status post discharge from [facility]. The details of the hospitalization included below. He is now complaining of pain in his right groin for the past week. He states it is painful when he walks. He denies any genitourinary symptoms or any change in bowels.\par \par Apparently he is now on home hospice and home oxygen after his last hospitalization

## 2018-10-19 LAB
ANION GAP SERPL CALC-SCNC: 12 MMOL/L
BUN SERPL-MCNC: 45 MG/DL
CALCIUM SERPL-MCNC: 9.4 MG/DL
CHLORIDE SERPL-SCNC: 95 MMOL/L
CO2 SERPL-SCNC: 37 MMOL/L
CREAT SERPL-MCNC: 1.99 MG/DL
GLUCOSE SERPL-MCNC: 112 MG/DL
POTASSIUM SERPL-SCNC: 4.7 MMOL/L
SODIUM SERPL-SCNC: 144 MMOL/L

## 2018-10-22 ENCOUNTER — APPOINTMENT (OUTPATIENT)
Dept: ORTHOPEDIC SURGERY | Facility: CLINIC | Age: 83
End: 2018-10-22
Payer: MEDICARE

## 2018-10-22 VITALS
WEIGHT: 230 LBS | HEIGHT: 60 IN | BODY MASS INDEX: 45.16 KG/M2 | TEMPERATURE: 97.8 F | DIASTOLIC BLOOD PRESSURE: 59 MMHG | SYSTOLIC BLOOD PRESSURE: 102 MMHG | HEART RATE: 63 BPM

## 2018-10-22 DIAGNOSIS — M17.11 UNILATERAL PRIMARY OSTEOARTHRITIS, RIGHT KNEE: ICD-10-CM

## 2018-10-22 DIAGNOSIS — M17.12 UNILATERAL PRIMARY OSTEOARTHRITIS, LEFT KNEE: ICD-10-CM

## 2018-10-22 PROCEDURE — 20610 DRAIN/INJ JOINT/BURSA W/O US: CPT | Mod: RT

## 2018-10-22 PROCEDURE — 99213 OFFICE O/P EST LOW 20 MIN: CPT | Mod: 25

## 2018-10-25 PROCEDURE — 93005 ELECTROCARDIOGRAM TRACING: CPT

## 2018-10-25 PROCEDURE — 83605 ASSAY OF LACTIC ACID: CPT

## 2018-10-25 PROCEDURE — 96374 THER/PROPH/DIAG INJ IV PUSH: CPT

## 2018-10-25 PROCEDURE — 94640 AIRWAY INHALATION TREATMENT: CPT

## 2018-10-25 PROCEDURE — 85730 THROMBOPLASTIN TIME PARTIAL: CPT

## 2018-10-25 PROCEDURE — 84484 ASSAY OF TROPONIN QUANT: CPT

## 2018-10-25 PROCEDURE — 84466 ASSAY OF TRANSFERRIN: CPT

## 2018-10-25 PROCEDURE — 84100 ASSAY OF PHOSPHORUS: CPT

## 2018-10-25 PROCEDURE — 86900 BLOOD TYPING SEROLOGIC ABO: CPT

## 2018-10-25 PROCEDURE — 36430 TRANSFUSION BLD/BLD COMPNT: CPT

## 2018-10-25 PROCEDURE — 83880 ASSAY OF NATRIURETIC PEPTIDE: CPT

## 2018-10-25 PROCEDURE — 93306 TTE W/DOPPLER COMPLETE: CPT

## 2018-10-25 PROCEDURE — 82272 OCCULT BLD FECES 1-3 TESTS: CPT

## 2018-10-25 PROCEDURE — 83735 ASSAY OF MAGNESIUM: CPT

## 2018-10-25 PROCEDURE — 99285 EMERGENCY DEPT VISIT HI MDM: CPT | Mod: 25

## 2018-10-25 PROCEDURE — 86901 BLOOD TYPING SEROLOGIC RH(D): CPT

## 2018-10-25 PROCEDURE — 97163 PT EVAL HIGH COMPLEX 45 MIN: CPT

## 2018-10-25 PROCEDURE — 82728 ASSAY OF FERRITIN: CPT

## 2018-10-25 PROCEDURE — 85610 PROTHROMBIN TIME: CPT

## 2018-10-25 PROCEDURE — 86923 COMPATIBILITY TEST ELECTRIC: CPT

## 2018-10-25 PROCEDURE — P9016: CPT

## 2018-10-25 PROCEDURE — 85027 COMPLETE CBC AUTOMATED: CPT

## 2018-10-25 PROCEDURE — 83550 IRON BINDING TEST: CPT

## 2018-10-25 PROCEDURE — 71045 X-RAY EXAM CHEST 1 VIEW: CPT

## 2018-10-25 PROCEDURE — 80048 BASIC METABOLIC PNL TOTAL CA: CPT

## 2018-10-25 PROCEDURE — 86850 RBC ANTIBODY SCREEN: CPT

## 2018-10-25 PROCEDURE — 36415 COLL VENOUS BLD VENIPUNCTURE: CPT

## 2018-11-01 ENCOUNTER — APPOINTMENT (OUTPATIENT)
Dept: INTERNAL MEDICINE | Facility: CLINIC | Age: 83
End: 2018-11-01
Payer: MEDICARE

## 2018-11-01 DIAGNOSIS — Z23 ENCOUNTER FOR IMMUNIZATION: ICD-10-CM

## 2018-11-01 PROCEDURE — 90662 IIV NO PRSV INCREASED AG IM: CPT

## 2018-11-01 PROCEDURE — G0008: CPT

## 2018-11-01 RX ORDER — BLOOD SUGAR DIAGNOSTIC
STRIP MISCELLANEOUS 3 TIMES DAILY
Qty: 300 | Refills: 3 | Status: ACTIVE | COMMUNITY
Start: 1900-01-01 | End: 1900-01-01

## 2018-11-08 NOTE — PROGRESS NOTE ADULT - SUBJECTIVE AND OBJECTIVE BOX
DAVID VINCENT  [id number]        Chief Complaint: f/u anemia/neck pain      Subjective: neck pain better, no cp/sob/palps      24 hour Tele: SR with 1st degree AV delay        ALBUTerol/ipratropium for Nebulization 3 milliLiter(s) Nebulizer every 6 hours PRN  amiodarone    Tablet 200 milliGRAM(s) Oral daily  atorvastatin 10 milliGRAM(s) Oral at bedtime  cefTRIAXone   IVPB 1 Gram(s) IV Intermittent every 24 hours  cyanocobalamin 1000 MICROGram(s) Oral daily  dextrose 5%. 1000 milliLiter(s) IV Continuous <Continuous>  dextrose 50% Injectable 12.5 Gram(s) IV Push once  dextrose 50% Injectable 25 Gram(s) IV Push once  dextrose 50% Injectable 25 Gram(s) IV Push once  dextrose Gel 1 Dose(s) Oral once PRN  folic acid 1 milliGRAM(s) Oral daily  glucagon  Injectable 1 milliGRAM(s) IntraMuscular once PRN  insulin glargine Injectable (LANTUS) 10 Unit(s) SubCutaneous at bedtime  insulin lispro (HumaLOG) corrective regimen sliding scale   SubCutaneous three times a day before meals  iron sucrose IVPB 100 milliGRAM(s) IV Intermittent every 24 hours  oxyCODONE    IR 5 milliGRAM(s) Oral every 6 hours PRN  pantoprazole  Injectable 40 milliGRAM(s) IV Push every 12 hours  saccharomyces boulardii 250 milliGRAM(s) Oral two times a day          Physical Exam:  T(C): 36.7 (03-14-18 @ 08:21), Max: 37.2 (03-14-18 @ 00:34)  HR: 61 (03-14-18 @ 08:21) (61 - 76)  BP: 127/74 (03-14-18 @ 08:21) (116/65 - 132/87)  RR: 16 (03-14-18 @ 08:21) (16 - 18)  SpO2: 98% (03-14-18 @ 08:21) (94% - 100%)  Wt(kg): --  General: Comfortable in NAD  HEENT: MMM, sclera anicteric, poor dentition  Resp: CTA bilaterally, diminished at the bases  CVS: nl s1s2, rrr, no s3, ? JVD  Abd: soft, NT, ND, BS+, obese  Ext: 1-2+ edema bilaterally  Neuro A&O x3  Psych: Normal affect    I&O's Summary    13 Mar 2018 07:01  -  14 Mar 2018 07:00  --------------------------------------------------------  IN: 300 mL / OUT: 825 mL / NET: -525 mL          Labs:   14 Mar 2018 06:50    146    |  97     |  23.0   ----------------------------<  81     3.9     |  40.0   |  1.36     Ca    9.2        14 Mar 2018 06:50  Mg     1.6       14 Mar 2018 06:50                            7.7    4.9   )-----------( x        ( 14 Mar 2018 06:50 )             26.3     PT/INR - ( 14 Mar 2018 06:50 )   PT: 15.2 sec;   INR: 1.37 ratio         PTT - ( 13 Mar 2018 12:11 )  PTT:33.6 sec        Assessment:  Patient is a 83yoM with a PMH of CAD s/p CABG, Bio AVR, persistent AF, COPD, KRYSTIN, GIB presenting with neck pain and found to be severely anemic and with UTI. NEck pain likely musculoskeletal. No signs of decompensated CHF at this time and no CP. Mostly SR on monitor so will continue Amio      Plan:  1. Continue home CV meds including Amiodarone and Lasix 40mg bid  2. If needs EGD/colo, CV stable at moderate risk  3. If no active bleeding, would restart Xarelto  4. No further cardiac work up at this time 63.5

## 2018-11-11 PROCEDURE — 84466 ASSAY OF TRANSFERRIN: CPT

## 2018-11-11 PROCEDURE — 86901 BLOOD TYPING SEROLOGIC RH(D): CPT

## 2018-11-11 PROCEDURE — 94640 AIRWAY INHALATION TREATMENT: CPT

## 2018-11-11 PROCEDURE — 82550 ASSAY OF CK (CPK): CPT

## 2018-11-11 PROCEDURE — 84484 ASSAY OF TROPONIN QUANT: CPT

## 2018-11-11 PROCEDURE — 82803 BLOOD GASES ANY COMBINATION: CPT

## 2018-11-11 PROCEDURE — 94660 CPAP INITIATION&MGMT: CPT

## 2018-11-11 PROCEDURE — 97163 PT EVAL HIGH COMPLEX 45 MIN: CPT

## 2018-11-11 PROCEDURE — 82436 ASSAY OF URINE CHLORIDE: CPT

## 2018-11-11 PROCEDURE — 85730 THROMBOPLASTIN TIME PARTIAL: CPT

## 2018-11-11 PROCEDURE — 84100 ASSAY OF PHOSPHORUS: CPT

## 2018-11-11 PROCEDURE — 86900 BLOOD TYPING SEROLOGIC ABO: CPT

## 2018-11-11 PROCEDURE — 84300 ASSAY OF URINE SODIUM: CPT

## 2018-11-11 PROCEDURE — 82140 ASSAY OF AMMONIA: CPT

## 2018-11-11 PROCEDURE — 97530 THERAPEUTIC ACTIVITIES: CPT

## 2018-11-11 PROCEDURE — 85610 PROTHROMBIN TIME: CPT

## 2018-11-11 PROCEDURE — 84540 ASSAY OF URINE/UREA-N: CPT

## 2018-11-11 PROCEDURE — 36415 COLL VENOUS BLD VENIPUNCTURE: CPT

## 2018-11-11 PROCEDURE — 86850 RBC ANTIBODY SCREEN: CPT

## 2018-11-11 PROCEDURE — 83935 ASSAY OF URINE OSMOLALITY: CPT

## 2018-11-11 PROCEDURE — 96375 TX/PRO/DX INJ NEW DRUG ADDON: CPT

## 2018-11-11 PROCEDURE — 82553 CREATINE MB FRACTION: CPT

## 2018-11-11 PROCEDURE — 84133 ASSAY OF URINE POTASSIUM: CPT

## 2018-11-11 PROCEDURE — 87086 URINE CULTURE/COLONY COUNT: CPT

## 2018-11-11 PROCEDURE — 99285 EMERGENCY DEPT VISIT HI MDM: CPT | Mod: 25

## 2018-11-11 PROCEDURE — 81001 URINALYSIS AUTO W/SCOPE: CPT

## 2018-11-11 PROCEDURE — 83550 IRON BINDING TEST: CPT

## 2018-11-11 PROCEDURE — 82728 ASSAY OF FERRITIN: CPT

## 2018-11-11 PROCEDURE — 83880 ASSAY OF NATRIURETIC PEPTIDE: CPT

## 2018-11-11 PROCEDURE — 97110 THERAPEUTIC EXERCISES: CPT

## 2018-11-11 PROCEDURE — 84156 ASSAY OF PROTEIN URINE: CPT

## 2018-11-11 PROCEDURE — 83930 ASSAY OF BLOOD OSMOLALITY: CPT

## 2018-11-11 PROCEDURE — 83735 ASSAY OF MAGNESIUM: CPT

## 2018-11-11 PROCEDURE — T1013: CPT

## 2018-11-11 PROCEDURE — 36600 WITHDRAWAL OF ARTERIAL BLOOD: CPT

## 2018-11-11 PROCEDURE — 93005 ELECTROCARDIOGRAM TRACING: CPT

## 2018-11-11 PROCEDURE — 80053 COMPREHEN METABOLIC PANEL: CPT

## 2018-11-11 PROCEDURE — 84295 ASSAY OF SERUM SODIUM: CPT

## 2018-11-11 PROCEDURE — 80048 BASIC METABOLIC PNL TOTAL CA: CPT

## 2018-11-11 PROCEDURE — 85027 COMPLETE CBC AUTOMATED: CPT

## 2018-11-11 PROCEDURE — 96365 THER/PROPH/DIAG IV INF INIT: CPT

## 2018-11-27 ENCOUNTER — RX RENEWAL (OUTPATIENT)
Age: 83
End: 2018-11-27

## 2018-11-27 RX ORDER — FAMOTIDINE 40 MG/1
40 TABLET, FILM COATED ORAL DAILY
Qty: 90 | Refills: 2 | Status: ACTIVE | COMMUNITY
Start: 2018-02-14 | End: 1900-01-01

## 2018-12-05 RX ORDER — METOLAZONE 2.5 MG/1
2.5 TABLET ORAL
Refills: 0 | Status: ACTIVE | COMMUNITY

## 2018-12-10 ENCOUNTER — INPATIENT (INPATIENT)
Facility: HOSPITAL | Age: 83
LOS: 0 days | Discharge: HOSPICE | DRG: 70 | End: 2018-12-11
Attending: HOSPITALIST | Admitting: HOSPITALIST
Payer: OTHER MISCELLANEOUS

## 2018-12-10 VITALS
OXYGEN SATURATION: 97 % | HEART RATE: 58 BPM | HEIGHT: 65 IN | SYSTOLIC BLOOD PRESSURE: 118 MMHG | WEIGHT: 274.92 LBS | DIASTOLIC BLOOD PRESSURE: 72 MMHG | RESPIRATION RATE: 20 BRPM | TEMPERATURE: 99 F

## 2018-12-10 DIAGNOSIS — Z98.89 OTHER SPECIFIED POSTPROCEDURAL STATES: Chronic | ICD-10-CM

## 2018-12-10 DIAGNOSIS — R41.82 ALTERED MENTAL STATUS, UNSPECIFIED: ICD-10-CM

## 2018-12-10 LAB
ALBUMIN SERPL ELPH-MCNC: 3.3 G/DL — SIGNIFICANT CHANGE UP (ref 3.3–5.2)
ALP SERPL-CCNC: 103 U/L — SIGNIFICANT CHANGE UP (ref 40–120)
ALT FLD-CCNC: 13 U/L — SIGNIFICANT CHANGE UP
ANION GAP SERPL CALC-SCNC: 11 MMOL/L — SIGNIFICANT CHANGE UP (ref 5–17)
ANISOCYTOSIS BLD QL: SLIGHT — SIGNIFICANT CHANGE UP
APPEARANCE UR: CLEAR — SIGNIFICANT CHANGE UP
APTT BLD: 29 SEC — SIGNIFICANT CHANGE UP (ref 27.5–36.3)
AST SERPL-CCNC: 18 U/L — SIGNIFICANT CHANGE UP
BACTERIA # UR AUTO: ABNORMAL
BILIRUB SERPL-MCNC: 0.5 MG/DL — SIGNIFICANT CHANGE UP (ref 0.4–2)
BILIRUB UR-MCNC: NEGATIVE — SIGNIFICANT CHANGE UP
BUN SERPL-MCNC: 56 MG/DL — HIGH (ref 8–20)
CALCIUM SERPL-MCNC: 8.5 MG/DL — LOW (ref 8.6–10.2)
CHLORIDE SERPL-SCNC: 90 MMOL/L — LOW (ref 98–107)
CK SERPL-CCNC: 53 U/L — SIGNIFICANT CHANGE UP (ref 30–200)
CO2 SERPL-SCNC: 34 MMOL/L — HIGH (ref 22–29)
COLOR SPEC: YELLOW — SIGNIFICANT CHANGE UP
CREAT SERPL-MCNC: 3.35 MG/DL — HIGH (ref 0.5–1.3)
DIFF PNL FLD: ABNORMAL
ELLIPTOCYTES BLD QL SMEAR: SLIGHT — SIGNIFICANT CHANGE UP
EOSINOPHIL NFR BLD AUTO: 2 % — SIGNIFICANT CHANGE UP (ref 0–6)
EPI CELLS # UR: SIGNIFICANT CHANGE UP
GLUCOSE SERPL-MCNC: 122 MG/DL — HIGH (ref 70–115)
GLUCOSE UR QL: NEGATIVE MG/DL — SIGNIFICANT CHANGE UP
HCT VFR BLD CALC: 31 % — LOW (ref 42–52)
HGB BLD-MCNC: 9.1 G/DL — LOW (ref 14–18)
HYPOCHROMIA BLD QL: SLIGHT — SIGNIFICANT CHANGE UP
INR BLD: 1.13 RATIO — SIGNIFICANT CHANGE UP (ref 0.88–1.16)
KETONES UR-MCNC: NEGATIVE — SIGNIFICANT CHANGE UP
LACTATE BLDV-MCNC: 1 MMOL/L — SIGNIFICANT CHANGE UP (ref 0.5–2)
LEUKOCYTE ESTERASE UR-ACNC: ABNORMAL
LYMPHOCYTES # BLD AUTO: 6 % — LOW (ref 20–55)
MACROCYTES BLD QL: SLIGHT — SIGNIFICANT CHANGE UP
MCHC RBC-ENTMCNC: 24.3 PG — LOW (ref 27–31)
MCHC RBC-ENTMCNC: 29.4 G/DL — LOW (ref 32–36)
MCV RBC AUTO: 82.9 FL — SIGNIFICANT CHANGE UP (ref 80–94)
MICROCYTES BLD QL: SLIGHT — SIGNIFICANT CHANGE UP
MONOCYTES NFR BLD AUTO: 5 % — SIGNIFICANT CHANGE UP (ref 3–10)
NEUTROPHILS NFR BLD AUTO: 83 % — HIGH (ref 37–73)
NEUTS BAND # BLD: 4 % — SIGNIFICANT CHANGE UP (ref 0–8)
NITRITE UR-MCNC: NEGATIVE — SIGNIFICANT CHANGE UP
NT-PROBNP SERPL-SCNC: 7335 PG/ML — HIGH (ref 0–300)
OVALOCYTES BLD QL SMEAR: SLIGHT — SIGNIFICANT CHANGE UP
PH UR: 5 — SIGNIFICANT CHANGE UP (ref 5–8)
PLAT MORPH BLD: NORMAL — SIGNIFICANT CHANGE UP
PLATELET # BLD AUTO: 99 K/UL — LOW (ref 150–400)
POIKILOCYTOSIS BLD QL AUTO: SLIGHT — SIGNIFICANT CHANGE UP
POTASSIUM SERPL-MCNC: 4.6 MMOL/L — SIGNIFICANT CHANGE UP (ref 3.5–5.3)
POTASSIUM SERPL-SCNC: 4.6 MMOL/L — SIGNIFICANT CHANGE UP (ref 3.5–5.3)
PROT SERPL-MCNC: 6.1 G/DL — LOW (ref 6.6–8.7)
PROT UR-MCNC: 15 MG/DL
PROTHROM AB SERPL-ACNC: 13.1 SEC — HIGH (ref 10–12.9)
RAPID RVP RESULT: SIGNIFICANT CHANGE UP
RBC # BLD: 3.74 M/UL — LOW (ref 4.6–6.2)
RBC # FLD: 18.7 % — HIGH (ref 11–15.6)
RBC BLD AUTO: ABNORMAL
RBC CASTS # UR COMP ASSIST: ABNORMAL /HPF (ref 0–4)
SCHISTOCYTES BLD QL AUTO: SLIGHT — SIGNIFICANT CHANGE UP
SODIUM SERPL-SCNC: 135 MMOL/L — SIGNIFICANT CHANGE UP (ref 135–145)
SP GR SPEC: 1.01 — SIGNIFICANT CHANGE UP (ref 1.01–1.02)
TROPONIN T SERPL-MCNC: 0.06 NG/ML — SIGNIFICANT CHANGE UP (ref 0–0.06)
UROBILINOGEN FLD QL: NEGATIVE MG/DL — SIGNIFICANT CHANGE UP
WBC # BLD: 5.3 K/UL — SIGNIFICANT CHANGE UP (ref 4.8–10.8)
WBC # FLD AUTO: 5.3 K/UL — SIGNIFICANT CHANGE UP (ref 4.8–10.8)
WBC UR QL: SIGNIFICANT CHANGE UP

## 2018-12-10 PROCEDURE — 72128 CT CHEST SPINE W/O DYE: CPT | Mod: 26

## 2018-12-10 PROCEDURE — 70450 CT HEAD/BRAIN W/O DYE: CPT | Mod: 26

## 2018-12-10 PROCEDURE — 72192 CT PELVIS W/O DYE: CPT | Mod: 26

## 2018-12-10 PROCEDURE — 99497 ADVNCD CARE PLAN 30 MIN: CPT | Mod: 25

## 2018-12-10 PROCEDURE — 72131 CT LUMBAR SPINE W/O DYE: CPT | Mod: 26

## 2018-12-10 PROCEDURE — 72125 CT NECK SPINE W/O DYE: CPT | Mod: 26

## 2018-12-10 PROCEDURE — 99223 1ST HOSP IP/OBS HIGH 75: CPT

## 2018-12-10 PROCEDURE — 93010 ELECTROCARDIOGRAM REPORT: CPT

## 2018-12-10 PROCEDURE — 71250 CT THORAX DX C-: CPT | Mod: 26

## 2018-12-10 PROCEDURE — 99285 EMERGENCY DEPT VISIT HI MDM: CPT

## 2018-12-10 PROCEDURE — 71045 X-RAY EXAM CHEST 1 VIEW: CPT | Mod: 26

## 2018-12-10 RX ORDER — CEFTRIAXONE 500 MG/1
1 INJECTION, POWDER, FOR SOLUTION INTRAMUSCULAR; INTRAVENOUS ONCE
Qty: 0 | Refills: 0 | Status: COMPLETED | OUTPATIENT
Start: 2018-12-10 | End: 2018-12-10

## 2018-12-10 RX ORDER — ACETAMINOPHEN 500 MG
1000 TABLET ORAL ONCE
Qty: 0 | Refills: 0 | Status: COMPLETED | OUTPATIENT
Start: 2018-12-10 | End: 2018-12-10

## 2018-12-10 RX ORDER — FUROSEMIDE 40 MG
40 TABLET ORAL ONCE
Qty: 0 | Refills: 0 | Status: DISCONTINUED | OUTPATIENT
Start: 2018-12-10 | End: 2018-12-10

## 2018-12-10 RX ORDER — SODIUM CHLORIDE 9 MG/ML
500 INJECTION INTRAMUSCULAR; INTRAVENOUS; SUBCUTANEOUS ONCE
Qty: 0 | Refills: 0 | Status: COMPLETED | OUTPATIENT
Start: 2018-12-10 | End: 2018-12-10

## 2018-12-10 RX ADMIN — Medication 400 MILLIGRAM(S): at 21:24

## 2018-12-10 RX ADMIN — Medication 1000 MILLIGRAM(S): at 22:04

## 2018-12-10 RX ADMIN — Medication 1000 MILLIGRAM(S): at 21:39

## 2018-12-10 RX ADMIN — SODIUM CHLORIDE 500 MILLILITER(S): 9 INJECTION INTRAMUSCULAR; INTRAVENOUS; SUBCUTANEOUS at 22:14

## 2018-12-10 RX ADMIN — CEFTRIAXONE 100 GRAM(S): 500 INJECTION, POWDER, FOR SOLUTION INTRAMUSCULAR; INTRAVENOUS at 21:15

## 2018-12-10 RX ADMIN — CEFTRIAXONE 1 GRAM(S): 500 INJECTION, POWDER, FOR SOLUTION INTRAMUSCULAR; INTRAVENOUS at 21:45

## 2018-12-10 RX ADMIN — SODIUM CHLORIDE 500 MILLILITER(S): 9 INJECTION INTRAMUSCULAR; INTRAVENOUS; SUBCUTANEOUS at 21:14

## 2018-12-10 NOTE — ED ADULT NURSE NOTE - OBJECTIVE STATEMENT
pt home aid at bedside reports pt found on floor Wednesday, and again Saturday, yesterday with tremors and shakes, inability to stand, has been c/o back pain, generalized weakness. as per aid at baseline pt is "pretty self sufficient", A&Ox3, uses wheelchair. aid denies pt c/o any SOB or CP. pt home aid at bedside reports pt found on floor Wednesday, and again Saturday, yesterday with tremors and shakes, inability to stand, has been c/o back pain, generalized weakness. as per aid at baseline pt is "pretty self sufficient", A&Ox3, uses wheelchair. aid denies pt c/o any SOB or CP.  pt presents drowsy, difficulty to arouse, when awake c/o back pain.

## 2018-12-10 NOTE — ED ADULT TRIAGE NOTE - CHIEF COMPLAINT QUOTE
generalized weakness recently dx with uti  wife states pt fell on sat because legs are weak denies hitting head. pt maex4 equally. pt c/o pain to low back

## 2018-12-10 NOTE — ED PROVIDER NOTE - CARE PLAN
Principal Discharge DX:	Altered mental status, unspecified altered mental status type  Secondary Diagnosis:	UTI (urinary tract infection)

## 2018-12-10 NOTE — ED PROVIDER NOTE - OBJECTIVE STATEMENT
84 year old male with a h/o COPD, HLD, A-FIB, CHF, CKD DM, HTN, cardiomyopathy and asthma presents with generalized weakness and decreased mental status. As per pt's aid he was in his USOH until 3 days ago when she found him on the floor  and he told her he slid out of bed because he felt weak. Since then he has been complaining of low back pain and was recently treated for UTI with Bactrim. Pt normally gets around in a wheelchair and is usually alert and oriented but she thought he seemed very sleepy today

## 2018-12-10 NOTE — ED ADULT NURSE NOTE - NSIMPLEMENTINTERV_GEN_ALL_ED
Implemented All Fall Risk Interventions:  [place] to call system. Call bell, personal items and telephone within reach. Instruct patient to call for assistance. Room bathroom lighting operational. Non-slip footwear when patient is off stretcher. Physically safe environment: no spills, clutter or unnecessary equipment. Stretcher in lowest position, wheels locked, appropriate side rails in place. Provide visual cue, wrist band, yellow gown, etc. Monitor gait and stability. Monitor for mental status changes and reorient to person, place, and time. Review medications for side effects contributing to fall risk. Reinforce activity limits and safety measures with patient and family.

## 2018-12-10 NOTE — ED PROVIDER NOTE - CONSTITUTIONAL, MLM
normal... Well appearing, well nourished, lethargic but easiy arousable and in no apparent distress.

## 2018-12-11 VITALS — WEIGHT: 274.92 LBS

## 2018-12-11 LAB
ANION GAP SERPL CALC-SCNC: 13 MMOL/L — SIGNIFICANT CHANGE UP (ref 5–17)
ANISOCYTOSIS BLD QL: SLIGHT — SIGNIFICANT CHANGE UP
BASE EXCESS BLDA CALC-SCNC: 9 MMOL/L — HIGH (ref -2–2)
BASE EXCESS BLDA CALC-SCNC: 9.7 MMOL/L — HIGH (ref -2–2)
BASE EXCESS BLDA CALC-SCNC: 9.7 MMOL/L — HIGH (ref -2–2)
BLOOD GAS COMMENTS ARTERIAL: SIGNIFICANT CHANGE UP
BUN SERPL-MCNC: 58 MG/DL — HIGH (ref 8–20)
CALCIUM SERPL-MCNC: 8.8 MG/DL — SIGNIFICANT CHANGE UP (ref 8.6–10.2)
CHLORIDE SERPL-SCNC: 92 MMOL/L — LOW (ref 98–107)
CO2 SERPL-SCNC: 31 MMOL/L — HIGH (ref 22–29)
CREAT SERPL-MCNC: 3.48 MG/DL — HIGH (ref 0.5–1.3)
CULTURE RESULTS: NO GROWTH — SIGNIFICANT CHANGE UP
DACRYOCYTES BLD QL SMEAR: SLIGHT — SIGNIFICANT CHANGE UP
ELLIPTOCYTES BLD QL SMEAR: SLIGHT — SIGNIFICANT CHANGE UP
EOSINOPHIL NFR BLD AUTO: 4 % — SIGNIFICANT CHANGE UP (ref 0–6)
GAS PNL BLDA: SIGNIFICANT CHANGE UP
GLUCOSE SERPL-MCNC: 92 MG/DL — SIGNIFICANT CHANGE UP (ref 70–115)
HCO3 BLDA-SCNC: 33 MMOL/L — HIGH (ref 20–26)
HCT VFR BLD CALC: 32.5 % — LOW (ref 42–52)
HGB BLD-MCNC: 9.5 G/DL — LOW (ref 14–18)
HOROWITZ INDEX BLDA+IHG-RTO: 2 — SIGNIFICANT CHANGE UP
HOROWITZ INDEX BLDA+IHG-RTO: 28 — SIGNIFICANT CHANGE UP
HOROWITZ INDEX BLDA+IHG-RTO: 40 — SIGNIFICANT CHANGE UP
HYPOCHROMIA BLD QL: SLIGHT — SIGNIFICANT CHANGE UP
LYMPHOCYTES # BLD AUTO: 13 % — LOW (ref 20–55)
MACROCYTES BLD QL: SLIGHT — SIGNIFICANT CHANGE UP
MCHC RBC-ENTMCNC: 24.3 PG — LOW (ref 27–31)
MCHC RBC-ENTMCNC: 29.2 G/DL — LOW (ref 32–36)
MCV RBC AUTO: 83.1 FL — SIGNIFICANT CHANGE UP (ref 80–94)
MICROCYTES BLD QL: SLIGHT — SIGNIFICANT CHANGE UP
MONOCYTES NFR BLD AUTO: 7 % — SIGNIFICANT CHANGE UP (ref 3–10)
NEUTROPHILS NFR BLD AUTO: 73 % — SIGNIFICANT CHANGE UP (ref 37–73)
NEUTS BAND # BLD: 3 % — SIGNIFICANT CHANGE UP (ref 0–8)
OVALOCYTES BLD QL SMEAR: SLIGHT — SIGNIFICANT CHANGE UP
PCO2 BLDA: 72 MMHG — CRITICAL HIGH (ref 35–45)
PCO2 BLDA: 76 MMHG — CRITICAL HIGH (ref 35–45)
PCO2 BLDA: 86 MMHG — CRITICAL HIGH (ref 35–45)
PH BLDA: 7.26 — LOW (ref 7.35–7.45)
PH BLDA: 7.3 — LOW (ref 7.35–7.45)
PH BLDA: 7.33 — LOW (ref 7.35–7.45)
PLAT MORPH BLD: NORMAL — SIGNIFICANT CHANGE UP
PLATELET # BLD AUTO: 74 K/UL — LOW (ref 150–400)
PO2 BLDA: 66 MMHG — LOW (ref 83–108)
PO2 BLDA: 70 MMHG — LOW (ref 83–108)
PO2 BLDA: 91 MMHG — SIGNIFICANT CHANGE UP (ref 83–108)
POIKILOCYTOSIS BLD QL AUTO: SLIGHT — SIGNIFICANT CHANGE UP
POTASSIUM SERPL-MCNC: 4.7 MMOL/L — SIGNIFICANT CHANGE UP (ref 3.5–5.3)
POTASSIUM SERPL-SCNC: 4.7 MMOL/L — SIGNIFICANT CHANGE UP (ref 3.5–5.3)
RBC # BLD: 3.91 M/UL — LOW (ref 4.6–6.2)
RBC # FLD: 18.8 % — HIGH (ref 11–15.6)
RBC BLD AUTO: ABNORMAL
SAO2 % BLDA: 94 % — LOW (ref 95–99)
SAO2 % BLDA: 94 % — LOW (ref 95–99)
SAO2 % BLDA: 97 % — SIGNIFICANT CHANGE UP (ref 95–99)
SODIUM SERPL-SCNC: 136 MMOL/L — SIGNIFICANT CHANGE UP (ref 135–145)
SPECIMEN SOURCE: SIGNIFICANT CHANGE UP
WBC # BLD: 4.8 K/UL — SIGNIFICANT CHANGE UP (ref 4.8–10.8)
WBC # FLD AUTO: 4.8 K/UL — SIGNIFICANT CHANGE UP (ref 4.8–10.8)

## 2018-12-11 PROCEDURE — 94660 CPAP INITIATION&MGMT: CPT

## 2018-12-11 PROCEDURE — 72128 CT CHEST SPINE W/O DYE: CPT

## 2018-12-11 PROCEDURE — 80048 BASIC METABOLIC PNL TOTAL CA: CPT

## 2018-12-11 PROCEDURE — 87486 CHLMYD PNEUM DNA AMP PROBE: CPT

## 2018-12-11 PROCEDURE — 71250 CT THORAX DX C-: CPT

## 2018-12-11 PROCEDURE — 85610 PROTHROMBIN TIME: CPT

## 2018-12-11 PROCEDURE — 85027 COMPLETE CBC AUTOMATED: CPT

## 2018-12-11 PROCEDURE — 94640 AIRWAY INHALATION TREATMENT: CPT

## 2018-12-11 PROCEDURE — 85730 THROMBOPLASTIN TIME PARTIAL: CPT

## 2018-12-11 PROCEDURE — 72192 CT PELVIS W/O DYE: CPT

## 2018-12-11 PROCEDURE — 82803 BLOOD GASES ANY COMBINATION: CPT

## 2018-12-11 PROCEDURE — 71045 X-RAY EXAM CHEST 1 VIEW: CPT

## 2018-12-11 PROCEDURE — 93005 ELECTROCARDIOGRAM TRACING: CPT

## 2018-12-11 PROCEDURE — 87633 RESP VIRUS 12-25 TARGETS: CPT

## 2018-12-11 PROCEDURE — 99285 EMERGENCY DEPT VISIT HI MDM: CPT | Mod: 25

## 2018-12-11 PROCEDURE — 72131 CT LUMBAR SPINE W/O DYE: CPT

## 2018-12-11 PROCEDURE — 96365 THER/PROPH/DIAG IV INF INIT: CPT

## 2018-12-11 PROCEDURE — 87798 DETECT AGENT NOS DNA AMP: CPT

## 2018-12-11 PROCEDURE — 87040 BLOOD CULTURE FOR BACTERIA: CPT

## 2018-12-11 PROCEDURE — 99239 HOSP IP/OBS DSCHRG MGMT >30: CPT

## 2018-12-11 PROCEDURE — 70450 CT HEAD/BRAIN W/O DYE: CPT

## 2018-12-11 PROCEDURE — 87581 M.PNEUMON DNA AMP PROBE: CPT

## 2018-12-11 PROCEDURE — 84484 ASSAY OF TROPONIN QUANT: CPT

## 2018-12-11 PROCEDURE — 36415 COLL VENOUS BLD VENIPUNCTURE: CPT

## 2018-12-11 PROCEDURE — 82550 ASSAY OF CK (CPK): CPT

## 2018-12-11 PROCEDURE — 83880 ASSAY OF NATRIURETIC PEPTIDE: CPT

## 2018-12-11 PROCEDURE — 82962 GLUCOSE BLOOD TEST: CPT

## 2018-12-11 PROCEDURE — 36600 WITHDRAWAL OF ARTERIAL BLOOD: CPT

## 2018-12-11 PROCEDURE — 83605 ASSAY OF LACTIC ACID: CPT

## 2018-12-11 PROCEDURE — 87086 URINE CULTURE/COLONY COUNT: CPT

## 2018-12-11 PROCEDURE — 72125 CT NECK SPINE W/O DYE: CPT

## 2018-12-11 PROCEDURE — 80053 COMPREHEN METABOLIC PANEL: CPT

## 2018-12-11 PROCEDURE — 96375 TX/PRO/DX INJ NEW DRUG ADDON: CPT

## 2018-12-11 PROCEDURE — 81001 URINALYSIS AUTO W/SCOPE: CPT

## 2018-12-11 RX ORDER — LACTULOSE 10 G/15ML
30 SOLUTION ORAL
Qty: 0 | Refills: 0 | COMMUNITY

## 2018-12-11 RX ORDER — CARVEDILOL PHOSPHATE 80 MG/1
3.12 CAPSULE, EXTENDED RELEASE ORAL EVERY 12 HOURS
Qty: 0 | Refills: 0 | Status: DISCONTINUED | OUTPATIENT
Start: 2018-12-11 | End: 2018-12-11

## 2018-12-11 RX ORDER — FERROUS SULFATE 325(65) MG
325 TABLET ORAL DAILY
Qty: 0 | Refills: 0 | Status: DISCONTINUED | OUTPATIENT
Start: 2018-12-11 | End: 2018-12-11

## 2018-12-11 RX ORDER — ALBUTEROL 90 UG/1
3 AEROSOL, METERED ORAL
Qty: 0 | Refills: 0 | COMMUNITY

## 2018-12-11 RX ORDER — AMIODARONE HYDROCHLORIDE 400 MG/1
200 TABLET ORAL DAILY
Qty: 0 | Refills: 0 | Status: DISCONTINUED | OUTPATIENT
Start: 2018-12-11 | End: 2018-12-11

## 2018-12-11 RX ORDER — IPRATROPIUM/ALBUTEROL SULFATE 18-103MCG
3 AEROSOL WITH ADAPTER (GRAM) INHALATION
Qty: 0 | Refills: 0 | COMMUNITY
Start: 2018-12-11

## 2018-12-11 RX ORDER — SODIUM CHLORIDE 9 MG/ML
500 INJECTION INTRAMUSCULAR; INTRAVENOUS; SUBCUTANEOUS ONCE
Qty: 0 | Refills: 0 | Status: COMPLETED | OUTPATIENT
Start: 2018-12-11 | End: 2018-12-11

## 2018-12-11 RX ORDER — PREGABALIN 225 MG/1
1000 CAPSULE ORAL DAILY
Qty: 0 | Refills: 0 | Status: DISCONTINUED | OUTPATIENT
Start: 2018-12-11 | End: 2018-12-11

## 2018-12-11 RX ORDER — CEFTRIAXONE 500 MG/1
1 INJECTION, POWDER, FOR SOLUTION INTRAMUSCULAR; INTRAVENOUS
Qty: 0 | Refills: 0 | COMMUNITY
Start: 2018-12-11

## 2018-12-11 RX ORDER — SENNA PLUS 8.6 MG/1
2 TABLET ORAL AT BEDTIME
Qty: 0 | Refills: 0 | Status: DISCONTINUED | OUTPATIENT
Start: 2018-12-11 | End: 2018-12-11

## 2018-12-11 RX ORDER — IPRATROPIUM BROMIDE 0.2 MG/ML
2.5 SOLUTION, NON-ORAL INHALATION
Qty: 0 | Refills: 0 | COMMUNITY

## 2018-12-11 RX ORDER — IPRATROPIUM/ALBUTEROL SULFATE 18-103MCG
3 AEROSOL WITH ADAPTER (GRAM) INHALATION EVERY 6 HOURS
Qty: 0 | Refills: 0 | Status: DISCONTINUED | OUTPATIENT
Start: 2018-12-11 | End: 2018-12-11

## 2018-12-11 RX ORDER — PANTOPRAZOLE SODIUM 20 MG/1
40 TABLET, DELAYED RELEASE ORAL
Qty: 0 | Refills: 0 | Status: DISCONTINUED | OUTPATIENT
Start: 2018-12-11 | End: 2018-12-11

## 2018-12-11 RX ORDER — RIVAROXABAN 15 MG-20MG
15 KIT ORAL EVERY 24 HOURS
Qty: 0 | Refills: 0 | Status: DISCONTINUED | OUTPATIENT
Start: 2018-12-11 | End: 2018-12-11

## 2018-12-11 RX ORDER — BUDESONIDE AND FORMOTEROL FUMARATE DIHYDRATE 160; 4.5 UG/1; UG/1
2 AEROSOL RESPIRATORY (INHALATION)
Qty: 0 | Refills: 0 | Status: DISCONTINUED | OUTPATIENT
Start: 2018-12-11 | End: 2018-12-11

## 2018-12-11 RX ORDER — CEFTRIAXONE 500 MG/1
1 INJECTION, POWDER, FOR SOLUTION INTRAMUSCULAR; INTRAVENOUS EVERY 24 HOURS
Qty: 0 | Refills: 0 | Status: DISCONTINUED | OUTPATIENT
Start: 2018-12-11 | End: 2018-12-11

## 2018-12-11 RX ADMIN — Medication 3 MILLILITER(S): at 09:15

## 2018-12-11 RX ADMIN — SODIUM CHLORIDE 2000 MILLILITER(S): 9 INJECTION INTRAMUSCULAR; INTRAVENOUS; SUBCUTANEOUS at 02:16

## 2018-12-11 NOTE — DISCHARGE NOTE ADULT - MEDICATION SUMMARY - MEDICATIONS TO TAKE
I will START or STAY ON the medications listed below when I get home from the hospital:    acetaminophen 325 mg oral tablet  -- 2 tab(s) by mouth every 6 hours, As needed, Mild Pain (1 - 3)  -- Indication: For pain    morphine 20 mg/5 mL oral solution  -- 1 milliliter(s) by mouth every 8 hours PRN q8 HRS FOR MILD PAIN OR SOB 2ml prn every 8 hrs severe SOB MDD:not more than 6 ml a day  -- Caution federal law prohibits the transfer of this drug to any person other  than the person for whom it was prescribed.  May cause drowsiness.  Alcohol may intensify this effect.  Use care when operating dangerous machinery.  This prescription cannot be refilled.  Using more of this medication than prescribed may cause serious breathing problems.    -- Indication: For pain    amiodarone 200 mg oral tablet  -- 1 tab(s) by mouth once a day  -- Indication: For heart    rivaroxaban 15 mg oral tablet  -- 1 tab(s) by mouth every 24 hours  -- Indication: For prevent stroke    gabapentin 100 mg oral capsule  -- 2 cap(s) by mouth 3 times a day   -- It is very important that you take or use this exactly as directed.  Do not skip doses or discontinue unless directed by your doctor.  May cause drowsiness.  Alcohol may intensify this effect.  Use care when operating dangerous machinery.    -- Indication: For pain    LORazepam 2 mg/mL oral concentrate  -- 0.5 milliliter(s) by mouth 2 times a day MDD:NOT MORE THAN 2 MG A DAY   -- Caution federal law prohibits the transfer of this drug to any person other  than the person for whom it was prescribed.  Dilute this medication with liquid before administration.  Do not take this drug if you are pregnant.  Keep in refrigerator.  Do not freeze.  May cause drowsiness.  Alcohol may intensify this effect.  Use care when operating dangerous machinery.    -- Indication: For breathing or agitation    carvedilol 3.125 mg oral tablet  -- 1 tab(s) by mouth every 12 hours  -- Indication: For heart    Breo Ellipta 200 mcg-25 mcg/inh inhalation powder  -- 1 puff(s) inhaled once a day  -- Indication: For breathing    ipratropium 500 mcg/2.5 mL inhalation solution  -- 2.5 milliliter(s) inhaled 3 times a day  -- Indication: For breathing    albuterol 2.5 mg/3 mL (0.083%) inhalation solution  -- 3 milliliter(s) inhaled every 6 hours  -- Indication: For breathing    ipratropium-albuterol 0.5 mg-2.5 mg/3 mLinhalation solution  -- 3 milliliter(s) inhaled every 6 hours, As needed, Shortness of Breath and/or Wheezing  -- Indication: For breathing    cefTRIAXone 1 g intravenous injection  -- 1 gram(s) intravenous every 24 hours  -- Indication: For opssible infectio    furosemide 40 mg oral tablet  -- 1 tab(s) by mouth once a day  -- Indication: For heart    glycopyrrolate 1 mg/5 mL oral solution  -- 2.5 milliliter(s) by mouth 3 times a day  AS NEEDED   -- Check with your doctor before becoming pregnant.  May cause drowsiness.  Alcohol may intensify this effect.  Use care when operating dangerous machinery.  Obtain medical advice before taking any non-prescription drugs as some may affect the action of this medication.  Take medication on an empty stomach 1 hour before or 2 to 3 hours after a meal unless otherwise directed by your doctor.  This drug may impair the ability to drive or operate machinery.  Use care until you become familiar with its effects.    -- Indication: For secretions    ferrous sulfate 325 mg (65 mg elemental iron) oral tablet  -- 1 tab(s) by mouth 3 times a day   -- Check with your doctor before becoming pregnant.  Do not chew, break, or crush.  May discolor urine or feces.    -- Indication: For Anemia    senna oral tablet  -- 2 tab(s) by mouth once a day   -- Indication: For constipation

## 2018-12-11 NOTE — H&P ADULT - NSHPSOCIALHISTORY_GEN_ALL_CORE
Lives at home with aide/family friend Dona ([phone]), also has separate aide several hours per week, no known toxic habits

## 2018-12-11 NOTE — H&P ADULT - ASSESSMENT
84yoM hx COPD on home O2, Afib on Xarelto and amiodarone, CKD, CHF, HTN, recently treated for UTI brought to hospital for confusion after being found down on ground after reportedly sliding out of bed, found to be encephalopathy and febrile in ED with acute on chronic hypercapnic respiratory failure    #Encephalopathy in setting of acute on chronic hypercapnic respiratory failure  -Suspect decompensation related to sedatives being used on home  -Admit to step down unit  -Placed pt on BiPAP  -Repeat ABG  -Holding all sedatives (morphine, Ativan PRN and gabapentin   -NPO while on BiPAP  -Pt DNI/DNR    #Hypotension  -Likely due to hypovolemic and partly iatrogenic in setting of pressure from BiPAP use  -Ordered 500cc bolus, monitor BP closely     #UTI  -Continue with ceftriaxone  -Blood and urine cultures pending    #[place] on CKD  -Fluid challenge, repeat BMP    #Afib/HFpEF  -On xarelto and amiodarone at home  -Holding amiodarone and coreg in setting of hypotension/NPO status    #Prophylactic measure- resume Xarelto when mental status improves 84yoM hx COPD on home O2, Afib on Xarelto and amiodarone, CKD, CHF, HTN, recently treated for UTI brought to hospital for confusion after being found down on ground after reportedly sliding out of bed, found to be encephalopathy and febrile in ED with acute on chronic hypercapnic respiratory failure    #Encephalopathy in setting of acute on chronic hypercapnic respiratory failure  -Suspect sudden decompensation related to sedatives being used on home  -Admit to step down unit  -Placed pt on BiPAP, wean as tolearted  -Serial ABG until mental status and acute hypercapnic resolves  -Holding all sedatives (morphine, Ativan PRN and gabapentin)   -NPO while on BiPAP  -Pt DNI/DNR    #Hypotension  -Likely due to hypovolemia and partly iatrogenic in setting of pressure from BiPAP use  -Ordered 500cc bolus, monitor BP closely     #UTI  -Fever mildly positive UA in setting of recent antibiotic exposure, continue with ceftriaxone for now  -Blood and urine cultures pending    #[place] on CKD  -Gentle IVF bolus as above, repeat BMP    #Afib/HFpEF  -On xarelto, coreg, amiodarone at home, holding amiodarone and coreg in setting of hypotension/NPO status    #Prophylactic measure- resume Xarelto when mental status improves, able to be weaned off BIPAP and tolerate PO

## 2018-12-11 NOTE — H&P ADULT - ATTENDING COMMENTS
I spent 20 minutes via phone addressing advanced care planning with daughter Melania who is health care surrogate by next of kin.  Pt has MOLST form from previous hospitalization in 10/2018 where he decided he would be DNR/DNI.  Confirmed that pt wishes hasn't changed and he would not want mechanical ventilation or chest compressions. I spent 20 minutes via phone addressing advanced care planning with daughter Melania who is health care surrogate by next of kin.  Pt has MOLST form from previous hospitalization in 10/2018 where he decided he would be DNR/DNI.  Confirmed that pt wishes hasn't changed and he would not want mechanical ventilation or chest compressions.  I also discussed vasopressor use in case of refractory hypotension and explained that would involve invasive lines and daughter was amenable to use of vasopressors. I spent 20 minutes via phone addressing advanced care planning with daughter Melania who is health care surrogate by next of kin.  Pt has MOLST form from previous hospitalization in 10/2018 where he decided he would be DNR/DNI.  Confirmed that pt wishes hasn't changed and he would not want mechanical ventilation or chest compressions.  I also discussed vasopressor use in case of refractory hypotension and explained that would involve invasive lines and daughter was amenable to use of vasopressors.    DNR form signed with ED RN as phone witness and scanned into alpha.

## 2018-12-11 NOTE — H&P ADULT - FAMILY HISTORY
No pertinent family history in first degree relatives, No known hx of CHF or COPD in mother or father

## 2018-12-11 NOTE — DISCHARGE NOTE ADULT - SECONDARY DIAGNOSIS.
Chronic obstructive pulmonary disease, unspecified COPD type Chronic systolic CHF (congestive heart failure) Chronic atrial fibrillation

## 2018-12-11 NOTE — ED ADULT NURSE REASSESSMENT NOTE - NS ED NURSE REASSESS COMMENT FT1
Assumed patient care this morning, sleeping on BIPAP, tolerating well, VSS, will continue to monitor.

## 2018-12-11 NOTE — DISCHARGE NOTE ADULT - CARE PLAN
Principal Discharge DX:	Encephalopathy  Goal:	resolution  Assessment and plan of treatment:	Continue antibiotics for now - follow up culture results  Secondary Diagnosis:	Chronic obstructive pulmonary disease, unspecified COPD type  Assessment and plan of treatment:	Medications as prescribed  Secondary Diagnosis:	Chronic systolic CHF (congestive heart failure)  Assessment and plan of treatment:	Medications as prescribed  Secondary Diagnosis:	Chronic atrial fibrillation  Assessment and plan of treatment:	Medications as prescribed

## 2018-12-11 NOTE — DISCHARGE NOTE ADULT - HOSPITAL COURSE
84yoM hx COPD on home O2, Afib on Xarelto and amiodarone, CKD, CHF, HTN, recently treated for UTI brought to hospital for confusion after being found down on ground after reportedly sliding out of bed, found to be encephalopathy and febrile in ED with acute on chronic hypercapnic respiratory failure    #Encephalopathy in setting of acute on chronic hypercapnic respiratory failure  -Suspect sudden decompensation related to sedatives being used on home  -Admit to step down unit  -Placed pt on BiPAP, wean as tolearted  -Serial ABG until mental status and acute hypercapnic resolves  -Holding all sedatives (morphine, Ativan PRN and gabapentin)   -NPO while on BiPAP  -Pt DNI/DNR    #Hypotension  -Likely due to hypovolemia and partly iatrogenic in setting of pressure from BiPAP use  -Ordered 500cc bolus, monitor BP closely     #UTI  -Fever mildly positive UA in setting of recent antibiotic exposure, continue with ceftriaxone for now  -Blood and urine cultures pending    #[place] on CKD  -Gentle IVF bolus as above, repeat BMP    #Afib/HFpEF  -On xarelto, coreg, amiodarone at home, holding amiodarone and coreg in setting of hypotension/NPO status      Patient did show some improvement in his encephalopathy with above measures. Seen in consultation by Hospice, arrangements being made for [facility].    Continue IV Rocephin for at east 48 hours till cultures results available - further antibiotics to be considered upon results.    Discharge time - 35 minutes

## 2018-12-11 NOTE — DISCHARGE NOTE ADULT - PATIENT PORTAL LINK FT
You can access the [facility] Patient Portal, offered by [facility], by registering with the following website: [email or web address]

## 2018-12-11 NOTE — GOALS OF CARE CONVERSATION - PERSONAL ADVANCE DIRECTIVE - CONVERSATION DETAILS
Pt is a home hospice pt  , pt and and family agreeable to be transferred to the hospice [facility] for management of his dyspnea  and infection  time at 1230 pm . Iv lock to remain in place please do not remove .

## 2018-12-11 NOTE — H&P ADULT - NSHPPHYSICALEXAM_GEN_ALL_CORE
Vital Signs Last 24 Hrs  T(C): 36.7 (10 Dec 2018 22:50), Max: 38.1 (10 Dec 2018 19:07)  T(F): 98.1 (10 Dec 2018 22:50), Max: 100.5 (10 Dec 2018 19:07)  HR: 55 (11 Dec 2018 02:20) (55 - 62)  BP: 87/55 (11 Dec 2018 02:20) (75/79 - 118/72)  BP(mean): --  RR: 14 (11 Dec 2018 02:20) (14 - 22)  SpO2: 92% (11 Dec 2018 02:20) (70% - 99%)    GENERAL:  Ill appearing elderly male, lethargic and difficult to arouse, pt woke up and began to talk with sternal rub and forcing him to sit up   EYES:  Clear conjunctiva, pupils reactive to light  ENT: Somewhat mucous membranes  RESP:  Non-labored breathing pattern, poor respiratory effort, but unable to appreciate wheeze or crackles  CV: Sinus bradycardia, no murmurs appreciated, bilateral lower extremity edema  GI: Soft, non-tender, non-distended  NEURO: Lethargic, once aroused as above, able to speak, follow commands, move extremities spontaneously   PSYCH: Calm, cooperative  SKIN: No rash or lesions, warm and dry

## 2018-12-11 NOTE — PROGRESS NOTE ADULT - PROBLEM/PLAN-4
DISPLAY PLAN FREE TEXT
mother/father

## 2018-12-11 NOTE — H&P ADULT - HISTORY OF PRESENT ILLNESS
84yoM hx COPD on home O2, Afib on Xarelto and amiodarone, CKD, CHF, HTN, brought to hospital for confusion, lethargy and weakness.  Collateral hx obtained from daughter and aide by phone as pt lethargic and difficult arouse.  Pt was found down on the ground earlier today, had told his aide that he slid out of the bed because he was feeling weak.  Pt had appeared disoriented and lethargic.  This was a change from his baseline as he is typically alert, conversant and can ambulate with a walker.  Of note, pt was recently treated for UTI reportedly with Bactrim.  He had not reported any fevers, chills, chest pain, dyspnea or abdominal pain to them recently.  In ED, Tmax 100.5, UA mildly positive and pt received ceftriaxone  CT head/chest/thoracic/lumbar spine in setting of unwitnessed fall which was negative for acute injury.  Ordered ABG which showed acute on chronic hypercapnic respiratory failure and placed pt on BiPAP.  Discussed advanced care planning with daughter via phone which revealed that pt currently on hospice and was discharged on glycopyrrolate, morphine, Ativan PRN.  Pt also on gabapentin at home which he takes routinely. Pt has MOLST form from previous hospitalization and is DNR/DNI.    Course in ED further complicated by hypotension (SBP 75) after placed on BiPAP. 84yoM hx COPD on home O2, Afib on Xarelto and amiodarone, CKD, CHF, HTN, brought to hospital for confusion, lethargy and weakness.  Collateral hx obtained from daughter and aide via phone as pt lethargic and difficult to arouse.  Pt was found down on the ground earlier today, had told his aide that he slid out of the bed because he was feeling weak.  Pt had appeared disoriented and lethargic.  This was a change from his baseline as he is typically alert, conversant and can ambulate with a walker.  Of note, pt was recently treated for UTI reportedly with Bactrim.  He had not reported any fevers, chills, chest pain, dyspnea or abdominal pain to them recently.  In ED, Tmax 100.5, UA mildly positive and pt received ceftriaxone  CT head/chest/thoracic/lumbar spine in setting of unwitnessed fall which was negative for acute injury.  Ordered ABG which showed acute on chronic hypercapnic respiratory failure (pH 7.26, pCO2 86) and placed pt on BiPAP.  Pt currently on home hospice and was discharged from last hospitalization on glycopyrrolate, morphine, Ativan PRN.  Pt also on gabapentin at home which he takes routinely.    Course in ED further complicated by hypotension (SBP 75) after placed on BiPAP.

## 2018-12-11 NOTE — ED ADULT NURSE REASSESSMENT NOTE - NS ED NURSE REASSESS COMMENT FT1
Pt. mentation greatly improved since 0100. Awakens to voice, oriented to person, place, and month. all vital signs improved. remains on BiPAP to be re-assessed by MD

## 2018-12-11 NOTE — DISCHARGE NOTE ADULT - NS AS ACTIVITY OBS
Bathing allowed/Do not make important decisions/Do not drive or operate machinery/No Heavy lifting/straining/Walking-Indoors allowed/Showering allowed

## 2018-12-11 NOTE — H&P ADULT - NSHPLABSRESULTS_GEN_ALL_CORE
9.1    5.3   )-----------( 99       ( 10 Dec 2018 21:07 )             31.0       12-10    135  |  90<L>  |  56.0<H>  ----------------------------<  122<H>  4.6   |  34.0<H>  |  3.35<H>    Ca    8.5<L>      10 Dec 2018 21:07    TPro  6.1<L>  /  Alb  3.3  /  TBili  0.5  /  DBili  x   /  AST  18  /  ALT  13  /  AlkPhos  103  12-10      EKG: junctional rhythm, HR 60    Urinalysis Basic - ( 10 Dec 2018 19:16 )    Color: Yellow / Appearance: Clear / S.015 / pH: x  Gluc: x / Ketone: Negative  / Bili: Negative / Urobili: Negative mg/dL   Blood: x / Protein: 15 mg/dL / Nitrite: Negative   Leuk Esterase: Small / RBC: 3-5 /HPF / WBC 3-5   Sq Epi: x / Non Sq Epi: Occasional / Bacteria: Few

## 2018-12-11 NOTE — ED ADULT NURSE REASSESSMENT NOTE - NS ED NURSE REASSESS COMMENT FT1
Received pt. from CDU after clinical upgrade to stepdown for needing continuous BiPAP. As per MD Ochoa patient is lethargic due to acute episode of hypercapnia. In ED patient opens eyes to tactile stimulation, nonverbal indicators of pain absent. placed on O2 monitoring after returning to ED from CDU. found to be in low 70s 02 saturation. with RT at bedside patient O2 percentage increased.

## 2018-12-12 ENCOUNTER — APPOINTMENT (OUTPATIENT)
Dept: CARDIOLOGY | Facility: CLINIC | Age: 83
End: 2018-12-12

## 2018-12-18 NOTE — PHYSICAL THERAPY INITIAL EVALUATION ADULT - GROSSLY INTACT, SENSORY
12/18/2018        RE: Jade Saeed  [address] [facility] [address]        [facility]    Chief Complaint   Patient presents with     Fever       [facility] Medical Record Number:  [id number]  Place of Service where encounter took place:  [facility] [[id number]]    HPI:    Jade Caba is a 85 year old  (8/26/1933), who is being seen today for an episodic care visit.  HPI information obtained from: facility chart records, facility staff, patient report, [facility] chart review and family/first contact dtr report.Today's concern is: fever, n/v, weakness.  This am found to have fever, not feeling good, gen. Weakness, shaky at times.  Has occ cough. O2 sats stable. No apparent resp. Distress. After taking am meds, had emesis.  Reports nausea currently resolved. Taking sched reglan, also has prn reglan.  Recent L knee inj for OA. Reports effective. Gait steady, reports some gen. Weakness from baseline this am.       ALLERGIES: No known allergies  Past Medical, Surgical, Family and Social History reviewed and updated in [facility].    Current Outpatient Medications   Medication Sig Dispense Refill     Acetaminophen (TYLENOL PO) Take 1,000 mg by mouth 2 times daily       acetaminophen (TYLENOL) 325 MG tablet Take 650 mg by mouth 2 times daily as needed for mild pain       amLODIPine (NORVASC) 10 MG tablet Take 1 tablet (10 mg) by mouth daily 30 tablet 1     ASPIRIN EC PO Take 81 mg by mouth daily       bisacodyl (DULCOLAX) 10 MG suppository Place 10 mg rectally daily as needed       calcium carbonate (OS-FRANCISCO) 500 MG TABS Take 2 tablets by mouth 2 times daily (with meals)       carvedilol (COREG) 12.5 MG tablet Take 1 tablet (12.5 mg) by mouth 2 times daily (with meals) 60 tablet 1     cholecalciferol (VITAMIN D3) 5000 UNITS CAPS capsule Take 5,000 Units by mouth daily       citalopram (CELEXA) 10 MG tablet Take 30 mg by mouth daily       clopidogrel  (PLAVIX) 75 MG tablet Take 1 tablet (75 mg) by mouth daily 30 tablet 11     folic acid (FOLVITE) 1 MG tablet Take 1 mg by mouth daily       furosemide (LASIX) 20 MG tablet Take 20 mg by mouth daily       hypromellose (ARTIFICIAL TEARS) 0.5 % SOLN ophthalmic solution Place 3 drops into both eyes 3 times daily       levETIRAcetam (KEPPRA) 500 MG tablet Take 500 mg by mouth 2 times daily       losartan (COZAAR) 100 MG tablet Take 100 mg by mouth daily       metoclopramide (REGLAN) 5 MG tablet Take 5 mg by mouth 2 times daily And daily PRN at noon       Mirtazapine (REMERON PO) Take 15 mg by mouth 2 times daily       Multiple Vitamins-Minerals (CERTAVITE SENIOR/ANTIOXIDANT PO) Take by mouth daily       omeprazole (PRILOSEC) 40 MG capsule Take 1 capsule (40 mg) by mouth daily Take 30-60 minutes before a meal. 30 capsule 9     oxybutynin (DITROPAN) 5 MG tablet Take 5 mg by mouth 2 times daily       polyethylene glycol (MIRALAX/GLYCOLAX) powder Take 17 g by mouth nightly as needed        rosuvastatin (CRESTOR) 40 MG tablet Take 1 tablet (40 mg) by mouth daily 90 tablet 0     senna-docusate (SENNA S) 8.6-50 MG per tablet Take 1 tablet by mouth 2 times daily Please hold if having loose stools and contact nurse.       alendronate (FOSAMAX) 70 MG tablet Take 70 mg by mouth every 7 days On Wednesdays       Medications reviewed:  Medications reconciled to facility chart and changes were made to reflect current medications as identified as above med list. Below are the changes that were made:   Medications stopped since last EPIC medication reconciliation:   There are no discontinued medications.    Medications started since last [facility] medication reconciliation:  No orders of the defined types were placed in this encounter.        REVIEW OF SYSTEMS:  CONSTITUTIONAL:  fevers and forgetfulness, EYES:  glasses or contacts, ENT:  [place], CV:  neg, RESPIRATORY: cough, :  neg, GI:  neg, NEURO:  h/o seizures, PSYCH: neg and  MUSCULOSKELETAL: some gen weakness.    Physical Exam:  /72   Pulse 89   Temp 102.5  F (39.2  C)   Resp 22   SpO2 92%   GENERAL APPEARANCE:  Alert, cooperative, tremulous at times, UEs  ENT:  Mouth and posterior oropharynx normal, moist mucous membranes, [place], no rhinitis  EYES:  EOM, conjunctivae, lids, pupils and irises normal, PERRL  NECK:  No adenopathy,masses or thyromegaly, FROM  RESP:  respiratory effort and palpation of chest normal, lungs clear to auscultation , no respiratory distress, diminished breath sounds bibasilar  CV:  Palpation and auscultation of heart done , no edema, irregular rhythm sl. irreg, rate-normal  ABDOMEN:  normal bowel sounds, soft, nontender, no hepatosplenomegaly or other masses, no guarding or rebound  M/S:   Gait and station normal  muscle strength 5/5 all 4 ext., normal tone  NEURO:   Cranial nerves 2-12 are normal tested and grossly at patient's baseline, alert, speech clear  PSYCH:  insight and judgement impaired, memory impaired , affect and mood normal    Recent Labs:     CBC RESULTS:   Recent Labs   Lab Test 10/09/18 07/27/18 03/12/18  0850   WBC  --  8.5 17.6*   RBC  --  4.46 4.28   HGB 13.4 14.1 13.6   HCT  --  42.7 40.7   MCV  --  96 95   MCH  --  31.6 31.8   MCHC  --  33.0 33.4   RDW  --  12.1 12.5   PLT  --  198 227       Last Basic Metabolic Panel:  Recent Labs   Lab Test 10/09/18 07/27/18    140   POTASSIUM 4.0 4.6   CHLORIDE 105 103   FRANCISCO 9.3 9.7   CO2 28 28   BUN 14 19   CR 0.96 0.98    102       Liver Function Studies -   Recent Labs   Lab Test 10/09/18 07/18/18  0955  03/12/18  0850 01/31/18  1050   PROTTOTAL  --   --   --  7.2 6.9   ALBUMIN  --   --   --  3.4 3.1*   BILITOTAL  --   --   --  0.7 0.8   ALKPHOS  --   --   --  54 55   AST 23  --   --  14 34   ALT 11 15   < > 17 21    < > = values in this interval not displayed.       Assessment/Plan:  Fever, unspecified fever cause  Possible URI, cough present  1. Give tylenol this am  2. CXR  3.  Follow O2 sats  4. Rapid influenza A, B swab    Non-intractable vomiting with nausea, unspecified vomiting type  Currently stable  1. Cont. Sched, prn reglan  2. Liquids this am, advance diet as emre  3. Encourage fluids  4. Monitor for diarrhea    Generalized muscle weakness  cindi r/t#1  1. Monitor for changes in gait  2. Monitor for ongoing shakiness  3. Uses walker for above s/s        Electronically signed by  ZAIN Marcos CNP                      Sincerely,        ZAIN Marcos CNP     Grossly Intact

## 2019-01-28 ENCOUNTER — APPOINTMENT (OUTPATIENT)
Dept: ORTHOPEDIC SURGERY | Facility: CLINIC | Age: 84
End: 2019-01-28

## 2019-03-11 NOTE — PATIENT PROFILE ADULT. - FUNCTIONAL SCREEN CURRENT LEVEL: EATING, MLM
[FreeTextEntry1] : 15yF with hx of MHE and ankle pain.  She had medial malleolar screws placed for valgus ankles for a hemiepiphysiodesis in 2015.  She continues to experience ankle pain, R > L.  In the past we have recommended a supramalleolar osteotomy to correct her alignment and pain, family has not wanted to do further surgical intervention.  She complains of some swelling and limited motion of her right ankle.  She also has left ankle pain intermittently.  She has been participating in activity as tolerated.  She was using orthotics but has stopped as she reports they increase her ankle pain.  
(0) independent

## 2019-04-10 NOTE — PROGRESS NOTE ADULT - PROBLEM/PLAN-9
History & Physical    SUBJECTIVE:     History of Present Illness:  Patient is a 41 y.o. male former smoker with hx TIA (dec 2014, negative work-up), renal stones, and anxiety who was referred to Thoracic for evaluation of xiphoid pain. US of mediastinum following complaints of midline chest pain, tenderness, and swelling dating back to Dec 2018. No inciting event or trauma. Ultimately referred to pain management. Tried Voltaren gel, NSAIDS, and tylenol with minimal relief. Reports noted dull soreness after repetitive motion. Pain and tenderness is gradually worsening. Exacerbated by movement. Denies cardiac history. No chest surgeries. Not on blood thinners or steroids.     Former smoker. 2 pack years.   Multiple cystoscopies with placement and removal of ureteral stents. Meniscal surgery.  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 Occasional heavy lifting.     Chief Complaint   Patient presents with    Consult       Review of patient's allergies indicates:   Allergen Reactions    Azithromycin Rash    Penicillins Rash       Current Outpatient Medications   Medication Sig Dispense Refill    buPROPion (WELLBUTRIN XL) 150 MG TB24 tablet Take 1 tablet (150 mg total) by mouth once daily. 30 tablet 5     No current facility-administered medications for this visit.        Past Medical History:   Diagnosis Date    Anxiety     Kidney stones     TIA (transient ischemic attack)     Wears dentures     lower     Past Surgical History:   Procedure Laterality Date    CYSTOSCOPY, RETROGRADE, URETEROSCOPY, STENT PLACEMENT Right 5/11/2016    Performed by [facility]BI [facility] at [facility]    EXPLORATION-MIDDLE EAR-  ROUND WINDOW FISTULA REPAIR Right 4/25/2017    [facility]formed by Leonard Douglas [facility] at [facility]    EXTRACTION - STONE Right 5/11/2016    [facility] at [facility]    left knee surgery      LITHOTRIPSY      LITHOTRIPSY-LASER Right 5/11/2016    [facility] at [facility]    .knee  "surgery      MENISCECTOMY      left Dr. Brice    PLACEMENT  5/11/2016    [facility]    REMOVAL-STENT-URETERAL Right 6/8/2016    [facility] at [facility]     Family History   Problem Relation Age of Onset    Nephrolithiasis Father     Hypertension Father         does not take medicine presently     Social History     Tobacco Use    Smoking status: Former Smoker     Packs/day: 1.00     Years: 3.00     Pack years: 3.00    Smokeless tobacco: Former User     Quit date: 5/9/2005   Substance Use Topics    Alcohol use: No    Drug use: No        Review of Systems:  Review of Systems   Constitutional: Negative for activity change, appetite change, fatigue, fever and unexpected weight change.   HENT: Negative for congestion.    Respiratory: Positive for chest tightness (and tenderness). Negative for cough and shortness of breath.    Cardiovascular: Negative for chest pain, palpitations and leg swelling.   Gastrointestinal: Negative for abdominal pain, nausea and vomiting.   Genitourinary: Negative for difficulty urinating.   Skin: Negative for color change and rash.   Neurological: Negative for dizziness and syncope.   Psychiatric/Behavioral: Negative for agitation. The patient is not nervous/anxious.        OBJECTIVE:     Vital Signs (Most Recent)  Vitals:    04/10/19 1009   BP: 128/84   SpO2: 97%   Weight: 126.8 kg (279 lb 8.7 oz)   Height: 6' 2" (1.88 m)   PainSc:   4       Physical Exam:  Physical Exam   Constitutional: He is oriented to person, place, and time. He appears well-developed and well-nourished.   HENT:   Head: Normocephalic and atraumatic.   Eyes: EOM are normal.   Cardiovascular: Normal rate and regular rhythm.   Pulmonary/Chest: Effort normal and breath sounds normal. He has no wheezes.       Abdominal: Soft.   Neurological: He is alert and oriented to person, place, and time.   Skin: Skin is warm and dry.   Psychiatric: He has a normal mood and " affect. Thought content normal.   Vitals reviewed.        US mediastinum 12/21/18:  No significant abnormalities identified overlying the soft tissue of the sternum.  No organized fluid collections in the subcutaneous soft tissue or focal masses.  As on the prior chest radiograph of 4/21/17 and CT of 5/3/16, the inferior aspect of the xiphoid process is directed anteriorly and may correspond to the clinical finding.    Chest CT 4/9/19: images reviewed. Bifid xiphoid with ventral deviation      ASSESSMENT/PLAN:     42 yo male former smoker with hx TIA (dec 2014, negative work-up), renal stones, and anxiety presenting with protuberant bifid xiphoid and chronic pain.   I am unclear if the protuberant xiphoid is the cause of the bandlike lower chest wall pain.  PLAN:Plan     Offered patient excision of bifid xiphoid process with underlying ventral deviation. Removal may offer some relief but no guarantee relief of symptoms. Patient will also experience post-operative pain which he was aware of. Lifting restrictions following surgery. He will check with his work and call us back if timing for surgery does not work.   Appropriate patient education regarding the adeline-operative period as well as intraoperative details were discussed. Risks, including but not limited to, bleeding, infection, pain and anesthetic complication were discussed. Patient was given the opportunity to ask questions and to have those questions answered to their satisfaction. Patient verbalized understanding to both procedure and associated risks. Consent was obtained.  Distress Screening Results: Psychosocial Distress screening score of Distress Score: 0 noted and reviewed. No intervention indicated.   DISPLAY PLAN FREE TEXT

## 2019-04-10 NOTE — ED ADULT NURSE NOTE - NSSISCREENINGQ3_ED_A_ED
[de-identified] : This is a 64 old gentleman with a known history of right knee arthritis, he was originally seen about 3 weeks ago for the same, he requested an injection but was unable to have this because it had only been 3 weeks since his most previous. His injections have become decreasingly less effective. He has constant pain with weightbearing ambulation, and all of his ADLs, he is ready to pursue a total knee arthroplasty and has no other complaints at this time.
No

## 2019-07-22 NOTE — DIETITIAN INITIAL EVALUATION ADULT. - SIGNS/SYMPTOMS
[facility] Hospital Medicine  Progress Note    Patient Name: Bernadette Berkowitz  MRN: [id number]  Patient Class: IP- Inpatient   Admission Date: 7/21/2019  Length of Stay: 0 days  Attending Physician: Graeme Summers MD  Primary Care Provider: Demarcus Partida NP        Subjective:     Principal Problem:IZZY (acute kidney injury)      HPI:  Patient 37 year old female presents today with complaint of abd pain that started over the past few days. Radiates to sides. No modifying factors. associated includes n/v, muscle spasms, decreased intake. Prior treatment. Patient was evaluated 2 days ago at [facility] for similar symptoms and treated for trichomonas with 2g flagyl and discharged. Patient evaluated in er. WBC 21. Cr. Over 7.4. CPK 4807. Renal consulted recommended aggressive IV hydration. Patient was seen and examined. Patient had simialar presentation to [facility] 7/2018 with cr just below 4 and treated for dehydration and UTI. UDS + THC. Patient admitted. Started on rocephin. Blood cultures/lactic/procal pending.     Overview/Hospital Course:  The patient reports no issues overnight. She reports nausea.  Renal function improving with IV hydration. Nephrology following.     Interval History: No acute issues overnight. She reports nausea.     Review of Systems   Constitutional: Negative for activity change, appetite change, chills, diaphoresis, fatigue, fever and unexpected weight change.   HENT: Negative for congestion, drooling, facial swelling, rhinorrhea, sinus pressure, sneezing, sore throat and voice change.    Eyes: Negative for photophobia, discharge, redness, itching and visual disturbance.   Respiratory: Negative for apnea, cough, choking, chest tightness, shortness of breath, wheezing and stridor.    Cardiovascular: Negative for chest pain, palpitations and leg swelling.   Gastrointestinal: Positive for nausea. Negative for abdominal distention, abdominal pain, anal bleeding, blood in stool,  constipation, diarrhea and vomiting.   Endocrine: Negative for polydipsia, polyphagia and polyuria.   Genitourinary: Negative for decreased urine volume, difficulty urinating, dysuria, flank pain, frequency, hematuria, pelvic pain, urgency, vaginal bleeding and vaginal discharge.   Musculoskeletal: Negative for arthralgias, back pain, gait problem, joint swelling, myalgias, neck pain and neck stiffness.   Skin: Negative for color change, pallor, rash and wound.   Allergic/Immunologic: Negative for immunocompromised state.   Neurological: Negative for dizziness, seizures, syncope, facial asymmetry, speech difficulty, weakness, light-headedness, numbness and headaches.   Hematological: Does not bruise/bleed easily.   Psychiatric/Behavioral: Negative for agitation, behavioral problems, confusion, hallucinations and suicidal ideas.   All other systems reviewed and are negative.    Objective:     Vital Signs (Most Recent):  Temp: 98 °F (36.7 °C) (07/22/19 0736)  Pulse: 87 (07/22/19 1106)  Resp: 12 (07/22/19 0736)  BP: 131/80 (07/22/19 0736)  SpO2: 100 % (07/22/19 0736) Vital Signs (24h Range):  Temp:  [98 °F (36.7 °C)-98.1 °F (36.7 °C)] 98 °F (36.7 °C)  Pulse:  [] 87  Resp:  [12-26] 12  SpO2:  [95 %-100 %] 100 %  BP: (113-148)/(68-96) 131/80     Weight: 77.1 kg (169 lb 15.6 oz)  Body mass index is 28.29 kg/m².    Intake/Output Summary (Last 24 hours) at 7/22/2019 1204  Last data filed at 7/22/2019 0900  Gross per 24 hour   Intake 2685 ml   Output 750 ml   Net 1935 ml      Physical Exam   Constitutional: She is oriented to person, place, and time. She appears well-developed and well-nourished. No distress.   HENT:   Head: Normocephalic and atraumatic.   Nose: Nose normal.   Eyes: Pupils are equal, round, and reactive to light. Conjunctivae and EOM are normal. No scleral icterus.   Neck: Normal range of motion. Neck supple. No tracheal deviation present.   Cardiovascular: Normal rate, regular rhythm, normal heart  sounds and intact distal pulses.   No murmur heard.  Pulmonary/Chest: Effort normal and breath sounds normal. No stridor. No respiratory distress. She has no wheezes. She has no rales.   Abdominal: Soft. Bowel sounds are normal. She exhibits no distension. There is tenderness. There is no guarding.   Mild CVA bilaterall   Genitourinary:   Genitourinary Comments: Check urine   Musculoskeletal: Normal range of motion. She exhibits no edema, tenderness or deformity.   Neurological: She is alert and oriented to person, place, and time. She has normal reflexes. No cranial nerve deficit.   Skin: Skin is warm and dry. Capillary refill takes 2 to 3 seconds. No rash noted. She is not diaphoretic. No erythema.   Psychiatric: She has a normal mood and affect. Her behavior is normal. Judgment and thought content normal.   Nursing note and vitals reviewed.      Significant Labs: All pertinent labs within the past 24 hours have been reviewed.    Significant Imaging:  Imaging Results          CT Renal Stone Study ABD Pelvis WO (Final result)  Result time 07/22/19 06:33:57    Final result by Ayden Chua MD (07/22/19 06:33:57)                 Impression:      No evidence of obstructive uropathy.    All CT scans at this facility use dose modulation, iterative reconstruction and/or weight based dosing when appropriate to reduce radiation dose to as low as reasonably achievable.      Electronically signed by: Ayden Chua MD  Date:    07/22/2019  Time:    06:33             Narrative:    EXAMINATION:  CT RENAL STONE STUDY ABD PELVIS WO    CLINICAL HISTORY:  Flank pain, stone disease suspected;    TECHNIQUE:  Routine 5 mm non-contrast images of the abdomen and pelvis were done.  Sagittal and coronal reformats were also submitted for interpretation.    COMPARISON:  None    FINDINGS:  The lung bases are unremarkable.  There is no pleural fluid present.  The visualized portions of the heart appear normal.    The liver is normal in  size and attenuation with no focal hepatic abnormality.  The gallbladder shows no evidence of stones or pericholecystic fluid.  There is no intra-or extrahepatic biliary ductal dilatation.    The spleen, pancreas, and adrenal glands are unremarkable.    The kidneys are normal in size and location.  There is no evidence of hydronephrosis. Bladder is grossly unremarkable.    Stomach is unremarkable.   The visualized loops of small bowel show no evidence of obstruction or inflammation. Large bowel demonstrates mild constipation.  No evidence of appendicitis.  There is no ascites, free fluid, or intraperitoneal free air.    There is no evidence of lymph node enlargement in the abdomen or pelvis.    The abdominal aorta is normal in course and caliber without significant atherosclerotic calcifications.    When viewed with bone windows the osseous structures are unremarkable.    The extraperitoneal soft tissues are unremarkable.                               X-Ray Chest 1 View (Final result)  Result time 07/22/19 06:52:44    Final result by Ayden Chua MD (07/22/19 06:52:44)                 Impression:      No acute process seen.      Electronically signed by: Ayden Chua MD  Date:    07/22/2019  Time:    06:52             Narrative:    EXAMINATION:  XR CHEST 1 VIEW    CLINICAL HISTORY:  sob;    FINDINGS:  Single view of the chest.    Cardiac silhouette is normal.  The lungs demonstrate no evidence of active disease.  No evidence of pleural effusion or pneumothorax.  Bones appear intact.                                Assessment/Plan:      * IZZY (acute kidney injury)  Related to IVVD.   Continue aggressive IV hydration  Fluids with bicard recommended by Dr. Chester jones in diff. Rocephin.   UA/culture pending  Further evaluation/diagnostics/interventions/consults pending course   7/22/19  -24 hours of IV hydration.   -Creatinine improving from hydration 7.4>5.1.  -Repeat labs.   -Nephrology following.        SIRS  (systemic inflammatory response syndrome)  Vitals improved  Suspect related to IVVD  plyeo in diff  Culture pending  Rocephin  Further evaluation/diagnostics/interventions/consults pending course   7/22/19  -Resolved.      Non-traumatic rhabdomyolysis  Related to IVVD  IV hydration  Further evaluation/diagnostics/interventions/consults pending course   7/22/19  -Repeat labs.  -Continue IV hydration.   -Monitor renal function.        Hyponatremia  IV hydration  Monitor  Further evaluation/diagnostics/interventions/consults pending course   7/22/19  -Improving, continue IV hydration.      Acidosis  Check vbg  Treat madeleine  Bicarb drip  Further evaluation/diagnostics/interventions/consults pending course   7/22/19  -CO2 of 18.   -24 hours of IV hydration.   -Repeated labs today.                 VTE Risk Mitigation (From admission, onward)        Ordered     Place sequential compression device  Until discontinued      07/22/19 0202     Place BASHIR hose  Until discontinued      07/22/19 0202                Dayton Cullen NP  Department of Hospital Medicine   [facility] -    as evidenced by NPO status at this time.

## 2019-10-07 NOTE — PROGRESS NOTE ADULT - PROBLEM SELECTOR PLAN 1
lasix drip  renal following  strick i/o  trend Na--improving.
Improved.
Improved.
s/p lasix drip--on hold  renal following  on urea  Na stable at 125
Improved, off Lasix gtt. Will discontinue urea
Improved.
lasix drip, urea added  renal following  strick i/o  trend Na
s/p lasix drip--on hold  renal following  on urea  improving NA
Skin normal color for race, warm, dry and intact. No evidence of rash.

## 2020-02-07 NOTE — PROGRESS NOTE ADULT - ASSESSMENT
83 y/o male w/PMhx of afib on xarelto, diastolic HF, COPD on chronic O2 presents with generalized weakness. Found to have significant fluid overload and

## 2020-09-14 NOTE — ED PROVIDER NOTE - RELIEVING FACTORS
Confirmed how often to take ax w/patient. Will call patient back once final word on clinic tomorrow received.    ----- Message from Maeve Rodríguez sent at 9/14/2020  8:53 AM CDT -----  Regarding: Call back  Name of Who is Calling: ISAI FLOR  What is the request in detail: PT is requesting a call back concerning if he should take his antibiotics before his procedure tomorrow  Can the clinic reply by [facility]: NO What Number to Call Back if not in [facility]: [phone]      
none

## 2021-02-16 NOTE — PROGRESS NOTE ADULT - ASSESSMENT
This 82 y.o  AA man with DM - 2 , C KD - 3 , here for hyponatremia, which since resolved,    A KI , hypercapnia - now resolved.
This 82 y.o  AA man with DM - 2 , C KD - 3 , here for hyponatremia, which since resolved,    A KI , hypercapnia - now resolved.
This 82 y.o man with DM, Renal failure, CKD, here for hyponatremia, acute on chronic renal failure, hypercapnia - now resolved.
.asx  awaiting transfer parag  afebrile vss
82 yom with hypercarbic respiratory failure requiring intubation, hyponatremia, CRI, encephalopathy
82 yom with hypercarbic respiratory failure, hyponatemia, encephalopathy, dysphagia
83 yo M with chronic HFpEF, AVR with bioprosthetic valve, recurrent hyponatremia, DM, CKD - 3; discharged after treatment of hyponatremia readmitted for lethargy.   Intubated for hypercapnic respiratory failure, now extubated.  undergoing ICU care.
83 yo M with h/o chronic HFpEF, AVR with bioprosthetic valve, recurrent hyponatremia, DM, CKD with Acute Hypercapnic respiratory failure requiring NIPPV with Bipap, Improving Hyponatremia  with fluid restriction from polydipsia, CKD Stage 4
FEVER- RESOLVED  D/C ABS  HYPONATREMIA - POLYDIPSIA  - D/W PT  NEW NON FOCAL LETHARGY    PLAN  FLUID RESTRICTION  HOLD DIURETICS  CHK ABG  NEUROL EVAL IF DOESNT AWAKEN   ? JUST BECAUSE PT WAS SLEEPON
FEVER- RESOLVED  D/C ABVS IF C/S NEG  HYPONATREMIA - POLYDIPSIA  - D/W PT    PLAN  FLUID RESTRICTION  HOLD DIURETICS  D/C SOON  D/W FAMILY
PT CESAR FOR TRANSFER D/W DAUGHTER VIA TELEPHONE  WILL FOLLOW UP IN OFFICE ONCE OUT OF REHAB  SEE DISCHARGE SUMMARY DONE  YESTERDAY
PT WAS SEEN YESTERDAY DISCHARGED WITH IMPROVEMENT IN SODIUM RETURSN WITH CONFUSION AND FEVERS  POSSIBLE UTI  CXT NOT C/W PNEUMONIA  WILL CHECK CX WILL FOLLOW UP  CONTINUE METFORMIN  FOLLOW UP ON NA
This 82 y.o  AA man with DM - 2 , C KD - 3 , here for hyponatremia, which since resolved,    A KI , hypercapnia - now resolved.
This 82 y.o  AA man with DM - 2 , C KD - 3 , here for hyponatremia, which since resolved,    A KI , hypercapnia - now resolved.
Dr. Miranda
detailed exam
This 82 y.o man with DM, Renal failure, CKD, here for hyponatremia, acute on chronic renal failure, hypercapnia - now resolved.

## 2021-03-11 PROBLEM — E66.2 OBESITY HYPOVENTILATION SYNDROME: Status: ACTIVE | Noted: 2018-02-15

## 2021-11-20 NOTE — PATIENT PROFILE ADULT. - PRESSURE ULCER(S)
Neurology Progress Note    S: Patient seen and examined. No new events overnight. patient denied CP, SOB, HA or pain.     Medication:  ALBUTerol    90 MICROgram(s) HFA Inhaler 2 Puff(s) Inhalation every 6 hours PRN  aspirin enteric coated 325 milliGRAM(s) Oral daily  atorvastatin 40 milliGRAM(s) Oral at bedtime  cyanocobalamin 1000 MICROGram(s) Oral daily  losartan 25 milliGRAM(s) Oral daily  metoprolol succinate ER 25 milliGRAM(s) Oral at bedtime      Vitals:  Vital Signs Last 24 Hrs  T(C): 36.4 (20 Nov 2021 11:47), Max: 37.1 (20 Nov 2021 05:33)  T(F): 97.5 (20 Nov 2021 11:47), Max: 98.7 (20 Nov 2021 05:33)  HR: 84 (20 Nov 2021 11:47) (78 - 84)  BP: 131/80 (20 Nov 2021 11:47) (131/80 - 167/97)  BP(mean): --  RR: 18 (20 Nov 2021 11:47) (16 - 18)  SpO2: 93% (20 Nov 2021 11:47) (93% - 95%)    General Exam:   General Appearance: Appropriately dressed and in no acute distress       Head: Normocephalic, atraumatic and no dysmorphic features  Ear, Nose, and Throat: Moist mucous membranes  CVS: S1S2+  Resp: No SOB, no wheeze or rhonchi  Abd: soft NTND  Extremities: No edema, no cyanosis  Skin: No bruises, no rashes     Neurological Exam:  Mental Status: Awake, alert and oriented x 3.  Able to follow simple and complex verbal commands. Able to name and repeat. fluent speech. No obvious aphasia or dysarthria noted.   Cranial Nerves: PERRL, EOMI, VFFC, sensation V1-V3 intact,  no obvious facial asymmetry , equal elevation of palate, scm/trap 5/5, tongue is midline on protrusion. no obvious papilledema on fundoscopic exam. Hearing is grossly intact.   Motor: Normal bulk, tone and strength throughout. Fine finger movements were intact and symmetric. no tremors or drift noted.    Sensation: Intact to light touch and pinprick throughout. no right/left confusion. no extinction to tactile on DSS.   Reflexes: 1+ throughout at biceps, brachioradialis, triceps, patellars and ankles bilaterally and equal. No clonus. R toe and L toe were both downgoing.  Coordination: No dysmetria on FNF or HKS  Gait: deferred     I personally reviewed the below data/images/labs:      CBC Full  -  ( 19 Nov 2021 05:51 )  WBC Count : 10.11 K/uL  RBC Count : 4.19 M/uL  Hemoglobin : 11.0 g/dL  Hematocrit : 35.0 %  Platelet Count - Automated : 295 K/uL  Mean Cell Volume : 83.5 fl  Mean Cell Hemoglobin : 26.3 pg  Mean Cell Hemoglobin Concentration : 31.4 gm/dL  Auto Neutrophil # : 6.76 K/uL  Auto Lymphocyte # : 2.46 K/uL  Auto Monocyte # : 0.70 K/uL  Auto Eosinophil # : 0.11 K/uL  Auto Basophil # : 0.03 K/uL  Auto Neutrophil % : 66.9 %  Auto Lymphocyte % : 24.3 %  Auto Monocyte % : 6.9 %  Auto Eosinophil % : 1.1 %  Auto Basophil % : 0.3 %    11-19    139  |  102  |  12  ----------------------------<  111<H>  4.0   |  26  |  0.51    Ca    8.7      19 Nov 2021 05:51  Phos  3.4     11-19  Mg     1.9     11-19    TPro  6.5  /  Alb  3.6  /  TBili  0.3  /  DBili  x   /  AST  29  /  ALT  17  /  AlkPhos  92  11-19    LIVER FUNCTIONS - ( 19 Nov 2021 05:51 )  Alb: 3.6 g/dL / Pro: 6.5 g/dL / ALK PHOS: 92 U/L / ALT: 17 U/L / AST: 29 U/L / GGT: x           PT/INR - ( 19 Nov 2021 05:51 )   PT: 13.3 sec;   INR: 1.11 ratio         PTT - ( 19 Nov 2021 05:51 )  PTT:32.5 sec    -11/15 CTH: No acute hemorrhage, mass effect or extra-axial collections.  -11/16 MRI Head w/o: Multiple small foci of diffusion restriction in both hemispheres as well as within the posterior fossa suggesting embolic or thrombotic showering in the anterior and posterior circulation. Further workup and assessment recommended. Also noted are chronic ischemic changes in both hemispheres with atrophic change.  -11/16 MRA N: Unremarkable study.  -11/16 MRA H: Widely patent vasculature. There is fetal-type supply of the left posterior cerebral artery. Left P1 segment is absent.    < from: Transesophageal Echocardiogram (11.19.21 @ 15:19) >    Patient name: GABRIELA WHELAN  YOB: 1943   Age: 78 (F)   MR#: [id number]  Study Date: 11/19/2021  Location: [address] [place], [place] [id number]: Domi Saini RDCS  Study quality: Technically good  Referring Physician: Roberto Gordon MD  Blood Pressure: 193/85 mmHg  Height: 155 cm  Weight: 44 kg  BSA: 1.4 m2  ------------------------------------------------------------------------  PROCEDURE: Transesophageal and transthoracic  echocardiograms with 2-D, M-Mode and complete spectral and  color flow Doppler were performed.  Informed consent was  first obtained for JASON. The patient was sedated - see  anesthesia record.  The procedure was monitored with  automatic blood pressure monitoring, ECG tracings and pulse  oximetry.  The transesophageal probe was placed in the  esophagus posterior to the heart without complications.  INDICATION: Cerebral infarction, unspecified (I63.9)  ------------------------------------------------------------------------  Dimensions:    Normal Values:  LA:    3.3    2.0 - 4.0 cm  Ao:     2.4    2.0 - 3.8 cm  SEPTUM: 1.5    0.6 - 1.2 cm  PWT:    1.1    0.6 - 1.1 cm  LVIDd:  3.4    3.0 - 5.6 cm  LVIDs:  2.2    1.8 - 4.0 cm  Derived variables:  LVMI: 107 g/m2  RWT: 0.64  Fractional short: 35 %  EF (Lopez Rule): 77 %Doppler Peak Velocity (m/sec):  AoV=2.7  ------------------------------------------------------------------------  Observations:  Mitral Valve: Mitral annular calcification and calcified  mitral leaflets.  Calcifications extend to the chordae.  There is a calcified cystic structure on  chordae. Severe  mitral regurgitation. Peak mitral valve gradient equals 14  mm Hg, mean transmitral valve gradient equals 7 mm Hg,  estimated mitral valve area equals 1.8 sqcm (by  planimetry).  Elevated gradients in part due to severe  regurgitation.  Aortic Valve/Aorta: Calcified trileaflet aortic valve with  decreased opening. Peak transaortic valve gradient equals  29 mm Hg, mean transaortic valve gradient equals 16 mm Hg,  estimated aortic valve area equals 1 sqcm (by continuity  equation), aortic valve velocity time integral equals 53  cm, consistent with moderate aortic stenosis. Peak left  ventricular outflow tract gradient equals 3 mm Hg, mean  gradient is equal to 1 mm Hg, LVOT velocity time integral  equals 17 cm.  Aortic Root: 2.4 cm.  LVOT diameter: 2 cm.  Focal calcifications within the aortic arch.  Left Atrium: Moderately dilated left atrium.  LA volume  index = 43 cc/m2.   No left atrial or left atrial appendage  thrombus.  Left Ventricle: Hyperdynamic left ventricular systolic  function. Concentric left ventricular hypertrophy.  Right Heart: Right atrial enlargement. Right ventricular  enlargement with normal right ventricular systolic  function. Normal tricuspid valve. Moderate-severe tricuspid  regurgitation. Normal pulmonic valve.  Pericardium/Pleura: Normal pericardium with no pericardial  effusion.  Hemodynamic: Estimated right ventricular systolic pressure  equals 56 mm Hg, assuming right atrial pressure equals 10  mm Hg, consistent with moderate pulmonary hypertension.  Color Doppler demonstrates evidence of a patent foramen  ovale.  No shunting of bubbles from right to left.  ------------------------------------------------------------------------  Conclusions:  1. Mitral annular calcification and calcified mitral  leaflets.  Calcifications extend to the chordae. There is a  calcified cystic structure on  chordae. Severe mitral  regurgitation. Peak mitral valve gradient equals 14 mm Hg,  mean transmitral valve gradient equals 7 mm Hg, estimated  mitral valve area equals 1.8 sqcm (by planimetry).  Elevated gradients in part due to severe regurgitation.  2. Calcified trileaflet aortic valve with decreased  opening. Peak transaortic valve gradientequals 29 mm Hg,  mean transaortic valve gradient equals 16 mm Hg, estimated  aortic valve area equals 1 sqcm (by continuity equation),  aortic valve velocity time integral equals 53 cm,  consistent with moderate aortic stenosis.  3. Moderately dilated left atrium.  LA volume index = 43  cc/m2.   No left atrial or left atrial appendage thrombus.  4. Concentric left ventricular hypertrophy.  5. Hyperdynamic left ventricular systolic function.  6. Right ventricular enlargement with normal right  ventricular systolic function.  7. Estimated pulmonary artery systolic pressure equals 56  mm Hg, assuming right atrial pressure equals 10 mm Hg,  consistent with moderate pulmonary pressures.  8. Color Doppler demonstrates evidence of a patent foramen  ovale.  No shunting of bubbles from right to left.  *** No previous Echo exam.  ------------------------------------------------------------------------  Confirmed on  11/19/2021 - 17:04:55 by KARLA Holt  ------------------------------------------------------------------------    < end of copied text >   no

## 2021-12-07 NOTE — PROGRESS NOTE ADULT - PROBLEM SELECTOR PROBLEM 2
Spoke with Atiya Jeter concerning patient's PA for Lidocaine patches. Patient has been approve  Until 12/31/22. We will receive a fax and patient will get a letter in mail.
Iron deficiency anemia due to chronic blood loss

## 2022-02-24 NOTE — H&P ADULT. - PROBLEM SELECTOR PROBLEM 2
[Doing Well] : doing well [None] : The patient is not currently on any medications for ~his/her~ COPD [Adherent] : the patient is adherent with ~his/her~ medication regimen [Goals--Doing Well] : the patient is doing well with ~his/her~ COPD goals [PFTs] : pulmonary function tests [Initial Evaluation] : an initial evaluation of [Cough] : no cough [Non-Productive Cough] : no non-productive cough [Currently Experiencing] : The patient is currently experiencing symptoms. Hyponatremia

## 2022-03-22 NOTE — ED ADULT NURSE NOTE - NSFALLRSKINDICATORS_ED_ALL_ED
Products Recommended: Sun block with Zinc oxide ( Neutrogena, Sheer Zinc  or others) if  allergic to chemical sunscreen. Detail Level: Generalized General Sunscreen Counseling: I recommended a broad spectrum sunscreen with a SPF of 30 or higher.  I explained that SPF 30 sunscreens block approximately 97 percent of the sun's harmful rays.  Sunscreens should be applied at least 30 minutes prior to expected sun exposure and then every 2 hours after that as long as sun exposure continues. If swimming or exercising sunscreen should be reapplied every 45 minutes to an hour after getting wet or sweating.  One ounce, or the equivalent of a shot glass full of sunscreen, is adequate to protect the skin not covered by a bathing suit. I also recommended a lip balm with a sunscreen as well. Sun protective clothing can be used in lieu of sunscreen but must be worn the entire time you are exposed to the sun's rays. yes

## 2023-03-31 NOTE — DISCHARGE NOTE ADULT - WEIGHT IN KG
124.7 I,Juan Nguyen MD,  performed the initial face to face bedside interview with this patient regarding history of present illness, review of symptoms and relevant past medical, social and family history.  I completed an independent physical examination.  I was the initial provider who evaluated this patient. I have signed out the follow up of any pending tests (i.e. labs, radiological studies) to the ACP.  I have communicated the patient’s plan of care and disposition with the ACP.  The history, relevant review of systems, past medical and surgical history, medical decision making, and physical examination was documented by the scribe in my presence and I attest to the accuracy of the documentation.

## 2023-09-14 NOTE — ED ADULT NURSE NOTE - CAS ELECT INFOMATION PROVIDED
DC instructions Metronidazole Pregnancy And Lactation Text: This medication is Pregnancy Category B and considered safe during pregnancy.  It is also excreted in breast milk.

## 2023-11-17 NOTE — ED ADULT NURSE NOTE - CHPI ED NUR SYMPTOMS NEG
You were seen in the ED for cat scratch. Return to the ED for any worsening symptoms or new symptoms. Follow up with your primary care doctor as soon as possible. Return in a week to remove staples and suture. Take antibiotic as directed. Avoid sunlight to prevent scarring. no chest pain/no diaphoresis/no fever

## 2024-04-25 NOTE — PROGRESS NOTE ADULT - PROBLEM/PLAN-1
DISPLAY PLAN FREE TEXT
25-Apr-2024 23:14

## 2024-06-21 NOTE — DIETITIAN INITIAL EVALUATION ADULT. - DOB: +DATEOFBIRTH
Writer called patient and did a further triage, and sent to Dr. Bonilla. Please see that encounter.   Statement Selected

## 2025-06-25 NOTE — ED PROVIDER NOTE - DR. NAME
PRE-SEDATION ASSESSMENT    CONSENT  Risks, benefits, and alternatives have been discussed with the patient/patient representative, and patient/patient representative agrees to proceed: Yes    MEDICAL HISTORY  Significant medical/surgical history: Yes  Past Complications with Sedation/Anesthesia: No  Significant Family History: No  Smoking History: No  Alcohol/Drug abuse: No  Possible Pregnancy (LMP): Not Applicable  Cardiac History: No  Respiratory History: No    PHYSICAL EXAM  History and Physical Reviewed: H&P completed today  Airway Risk History: No previous complications  Airway Anatomy : Class II  Heart : Normal  Lungs : Normal  LOC/Mental Status : Normal    OTHER FINDINGS  Reviewed current medications and allergies: Yes  Pertinent lab/diagnostic test reviewed: Yes    SEDATION RISK ASSESSMENT  Risk Status ASA: Class II - Normal patient with mild systemic disease  Plan for Sedation: Moderate Sedation  Indications for Procedure/Pre-Procedure Diagnosis and Planned Procedure: Screening for colorectal cancer  EKG Monitoring: Yes    NARRATIVE FINDINGS      Everardo

## 2025-07-15 NOTE — PHYSICAL THERAPY INITIAL EVALUATION ADULT - PLANNED THERAPY INTERVENTIONS, PT EVAL
Universal Safety Interventions
balance training/ROM/strengthening/bed mobility training/transfer training/gait training/stair training
bed mobility training/balance training/ROM/transfer training/strengthening/gait training/stair training

## 2025-07-24 NOTE — DISCHARGE NOTE ADULT - FUNCTIONAL SCREEN CURRENT LEVEL: DRESSING, MLM
(0) independent Show Applicator Variable?: Yes Detail Level: Detailed Consent: The patient's consent was obtained including but not limited to risks of crusting, scabbing, blistering, scarring, darker or lighter pigmentary change, recurrence, incomplete removal and infection. Post-Care Instructions: I reviewed with the patient in detail post-care instructions. Patient is to wear sunprotection, and avoid picking at any of the treated lesions. Pt may apply Vaseline to crusted or scabbing areas. Render Post-Care Instructions In Note?: no Duration Of Freeze Thaw-Cycle (Seconds): 0 Medical Necessity Clause: This procedure was medically necessary because the lesions that were treated were: Medical Necessity Information: It is in your best interest to select a reason for this procedure from the list below. All of these items fulfill various CMS LCD requirements except the new and changing color options. Spray Paint Text: The liquid nitrogen was applied to the skin utilizing a spray paint frosting technique.